# Patient Record
Sex: MALE | Race: WHITE | NOT HISPANIC OR LATINO
[De-identification: names, ages, dates, MRNs, and addresses within clinical notes are randomized per-mention and may not be internally consistent; named-entity substitution may affect disease eponyms.]

---

## 2018-04-03 ENCOUNTER — RX ONLY (RX ONLY)
Age: 83
End: 2018-04-03

## 2018-04-03 RX ORDER — AMLODIPINE BESYLATE 10 MG/1
TABLET ORAL
Qty: 180 | Refills: 0 | Status: ACTIVE | COMMUNITY

## 2018-04-03 RX ORDER — DIGOXIN 125 MCG
TABLET ORAL
Qty: 90 | Refills: 0 | Status: ACTIVE | COMMUNITY

## 2018-05-25 ENCOUNTER — RX ONLY (RX ONLY)
Age: 83
End: 2018-05-25

## 2018-05-25 RX ORDER — GLIPIZIDE 5 MG/1
TABLET ORAL
Qty: 180 | Refills: 0 | Status: ACTIVE | COMMUNITY

## 2018-06-19 ENCOUNTER — RX ONLY (RX ONLY)
Age: 83
End: 2018-06-19

## 2018-06-19 RX ORDER — METOPROLOL SUCCINATE 25 MG/1
TABLET, EXTENDED RELEASE ORAL
Qty: 60 | Refills: 0 | Status: ACTIVE | COMMUNITY

## 2018-06-21 ENCOUNTER — RX ONLY (RX ONLY)
Age: 83
End: 2018-06-21

## 2018-06-21 RX ORDER — VALSARTAN AND HYDROCHLOROTHIAZIDE 80; 12.5 MG/1; MG/1
TABLET, FILM COATED ORAL
Qty: 90 | Refills: 0 | Status: ACTIVE | COMMUNITY

## 2018-06-26 ENCOUNTER — DOCTOR'S OFFICE (OUTPATIENT)
Dept: URBAN - METROPOLITAN AREA CLINIC 165 | Facility: CLINIC | Age: 83
Setting detail: OPHTHALMOLOGY
End: 2018-06-26
Payer: MEDICARE

## 2018-06-26 DIAGNOSIS — H40.033: ICD-10-CM

## 2018-06-26 DIAGNOSIS — H25.13: ICD-10-CM

## 2018-06-26 DIAGNOSIS — H54.40: ICD-10-CM

## 2018-06-26 DIAGNOSIS — E11.9: ICD-10-CM

## 2018-06-26 DIAGNOSIS — H34.12: ICD-10-CM

## 2018-06-26 PROCEDURE — 92004 COMPRE OPH EXAM NEW PT 1/>: CPT | Performed by: OPHTHALMOLOGY

## 2018-06-26 ASSESSMENT — REFRACTION_MANIFEST
OS_ADD: +3.00
OD_ADD: +3.00
OS_CYLINDER: +0.75
OD_AXIS: 180
OS_VA1: 20/
OS_VA2: 20/
OD_VA3: 20/
OU_VA: 20/
OD_VA2: 20/25(J1)+1
OS_VA1: 20/NI
OS_VA2: 20/NI
OD_VA3: 20/
OD_SPHERE: PLANO
OS_SPHERE: PLANO
OU_VA: 20/
OS_VA3: 20/
OD_VA2: 20/
OD_CYLINDER: +0.75
OS_VA3: 20/
OD_VA1: 20/
OD_VA1: 20/NI
OS_AXIS: 180

## 2018-06-26 ASSESSMENT — CONFRONTATIONAL VISUAL FIELD TEST (CVF)
OD_FINDINGS: FULL
OS_FINDINGS: SUPERONASAL DEFECT, INFERONASAL DEFECT

## 2018-06-26 ASSESSMENT — REFRACTION_CURRENTRX
OS_OVR_VA: 20/
OD_OVR_VA: 20/
OS_OVR_VA: 20/
OD_OVR_VA: 20/
OD_OVR_VA: 20/
OS_OVR_VA: 20/

## 2018-06-26 ASSESSMENT — VISUAL ACUITY
OD_BCVA: 20/CF@FACE
OS_BCVA: 20/80-2

## 2018-06-26 ASSESSMENT — KERATOMETRY
OD_K2POWER_DIOPTERS: 45.25
OD_K1POWER_DIOPTERS: 44.00
OD_AXISANGLE_DEGREES: 170
OS_AXISANGLE_DEGREES: 005
OS_K1POWER_DIOPTERS: 44.25
OS_K2POWER_DIOPTERS: 45.25

## 2018-06-26 ASSESSMENT — REFRACTION_AUTOREFRACTION
OD_SPHERE: NO TARGET
OS_SPHERE: NO TARGET

## 2018-08-06 ENCOUNTER — DOCTOR'S OFFICE (OUTPATIENT)
Dept: URBAN - METROPOLITAN AREA CLINIC 166 | Facility: CLINIC | Age: 83
Setting detail: OPHTHALMOLOGY
End: 2018-08-06
Payer: MEDICARE

## 2018-08-06 DIAGNOSIS — H25.11: ICD-10-CM

## 2018-08-06 PROCEDURE — 92136 OPHTHALMIC BIOMETRY: CPT | Performed by: OPHTHALMOLOGY

## 2018-10-10 ENCOUNTER — AMBUL SURGICAL CARE (OUTPATIENT)
Dept: URBAN - METROPOLITAN AREA CLINIC 158 | Facility: CLINIC | Age: 83
Setting detail: OPHTHALMOLOGY
End: 2018-10-10
Payer: MEDICARE

## 2018-10-10 DIAGNOSIS — H25.13: ICD-10-CM

## 2018-10-10 DIAGNOSIS — H21.541: ICD-10-CM

## 2018-10-10 DIAGNOSIS — H25.21: ICD-10-CM

## 2018-10-10 PROCEDURE — 66982 XCAPSL CTRC RMVL CPLX WO ECP: CPT | Performed by: OPHTHALMOLOGY

## 2018-11-26 ENCOUNTER — PATIENT HOME (OUTPATIENT)
Dept: URBAN - METROPOLITAN AREA OTHER 4 | Facility: OTHER | Age: 83
Setting detail: OPHTHALMOLOGY
End: 2018-11-26
Payer: COMMERCIAL

## 2018-11-26 DIAGNOSIS — Z96.1: ICD-10-CM

## 2018-11-26 PROCEDURE — V2020 VISION SVCS FRAMES PURCHASES: HCPCS | Performed by: OPHTHALMOLOGY

## 2018-11-26 PROCEDURE — V2100 LENS SPHER SINGLE PLANO 4.00: HCPCS | Performed by: OPHTHALMOLOGY

## 2018-11-26 PROCEDURE — V2784 LENS POLYCARB OR EQUAL: HCPCS | Performed by: OPHTHALMOLOGY

## 2018-12-13 ENCOUNTER — DOCTOR'S OFFICE (OUTPATIENT)
Dept: URBAN - METROPOLITAN AREA CLINIC 166 | Facility: CLINIC | Age: 83
Setting detail: OPHTHALMOLOGY
End: 2018-12-13
Payer: MEDICARE

## 2018-12-13 DIAGNOSIS — H35.3112: ICD-10-CM

## 2018-12-13 PROCEDURE — 92134 CPTRZ OPH DX IMG PST SGM RTA: CPT | Performed by: OPHTHALMOLOGY

## 2018-12-13 ASSESSMENT — REFRACTION_MANIFEST
OD_SPHERE: +1.00
OD_AXIS: 180
OS_SPHERE: PLANO
OD_VA2: 20/30(J2)+
OS_VA3: 20/
OD_AXIS: 180
OS_VA2: 20/NI
OD_VA2: 20/
OD_VA1: 20/60-2
OS_AXIS: 180
OS_VA2: 20/
OD_VA3: 20/
OD_VA3: 20/
OU_VA: 20/
OD_SPHERE: +1.25
OU_VA: 20/
OD_CYLINDER: +0.75
OD_ADD: +3.00
OS_VA3: 20/
OS_SPHERE: PLANO
OS_ADD: +3.00
OD_CYLINDER: +1.00
OS_VA1: 20/NI
OS_CYLINDER: +0.75
OS_VA1: UNABLE
OS_CYLINDER: +0.75
OS_AXIS: 180
OD_VA1: 20/50-3

## 2018-12-13 ASSESSMENT — CONFRONTATIONAL VISUAL FIELD TEST (CVF)
OS_FINDINGS: SUPERONASAL DEFECT, INFERONASAL DEFECT
OD_FINDINGS: FULL

## 2018-12-13 ASSESSMENT — SPHEQUIV_DERIVED
OD_SPHEQUIV: 1.5
OD_SPHEQUIV: 1.625

## 2018-12-13 ASSESSMENT — CORNEAL EDEMA - FOLDS/STRIAE: OD_FOLDSSTRIAE: T

## 2018-12-14 ASSESSMENT — REFRACTION_CURRENTRX
OS_SPHERE: +3.00
OD_AXIS: 180
OD_CYLINDER: +0.75
OS_VPRISM_DIRECTION: SV
OS_AXIS: 180
OD_SPHERE: +4.25
OS_OVR_VA: 20/
OD_OVR_VA: 20/
OD_OVR_VA: 20/
OD_VPRISM_DIRECTION: SV
OD_OVR_VA: 20/
OS_OVR_VA: 20/
OS_OVR_VA: 20/
OS_CYLINDER: +0.75

## 2018-12-14 ASSESSMENT — KERATOMETRY
OS_AXISANGLE_DEGREES: 012
OD_AXISANGLE_DEGREES: 165
OS_K1POWER_DIOPTERS: 44.50
OD_K2POWER_DIOPTERS: 45.25
OS_K2POWER_DIOPTERS: 45.25
OD_K1POWER_DIOPTERS: 43.75

## 2018-12-14 ASSESSMENT — VISUAL ACUITY
OS_BCVA: 20/80-2
OD_BCVA: CF

## 2018-12-14 ASSESSMENT — REFRACTION_AUTOREFRACTION
OD_AXIS: 166
OS_SPHERE: UNABLE
OD_CYLINDER: +1.00
OD_SPHERE: +1.50

## 2018-12-14 ASSESSMENT — AXIALLENGTH_DERIVED: OD_AL: 22.4985

## 2018-12-14 ASSESSMENT — SPHEQUIV_DERIVED: OD_SPHEQUIV: 2

## 2018-12-19 ENCOUNTER — DOCTOR'S OFFICE (OUTPATIENT)
Dept: URBAN - METROPOLITAN AREA CLINIC 166 | Facility: CLINIC | Age: 83
Setting detail: OPHTHALMOLOGY
End: 2018-12-19

## 2018-12-19 ENCOUNTER — DOCTOR'S OFFICE (OUTPATIENT)
Dept: URBAN - METROPOLITAN AREA CLINIC 166 | Facility: CLINIC | Age: 83
Setting detail: OPHTHALMOLOGY
End: 2018-12-19
Payer: MEDICARE

## 2018-12-19 DIAGNOSIS — H25.12: ICD-10-CM

## 2018-12-19 DIAGNOSIS — E11.9: ICD-10-CM

## 2018-12-19 DIAGNOSIS — H52.13: ICD-10-CM

## 2018-12-19 DIAGNOSIS — H35.3112: ICD-10-CM

## 2018-12-19 PROCEDURE — 92014 COMPRE OPH EXAM EST PT 1/>: CPT | Performed by: OPHTHALMOLOGY

## 2018-12-19 PROCEDURE — V2020 VISION SVCS FRAMES PURCHASES: HCPCS | Performed by: OPHTHALMOLOGY

## 2018-12-19 PROCEDURE — 92235 FLUORESCEIN ANGRPH MLTIFRAME: CPT | Performed by: OPHTHALMOLOGY

## 2018-12-19 PROCEDURE — 92250 FUNDUS PHOTOGRAPHY W/I&R: CPT | Performed by: OPHTHALMOLOGY

## 2018-12-19 ASSESSMENT — REFRACTION_MANIFEST
OS_VA2: 20/
OD_CYLINDER: +0.75
OD_ADD: +3.00
OS_AXIS: 180
OS_SPHERE: PLANO
OS_VA2: 20/NI
OD_AXIS: 180
OU_VA: 20/
OD_SPHERE: +1.25
OS_VA1: 20/NI
OS_CYLINDER: +0.75
OU_VA: 20/
OD_VA1: 20/50-3
OS_AXIS: 180
OS_CYLINDER: +0.75
OD_VA3: 20/
OD_VA1: 20/60-2
OD_CYLINDER: +1.00
OS_VA1: UNABLE
OS_ADD: +3.00
OD_SPHERE: +1.00
OS_SPHERE: PLANO
OS_VA3: 20/
OD_VA2: 20/30(J2)+
OD_VA2: 20/
OS_VA3: 20/
OD_AXIS: 180
OD_VA3: 20/

## 2018-12-19 ASSESSMENT — CONFRONTATIONAL VISUAL FIELD TEST (CVF)
OS_FINDINGS: SUPERONASAL DEFECT, INFERONASAL DEFECT
OD_FINDINGS: FULL

## 2018-12-19 ASSESSMENT — REFRACTION_AUTOREFRACTION
OD_SPHERE: +1.50
OS_SPHERE: UNABLE
OD_AXIS: 166
OD_CYLINDER: +1.00

## 2018-12-19 ASSESSMENT — REFRACTION_CURRENTRX
OS_CYLINDER: +0.75
OD_CYLINDER: +0.75
OS_OVR_VA: 20/
OD_VPRISM_DIRECTION: SV
OD_SPHERE: +4.25
OD_OVR_VA: 20/
OD_OVR_VA: 20/
OS_SPHERE: +3.00
OS_VPRISM_DIRECTION: SV
OS_OVR_VA: 20/
OD_OVR_VA: 20/
OS_AXIS: 180
OD_AXIS: 180
OS_OVR_VA: 20/

## 2018-12-19 ASSESSMENT — SPHEQUIV_DERIVED
OD_SPHEQUIV: 1.625
OD_SPHEQUIV: 1.5
OD_SPHEQUIV: 2

## 2018-12-19 ASSESSMENT — VISUAL ACUITY
OS_BCVA: 20/60+1
OD_BCVA: CF

## 2018-12-19 ASSESSMENT — CORNEAL EDEMA - FOLDS/STRIAE: OD_FOLDSSTRIAE: T

## 2019-03-28 ENCOUNTER — DOCTOR'S OFFICE (OUTPATIENT)
Dept: URBAN - METROPOLITAN AREA CLINIC 166 | Facility: CLINIC | Age: 84
Setting detail: OPHTHALMOLOGY
End: 2019-03-28
Payer: MEDICARE

## 2019-03-28 ENCOUNTER — RX ONLY (RX ONLY)
Age: 84
End: 2019-03-28

## 2019-03-28 DIAGNOSIS — H40.033: ICD-10-CM

## 2019-03-28 DIAGNOSIS — H34.12: ICD-10-CM

## 2019-03-28 DIAGNOSIS — H35.3112: ICD-10-CM

## 2019-03-28 DIAGNOSIS — H54.40: ICD-10-CM

## 2019-03-28 DIAGNOSIS — E11.9: ICD-10-CM

## 2019-03-28 DIAGNOSIS — H26.491: ICD-10-CM

## 2019-03-28 DIAGNOSIS — H25.12: ICD-10-CM

## 2019-03-28 PROCEDURE — 92250 FUNDUS PHOTOGRAPHY W/I&R: CPT | Performed by: OPHTHALMOLOGY

## 2019-03-28 PROCEDURE — 92014 COMPRE OPH EXAM EST PT 1/>: CPT | Performed by: OPHTHALMOLOGY

## 2019-03-28 ASSESSMENT — SPHEQUIV_DERIVED
OD_SPHEQUIV: 1.625
OD_SPHEQUIV: 2
OD_SPHEQUIV: 1.5

## 2019-03-28 ASSESSMENT — REFRACTION_CURRENTRX
OS_AXIS: 180
OD_OVR_VA: 20/
OS_OVR_VA: 20/
OD_OVR_VA: 20/
OD_CYLINDER: +0.75
OD_SPHERE: +4.25
OS_OVR_VA: 20/
OS_VPRISM_DIRECTION: SV
OD_AXIS: 180
OD_OVR_VA: 20/
OS_SPHERE: +3.00
OS_CYLINDER: +0.75
OS_OVR_VA: 20/
OD_VPRISM_DIRECTION: SV

## 2019-03-28 ASSESSMENT — REFRACTION_MANIFEST
OS_VA3: 20/
OD_VA3: 20/
OS_CYLINDER: +0.75
OS_ADD: +3.00
OS_VA2: 20/NI
OS_AXIS: 180
OD_VA1: 20/60-2
OS_VA3: 20/
OS_CYLINDER: +0.75
OS_VA1: UNABLE
OU_VA: 20/
OU_VA: 20/
OS_VA2: 20/
OD_ADD: +3.00
OD_SPHERE: +1.25
OD_AXIS: 180
OD_VA1: 20/50-3
OD_VA3: 20/
OD_AXIS: 180
OS_SPHERE: PLANO
OD_VA2: 20/
OD_CYLINDER: +0.75
OD_SPHERE: +1.00
OS_VA1: 20/NI
OD_VA2: 20/30(J2)+
OS_SPHERE: PLANO
OD_CYLINDER: +1.00
OS_AXIS: 180

## 2019-03-28 ASSESSMENT — VISUAL ACUITY
OD_BCVA: CF 1FT
OS_BCVA: 20/60+2

## 2019-03-28 ASSESSMENT — REFRACTION_AUTOREFRACTION
OD_SPHERE: +1.50
OS_SPHERE: UNABLE
OD_AXIS: 166
OD_CYLINDER: +1.00

## 2019-03-28 ASSESSMENT — CONFRONTATIONAL VISUAL FIELD TEST (CVF)
OD_FINDINGS: FULL
OS_FINDINGS: SUPERONASAL DEFECT, INFERONASAL DEFECT

## 2022-03-29 ENCOUNTER — RX ONLY (RX ONLY)
Age: 87
End: 2022-03-29

## 2022-03-29 RX ORDER — FUROSEMIDE 20 MG/1
TABLET ORAL
Qty: 90 | Refills: 1 | Status: ACTIVE | COMMUNITY

## 2022-04-22 ENCOUNTER — RX ONLY (RX ONLY)
Age: 87
End: 2022-04-22

## 2022-04-22 RX ORDER — REPAGLINIDE 0.5 MG/1
TABLET ORAL
Qty: 270 | Refills: 0 | Status: ACTIVE | COMMUNITY

## 2022-05-17 ENCOUNTER — RX ONLY (RX ONLY)
Age: 87
End: 2022-05-17

## 2022-05-17 RX ORDER — TAMSULOSIN HYDROCHLORIDE 0.4 MG/1
CAPSULE ORAL
Qty: 90 | Refills: 0 | Status: ACTIVE | COMMUNITY

## 2022-05-18 ENCOUNTER — RX ONLY (RX ONLY)
Age: 87
End: 2022-05-18

## 2022-05-18 RX ORDER — METOPROLOL TARTRATE 25 MG/1
TABLET, FILM COATED ORAL
Qty: 90 | Refills: 1 | Status: ACTIVE | COMMUNITY

## 2022-06-06 ENCOUNTER — DOCTOR'S OFFICE (OUTPATIENT)
Dept: URBAN - METROPOLITAN AREA CLINIC 166 | Facility: CLINIC | Age: 87
Setting detail: OPHTHALMOLOGY
End: 2022-06-06
Payer: MEDICARE

## 2022-06-06 ENCOUNTER — RX ONLY (RX ONLY)
Age: 87
End: 2022-06-06

## 2022-06-06 PROBLEM — H25.12 NUCLEAR SCLEROSIS; LEFT EYE: Status: ACTIVE | Noted: 2018-10-11

## 2022-06-06 PROBLEM — H35.3112 MACULAR DEGENERATION, DRY; RIGHT EYE INTERMEDIATE: Status: ACTIVE | Noted: 2018-12-13

## 2022-06-06 PROBLEM — H34.12 CENTRAL RETINAL ARTERY OCCLUSION; LEFT EYE: Status: ACTIVE | Noted: 2018-06-26

## 2022-06-06 PROBLEM — E11.9 DIABETES TYPE 2 NO RETINOPATHY: Status: ACTIVE | Noted: 2018-06-26

## 2022-06-06 PROBLEM — H26.491 PCO; RIGHT EYE: Status: ACTIVE | Noted: 2018-11-26

## 2022-06-06 PROBLEM — H40.033 NAG SUSPECT; BOTH EYES: Status: ACTIVE | Noted: 2018-06-26

## 2022-06-06 PROBLEM — H54.40 BLINDNESS, ONE EYE, UNSPECIFIED EYE: Status: ACTIVE | Noted: 2018-06-26

## 2022-06-06 PROCEDURE — 92004 COMPRE OPH EXAM NEW PT 1/>: CPT | Performed by: OPHTHALMOLOGY

## 2022-06-06 PROCEDURE — 92134 CPTRZ OPH DX IMG PST SGM RTA: CPT | Performed by: OPHTHALMOLOGY

## 2022-06-06 ASSESSMENT — REFRACTION_CURRENTRX
OS_OVR_VA: 20/
OS_VPRISM_DIRECTION: SV
OD_SPHERE: +4.25
OD_AXIS: 180
OS_CYLINDER: +0.75
OD_OVR_VA: 20/
OD_OVR_VA: 20/
OD_VPRISM_DIRECTION: SV
OS_OVR_VA: 20/
OD_CYLINDER: +0.75
OS_SPHERE: +3.00
OS_AXIS: 180

## 2022-06-06 ASSESSMENT — REFRACTION_MANIFEST
OS_VA1: 20/NI
OD_VA2: 20/30(J2)+
OD_SPHERE: +1.00
OS_ADD: +3.25
OS_CYLINDER: +0.75
OS_AXIS: 180
OS_SPHERE: PLANO
OD_CYLINDER: +0.75
OD_CYLINDER: +1.00
OD_VA1: 20/50-3
OS_SPHERE: BAL
OD_ADD: +3.25
OS_VA2: 20/NI
OD_VA1: 20/80-2
OD_SPHERE: +1.25
OD_AXIS: 180
OS_VA1: UNABLE
OD_AXIS: 180

## 2022-06-06 ASSESSMENT — KERATOMETRY
OD_K2POWER_DIOPTERS: 44.50
OS_K1POWER_DIOPTERS: UNABLE
OD_AXISANGLE_DEGREES: 170
OD_K1POWER_DIOPTERS: 43.00

## 2022-06-06 ASSESSMENT — TONOMETRY
OD_IOP_MMHG: 19
OS_IOP_MMHG: 19

## 2022-06-06 ASSESSMENT — REFRACTION_AUTOREFRACTION
OD_SPHERE: UNABL
OS_SPHERE: UNABLE

## 2022-06-06 ASSESSMENT — CONFRONTATIONAL VISUAL FIELD TEST (CVF)
OS_FINDINGS: FULL
OD_FINDINGS: FULL

## 2022-06-06 ASSESSMENT — SPHEQUIV_DERIVED
OD_SPHEQUIV: 1.5
OD_SPHEQUIV: 1.625

## 2022-06-06 ASSESSMENT — VISUAL ACUITY
OD_BCVA: CF 1FT
OS_BCVA: 20/80-2

## 2022-06-06 ASSESSMENT — AXIALLENGTH_DERIVED
OD_AL: 22.8886
OD_AL: 22.9345

## 2022-11-15 ENCOUNTER — APPOINTMENT (EMERGENCY)
Dept: RADIOLOGY | Facility: HOSPITAL | Age: 87
End: 2022-11-15

## 2022-11-15 ENCOUNTER — HOSPITAL ENCOUNTER (INPATIENT)
Facility: HOSPITAL | Age: 87
LOS: 7 days | End: 2022-11-22
Attending: EMERGENCY MEDICINE | Admitting: FAMILY MEDICINE

## 2022-11-15 DIAGNOSIS — Z86.79 HISTORY OF CONGESTIVE HEART FAILURE: ICD-10-CM

## 2022-11-15 DIAGNOSIS — R14.3 FLATULENCE: ICD-10-CM

## 2022-11-15 DIAGNOSIS — B37.0 THRUSH: ICD-10-CM

## 2022-11-15 DIAGNOSIS — I50.31 ACUTE DIASTOLIC (CONGESTIVE) HEART FAILURE (HCC): ICD-10-CM

## 2022-11-15 DIAGNOSIS — R05.9 COUGH: ICD-10-CM

## 2022-11-15 DIAGNOSIS — R13.10 DYSPHAGIA: ICD-10-CM

## 2022-11-15 DIAGNOSIS — I50.9 CHF (CONGESTIVE HEART FAILURE) (HCC): ICD-10-CM

## 2022-11-15 DIAGNOSIS — R77.8 ELEVATED TROPONIN: ICD-10-CM

## 2022-11-15 DIAGNOSIS — Z87.448 HISTORY OF CHRONIC KIDNEY DISEASE: ICD-10-CM

## 2022-11-15 DIAGNOSIS — J18.9 PNEUMONIA: Primary | ICD-10-CM

## 2022-11-15 PROBLEM — E87.1 HYPONATREMIA: Status: ACTIVE | Noted: 2022-11-15

## 2022-11-15 PROBLEM — N18.4 STAGE 4 CHRONIC KIDNEY DISEASE (HCC): Status: ACTIVE | Noted: 2022-11-15

## 2022-11-15 PROBLEM — E11.9 TYPE 2 DIABETES MELLITUS (HCC): Status: ACTIVE | Noted: 2022-11-15

## 2022-11-15 PROBLEM — I10 PRIMARY HYPERTENSION: Status: ACTIVE | Noted: 2022-11-15

## 2022-11-15 LAB
2HR DELTA HS TROPONIN: 21 NG/L
4HR DELTA HS TROPONIN: 20 NG/L
ALBUMIN SERPL BCP-MCNC: 3 G/DL (ref 3.5–5)
ALP SERPL-CCNC: 84 U/L (ref 46–116)
ALT SERPL W P-5'-P-CCNC: 19 U/L (ref 12–78)
ANION GAP SERPL CALCULATED.3IONS-SCNC: 9 MMOL/L (ref 4–13)
BASOPHILS # BLD AUTO: 0.05 THOUSANDS/ÂΜL (ref 0–0.1)
BASOPHILS NFR BLD AUTO: 0 % (ref 0–1)
BILIRUB SERPL-MCNC: 0.79 MG/DL (ref 0.2–1)
BUN SERPL-MCNC: 53 MG/DL (ref 5–25)
CALCIUM ALBUM COR SERPL-MCNC: 9.4 MG/DL (ref 8.3–10.1)
CALCIUM SERPL-MCNC: 8.6 MG/DL (ref 8.3–10.1)
CARDIAC TROPONIN I PNL SERPL HS: 120 NG/L
CARDIAC TROPONIN I PNL SERPL HS: 140 NG/L
CARDIAC TROPONIN I PNL SERPL HS: 141 NG/L
CHLORIDE SERPL-SCNC: 94 MMOL/L (ref 96–108)
CO2 SERPL-SCNC: 28 MMOL/L (ref 21–32)
CREAT SERPL-MCNC: 2.59 MG/DL (ref 0.6–1.3)
EOSINOPHIL # BLD AUTO: 0.14 THOUSAND/ÂΜL (ref 0–0.61)
EOSINOPHIL NFR BLD AUTO: 1 % (ref 0–6)
ERYTHROCYTE [DISTWIDTH] IN BLOOD BY AUTOMATED COUNT: 14.1 % (ref 11.6–15.1)
FLUAV RNA RESP QL NAA+PROBE: NEGATIVE
FLUBV RNA RESP QL NAA+PROBE: NEGATIVE
GFR SERPL CREATININE-BSD FRML MDRD: 20 ML/MIN/1.73SQ M
GLUCOSE SERPL-MCNC: 149 MG/DL (ref 65–140)
GLUCOSE SERPL-MCNC: 181 MG/DL (ref 65–140)
GLUCOSE SERPL-MCNC: 96 MG/DL (ref 65–140)
HCT VFR BLD AUTO: 31.2 % (ref 36.5–49.3)
HGB BLD-MCNC: 10.2 G/DL (ref 12–17)
IMM GRANULOCYTES # BLD AUTO: 0.2 THOUSAND/UL (ref 0–0.2)
IMM GRANULOCYTES NFR BLD AUTO: 2 % (ref 0–2)
L PNEUMO1 AG UR QL IA.RAPID: NEGATIVE
LACTATE SERPL-SCNC: 0.8 MMOL/L (ref 0.5–2)
LYMPHOCYTES # BLD AUTO: 0.95 THOUSANDS/ÂΜL (ref 0.6–4.47)
LYMPHOCYTES NFR BLD AUTO: 8 % (ref 14–44)
MAGNESIUM SERPL-MCNC: 2.8 MG/DL (ref 1.6–2.6)
MCH RBC QN AUTO: 29.6 PG (ref 26.8–34.3)
MCHC RBC AUTO-ENTMCNC: 32.7 G/DL (ref 31.4–37.4)
MCV RBC AUTO: 90 FL (ref 82–98)
MONOCYTES # BLD AUTO: 1.11 THOUSAND/ÂΜL (ref 0.17–1.22)
MONOCYTES NFR BLD AUTO: 9 % (ref 4–12)
NEUTROPHILS # BLD AUTO: 9.42 THOUSANDS/ÂΜL (ref 1.85–7.62)
NEUTS SEG NFR BLD AUTO: 80 % (ref 43–75)
NRBC BLD AUTO-RTO: 0 /100 WBCS
NT-PROBNP SERPL-MCNC: 9359 PG/ML
PLATELET # BLD AUTO: 186 THOUSANDS/UL (ref 149–390)
PMV BLD AUTO: 12.1 FL (ref 8.9–12.7)
POTASSIUM SERPL-SCNC: 4.4 MMOL/L (ref 3.5–5.3)
PROT SERPL-MCNC: 6.8 G/DL (ref 6.4–8.4)
QRS AXIS: -44 DEGREES
QRSD INTERVAL: 134 MS
QT INTERVAL: 504 MS
QTC INTERVAL: 515 MS
RBC # BLD AUTO: 3.45 MILLION/UL (ref 3.88–5.62)
RSV RNA RESP QL NAA+PROBE: NEGATIVE
S PNEUM AG UR QL: NEGATIVE
SARS-COV-2 RNA RESP QL NAA+PROBE: NEGATIVE
SODIUM SERPL-SCNC: 131 MMOL/L (ref 135–147)
T WAVE AXIS: 14 DEGREES
VENTRICULAR RATE: 63 BPM
WBC # BLD AUTO: 11.87 THOUSAND/UL (ref 4.31–10.16)

## 2022-11-15 RX ORDER — GABAPENTIN 100 MG/1
100 CAPSULE ORAL 2 TIMES DAILY
Status: DISCONTINUED | OUTPATIENT
Start: 2022-11-15 | End: 2022-11-22 | Stop reason: HOSPADM

## 2022-11-15 RX ORDER — HYDRALAZINE HYDROCHLORIDE 20 MG/ML
5 INJECTION INTRAMUSCULAR; INTRAVENOUS ONCE
Status: COMPLETED | OUTPATIENT
Start: 2022-11-15 | End: 2022-11-15

## 2022-11-15 RX ORDER — INSULIN LISPRO 100 [IU]/ML
1-5 INJECTION, SOLUTION INTRAVENOUS; SUBCUTANEOUS
Status: DISCONTINUED | OUTPATIENT
Start: 2022-11-15 | End: 2022-11-22 | Stop reason: HOSPADM

## 2022-11-15 RX ORDER — CEFTRIAXONE 1 G/50ML
1000 INJECTION, SOLUTION INTRAVENOUS EVERY 24 HOURS
Status: DISCONTINUED | OUTPATIENT
Start: 2022-11-16 | End: 2022-11-22 | Stop reason: HOSPADM

## 2022-11-15 RX ORDER — AMLODIPINE BESYLATE 10 MG/1
10 TABLET ORAL DAILY
Status: DISCONTINUED | OUTPATIENT
Start: 2022-11-16 | End: 2022-11-22 | Stop reason: HOSPADM

## 2022-11-15 RX ORDER — AMLODIPINE BESYLATE 10 MG/1
10 TABLET ORAL DAILY
Status: ON HOLD | COMMUNITY
End: 2022-12-08 | Stop reason: SDUPTHER

## 2022-11-15 RX ORDER — HYDRALAZINE HYDROCHLORIDE 25 MG/1
100 TABLET, FILM COATED ORAL 2 TIMES DAILY
Status: DISCONTINUED | OUTPATIENT
Start: 2022-11-15 | End: 2022-11-22

## 2022-11-15 RX ORDER — CHOLECALCIFEROL (VITAMIN D3) 125 MCG
500 CAPSULE ORAL DAILY
COMMUNITY
End: 2022-12-10

## 2022-11-15 RX ORDER — LEVALBUTEROL 1.25 MG/.5ML
1.25 SOLUTION, CONCENTRATE RESPIRATORY (INHALATION) EVERY 6 HOURS PRN
Status: DISCONTINUED | OUTPATIENT
Start: 2022-11-15 | End: 2022-11-22 | Stop reason: HOSPADM

## 2022-11-15 RX ORDER — TAMSULOSIN HYDROCHLORIDE 0.4 MG/1
0.4 CAPSULE ORAL DAILY
Status: DISCONTINUED | OUTPATIENT
Start: 2022-11-15 | End: 2022-11-22 | Stop reason: HOSPADM

## 2022-11-15 RX ORDER — ISOSORBIDE MONONITRATE 60 MG/1
60 TABLET, EXTENDED RELEASE ORAL DAILY
Status: DISCONTINUED | OUTPATIENT
Start: 2022-11-16 | End: 2022-11-22 | Stop reason: HOSPADM

## 2022-11-15 RX ORDER — INSULIN LISPRO 100 [IU]/ML
1-6 INJECTION, SOLUTION INTRAVENOUS; SUBCUTANEOUS
Status: DISCONTINUED | OUTPATIENT
Start: 2022-11-15 | End: 2022-11-22 | Stop reason: HOSPADM

## 2022-11-15 RX ORDER — HEPARIN SODIUM 5000 [USP'U]/ML
5000 INJECTION, SOLUTION INTRAVENOUS; SUBCUTANEOUS EVERY 8 HOURS SCHEDULED
Status: DISCONTINUED | OUTPATIENT
Start: 2022-11-15 | End: 2022-11-22 | Stop reason: HOSPADM

## 2022-11-15 RX ORDER — HYDRALAZINE HYDROCHLORIDE 100 MG/1
100 TABLET, FILM COATED ORAL 2 TIMES DAILY
Status: ON HOLD | COMMUNITY
End: 2022-12-08 | Stop reason: SDUPTHER

## 2022-11-15 RX ORDER — CHOLECALCIFEROL (VITAMIN D3) 125 MCG
2000 CAPSULE ORAL DAILY
COMMUNITY
End: 2022-12-10

## 2022-11-15 RX ORDER — ASCORBIC ACID 500 MG
1000 TABLET ORAL DAILY
Status: DISCONTINUED | OUTPATIENT
Start: 2022-11-16 | End: 2022-11-22 | Stop reason: HOSPADM

## 2022-11-15 RX ORDER — DOXAZOSIN 2 MG/1
2 TABLET ORAL
COMMUNITY
End: 2022-12-10

## 2022-11-15 RX ORDER — FUROSEMIDE 20 MG/1
20 TABLET ORAL DAILY
COMMUNITY
End: 2022-12-10

## 2022-11-15 RX ORDER — SACCHAROMYCES BOULARDII 250 MG
250 CAPSULE ORAL 2 TIMES DAILY
Status: DISCONTINUED | OUTPATIENT
Start: 2022-11-15 | End: 2022-11-22 | Stop reason: HOSPADM

## 2022-11-15 RX ORDER — BENZONATATE 100 MG/1
100 CAPSULE ORAL 3 TIMES DAILY PRN
Status: DISCONTINUED | OUTPATIENT
Start: 2022-11-15 | End: 2022-11-22 | Stop reason: HOSPADM

## 2022-11-15 RX ORDER — MELATONIN
2000 DAILY
Status: DISCONTINUED | OUTPATIENT
Start: 2022-11-16 | End: 2022-11-22 | Stop reason: HOSPADM

## 2022-11-15 RX ORDER — CEFEPIME HYDROCHLORIDE 2 G/50ML
2000 INJECTION, SOLUTION INTRAVENOUS ONCE
Status: COMPLETED | OUTPATIENT
Start: 2022-11-15 | End: 2022-11-15

## 2022-11-15 RX ORDER — DOXAZOSIN 2 MG/1
2 TABLET ORAL
Status: DISCONTINUED | OUTPATIENT
Start: 2022-11-15 | End: 2022-11-22 | Stop reason: HOSPADM

## 2022-11-15 RX ORDER — ISOSORBIDE MONONITRATE 60 MG/1
60 TABLET, EXTENDED RELEASE ORAL DAILY
Status: ON HOLD | COMMUNITY
End: 2022-12-08 | Stop reason: SDUPTHER

## 2022-11-15 RX ORDER — TAMSULOSIN HYDROCHLORIDE 0.4 MG/1
0.4 CAPSULE ORAL DAILY
Status: ON HOLD | COMMUNITY
End: 2022-12-08 | Stop reason: SDUPTHER

## 2022-11-15 RX ORDER — ATORVASTATIN CALCIUM 40 MG/1
40 TABLET, FILM COATED ORAL DAILY
Status: ON HOLD | COMMUNITY
End: 2022-12-08 | Stop reason: SDUPTHER

## 2022-11-15 RX ORDER — GABAPENTIN 100 MG/1
100 CAPSULE ORAL 2 TIMES DAILY
Status: ON HOLD | COMMUNITY
End: 2022-12-08 | Stop reason: SDUPTHER

## 2022-11-15 RX ORDER — ATORVASTATIN CALCIUM 40 MG/1
40 TABLET, FILM COATED ORAL DAILY
Status: DISCONTINUED | OUTPATIENT
Start: 2022-11-16 | End: 2022-11-22 | Stop reason: HOSPADM

## 2022-11-15 RX ORDER — FUROSEMIDE 10 MG/ML
40 INJECTION INTRAMUSCULAR; INTRAVENOUS ONCE
Status: COMPLETED | OUTPATIENT
Start: 2022-11-15 | End: 2022-11-15

## 2022-11-15 RX ORDER — REPAGLINIDE 0.5 MG/1
0.5 TABLET ORAL
Status: ON HOLD | COMMUNITY
End: 2022-12-08 | Stop reason: SDUPTHER

## 2022-11-15 RX ORDER — ACETAMINOPHEN 325 MG/1
650 TABLET ORAL EVERY 6 HOURS PRN
Status: DISCONTINUED | OUTPATIENT
Start: 2022-11-15 | End: 2022-11-22 | Stop reason: HOSPADM

## 2022-11-15 RX ORDER — ASPIRIN 81 MG/1
81 TABLET, CHEWABLE ORAL DAILY
Status: DISCONTINUED | OUTPATIENT
Start: 2022-11-16 | End: 2022-11-22 | Stop reason: HOSPADM

## 2022-11-15 RX ORDER — MULTIVIT WITH MINERALS/LUTEIN
1000 TABLET ORAL DAILY
COMMUNITY
End: 2022-12-10

## 2022-11-15 RX ORDER — ASPIRIN 81 MG/1
81 TABLET, CHEWABLE ORAL DAILY
COMMUNITY
End: 2022-12-10

## 2022-11-15 RX ORDER — SODIUM CHLORIDE FOR INHALATION 0.9 %
3 VIAL, NEBULIZER (ML) INHALATION EVERY 6 HOURS PRN
Status: DISCONTINUED | OUTPATIENT
Start: 2022-11-15 | End: 2022-11-22 | Stop reason: HOSPADM

## 2022-11-15 RX ORDER — METRONIDAZOLE 500 MG/100ML
500 INJECTION, SOLUTION INTRAVENOUS EVERY 8 HOURS
Status: DISCONTINUED | OUTPATIENT
Start: 2022-11-15 | End: 2022-11-17 | Stop reason: SDUPTHER

## 2022-11-15 RX ORDER — SIMETHICONE 80 MG
80 TABLET,CHEWABLE ORAL 4 TIMES DAILY PRN
Status: DISCONTINUED | OUTPATIENT
Start: 2022-11-15 | End: 2022-11-22 | Stop reason: HOSPADM

## 2022-11-15 RX ADMIN — Medication 250 MG: at 18:06

## 2022-11-15 RX ADMIN — HYDRALAZINE HYDROCHLORIDE 5 MG: 20 INJECTION INTRAMUSCULAR; INTRAVENOUS at 16:46

## 2022-11-15 RX ADMIN — METOPROLOL TARTRATE 25 MG: 25 TABLET, FILM COATED ORAL at 18:06

## 2022-11-15 RX ADMIN — GABAPENTIN 100 MG: 100 CAPSULE ORAL at 18:07

## 2022-11-15 RX ADMIN — CEFEPIME HYDROCHLORIDE 2000 MG: 2 INJECTION, SOLUTION INTRAVENOUS at 14:07

## 2022-11-15 RX ADMIN — METRONIDAZOLE 500 MG: 500 INJECTION, SOLUTION INTRAVENOUS at 18:07

## 2022-11-15 RX ADMIN — TAMSULOSIN HYDROCHLORIDE 0.4 MG: 0.4 CAPSULE ORAL at 18:07

## 2022-11-15 RX ADMIN — NYSTATIN 500000 UNITS: 100000 SUSPENSION ORAL at 21:13

## 2022-11-15 RX ADMIN — FUROSEMIDE 40 MG: 10 INJECTION, SOLUTION INTRAMUSCULAR; INTRAVENOUS at 18:06

## 2022-11-15 RX ADMIN — DOXAZOSIN 2 MG: 2 TABLET ORAL at 21:13

## 2022-11-15 RX ADMIN — VANCOMYCIN HYDROCHLORIDE 2000 MG: 10 INJECTION, POWDER, LYOPHILIZED, FOR SOLUTION INTRAVENOUS at 14:45

## 2022-11-15 RX ADMIN — HEPARIN SODIUM 5000 UNITS: 5000 INJECTION INTRAVENOUS; SUBCUTANEOUS at 21:13

## 2022-11-15 RX ADMIN — NYSTATIN 500000 UNITS: 100000 SUSPENSION ORAL at 18:06

## 2022-11-15 RX ADMIN — HYDRALAZINE HYDROCHLORIDE 100 MG: 25 TABLET ORAL at 18:07

## 2022-11-15 NOTE — Clinical Note
Case was discussed with Dr Paul Palomares and the patient's admission status was agreed to be Admission Status: observation status to the service of Dr Paul Palomares

## 2022-11-15 NOTE — PLAN OF CARE
Problem: MOBILITY - ADULT  Goal: Maintain or return to baseline ADL function  Description: INTERVENTIONS:  -  Assess patient's ability to carry out ADLs; assess patient's baseline for ADL function and identify physical deficits which impact ability to perform ADLs (bathing, care of mouth/teeth, toileting, grooming, dressing, etc )  - Assess/evaluate cause of self-care deficits   - Assess range of motion  - Assess patient's mobility; develop plan if impaired  - Assess patient's need for assistive devices and provide as appropriate  - Encourage maximum independence but intervene and supervise when necessary  - Involve family in performance of ADLs  - Assess for home care needs following discharge   - Consider OT consult to assist with ADL evaluation and planning for discharge  - Provide patient education as appropriate  Outcome: Progressing  Goal: Maintains/Returns to pre admission functional level  Description: INTERVENTIONS:  - Perform BMAT or MOVE assessment daily    - Set and communicate daily mobility goal to care team and patient/family/caregiver     - Collaborate with rehabilitation services on mobility goals if consulted  - Out of bed for toileting  - Record patient progress and toleration of activity level   Outcome: Progressing     Problem: Potential for Falls  Goal: Patient will remain free of falls  Description: INTERVENTIONS:  - Educate patient/family on patient safety including physical limitations  - Instruct patient to call for assistance with activity   - Consult OT/PT to assist with strengthening/mobility   - Keep Call bell within reach  - Keep bed low and locked with side rails adjusted as appropriate  - Keep care items and personal belongings within reach  - Initiate and maintain comfort rounds  - Make Fall Risk Sign visible to staff  - Apply yellow socks and bracelet for high fall risk patients  - Consider moving patient to room near nurses station  Outcome: Progressing     Problem: RESPIRATORY - ADULT  Goal: Achieves optimal ventilation and oxygenation  Description: INTERVENTIONS:  - Assess for changes in respiratory status  - Assess for changes in mentation and behavior  - Position to facilitate oxygenation and minimize respiratory effort  - Oxygen administered by appropriate delivery if ordered  - Initiate smoking cessation education as indicated  - Encourage broncho-pulmonary hygiene including cough, deep breathe, Incentive Spirometry  - Assess the need for suctioning and aspirate as needed  - Assess and instruct to report SOB or any respiratory difficulty  - Respiratory Therapy support as indicated  Outcome: Progressing     Problem: Prexisting or High Potential for Compromised Skin Integrity  Goal: Skin integrity is maintained or improved  Description: INTERVENTIONS:  - Identify patients at risk for skin breakdown  - Assess and monitor skin integrity  - Assess and monitor nutrition and hydration status  - Monitor labs   - Assess for incontinence   - Turn and reposition patient  - Assist with mobility/ambulation  - Relieve pressure over bony prominences  - Avoid friction and shearing  - Provide appropriate hygiene as needed including keeping skin clean and dry  - Evaluate need for skin moisturizer/barrier cream  - Collaborate with interdisciplinary team   - Patient/family teaching  - Consider wound care consult   Outcome: Progressing

## 2022-11-15 NOTE — ASSESSMENT & PLAN NOTE
Patient has a history of primary hypertension  Home medications include Norvasc 10 mg, Cardura 2 mg hydralazine 100 mg b i d , Imdur 60 mg and Lopressor 25 mg  Patient's daughter reports that patient has been on this regimen for over a year and he feels much better on it    Vital signs as per unit routine  Cardiology consulted

## 2022-11-15 NOTE — ASSESSMENT & PLAN NOTE
Lab Results   Component Value Date    EGFR 20 11/15/2022    CREATININE 2 59 (H) 11/15/2022     Patient has a history of stage IV CKD, on certain of baseline creatinine  Will repeat BMP in a m  Patsi Reil Avoid periods of relative hypotension  Avoid nephrotoxin agents

## 2022-11-15 NOTE — ASSESSMENT & PLAN NOTE
Suspected pneumonia, possibly aspiration secondary to patient reporting dysphagia with choking on pills and fluids over the past 2 weeks  Reports that he choked on his Flomax on Saturday evening, in has been coughing continually since then, bringing up some phlegm, reports clear in color  Reports that he did have a fever at [facility] on Sunday, currently afebrile  WBC noted to be 11 87  Chest x-ray pending; follow-up on results  Lactic acid negative  Procalcitonin pending, repeat in a m   P r n  Xopenex nebs  Tessalon Perles p r n   Cough

## 2022-11-15 NOTE — ASSESSMENT & PLAN NOTE
No results found for: HGBA1C    No results for input(s): POCGLU in the last 72 hours  Blood Sugar Average: Last 72 hrs:    Patient normally takes Prandin 0 5 mg t i d  With meals  Will hold at this time  Place on Accu-Cheks AC and HS with sliding scale coverage  Diabetic diet  Will obtain hemoglobin A1c in a [facility]

## 2022-11-15 NOTE — ASSESSMENT & PLAN NOTE
Patient reports that he has been having intermittent dysphagia over the past few weeks, having difficulty after drinking thin liquids, choking on pills  Patient did pass [employer] in the ED with nursing staff  Speech consulted for bedside swallow eval   Aspiration precautions  Patient noted to have what appears to be thrush like patches on tongue, suspect may be contributing to dysphagia      Nystatin swish and swallow ordered  GI consulted

## 2022-11-15 NOTE — ED NOTES
Received two pages from [facility] related to pt's DNR status which is active at this time  Medication list included as well       Pasha Samuel RN  11/15/22 8391

## 2022-11-15 NOTE — ED NOTES
Daughter Ruben Miller called and was updated on the pt  She is planning on coming in shortly       Brenda Torrez RN  11/15/22 0384

## 2022-11-15 NOTE — H&P
[address]  H&P- Alma Mejia 10/19/1929, 80 y o  male MRN: [id number]  Unit/Bed#: [address] [place] [address] Encounter: [id number]  Primary Care Provider: Tali Chinchilla PA-C   Date and time admitted to hospital: 11/15/2022 11:27 AM    * Pneumonia  Assessment & Plan  Suspected pneumonia, possibly aspiration secondary to patient reporting dysphagia with choking on pills and fluids over the past 2 weeks  Reports that he choked on his Flomax on Saturday evening, in has been coughing continually since then, bringing up some phlegm, reports clear in color  Reports that he did have a fever at [facility] on Sunday, currently afebrile  WBC noted to be 11 87  Chest x-ray pending; follow-up on results  Lactic acid negative  Procalcitonin pending, repeat in a m   P r n  Xopenex nebs  Tessalon Perles p r n  Cough        Dysphagia  Assessment & Plan  Patient reports that he has been having intermittent dysphagia over the past few weeks, having difficulty after drinking thin liquids, choking on pills  Patient did pass [employer] in the ED with nursing staff  Speech consulted for bedside swallow eval   Aspiration precautions  Patient noted to have what appears to be thrush like patches on tongue, suspect may be contributing to dysphagia  Nystatin swish and swallow ordered  GI consulted    Hyponatremia  Assessment & Plan  Mild hyponatremia noted at 131   Fluid restriction 1500 cc daily  Repeat BMP in a m     CHF (congestive heart failure) (HCC)  Assessment & Plan  Wt Readings from Last 3 Encounters:   11/15/22 85 2 kg (187 lb 13 3 oz)     Patient appears to be volume overloaded, pitting edema noted bilateral lower extremities  BNP elevated 9359  Does normally take Lasix p o  At home  Will do 1 time dose of IV Lasix 40 mg today  Monitor response  Intake and output ordered  Daily weights ordered    Daughter indicates that patient has not established with a cardiologist in this area, has been following cardiology in [facility]  Will consult Cardiology, follow-up on recommendations  Primary hypertension  Assessment & Plan  Patient has a history of primary hypertension  Home medications include Norvasc 10 mg, Cardura 2 mg hydralazine 100 mg b i d , Imdur 60 mg and Lopressor 25 mg  Patient's daughter reports that patient has been on this regimen for over a year and he feels much better on it  Vital signs as per unit routine  Cardiology consulted    Stage 4 chronic kidney disease [facility])  Assessment & Plan  Lab Results   Component Value Date    EGFR 20 11/15/2022    CREATININE 2 59 (H) 11/15/2022     Patient has a history of stage IV CKD, on certain of baseline creatinine  Will repeat BMP in a [facility] Serum Avoid periods of relative hypotension  Avoid nephrotoxin agents  Type 2 diabetes mellitus ([facility] [address] )  Assessment & Plan  No results found for: HGBA1C    No results for input(s): POCGLU in the last 72 hours  Blood Sugar Average: Last 72 hrs:    Patient normally takes Prandin 0 5 mg t i d  With meals  Will hold at this time  Place on Accu-Cheks AC and HS with sliding scale coverage  Diabetic diet  Will obtain hemoglobin A1c in a   [address] [facility]  Patient noted to have white patches on his tongue, admits to some dysphagia, suspect thrush  Nystatin swish and swallow ordered  Monitor    Elevated troponin  Assessment & Plan  Troponin elevated 120->140  Denies any chest discomfort  Sinus rhythm noted on monitor, right bundle branch block  Suspect may be elevated secondary to CHF? Follow-up on 3rd troponin  Cardiology consulted    VTE Prophylaxis: Heparin  / sequential compression device   Code Status: DNR/DNI as per discussion and records from [facility]    Anticipated Length of Stay:  Patient will be admitted on an Inpatient basis with an anticipated length of stay of  Greater than 2 midnights     Justification for Hospital Stay: IV abx, IV lasix, cardiology and GI consult, speech svetlana, repeat labs    Total Time for Visit, including Counseling / Coordination of Care: 1 hour  Greater than 50% of this total time spent on direct patient counseling and coordination of care  Chief Complaint:   Shortness of Breath (Pt comes from [facility]  Pt took his evening med pills on Saturday night and choked on one of them  The next morning he had an xray  Pt continues to feel as if something is lodged in his throat  Clears throat regularly and coughs  Pt has vomited phlegm twice yesterday  )      History of Present Illness:    Christian Dawkins is a 80 y o  male with a PMH of hypertension, hyperlipidemia, diabetes type 2, CKD stage 4, CHF, hyponatremia, diabetic neuropathy and BPH who presents with shortness of breath  Patient reports that for the past 2-3 weeks he has been having decreased appetite, difficulty swallowing thin liquids and choking on pills  On Saturday evening prior to admission he choked on his Flomax pill and has been coughing continually since then  Reports that he did have a chest x-ray at [facility], was given Levaquin however continued with coughing and shortness of breath and was sent to the emergency department for further evaluation and treatment  In the ED patient was noted to have mild hyponatremia, BNP elevated 9359, mild leukocytosis and elevated troponin  Bilateral lower extremity edema noted  Chest x-ray performed, pending  Patient was also noted to have white patches on tongue, suspect possible thrush, starting nystatin swish and swallow  Will give 1 time dose of IV Lasix  Patient was given IV cefepime and vancomycin in the ED, will change to ceftriaxone and Flagyl for suspected aspiration pneumonia  Will consult cardiology and GI   PT/OT evaluation and treatment ordered  Will also have speech evaluate patient for dysphagia  This was discussed with the patient and the daughter at the bedside, they verbalized understanding and are agreeable to the plan    Code status was discussed with the patient and family, indicated DNR/DNI  Review of Systems:    Review of Systems   Constitutional: Positive for fever  Negative for chills  HENT: Positive for trouble swallowing  Eyes: Negative  Respiratory: Positive for cough and shortness of breath  Cardiovascular: Positive for leg swelling  Negative for chest pain and palpitations  Gastrointestinal: Positive for abdominal distention  Negative for nausea  Reports loose bowels   Endocrine: Negative  Genitourinary: Negative  Musculoskeletal: Negative  Skin: Negative  Neurological: Negative for dizziness, light-headedness and headaches  Hematological: Negative  Psychiatric/Behavioral: Negative  Past Medical and Surgical History:     Past Medical History:   Diagnosis Date   • CHF (congestive heart failure) ([facility] )    • Diabetes mellitus ([facility] )    • Elevated lipids    • Hypertension    • Neuropathy    • Renal disorder        History reviewed  No pertinent surgical history  Meds/Allergies:    Prior to Admission medications    Medication Sig Start Date End Date Taking?  Authorizing Provider   amLODIPine (NORVASC) 10 mg tablet Take 10 mg by mouth daily   Yes Historical Provider, MD   Ascorbic Acid (vitamin C) 1000 MG tablet Take 1,000 mg by mouth daily   Yes Historical Provider, MD   aspirin 81 mg chewable tablet Chew 81 mg daily   Yes Historical Provider, MD   atorvastatin (LIPITOR) 40 mg tablet Take 40 mg by mouth daily   Yes Historical Provider, MD   Cholecalciferol (Vitamin D3) 50 MCG (2000 UT) TABS Take 2,000 Units by mouth daily   Yes Historical Provider, MD   doxazosin (CARDURA) 2 mg tablet Take 2 mg by mouth daily at bedtime   Yes Historical Provider, MD   furosemide (LASIX) 20 mg tablet Take 20 mg by mouth daily   Yes Historical Provider, MD   gabapentin (Neurontin) 100 mg capsule Take 100 mg by mouth 2 (two) times a day   Yes Historical Provider, MD   hydrALAZINE (APRESOLINE) 100 MG tablet Take 100 mg by mouth 2 (two) times a day   Yes Historical Provider, MD   isosorbide mononitrate (IMDUR) 60 mg 24 hr tablet Take 60 mg by mouth daily   Yes Historical Provider, MD   metoprolol tartrate (LOPRESSOR) 25 mg tablet Take 25 mg by mouth in the morning   Yes Historical Provider, MD   repaglinide (PRANDIN) 0 5 mg tablet Take 0 5 mg by mouth 3 (three) times a day before meals   Yes Historical Provider, MD   tamsulosin (FLOMAX) 0 4 mg Take 0 4 mg by mouth in the morning   Yes Historical Provider, MD   vitamin B-12 (VITAMIN B-12) 500 mcg tablet Take 500 mcg by mouth daily   Yes Historical Provider, MD       Allergies: No Known Allergies    Social History:     Marital Status:    Substance Use History:   Social History     Substance and Sexual Activity   Alcohol Use Not Currently     Social History     Tobacco Use   Smoking Status Former Smoker   • Packs/day: 3 00   • Quit date:    • Years since quittin 9   Smokeless Tobacco Never Used     Social History     Substance and Sexual Activity   Drug Use Not Currently       Family History:    History reviewed  No pertinent family history  Physical Exam:     Vitals:   Blood Pressure: 163/62 (11/15/22 170)  Pulse: 65 (11/15/22 1707)  Temperature: (!) 96 7 °F (35 9 °C) (11/15/22 1707)  Temp Source: Tympanic (11/15/22 1129)  Respirations: 18 (11/15/22 1707)  Height: 5' 5" (165 1 cm) (11/15/22 1129)  Weight - Scale: 85 2 kg (187 lb 13 3 oz) (11/15/22 1129)  SpO2: 92 % (11/15/22 1707)    Physical Exam  Vitals and nursing note reviewed  Constitutional:       General: He is not in acute distress  HENT:      Head: Normocephalic  Nose: Nose normal       Mouth/Throat:      Mouth: Mucous membranes are moist       Comments: White patches noted on tongue  Eyes:      Extraocular Movements: Extraocular movements intact  Conjunctiva/sclera: Conjunctivae normal    Cardiovascular:      Rate and Rhythm: Normal rate and regular rhythm  Pulses: Normal pulses  Heart sounds: Normal heart sounds  Pulmonary:      Comments: Left base few rhonchi  Abdominal:      General: Bowel sounds are normal  There is no distension  Palpations: Abdomen is soft  Tenderness: There is no abdominal tenderness  Comments: Appears mildly distended    Genitourinary:     Comments: Voiding spontaneously  Musculoskeletal:      Cervical back: Normal range of motion  Right lower leg: Edema present  Left lower leg: Edema present  Comments: Reports that left leg is chronically more swollen than right   Skin:     General: Skin is warm and dry  Capillary Refill: Capillary refill takes less than 2 seconds  Comments: Venous stasis discoloration noted bilateral lower extremities   Neurological:      General: No focal deficit present  Mental Status: He is alert and oriented to person, place, and time  Psychiatric:         Mood and Affect: Mood normal          Behavior: Behavior normal          Thought Content: Thought content normal          Judgment: Judgment normal            Additional Data:     Lab Results: I have personally reviewed pertinent reports  Results from last 7 days   Lab Units 11/15/22  1207   WBC Thousand/uL 11 87*   HEMOGLOBIN g/dL 10 2*   HEMATOCRIT % 31 2*   PLATELETS Thousands/uL 186   NEUTROS PCT % 80*     Results from last 7 days   Lab Units 11/15/22  1207   SODIUM mmol/L 131*   POTASSIUM mmol/L 4 4   CHLORIDE mmol/L 94*   CO2 mmol/L 28   BUN mg/dL 53*   CREATININE mg/dL 2 59*   CALCIUM mg/dL 8 6   TOTAL BILIRUBIN mg/dL 0 79   ALK PHOS U/L 84   ALT U/L 19                 Results from last 7 days   Lab Units 11/15/22  1358   LACTIC ACID mmol/L 0 8       Imaging: I have personally reviewed pertinent reports        XR chest 1 view portable    (Results Pending)       XR chest 1 view portable    (Results Pending)       EKG, Pathology, and Other Studies Reviewed on Admission:   · EKG:  Sinus rhythm right bundle branch block    Allscripts / Epic Records Reviewed: Yes     ** Please Note: This note has been constructed using a voice recognition system   **

## 2022-11-15 NOTE — ASSESSMENT & PLAN NOTE
Wt Readings from Last 3 Encounters:   11/15/22 85 2 kg (187 lb 13 3 oz)     Patient appears to be volume overloaded, pitting edema noted bilateral lower extremities  BNP elevated 9359  Does normally take Lasix p o  At home  Will do 1 time dose of IV Lasix 40 mg today  Monitor response  Intake and output ordered  Daily weights ordered  Daughter indicates that patient has not established with a cardiologist in this area, has been following cardiology in [facility]  Will consult Cardiology, follow-up on recommendations

## 2022-11-15 NOTE — ASSESSMENT & PLAN NOTE
Patient noted to have white patches on his tongue, admits to some dysphagia, suspect thrush  Nystatin swish and swallow ordered    Monitor

## 2022-11-15 NOTE — ED PROVIDER NOTES
History  Chief Complaint   Patient presents with   • Shortness of Breath     Pt comes from [facility]  Pt took his evening med pills on Saturday night and choked on one of them  The next morning he had an xray  Pt continues to feel as if something is lodged in his throat  Clears throat regularly and coughs  Pt has vomited phlegm twice yesterday  Patient is a 72-year-old male, sent from the [facility] for evaluation of possible pneumonia  States that he feels like when he eats, he doesn't swallow his food, and it remains lodged for a while  He denies cough, fevers or chills  Outpatient x-ray performed, patient noted to have a pneumonia and started on Levaquin  History provided by:  Patient  History limited by:  Acuity of condition   used: No        Prior to Admission Medications   Prescriptions Last Dose Informant Patient Reported? Taking?    Ascorbic Acid (vitamin C) 1000 MG tablet 11/15/2022 at Unknown time  Yes Yes   Sig: Take 1,000 mg by mouth daily   Cholecalciferol (Vitamin D3) 50 MCG (2000 UT) TABS 11/15/2022 at Unknown time  Yes Yes   Sig: Take 2,000 Units by mouth daily   amLODIPine (NORVASC) 10 mg tablet 11/15/2022 at Unknown time  Yes Yes   Sig: Take 10 mg by mouth daily   aspirin 81 mg chewable tablet 11/15/2022 at Unknown time  Yes Yes   Sig: Chew 81 mg daily   atorvastatin (LIPITOR) 40 mg tablet 11/14/2022 at Unknown time  Yes Yes   Sig: Take 40 mg by mouth daily   doxazosin (CARDURA) 2 mg tablet 11/14/2022 at Unknown time  Yes Yes   Sig: Take 2 mg by mouth daily at bedtime   furosemide (LASIX) 20 mg tablet 11/15/2022 at Unknown time  Yes Yes   Sig: Take 20 mg by mouth daily   gabapentin (NEURONTIN) 100 mg capsule 11/15/2022 at Unknown time  Yes Yes   Sig: Take 100 mg by mouth 2 (two) times a day   hydrALAZINE (APRESOLINE) 100 MG tablet   Yes Yes   Sig: Take 100 mg by mouth 2 (two) times a day   isosorbide mononitrate (IMDUR) 60 mg 24 hr tablet Unknown at Unknown time  Yes No Sig: Take 60 mg by mouth daily   metoprolol tartrate (LOPRESSOR) 25 mg tablet 11/15/2022 at Unknown time  Yes Yes   Sig: Take 25 mg by mouth in the morning   repaglinide (PRANDIN) 0 5 mg tablet 11/15/2022 at Unknown time  Yes Yes   Sig: Take 0 5 mg by mouth 3 (three) times a day before meals   tamsulosin (FLOMAX) 0 4 mg 11/15/2022 at Unknown time  Yes Yes   Sig: Take 0 4 mg by mouth in the morning   vitamin B-12 (VITAMIN B-12) 500 mcg tablet 11/15/2022 at Unknown time  Yes Yes   Sig: Take 500 mcg by mouth daily      Facility-Administered Medications: None       Past Medical History:   Diagnosis Date   • CHF (congestive heart failure) ([facility])    • [facility]    • Elevated lipids    • Hypertension    • Neuropathy    • Renal disorder        History reviewed  No pertinent surgical history  History reviewed  No pertinent family history  I have reviewed and agree with the history as documented  E-Cigarette/Vaping   • E-Cigarette Use Never User      E-Cigarette/Vaping Substances     Social History     Tobacco Use   • Smoking status: Former     Packs/day: 3 00     Types: Cigarettes     Quit date:      Years since quittin 9   • Smokeless tobacco: Never   Vaping Use   • Vaping Use: Never used   Substance Use Topics   • Alcohol use: Not Currently   • Drug use: Not Currently       Review of Systems   Constitutional: Negative for chills and fever  Respiratory: Negative for cough, shortness of breath and wheezing  Cardiovascular: Negative for chest pain and palpitations  Gastrointestinal: Negative for abdominal pain, constipation, diarrhea, nausea and vomiting  Genitourinary: Negative for dysuria, flank pain, hematuria and urgency  Musculoskeletal: Negative for back pain  Skin: Negative for color change and rash  All other systems reviewed and are negative  Physical Exam  Physical Exam  Vitals and nursing note reviewed  Constitutional:       Appearance: He is well-developed  HENT:      Head: Normocephalic and atraumatic  Eyes:      Pupils: Pupils are equal, round, and reactive to light  Cardiovascular:      Rate and Rhythm: Normal rate and regular rhythm  Heart sounds: Normal heart sounds  Pulmonary:      Effort: Pulmonary effort is normal       Breath sounds: Normal breath sounds  Abdominal:      General: Bowel sounds are normal  There is no distension  Palpations: Abdomen is soft  There is no mass  Tenderness: There is no abdominal tenderness  There is no guarding or rebound  Skin:     General: Skin is warm and dry  Capillary Refill: Capillary refill takes less than 2 seconds  Neurological:      Mental Status: He is alert and oriented to person, place, and time  Psychiatric:         Behavior: Behavior normal          Thought Content:  Thought content normal          Judgment: Judgment normal          Vital Signs  ED Triage Vitals [11/15/22 1129]   Temperature Pulse Respirations Blood Pressure SpO2   (!) 96 8 °F (36 °C) 63 20 (!) 190/86 94 %      Temp Source Heart Rate Source Patient Position - Orthostatic VS BP Location FiO2 (%)   Tympanic Monitor Lying Right arm --      Pain Score       No Pain           Vitals:    11/16/22 0826 11/16/22 1109 11/16/22 1555 11/16/22 1945   BP: (!) 193/77 149/62 151/61 (!) 180/70   Pulse: 86 66 71 79   Patient Position - Orthostatic VS: Lying            Visual Acuity      ED Medications  Medications   amLODIPine (NORVASC) tablet 10 mg (10 mg Oral Given 11/16/22 0830)   ascorbic acid (VITAMIN C) tablet 1,000 mg (1,000 mg Oral Given 11/16/22 0830)   aspirin chewable tablet 81 mg (81 mg Oral Given 11/16/22 0830)   atorvastatin (LIPITOR) tablet 40 mg (40 mg Oral Given 11/16/22 0830)   cholecalciferol (VITAMIN D3) tablet 2,000 Units (2,000 Units Oral Given 11/16/22 0830)   doxazosin (CARDURA) tablet 2 mg (2 mg Oral Given 11/15/22 2113)   gabapentin (NEURONTIN) capsule 100 mg (100 mg Oral Given 11/16/22 1732)   hydrALAZINE (APRESOLINE) tablet 100 mg (100 mg Oral Given 11/16/22 1732)   isosorbide mononitrate (IMDUR) 24 hr tablet 60 mg (60 mg Oral Given 11/16/22 0830)   metoprolol tartrate (LOPRESSOR) tablet 25 mg (25 mg Oral Given 11/16/22 0830)   tamsulosin (FLOMAX) capsule 0 4 mg (0 4 mg Oral Given 11/16/22 0830)   cyanocobalamin (VITAMIN B-12) tablet 500 mcg (500 mcg Oral Given 11/16/22 0830)   acetaminophen (TYLENOL) tablet 650 mg (650 mg Oral Given 11/16/22 1953)   simethicone (MYLICON) chewable tablet 80 mg (80 mg Oral Given 11/16/22 1731)   heparin (porcine) subcutaneous injection 5,000 Units (5,000 Units Subcutaneous Given 11/16/22 1454)   insulin lispro (HumaLOG) 100 units/mL subcutaneous injection 1-6 Units (1 Units Subcutaneous Given 11/16/22 1732)   insulin lispro (HumaLOG) 100 units/mL subcutaneous injection 1-5 Units (1 Units Subcutaneous Not Given 11/15/22 2116)   nystatin (MYCOSTATIN) oral suspension 500,000 Units (500,000 Units Swish & Swallow Given 11/16/22 1731)   cefTRIAXone (ROCEPHIN) IVPB (premix in dextrose) 1,000 mg 50 mL (1,000 mg Intravenous New Bag 11/16/22 0830)   metroNIDAZOLE (FLAGYL) IVPB (premix) 500 mg 100 mL (500 mg Intravenous New Bag 11/16/22 1731)   saccharomyces boulardii (FLORASTOR) capsule 250 mg (250 mg Oral Given 11/16/22 1732)   levalbuterol (XOPENEX) inhalation solution 1 25 mg (has no administration in time range)     And   sodium chloride 0 9 % inhalation solution 3 mL (has no administration in time range)   benzonatate (TESSALON PERLES) capsule 100 mg (has no administration in time range)   furosemide (LASIX) injection 40 mg (40 mg Intravenous Given 11/16/22 1640)   pantoprazole (PROTONIX) EC tablet 40 mg (has no administration in time range)   polyethylene glycol (MIRALAX) packet 17 g (has no administration in time range)   cefepime (MAXIPIME) IVPB (premix in dextrose) 2,000 mg 50 mL (0 mg Intravenous Stopped 11/15/22 1444)   vancomycin (VANCOCIN) 2,000 mg in sodium chloride 0 9 % 500 mL IVPB (2,000 mg Intravenous New Bag 11/15/22 1445)   furosemide (LASIX) injection 40 mg (40 mg Intravenous Given 11/15/22 1806)   hydrALAZINE (APRESOLINE) injection 5 mg (5 mg Intravenous Given 11/15/22 1646)   barium sulfate 60 % cream 0 4 g (0 4 g Oral Given 11/16/22 1011)   barium sulfate (LIQUID E-Z-PAQUE) oral suspension 80 mL (80 mL Oral Given 11/16/22 1011)   barium sulfate 98 % oral suspension 60 mL (60 mL Oral Given 11/16/22 1012)   barium sulfate tablet 1 tablet (1 tablet Oral Given 11/16/22 1013)       Diagnostic Studies  Results Reviewed     Procedure Component Value Units Date/Time    Blood culture #1 [[id number]] Collected: 11/15/22 1358    Lab Status: Preliminary result Specimen: Blood from Arm, Left Updated: 11/16/22 2101     Blood Culture No Growth at 24 hrs  Blood culture #2 [[id number]] Collected: 11/15/22 1359    Lab Status: Preliminary result Specimen: Blood from Arm, Right Updated: 11/16/22 2101     Blood Culture No Growth at 24 hrs  HS Troponin I 4hr [[id number]]  (Abnormal) Collected: 11/15/22 1610    Lab Status: Final result Specimen: Blood from Arm, Right Updated: 11/15/22 1638     hs TnI 4hr 140 ng/L      Delta 4hr hsTnI 20 ng/L     HS Troponin I 2hr [[id number]]  (Abnormal) Collected: 11/15/22 1404    Lab Status: Final result Specimen: Blood from Arm, Right Updated: 11/15/22 1435     hs TnI 2hr 141 ng/L      Delta 2hr hsTnI 21 ng/L     Lactic acid [[id number]]  (Normal) Collected: 11/15/22 1358    Lab Status: Final result Specimen: Blood from Arm, Left Updated: 11/15/22 1432     LACTIC ACID 0 8 mmol/L     Narrative:      Result may be elevated if tourniquet was used during collection      Comprehensive metabolic panel [[id number]]  (Abnormal) Collected: 11/15/22 1207    Lab Status: Final result Specimen: Blood from Arm, Right Updated: 11/15/22 1259     Sodium 131 mmol/L      Potassium 4 4 mmol/L      Chloride 94 mmol/L      CO2 28 mmol/L      ANION GAP 9 mmol/L      BUN 53 mg/dL Creatinine 2 59 mg/dL      Glucose 181 mg/dL      Calcium 8 6 mg/dL      Corrected Calcium 9 4 mg/dL      AST --     ALT 19 U/L      Alkaline Phosphatase 84 U/L      Total Protein 6 8 g/dL      Albumin 3 0 g/dL      Total Bilirubin 0 79 mg/dL      eGFR 20 ml/min/1 73sq m     Narrative:      [place] guidelines for Chronic Kidney Disease (CKD):   •  Stage 1 with normal or high GFR (GFR > 90 mL/min/1 73 square meters)  •  Stage 2 Mild CKD (GFR = 60-89 mL/min/1 73 square meters)  •  Stage 3A Moderate CKD (GFR = 45-59 mL/min/1 73 square meters)  •  Stage 3B Moderate CKD (GFR = 30-44 mL/min/1 73 square meters)  •  Stage 4 Severe CKD (GFR = 15-29 mL/min/1 73 square meters)  •  Stage 5 End Stage CKD (GFR <15 mL/min/1 73 square meters)  Note: GFR calculation is accurate only with a steady state creatinine    FLU/RSV/COVID - if FLU/RSV clinically relevant [[id number]]  (Normal) Collected: 11/15/22 1207    Lab Status: Final result Specimen: Nares from Nose Updated: 11/15/22 1257     SARS-CoV-2 Negative     INFLUENZA A PCR Negative     INFLUENZA B PCR Negative     RSV PCR Negative    Narrative:      FOR PEDIATRIC PATIENTS - copy/paste COVID Guidelines URL to browser: [email or web address]    SARS-CoV-2 assay is a Nucleic Acid Amplification assay intended for the  qualitative detection of nucleic acid from SARS-CoV-2 in nasopharyngeal  swabs  Results are for the presumptive identification of SARS-CoV-2 RNA  Positive results are indicative of infection with SARS-CoV-2, the virus  causing COVID-19, but do not rule out bacterial infection or co-infection  with other viruses  Laboratories within the [place] and its  territories are required to report all positive results to the appropriate  public health authorities   Negative results do not preclude SARS-CoV-2  infection and should not be used as the sole basis for treatment or other  patient management decisions  Negative results must be combined with  clinical observations, patient history, and epidemiological information  This test has not been FDA cleared or approved  This test has been authorized by FDA under an Emergency Use Authorization  (EUA)  This test is only authorized for the duration of time the  declaration that circumstances exist justifying the authorization of the  emergency use of an in vitro diagnostic tests for detection of SARS-CoV-2  virus and/or diagnosis of COVID-19 infection under section 564(b)(1) of  the Act, 21 U  S C  265SVS-7(O)(3), unless the authorization is terminated  or revoked sooner  The test has been validated but independent review by FDA  and CLIA is pending  Test performed using [employer] GeneXpert: This RT-PCR assay targets N2,  a region unique to SARS-CoV-2  A conserved region in the E-gene was chosen  for pan-Sarbecovirus detection which includes SARS-CoV-2  According to CMS-2020-01-R, this platform meets the definition of high-throughput technology      HS Troponin 0hr (reflex protocol) [[id number]]  (Abnormal) Collected: 11/15/22 1207    Lab Status: Final result Specimen: Blood from Arm, Right Updated: 11/15/22 1253     hs TnI 0hr 120 ng/L     NT-BNP PRO [[id number]]  (Abnormal) Collected: 11/15/22 1207    Lab Status: Final result Specimen: Blood from Arm, Right Updated: 11/15/22 1252     NT-proBNP 9,359 pg/mL     Magnesium [[id number]]  (Abnormal) Collected: 11/15/22 1207    Lab Status: Final result Specimen: Blood from Arm, Right Updated: 11/15/22 1252     Magnesium 2 8 mg/dL     CBC and differential [[id number]]  (Abnormal) Collected: 11/15/22 1207    Lab Status: Final result Specimen: Blood from Arm, Right Updated: 11/15/22 1226     WBC 11 87 Thousand/uL      RBC 3 45 Million/uL      Hemoglobin 10 2 g/dL      Hematocrit 31 2 %      MCV 90 fL      MCH 29 6 pg      MCHC 32 7 g/dL      RDW 14 1 %      MPV 12 1 fL      Platelets 153 Thousands/uL      nRBC 0 /100 WBCs      Neutrophils Relative 80 %      Immat GRANS % 2 %      Lymphocytes Relative 8 %      Monocytes Relative 9 %      Eosinophils Relative 1 %      Basophils Relative 0 %      Neutrophils Absolute 9 42 Thousands/µL      Immature Grans Absolute 0 20 Thousand/uL      Lymphocytes Absolute 0 95 Thousands/µL      Monocytes Absolute 1 11 Thousand/µL      Eosinophils Absolute 0 14 Thousand/µL      Basophils Absolute 0 05 Thousands/µL                  FL barium swallow video w speech   Final Result by  (11/16 1026)      FL barium swallow video w speech   Final Result by SYSTEMGENERATED, DOCUMENTATION (11/16 1013)      XR chest 1 view portable   Final Result by Abhi Barrett MD (27/81 5703)      Bilateral multifocal infiltrates  Small bilateral pleural effusions  Workstation performed: [id number]                    Procedures  ECG 12 Lead Documentation Only    Date/Time: 11/15/2022 12:06 PM  Performed by: Chris Moreno DO  Authorized by: Chris Moreno DO     Indications / Diagnosis:  Sob  ECG reviewed by me, the ED Provider: yes    Patient location:  ED  Previous ECG:     Previous ECG:  Unavailable    Comparison to cardiac monitor: Yes    Interpretation:     Interpretation: normal    Rate:     ECG rate:  63bpm    ECG rate assessment: normal    Rhythm:     Rhythm: sinus rhythm    Ectopy:     Ectopy: none    QRS:     QRS axis:  Left  Conduction:     Conduction: abnormal      Abnormal conduction: complete RBBB    ST segments:     ST segments:  Normal  T waves:     T waves: normal               ED Course                               SBIRT 22yo+    Flowsheet Row Most Recent Value   SBIRT (23 yo +)    In order to provide better care to our patients, we are screening all of our patients for alcohol and drug use  Would it be okay to ask you these screening questions?  No Filed at: 11/15/2022 1145                    [facility]  Number of Diagnoses or Management Options  CHF (congestive heart failure) [facility]): new and requires workup  Elevated troponin: new and requires workup  History of chronic kidney disease: new and requires workup  History of congestive heart failure: new and requires workup  Pneumonia: new and requires workup     Amount and/or Complexity of Data Reviewed  Clinical lab tests: ordered and reviewed  Tests in the radiology section of CPT®: ordered and reviewed    Risk of Complications, Morbidity, and/or Mortality  Presenting problems: high  Diagnostic procedures: high  Management options: high    Patient Progress  Patient progress: stable      Disposition  Final diagnoses:   Pneumonia   CHF (congestive heart failure) (HCC)   Elevated troponin   History of chronic kidney disease   History of congestive heart failure     Time reflects when diagnosis was documented in both MDM as applicable and the Disposition within this note     Time User Action Codes Description Comment    11/15/2022  1:39 PM Karl Hyun O Add [J18 9] Pneumonia     11/15/2022  1:39 PM Karl Hyun O Add [I50 9] CHF (congestive heart failure) ([address] )     11/15/2022  2:18 PM Karl Hyun O Add [R77 8] Elevated troponin     11/15/2022  2:18 PM Karl Hyun Add [Z87 448] History of chronic kidney disease     11/15/2022  2:18 PM Karl Hyun Add [Z86 79] History of congestive heart failure     11/15/2022  3:34 PM Oflucie Sequeira Add [R13 10] Dysphagia       ED Disposition     ED Disposition   Admit    Condition   Stable    Date/Time   Tue Nov 15, 2022  2:17 PM    Comment   Case was discussed with Dr Evelyn Kim and the patient's admission status was agreed to be Admission Status: inpatient status to the service of Dr Evelyn Kim              Follow-up Information    None         Current Discharge Medication List      CONTINUE these medications which have NOT CHANGED    Details   amLODIPine (NORVASC) 10 mg tablet Take 10 mg by mouth daily      Ascorbic Acid (vitamin C) 1000 MG tablet Take 1,000 mg by mouth daily      aspirin 81 mg chewable tablet Chew 81 mg daily      atorvastatin (LIPITOR) 40 mg tablet Take 40 mg by mouth daily      Cholecalciferol (Vitamin D3) 50 MCG (2000 UT) TABS Take 2,000 Units by mouth daily      doxazosin (CARDURA) 2 mg tablet Take 2 mg by mouth daily at bedtime      furosemide (LASIX) 20 mg tablet Take 20 mg by mouth daily      gabapentin (NEURONTIN) 100 mg capsule Take 100 mg by mouth 2 (two) times a day      hydrALAZINE (APRESOLINE) 100 MG tablet Take 100 mg by mouth 2 (two) times a day      metoprolol tartrate (LOPRESSOR) 25 mg tablet Take 25 mg by mouth in the morning      repaglinide (PRANDIN) 0 5 mg tablet Take 0 5 mg by mouth 3 (three) times a day before meals      tamsulosin (FLOMAX) 0 4 mg Take 0 4 mg by mouth in the morning      vitamin B-12 (VITAMIN B-12) 500 mcg tablet Take 500 mcg by mouth daily      isosorbide mononitrate (IMDUR) 60 mg 24 hr tablet Take 60 mg by mouth daily             No discharge procedures on file      PDMP Review       Value Time User    PDMP Reviewed  Yes 11/15/2022  3:28 PM Yinka Hernández, [facility] Provider  Electronically Signed by           Jorge Meadows DO  11/16/22 7259

## 2022-11-15 NOTE — ASSESSMENT & PLAN NOTE
Troponin elevated 120->140  Denies any chest discomfort  Sinus rhythm noted on monitor, right bundle branch block  Suspect may be elevated secondary to CHF? Follow-up on 3rd troponin    Cardiology consulted

## 2022-11-16 ENCOUNTER — APPOINTMENT (INPATIENT)
Dept: RADIOLOGY | Facility: HOSPITAL | Age: 87
End: 2022-11-16

## 2022-11-16 LAB
ANION GAP SERPL CALCULATED.3IONS-SCNC: 11 MMOL/L (ref 4–13)
BUN SERPL-MCNC: 54 MG/DL (ref 5–25)
CALCIUM SERPL-MCNC: 8.5 MG/DL (ref 8.3–10.1)
CHLORIDE SERPL-SCNC: 97 MMOL/L (ref 96–108)
CO2 SERPL-SCNC: 26 MMOL/L (ref 21–32)
CREAT SERPL-MCNC: 2.59 MG/DL (ref 0.6–1.3)
ERYTHROCYTE [DISTWIDTH] IN BLOOD BY AUTOMATED COUNT: 14.2 % (ref 11.6–15.1)
EST. AVERAGE GLUCOSE BLD GHB EST-MCNC: 117 MG/DL
GFR SERPL CREATININE-BSD FRML MDRD: 20 ML/MIN/1.73SQ M
GLUCOSE SERPL-MCNC: 127 MG/DL (ref 65–140)
GLUCOSE SERPL-MCNC: 131 MG/DL (ref 65–140)
GLUCOSE SERPL-MCNC: 156 MG/DL (ref 65–140)
GLUCOSE SERPL-MCNC: 166 MG/DL (ref 65–140)
GLUCOSE SERPL-MCNC: 178 MG/DL (ref 65–140)
HBA1C MFR BLD: 5.7 %
HCT VFR BLD AUTO: 31.9 % (ref 36.5–49.3)
HGB BLD-MCNC: 10.8 G/DL (ref 12–17)
MAGNESIUM SERPL-MCNC: 2.7 MG/DL (ref 1.6–2.6)
MCH RBC QN AUTO: 30.6 PG (ref 26.8–34.3)
MCHC RBC AUTO-ENTMCNC: 33.9 G/DL (ref 31.4–37.4)
MCV RBC AUTO: 90 FL (ref 82–98)
PLATELET # BLD AUTO: 207 THOUSANDS/UL (ref 149–390)
PMV BLD AUTO: 12.1 FL (ref 8.9–12.7)
POTASSIUM SERPL-SCNC: 3.8 MMOL/L (ref 3.5–5.3)
PROCALCITONIN SERPL-MCNC: 0.12 NG/ML
RBC # BLD AUTO: 3.53 MILLION/UL (ref 3.88–5.62)
SODIUM SERPL-SCNC: 134 MMOL/L (ref 135–147)
WBC # BLD AUTO: 13.33 THOUSAND/UL (ref 4.31–10.16)

## 2022-11-16 RX ORDER — POLYETHYLENE GLYCOL 3350 17 G/17G
17 POWDER, FOR SOLUTION ORAL DAILY
Status: DISCONTINUED | OUTPATIENT
Start: 2022-11-17 | End: 2022-11-22 | Stop reason: HOSPADM

## 2022-11-16 RX ORDER — DOCUSATE SODIUM 100 MG/1
100 CAPSULE, LIQUID FILLED ORAL 2 TIMES DAILY
Status: DISCONTINUED | OUTPATIENT
Start: 2022-11-16 | End: 2022-11-16

## 2022-11-16 RX ORDER — FUROSEMIDE 10 MG/ML
40 INJECTION INTRAMUSCULAR; INTRAVENOUS DAILY
Status: DISCONTINUED | OUTPATIENT
Start: 2022-11-16 | End: 2022-11-20

## 2022-11-16 RX ORDER — PANTOPRAZOLE SODIUM 40 MG/1
40 TABLET, DELAYED RELEASE ORAL
Status: DISCONTINUED | OUTPATIENT
Start: 2022-11-17 | End: 2022-11-22 | Stop reason: HOSPADM

## 2022-11-16 RX ADMIN — CEFTRIAXONE 1000 MG: 1 INJECTION, SOLUTION INTRAVENOUS at 08:30

## 2022-11-16 RX ADMIN — GABAPENTIN 100 MG: 100 CAPSULE ORAL at 08:30

## 2022-11-16 RX ADMIN — FUROSEMIDE 40 MG: 10 INJECTION, SOLUTION INTRAMUSCULAR; INTRAVENOUS at 16:40

## 2022-11-16 RX ADMIN — ATORVASTATIN CALCIUM 40 MG: 40 TABLET, FILM COATED ORAL at 08:30

## 2022-11-16 RX ADMIN — Medication 2000 UNITS: at 08:30

## 2022-11-16 RX ADMIN — METRONIDAZOLE 500 MG: 500 INJECTION, SOLUTION INTRAVENOUS at 10:44

## 2022-11-16 RX ADMIN — CYANOCOBALAMIN TAB 500 MCG 500 MCG: 500 TAB at 08:30

## 2022-11-16 RX ADMIN — OXYCODONE HYDROCHLORIDE AND ACETAMINOPHEN 1000 MG: 500 TABLET ORAL at 08:30

## 2022-11-16 RX ADMIN — HYDRALAZINE HYDROCHLORIDE 100 MG: 25 TABLET ORAL at 08:30

## 2022-11-16 RX ADMIN — METOPROLOL TARTRATE 25 MG: 25 TABLET, FILM COATED ORAL at 08:30

## 2022-11-16 RX ADMIN — ASPIRIN 81 MG CHEWABLE TABLET 81 MG: 81 TABLET CHEWABLE at 08:30

## 2022-11-16 RX ADMIN — ISOSORBIDE MONONITRATE 60 MG: 60 TABLET, EXTENDED RELEASE ORAL at 08:30

## 2022-11-16 RX ADMIN — NYSTATIN 500000 UNITS: 100000 SUSPENSION ORAL at 17:31

## 2022-11-16 RX ADMIN — ACETAMINOPHEN 650 MG: 325 TABLET, FILM COATED ORAL at 19:53

## 2022-11-16 RX ADMIN — NYSTATIN 500000 UNITS: 100000 SUSPENSION ORAL at 08:30

## 2022-11-16 RX ADMIN — NYSTATIN 500000 UNITS: 100000 SUSPENSION ORAL at 22:00

## 2022-11-16 RX ADMIN — HEPARIN SODIUM 5000 UNITS: 5000 INJECTION INTRAVENOUS; SUBCUTANEOUS at 05:41

## 2022-11-16 RX ADMIN — NYSTATIN 500000 UNITS: 100000 SUSPENSION ORAL at 12:50

## 2022-11-16 RX ADMIN — AMLODIPINE BESYLATE 10 MG: 10 TABLET ORAL at 08:30

## 2022-11-16 RX ADMIN — TAMSULOSIN HYDROCHLORIDE 0.4 MG: 0.4 CAPSULE ORAL at 08:30

## 2022-11-16 RX ADMIN — HYDRALAZINE HYDROCHLORIDE 100 MG: 25 TABLET ORAL at 17:32

## 2022-11-16 RX ADMIN — METRONIDAZOLE 500 MG: 500 INJECTION, SOLUTION INTRAVENOUS at 02:39

## 2022-11-16 RX ADMIN — METRONIDAZOLE 500 MG: 500 INJECTION, SOLUTION INTRAVENOUS at 17:31

## 2022-11-16 RX ADMIN — INSULIN LISPRO 1 UNITS: 100 INJECTION, SOLUTION INTRAVENOUS; SUBCUTANEOUS at 22:24

## 2022-11-16 RX ADMIN — HEPARIN SODIUM 5000 UNITS: 5000 INJECTION INTRAVENOUS; SUBCUTANEOUS at 22:24

## 2022-11-16 RX ADMIN — Medication 250 MG: at 08:30

## 2022-11-16 RX ADMIN — HEPARIN SODIUM 5000 UNITS: 5000 INJECTION INTRAVENOUS; SUBCUTANEOUS at 14:54

## 2022-11-16 RX ADMIN — Medication 250 MG: at 17:32

## 2022-11-16 RX ADMIN — SIMETHICONE 80 MG: 80 TABLET, CHEWABLE ORAL at 17:31

## 2022-11-16 RX ADMIN — GABAPENTIN 100 MG: 100 CAPSULE ORAL at 17:32

## 2022-11-16 RX ADMIN — DOXAZOSIN 2 MG: 2 TABLET ORAL at 22:23

## 2022-11-16 RX ADMIN — INSULIN LISPRO 1 UNITS: 100 INJECTION, SOLUTION INTRAVENOUS; SUBCUTANEOUS at 17:32

## 2022-11-16 RX ADMIN — INSULIN LISPRO 1 UNITS: 100 INJECTION, SOLUTION INTRAVENOUS; SUBCUTANEOUS at 12:50

## 2022-11-16 NOTE — ASSESSMENT & PLAN NOTE
Suspected pneumonia, possibly aspiration secondary to patient reporting dysphagia with choking on pills and fluids over the past 2 weeks  Febrile three days PTA  Leukocytosis on admission  Chest x-ray with bilateral multifocal infiltrates  Lactic acid negative  Procalcitonin 0 12    Urine for strep and legionella negative   · Continue rocephin, flagyl   · Blood cultures pending  · Send procalcitonin and trend  · Supportive therapies with duonebs, respiratory protocol  · Speech therapy recommendations appreciated

## 2022-11-16 NOTE — ASSESSMENT & PLAN NOTE
Patient reports that he has been having intermittent dysphagia over the past few weeks, having difficulty after drinking thin liquids, choking on pills  Speech consulted for bedside swallow eval  Aspiration precautions    · Patient passed video swallow

## 2022-11-16 NOTE — CASE MANAGEMENT
Case Management Progress Note    Patient name Cuba Humphreys  Location [facility]-* MRN [id number]  : 10/19/1929 Date 2022       LOS (days): 1  Geometric Mean LOS (GMLOS) (days): 3 60  Days to GMLOS:2 6        OBJECTIVE:      Current admission status: Inpatient  Preferred Pharmacy:   [facility] [address] [place]  [id number]  Phone: [phone] Fax: [phone]    Primary Care Provider: Lebron Crenshaw PA-C    Primary Insurance: MEDICARE  Secondary Insurance: [employer] 35    PROGRESS NOTE:    SW spoke by phone with Abner Castro in nursing at the [facility] at [address] [facility] [phone] to update on patient's status and discharge timeline  Abner Castro confirmed that patient is mostly independent at the [facility] and self-administers his medications  Patient will need paper scripts for any medication changes at discharge and AVS should be faxed to [facility] [phone]  SW will continue to follow

## 2022-11-16 NOTE — ASSESSMENT & PLAN NOTE
Wt Readings from Last 3 Encounters:   11/16/22 82 6 kg (182 lb 3 2 oz)     Patient appears to be volume overloaded, pitting edema noted bilateral lower extremities  BNP elevated 9359  Given lasix on admission with good diuresis      · Continue lasix 40mg IVP daily   · Cardiology consultation pending   · Daily Weight  · Intake and output  · Low sodium diet

## 2022-11-16 NOTE — CONSULTS
[facility]  [address] [place] [address] [address]   [place]  Cardiology Consultation    Michael Clark  [id number]  10/19/1929      Consult for:  Appreciate consult by: Josiah Dennis PA-C    1  Pneumonia        2  CHF (congestive heart failure) [facility])  Inpatient consult to Cardiology    Inpatient consult to Cardiology      3  Elevated troponin        4  History of chronic kidney disease        5  History of congestive heart failure        6  Dysphagia  Inpatient consult to gastroenterology    Inpatient consult to gastroenterology         Discussion/Summary:   Acute shortness of breath/volume overload- multifactorial including suspected community-acquired pneumonia  Currently receiving antibiotics  He has a history of a cardiomyopathy? We do not have his prior records  Clinical evidence of increased volume status  Patient also has CKD which contributes to volume overload  Will have an echocardiogram to assess his EF and diastolic function  Agree with current IV Lasix diuresis  Stage 4 chronic kidney disease- monitor with IV diuresis    Dysphagia/thrush    Malignant hypertension- currently on Norvasc plus hydralazine plus Imdur and Lopressor  Monitor with IV diuresis  Type 2 diabetes mellitus      HPI:   22-year-old gentleman with history of a known cardiomyopathy, malignant hypertension, CKD stage 4 presents with increased shortness of breath  Patient has had early satiety and decreased appetite  He has also been having dysphagia  He reports being compliant with his medications  He has had increased weight gain  He has also had lower extremity swelling  He denies having prior history of myocardial infarction  The patient is DNR DNI code status      Past Medical History:   Diagnosis Date   • CHF (congestive heart failure) ([facility])    • Diabetes mellitus ([facility] [address] )    • Elevated lipids    • Hypertension    • Neuropathy    • Renal disorder      Social History Socioeconomic History   • Marital status:      Spouse name: Not on file   • Number of children: Not on file   • Years of education: Not on file   • Highest education level: Not on file   Occupational History   • Not on file   Tobacco Use   • Smoking status: Former     Packs/day: 3 00     Types: Cigarettes     Quit date: 80     Years since quittin 9   • Smokeless tobacco: Never   Vaping Use   • Vaping Use: Never used   Substance and Sexual Activity   • Alcohol use: Not Currently   • Drug use: Not Currently   • Sexual activity: Not Currently   Other Topics Concern   • Not on file   Social History Narrative   • Not on file     Social Determinants of Health     Financial Resource Strain: Not on file   Food Insecurity: No Food Insecurity   • Worried About Running Out of Food in the Last Year: Never true   • Ran Out of Food in the Last Year: Never true   Transportation Needs: No Transportation Needs   • Lack of Transportation (Medical): No   • Lack of Transportation (Non-Medical): No   Physical Activity: Not on file   Stress: Not on file   Social Connections: Not on file   Intimate Partner Violence: Not on file   Housing Stability: Low Risk    • Unable to Pay for Housing in the Last Year: No   • Number of Places Lived in the Last Year: 2   • Unstable Housing in the Last Year: No      History reviewed  No pertinent family history  History reviewed  No pertinent surgical history      Current Facility-Administered Medications:   •  acetaminophen (TYLENOL) tablet 650 mg, 650 mg, Oral, Q6H PRN, KEISHA Arzola  •  amLODIPine (NORVASC) tablet 10 mg, 10 mg, Oral, Daily, APOLONIA ArzolaNP, 10 mg at 22  •  ascorbic acid (VITAMIN C) tablet 1,000 mg, 1,000 mg, Oral, Daily, APOLONIA ArzolaNP, 1,000 mg at 22  •  aspirin chewable tablet 81 mg, 81 mg, Oral, Daily, APOLONIA ArzolaNP, 81 mg at 22  •  atorvastatin (LIPITOR) tablet 40 mg, 40 mg, Oral, Daily, KEISHA Aquino, 40 mg at 11/16/22 0830  •  benzonatate (TESSALON PERLES) capsule 100 mg, 100 mg, Oral, TID PRN, KEISHA Aquino  •  cefTRIAXone (ROCEPHIN) IVPB (premix in dextrose) 1,000 mg 50 mL, 1,000 mg, Intravenous, Q24H, KEISHA Aquino, Last Rate: 100 mL/hr at 11/16/22 0830, 1,000 mg at 11/16/22 0830  •  cholecalciferol (VITAMIN D3) tablet 2,000 Units, 2,000 Units, Oral, Daily, KEISHA Aquino, 2,000 Units at 11/16/22 0830  •  cyanocobalamin (VITAMIN B-12) tablet 500 mcg, 500 mcg, Oral, Daily, KEISHA Aquino, 500 mcg at 11/16/22 0830  •  doxazosin (CARDURA) tablet 2 mg, 2 mg, Oral, HS, KEISHA Aquino, 2 mg at 11/15/22 2113  •  furosemide (LASIX) injection 40 mg, 40 mg, Intravenous, Daily, Thierno Coughlin, KEISHA, 40 mg at 11/16/22 1640  •  gabapentin (NEURONTIN) capsule 100 mg, 100 mg, Oral, BID, KEISHA Aquino, 100 mg at 11/16/22 1732  •  heparin (porcine) subcutaneous injection 5,000 Units, 5,000 Units, Subcutaneous, Q8H [address], 5,000 Units at 11/16/22 1454 **AND** [CANCELED] Platelet count, , , Once, KEISHA Aquino  •  hydrALAZINE (APRESOLINE) tablet 100 mg, 100 mg, Oral, BID, KEISHA Aquino, 100 mg at 11/16/22 1732  •  insulin lispro (HumaLOG) 100 units/mL subcutaneous injection 1-5 Units, 1-5 Units, Subcutaneous, HS, KEISHA Aquino  •  insulin lispro (HumaLOG) 100 units/mL subcutaneous injection 1-6 Units, 1-6 Units, Subcutaneous, TID AC, 1 Units at 11/16/22 1250 **AND** Fingerstick Glucose (POCT), , , TID AC, KEISHA Aquino  •  isosorbide mononitrate (IMDUR) 24 hr tablet 60 mg, 60 mg, Oral, Daily, KEISHA Aquino, 60 mg at 11/16/22 0830  •  levalbuterol (XOPENEX) inhalation solution 1 25 mg, 1 25 mg, Nebulization, Q6H PRN **AND** sodium chloride 0 9 % inhalation solution 3 mL, 3 mL, Nebulization, Q6H PRN, Yodit Burow KEISHA Mccann  •  metoprolol tartrate (LOPRESSOR) tablet 25 mg, 25 mg, Oral, Daily, Gary River, CRNP, 25 mg at 11/16/22 0830  •  metroNIDAZOLE (FLAGYL) IVPB (premix) 500 mg 100 mL, 500 mg, Intravenous, Q8H, Gary River, NP, Last Rate: 200 mL/hr at 11/16/22 1731, 500 mg at 11/16/22 1731  •  nystatin (MYCOSTATIN) oral suspension 500,000 Units, 500,000 Units, Swish & Swallow, 4x Daily, Gary River, NP, 500,000 Units at 11/16/22 1731  •  saccharomyces boulardii (FLORASTOR) capsule 250 mg, 250 mg, Oral, BID, Gary River, CRNP, 250 mg at 11/16/22 1732  •  simethicone (MYLICON) chewable tablet 80 mg, 80 mg, Oral, 4x Daily PRN, Gary River, CRNP, 80 mg at 11/16/22 1731  •  tamsulosin (FLOMAX) capsule 0 4 mg, 0 4 mg, Oral, Daily, Gary River, CRNP, 0 4 mg at 11/16/22 0830  No Known Allergies  Vitals:    11/16/22 0545 11/16/22 0826 11/16/22 1109 11/16/22 1555   BP:  (!) 193/77 149/62 151/61   BP Location:  Right arm     Pulse:  86 66 71   Resp:  20  18   Temp:  98 7 °F (37 1 °C)  97 7 °F (36 5 °C)   TempSrc:  Oral     SpO2:  95% 95% 94%   Weight: 82 6 kg (182 lb 3 2 oz)      Height:           Review of Systems:   Review of Systems   Constitutional: Positive for fatigue  HENT: Negative  Eyes: Negative  Respiratory: Positive for shortness of breath  Cardiovascular: Positive for leg swelling  Gastrointestinal: Positive for abdominal distention  Endocrine: Negative  Genitourinary: Negative  Musculoskeletal: Positive for arthralgias  Skin: Negative  Allergic/Immunologic: Negative  Neurological: Negative  Hematological: Negative  Psychiatric/Behavioral: Negative          Vitals:    11/16/22 0545 11/16/22 0826 11/16/22 1109 11/16/22 1555   BP:  (!) 193/77 149/62 151/61   BP Location:  Right arm     Pulse:  86 66 71   Resp:  20  18   Temp:  98 7 °F (37 1 °C)  97 7 °F (36 5 °C)   TempSrc:  Oral     SpO2:  95% 95% 94% Weight: 82 6 kg (182 lb 3 2 oz)      Height:         Physical Examination:   Physical Exam  Constitutional:       General: He is not in acute distress  Appearance: He is well-developed and well-nourished  He is ill-appearing  He is not diaphoretic  HENT:      Head: Normocephalic and atraumatic  Right Ear: External ear normal       Left Ear: External ear normal    Eyes:      General: No scleral icterus  Right eye: No discharge  Left eye: No discharge  Conjunctiva/sclera: Conjunctivae normal       Pupils: Pupils are equal, round, and reactive to light  Neck:      Thyroid: No thyromegaly  Vascular: No JVD  Trachea: No tracheal deviation  Cardiovascular:      Rate and Rhythm: Normal rate and regular rhythm  Heart sounds: Murmur heard  No friction rub  Gallop present  Pulmonary:      Effort: No respiratory distress  Breath sounds: No stridor  Rales present  No wheezing  Chest:      Chest wall: No tenderness  Abdominal:      General: Bowel sounds are normal  There is distension  Palpations: Abdomen is soft  There is no mass  Tenderness: There is no abdominal tenderness  There is no guarding or rebound  Musculoskeletal:         General: Swelling present  No tenderness, deformity or edema  Normal range of motion  Cervical back: Normal range of motion and neck supple  Skin:     General: Skin is warm and dry  Coloration: Skin is not pale  Findings: No erythema or rash  Neurological:      Mental Status: He is alert and oriented to person, place, and time  Cranial Nerves: No cranial nerve deficit  Motor: No abnormal muscle tone  Coordination: Coordination normal       Deep Tendon Reflexes: Reflexes are normal and symmetric  Reflexes normal    Psychiatric:         Mood and Affect: Mood and affect normal          Behavior: Behavior normal          Thought Content:  Thought content normal          Judgment: Judgment normal          Labs:     Lab Results   Component Value Date    WBC 13 33 (H) 11/16/2022    HGB 10 8 (L) 11/16/2022    HCT 31 9 (L) 11/16/2022    MCV 90 11/16/2022    RDW 14 2 11/16/2022     11/16/2022     BMP:  Lab Results   Component Value Date    SODIUM 134 (L) 11/16/2022    K 3 8 11/16/2022    CL 97 11/16/2022    CO2 26 11/16/2022    BUN 54 (H) 11/16/2022    CREATININE 2 59 (H) 11/16/2022    GLUC 127 11/16/2022    CALCIUM 8 5 11/16/2022    CORRECTEDCA 9 4 11/15/2022    EGFR 20 11/16/2022    MG 2 7 (H) 11/16/2022     LFT:  Lab Results   Component Value Date    AST  11/15/2022      Comment:      No result  If an AST is required for patient care, it must be ordered separately  Specimen collection should occur prior to Sulfasalazine administration due to the potential for falsely depressed results       ALT 19 11/15/2022    ALKPHOS 84 11/15/2022    TP 6 8 11/15/2022    ALB 3 0 (L) 11/15/2022      No results found for: [facility]  Lab Results   Component Value Date    HGBA1C 5 7 (H) 11/16/2022     Lipid Profile:   No results found for: CHOLESTEROL, HDL, LDLCALC, TRIG  No results found for: CHOLESTEROL  No results found for: CKTOTAL, CKMB, CKMBINDEX, TROPONINI  Lab Results   Component Value Date    NTBNP 9,359 (H) 11/15/2022      Recent Results (from the past 672 hour(s))   ECG 12 lead    Collection Time: 11/15/22 12:04 PM   Result Value Ref Range    Ventricular Rate 63 BPM    Atrial Rate  BPM    VA Interval  ms    QRSD Interval 134 ms    QT Interval 504 ms    QTC Interval 515 ms    P Axis  degrees    QRS Axis -44 degrees    T Wave Axis 14 degrees   CBC and differential    Collection Time: 11/15/22 12:07 PM   Result Value Ref Range    WBC 11 87 (H) 4 31 - 10 16 Thousand/uL    RBC 3 45 (L) 3 88 - 5 62 Million/uL    Hemoglobin 10 2 (L) 12 0 - 17 0 g/dL    Hematocrit 31 2 (L) 36 5 - 49 3 %    MCV 90 82 - 98 fL    MCH 29 6 26 8 - 34 3 pg    MCHC 32 7 31 4 - 37 4 g/dL    RDW 14 1 11 6 - 15 1 %    MPV 12 1 8 9 - 12 7 fL    Platelets 817 978 - 430 Thousands/uL    nRBC 0 /100 WBCs    Neutrophils Relative 80 (H) 43 - 75 %    Immat GRANS % 2 0 - 2 %    Lymphocytes Relative 8 (L) 14 - 44 %    Monocytes Relative 9 4 - 12 %    Eosinophils Relative 1 0 - 6 %    Basophils Relative 0 0 - 1 %    Neutrophils Absolute 9 42 (H) 1 85 - 7 62 Thousands/µL    Immature Grans Absolute 0 20 0 00 - 0 20 Thousand/uL    Lymphocytes Absolute 0 95 0 60 - 4 47 Thousands/µL    Monocytes Absolute 1 11 0 17 - 1 22 Thousand/µL    Eosinophils Absolute 0 14 0 00 - 0 61 Thousand/µL    Basophils Absolute 0 05 0 00 - 0 10 Thousands/µL   Comprehensive metabolic panel    Collection Time: 11/15/22 12:07 PM   Result Value Ref Range    Sodium 131 (L) 135 - 147 mmol/L    Potassium 4 4 3 5 - 5 3 mmol/L    Chloride 94 (L) 96 - 108 mmol/L    CO2 28 21 - 32 mmol/L    ANION GAP 9 4 - 13 mmol/L    BUN 53 (H) 5 - 25 mg/dL    Creatinine 2 59 (H) 0 60 - 1 30 mg/dL    Glucose 181 (H) 65 - 140 mg/dL    Calcium 8 6 8 3 - 10 1 mg/dL    Corrected Calcium 9 4 8 3 - 10 1 mg/dL    AST      ALT 19 12 - 78 U/L    Alkaline Phosphatase 84 46 - 116 U/L    Total Protein 6 8 6 4 - 8 4 g/dL    Albumin 3 0 (L) 3 5 - 5 0 g/dL    Total Bilirubin 0 79 0 20 - 1 00 mg/dL    eGFR 20 ml/min/1 73sq m   HS Troponin 0hr (reflex protocol)    Collection Time: 11/15/22 12:07 PM   Result Value Ref Range    hs TnI 0hr 120 (H) "Refer to ACS Flowchart"- see link ng/L   NT-BNP PRO    Collection Time: 11/15/22 12:07 PM   Result Value Ref Range    NT-proBNP 9,359 (H) <450 pg/mL   Magnesium    Collection Time: 11/15/22 12:07 PM   Result Value Ref Range    Magnesium 2 8 (H) 1 6 - 2 6 mg/dL   FLU/RSV/COVID - if FLU/RSV clinically relevant    Collection Time: 11/15/22 12:07 PM    Specimen: Nose; Nares   Result Value Ref Range    SARS-CoV-2 Negative Negative    INFLUENZA A PCR Negative Negative    INFLUENZA B PCR Negative Negative    RSV PCR Negative Negative   Blood culture #1    Collection Time: 11/15/22  1:58 PM Specimen: Arm, Left; Blood   Result Value Ref Range    Blood Culture Received in Microbiology Lab  Culture in Progress  Lactic acid    Collection Time: 11/15/22  1:58 PM   Result Value Ref Range    LACTIC ACID 0 8 0 5 - 2 0 mmol/L   Blood culture #2    Collection Time: 11/15/22  1:59 PM    Specimen: Arm, Right; Blood   Result Value Ref Range    Blood Culture Received in Microbiology Lab  Culture in Progress      HS Troponin I 2hr    Collection Time: 11/15/22  2:04 PM   Result Value Ref Range    hs TnI 2hr 141 (H) "Refer to ACS Flowchart"- see link ng/L    Delta 2hr hsTnI 21 (H) <20 ng/L   HS Troponin I 4hr    Collection Time: 11/15/22  4:10 PM   Result Value Ref Range    hs TnI 4hr 140 (H) "Refer to ACS Flowchart"- see link ng/L    Delta 4hr hsTnI 20 (H) <20 ng/L   Fingerstick Glucose (POCT)    Collection Time: 11/15/22  5:21 PM   Result Value Ref Range    POC Glucose 96 65 - 140 mg/dl   Strep Pneumoniae, Urine    Collection Time: 11/15/22  7:37 PM    Specimen: Urine, Clean Catch   Result Value Ref Range    Strep pneumoniae antigen, urine Negative Negative   Legionella antigen, urine    Collection Time: 11/15/22  7:37 PM    Specimen: Urine, Clean Catch   Result Value Ref Range    Legionella Urinary Antigen Negative Negative   Fingerstick Glucose (POCT)    Collection Time: 11/15/22  8:50 PM   Result Value Ref Range    POC Glucose 149 (H) 65 - 140 mg/dl   Procalcitonin    Collection Time: 11/16/22  5:22 AM   Result Value Ref Range    Procalcitonin 0 12 <=0 25 ng/ml   Basic metabolic panel    Collection Time: 11/16/22  5:22 AM   Result Value Ref Range    Sodium 134 (L) 135 - 147 mmol/L    Potassium 3 8 3 5 - 5 3 mmol/L    Chloride 97 96 - 108 mmol/L    CO2 26 21 - 32 mmol/L    ANION GAP 11 4 - 13 mmol/L    BUN 54 (H) 5 - 25 mg/dL    Creatinine 2 59 (H) 0 60 - 1 30 mg/dL    Glucose 127 65 - 140 mg/dL    Calcium 8 5 8 3 - 10 1 mg/dL    eGFR 20 ml/min/1 73sq m   Magnesium    Collection Time: 11/16/22  5:22 AM Result Value Ref Range    Magnesium 2 7 (H) 1 6 - 2 6 mg/dL   CBC (With Platelets)    Collection Time: 11/16/22  5:22 AM   Result Value Ref Range    WBC 13 33 (H) 4 31 - 10 16 Thousand/uL    RBC 3 53 (L) 3 88 - 5 62 Million/uL    Hemoglobin 10 8 (L) 12 0 - 17 0 g/dL    Hematocrit 31 9 (L) 36 5 - 49 3 %    MCV 90 82 - 98 fL    MCH 30 6 26 8 - 34 3 pg    MCHC 33 9 31 4 - 37 4 g/dL    RDW 14 2 11 6 - 15 1 %    Platelets 665 872 - 231 Thousands/uL    MPV 12 1 8 9 - 12 7 fL   Hemoglobin A1c w/EAG Estimation (Orders if not completed within the last 90 days)    Collection Time: 11/16/22  5:22 AM   Result Value Ref Range    Hemoglobin A1C 5 7 (H) Normal 3 8-5 6%; PreDiabetic 5 7-6 4%; Diabetic >=6 5%; Glycemic control for adults with diabetes <7 0% %     mg/dl   Fingerstick Glucose (POCT)    Collection Time: 11/16/22  7:31 AM   Result Value Ref Range    POC Glucose 131 65 - 140 mg/dl   Fingerstick Glucose (POCT)    Collection Time: 11/16/22 11:31 AM   Result Value Ref Range    POC Glucose 178 (H) 65 - 140 mg/dl   Fingerstick Glucose (POCT)    Collection Time: 11/16/22  4:02 PM   Result Value Ref Range    POC Glucose 166 (H) 65 - 140 mg/dl       Imaging & Testing   I have personally reviewed pertinent reports  Cardiac Testing       EKG: Personally reviewed  Normal sinus rhythm bifascicular heart block      Remington Oro MD [facility]  [phone]  Please call with any questions or suggestions    Counseling :  A description of the counseling:   Goals and Barriers:  Patient's ability to self care:  Medication side effect reviewed with patient in detail and all their questions answered  "Portions of the record may have been created with voice recognition software  Occasional wrong word or "sound a like" substitutions may have occurred due to the inherent limitations of voice recognition software  Read the chart carefully and recognize, using context, where substitutions have occurred   Please call if you have any questions  "

## 2022-11-16 NOTE — SPEECH THERAPY NOTE
Speech-Language Pathology Bedside Swallow Evaluation      Patient Name: Christian Dawkins    RMUSM'Z Date: 11/16/2022     Problem List  Principal Problem:    Pneumonia  Active Problems:    Primary hypertension    CHF (congestive heart failure) (HCC)    Hyponatremia    Stage 4 chronic kidney disease (HCC)    Type 2 diabetes mellitus (HCC)    Dysphagia    Thrush    Elevated troponin      Past Medical History  Past Medical History:   Diagnosis Date   • CHF (congestive heart failure) (HCC)    • Diabetes mellitus (HCC)    • Elevated lipids    • Hypertension    • Neuropathy    • Renal disorder        Past Surgical History  History reviewed  No pertinent surgical history  Summary   Pt presented with functional appearing oral and pharyngeal stage swallowing skills with water, applesauce, hot cereal, lesley crackers and limited pills this am with materials administered today  However, he recently presented c s/s liquid dysphagia alone as well as pill dysphagia and currently presents with b/l infiltrates  Pt also with thrush  Recommendation:  MBS/VBS  Additional recommendations to follow      Current Medical Status  Christian Dawkins is a 80 y o  male with a PMH of hypertension, hyperlipidemia, diabetes type 2, CKD stage 4, CHF, hyponatremia, diabetic neuropathy and BPH who presented 11/15 with shortness of breath  Patient reports that for the past 2-3 weeks he has been having decreased appetite, difficulty swallowing thin liquids and choking on pills  On Saturday evening prior to admission he choked on his Flomax pill and has been coughing continually since then  Reports that he did have a chest x-ray at [facility], was given Levaquin however continued with coughing and shortness of breath and was sent to the emergency department for further evaluation and treatment  In the ED patient was noted to have mild hyponatremia, BNP elevated 9359, mild leukocytosis and elevated troponin  Bilateral lower extremity edema noted  Chest x-ray performed, pending  Patient was also noted to have white patches on tongue, suspect possible thrush, starting nystatin swish and swallow  Will give 1 time dose of IV Lasix  Patient was given IV cefepime and vancomycin in the ED, will change to ceftriaxone and Flagyl for suspected aspiration pneumonia  SLP Swallowing Evaluation ordered at this time  Current Precautions:  Fall & Aspiration    Allergies:  No known food allergies    Past medical history:  Please see H&P for details    Special Studies:  11/15 CXR: Bilateral multifocal infiltrates  Small bilateral pleural effusions  Social/Education/Vocational Hx:  Pt lives in assisted living facility ([facility])    Swallow Information   Current Diet: CCD regular textured food and thin liquids (1500 cc FR)  Baseline Diet: regular diet and thin liquids      Baseline Assessment   Behavior/Cognition: alert  Speech/Language Status: able to participate in conversation  Patient Positioning: upright in bed  Pain Status/Interventions/Response to Interventions:   No report of or nonverbal indications of pain  Swallow Mechanism Exam  Facial: symmetrical  Labial: WFL  Lingual: WFL  Velum: symmetrical  Mandible: adequate ROM  Dentition: edentulous and full dentures  Vocal quality:clear/adequate   Volitional Cough: strong/productive   Respiratory Status: on RA         Consistencies Assessed and Performance   Materials administered included water, applesauce, hot cereal, lesley crackers (and pills w/RN)    Oral Stage:   Mastication was adequate with the materials administered today  Bolus formation and transfer were functional with no significant oral residue noted  No overt s/s reduced oral control  Pharyngeal Stage:   Swallow Mechanics:  Swallowing initiation appeared prompt  Laryngeal rise was palpated and judged to be within functional limits  No coughing, throat clearing, change in vocal quality or respiratory status noted today       Esophageal Concerns: occasions of retropulsion    Summary and Recommendations (see above)    Results Reviewed with: patient, RN and CRNP     Treatment Recommended: pending MBS/VBS results     Short Term Goals:  -Patient will comply with a Video/Modified Barium Swallow study for more complete assessment of swallowing anatomy/physiology/aspiration risk and to assess efficacy of treatment techniques so as to best guide treatment plan    Kamini Carrillo [address] [facility] [phone]  [phone]  Available via [facility] Text

## 2022-11-16 NOTE — ASSESSMENT & PLAN NOTE
Patient has a history of primary hypertension  Home medications include Norvasc 10 mg, Cardura 2 mg hydralazine 100 mg b i d , Imdur 60 mg and Lopressor 25 mg

## 2022-11-16 NOTE — PROCEDURES
Speech Pathology Videofluoroscopic Swallow Study (VFSS/VBSS/MBSS)      Patient Name: Michelle Dobbins    MYVTUMARIYA Date: 11/16/2022     Problem List  Principal Problem:    Pneumonia  Active Problems:    Primary hypertension    CHF (congestive heart failure) (HCC)    Hyponatremia    Stage 4 chronic kidney disease (HCC)    Type 2 diabetes mellitus (HCC)    Dysphagia    Thrush    Elevated troponin      Past Medical History  Past Medical History:   Diagnosis Date   • CHF (congestive heart failure) (HCC)    • Diabetes mellitus (HCC)    • Elevated lipids    • Hypertension    • Neuropathy    • Renal disorder        General Information;  Pt is a 80 y o  male with a PMH as stated above  Pt admitted 11/15 with recent c/o dysphagia & b/l infiltrates  No overt s/s oropharyngeal dysphagia noted on clinical/bedside swallowing evaluation  A VFSS was recommended for formal/instrumental assessment of oropharyngeal stage swallowing skills at this time  Pt was viewed in lateral position and assessed with thin liquid, nectar thick liquid, puree, solid food (Sujata Doone cookie) and a13mm pill with thin liquid  I transported pt and and from study  Oral stage:  No significant oral stage dysphagia  Mastication was timely and grossly effective with materials administered today  Bolus formation and transfer were functional   Oral control appeared adequate with no gross premature spillage over the base of tongue  Pharyngeal stage:  No significant pharyngeal dysphagia  Velar elevation noted  Swallowing initiation was prompt  Laryngeal rise and anterior hyoid excursion appeared adequate  AIrway entrance closure appeared adequate  Tongue base retraction appeared to be of adequate strength  Pharyngeal constriction presumed to be adequate  PES opening was adequate  Management of food/liquid/barium tablet follows:    All food, liquid and the barium tablet passed through the pharynx safely and efficiently (ie no laryngeal penetration, aspiration or significant pharyngeal residue noted today)  Esophageal stage:  Brief view of esophagus was completed after administration of the barium tablet  S/S suggestive of significant esophageal dysmotility (eg stasis throughout esophagus, tertiary contractions, retrograde intra-esophageal flow)  Pt also with frequent belching  Assessment Summary:    Pt presents with no significant s/s oropharyngeal dysphagia but did present with s/s suggest of esophageal dysphagia (stasis throughout esophagus, tertiary contractions, retrograde intra-esophageal flow,  belching and episode of reflux),    Note: Images are available for review in PACS as desired  Recommendations:   Recommended Diet:  No modification to diet texture indicated  Recommended Form of Medications: as tolerated (tolerated them whole w/water today)  Precautions to promote esophageal clearance  Consider referral to GI, ?trial of PPI vs other  No SLP Dysphagia therapy recommended at this time    Thank you for this referral   Please do not hesitate to contact me with any questions or concerns      [facility]   Speech Pathologist  [place] License [id number]  [place] License [id number]  Available via [facility] Text

## 2022-11-16 NOTE — ASSESSMENT & PLAN NOTE
Lab Results   Component Value Date    EGFR 20 11/16/2022    EGFR 20 11/15/2022    CREATININE 2 59 (H) 11/16/2022    CREATININE 2 59 (H) 11/15/2022     Patient has a history of stage IV CKD, on certain of baseline creatinine  Will repeat BMP in a [facility] Avoid periods of relative hypotension  Avoid nephrotoxin agents

## 2022-11-16 NOTE — PLAN OF CARE
Problem: PHYSICAL THERAPY ADULT  Goal: Performs mobility at highest level of function for planned discharge setting  See evaluation for individualized goals  Description: Treatment/Interventions: LE strengthening/ROM, Functional transfer training, Therapeutic exercise, Endurance training, Patient/family training  Equipment Recommended:  (pt has a walker)       See flowsheet documentation for full assessment, interventions and recommendations  Note: Prognosis: Good  Problem List: Decreased strength, Decreased endurance, Impaired balance, Decreased mobility  Assessment: Patient is an 80y o  year old male seen for Physical Therapy evaluation  Patient admitted with Pneumonia  Comorbidities affecting patient's physical performance include: CHF, DM, htn  Personal factors affecting patient at time of initial evaluation include: inability to navigate level surfaces without external assistance, inability to perform dynamic tasks in community and inability to live alone  Prior to admission, patient was independent with functional mobility without assistive device  Please find objective findings from Physical Therapy assessment regarding body systems outlined above with impairments and limitations including decreased endurance and decreased activity tolerance  The Barthel Index was used as a functional outcome tool presenting with a score of Barthel Index Score: 60 today indicating marked limitations of functional mobility and ADLS  Patient's clinical presentation is currently unstable/unpredictable as seen in patient's presentation of new onset of impairment of functional mobility and decreased endurance  Pt would benefit from continued Physical Therapy treatment to address deficits as defined above and maximize level of functional mobility  As demonstrated by objective findings, the assigned level of complexity for this evaluation is high  The patient's AM-PAC Basic Mobility Inpatient Short Form Raw Score is 17   A Raw score of greater than 16 suggests the patient may benefit from discharge to home  PT Discharge Recommendation: Return to facility with rehabilitation services    See flowsheet documentation for full assessment

## 2022-11-16 NOTE — PROGRESS NOTES
Irene 45  Progress Note Ada Harm 10/19/1929, 80 y o  male MRN: [id number]  Unit/Bed#: [address] [place] [address] Encounter: [id number]  Primary Care Provider: Nya Joshi PA-C   Date and time admitted to hospital: 11/15/2022 11:27 AM    * Pneumonia  Assessment & Plan  Suspected pneumonia, possibly aspiration secondary to patient reporting dysphagia with choking on pills and fluids over the past 2 weeks  Febrile three days PTA  Leukocytosis on admission  Chest x-ray with bilateral multifocal infiltrates  Lactic acid negative  Procalcitonin 0 12  Urine for strep and legionella negative   · Continue rocephin, flagyl   · Blood cultures pending  · Send procalcitonin and trend  · Supportive therapies with duonebs, respiratory protocol  · Speech therapy recommendations appreciated         Elevated troponin  Assessment & Plan  Troponin elevated 120->140  Denies any chest discomfort  Sinus rhythm noted on monitor, right bundle branch block  Suspect may be elevated secondary to CHF? Follow-up on 3rd troponin  Cardiology consult pending     [address]  Patient noted to have white patches on his tongue, admits to some dysphagia, suspect thrush  Nystatin swish and swallow ordered  Monitor    Dysphagia  Assessment & Plan  Patient reports that he has been having intermittent dysphagia over the past few weeks, having difficulty after drinking thin liquids, choking on pills  Speech consulted for bedside swallow eval  Aspiration precautions  · Patient passed video swallow    Type 2 diabetes mellitus [facility])  Assessment & Plan  Lab Results   Component Value Date    HGBA1C 5 7 (H) 11/16/2022       Recent Labs     11/15/22  1721 11/15/22  2050 11/16/22  0731 11/16/22  1131   POCGLU 96 149* 131 178*       Blood Sugar Average: Last 72 hrs:  (P) 138 5  Patient normally takes Prandin 0 5 mg t i d  With meals  Will hold at this time    Place on Accu-Cheks AC and HS with sliding scale coverage  Diabetic diet  Hga1c 5 7       Stage 4 chronic kidney disease [facility])  Assessment & Plan  Lab Results   Component Value Date    EGFR 20 11/16/2022    EGFR 20 11/15/2022    CREATININE 2 59 (H) 11/16/2022    CREATININE 2 59 (H) 11/15/2022     Patient has a history of stage IV CKD, on certain of baseline creatinine  Will repeat BMP in a m  [facility] Avoid periods of relative hypotension  Avoid nephrotoxin agents  Hyponatremia  Assessment & Plan  Mild hyponatremia noted at 131 - improving   Fluid restriction 1500 cc daily  Repeat BMP in a m     CHF (congestive heart failure) (HCC)  Assessment & Plan  Wt Readings from Last 3 Encounters:   11/16/22 82 6 kg (182 lb 3 2 oz)     Patient appears to be volume overloaded, pitting edema noted bilateral lower extremities  BNP elevated 9359  Given lasix on admission with good diuresis  · Continue lasix 40mg IVP daily   · Cardiology consultation pending   · Daily Weight  · Intake and output  · Low sodium diet        Primary hypertension  Assessment & Plan  Patient has a history of primary hypertension  Home medications include Norvasc 10 mg, Cardura 2 mg hydralazine 100 mg b i d , Imdur 60 mg and Lopressor 25 mg  VTE Pharmacologic Prophylaxis:   Pharmacologic: Heparin  Mechanical VTE Prophylaxis in Place: Yes    Patient Centered Rounds: I have performed bedside rounds with nursing staff today  Discussions with Specialists or Other Care Team Provider: Yes  Education and Discussions with Family / Patient:Yes  Time Spent for Care: 15 minutes  More than 50% of total time spent on counseling and coordination of care as described above  Current Length of Stay: 1 day(s)  Current Patient Status: Inpatient     Discharge Plan: to assisted living after treatment for PNA and cardiology consultation     Code Status: Level 3 - DNAR and DNI      Subjective:   Patient seen with daughter at bedside sitting up in the chair  He is on room air    He is feeling better - no chest pain or shortness of breath  Objective:     Vitals:   Temp (24hrs), Av 5 °F (36 4 °C), Min:96 7 °F (35 9 °C), Max:98 7 °F (37 1 °C)    Temp:  [96 7 °F (35 9 °C)-98 7 °F (37 1 °C)] 98 7 °F (37 1 °C)  HR:  [64-86] 66  Resp:  [16-20] 20  BP: (134-196)/(62-88) 149/62  SpO2:  [90 %-96 %] 95 %  Body mass index is 30 32 kg/m²  Input and Output Summary (last 24 hours): Intake/Output Summary (Last 24 hours) at 2022 1520  Last data filed at 2022 1055  Gross per 24 hour   Intake 940 ml   Output 1775 ml   Net -835 ml        Physical Exam:     Physical Exam  Vitals and nursing note reviewed  Constitutional:       Appearance: Normal appearance  HENT:      Head: Normocephalic  Nose: Nose normal    Eyes:      Extraocular Movements: Extraocular movements intact  Cardiovascular:      Rate and Rhythm: Normal rate and regular rhythm  Heart sounds: Murmur heard  Pulmonary:      Effort: Pulmonary effort is normal  No respiratory distress  Breath sounds: Normal breath sounds  No wheezing  Abdominal:      General: Abdomen is flat  Bowel sounds are normal  There is no distension  Palpations: Abdomen is soft  Tenderness: There is no abdominal tenderness  Musculoskeletal:         General: Swelling (1 pedal edema bilaterally) present  Normal range of motion  Skin:     General: Skin is warm and dry  Neurological:      General: No focal deficit present  Mental Status: He is alert and oriented to person, place, and time           Additional Data:     Labs:    Results from last 7 days   Lab Units 22  0522 11/15/22  1207   WBC Thousand/uL 13 33* 11 87*   HEMOGLOBIN g/dL 10 8* 10 2*   HEMATOCRIT % 31 9* 31 2*   PLATELETS Thousands/uL 207 186   NEUTROS PCT %  --  80*     Results from last 7 days   Lab Units 22  0522 11/15/22  1207   SODIUM mmol/L 134* 131*   POTASSIUM mmol/L 3 8 4 4   CHLORIDE mmol/L 97 94*   CO2 mmol/L 26 28   BUN mg/dL 54* 53*   CREATININE mg/dL 2 59* 2 59*   CALCIUM mg/dL 8 5 8 6   TOTAL BILIRUBIN mg/dL  --  0 79   ALK PHOS U/L  --  84   ALT U/L  --  19             Lab Results   Component Value Date/Time    HGBA1C 5 7 (H) 11/16/2022 05:22 AM     Results from last 7 days   Lab Units 11/16/22  1131 11/16/22  0731 11/15/22  2050 11/15/22  1721   POC GLUCOSE mg/dl 178* 131 149* 96     Results from last 7 days   Lab Units 11/16/22  0522 11/15/22  1358   LACTIC ACID mmol/L  --  0 8   PROCALCITONIN ng/ml 0 12  --        * I Have Reviewed All Lab Data Listed Above  * Additional Pertinent Lab Tests Reviewed: Lexie 66 Admission Reviewed    Imaging:     FL barium swallow video w speech   Final Result by  (11/16 1026)      FL barium swallow video w speech   Final Result by SYSTEMGENERATED, DOCUMENTATION (11/16 1013)      XR chest 1 view portable   Final Result by An Carmichael MD (54/31 0593)      Bilateral multifocal infiltrates  Small bilateral pleural effusions  Workstation performed: [id number]           Imaging Reports Reviewed by myself    Cultures:   Blood Culture:   Lab Results   Component Value Date    BLOODCX Received in Microbiology Lab  Culture in Progress  11/15/2022    BLOODCX Received in Microbiology Lab  Culture in Progress   11/15/2022     Urine Culture: No results found for: URINECX  Sputum Culture: No components found for: SPUTUMCX  Wound Culture: No results found for: WOUNDCULT    Last 24 Hours Medication List:   Current Facility-Administered Medications   Medication Dose Route Frequency Provider Last Rate   • acetaminophen  650 mg Oral Q6H PRN KEISHA Beckett     • amLODIPine  10 mg Oral Daily KEISHA Beckett     • vitamin C  1,000 mg Oral Daily KEISHA Beckett     • aspirin  81 mg Oral Daily KEISHA Beckett     • atorvastatin  40 mg Oral Daily KEISHA Beckett     • benzonatate  100 mg Oral TID PRN KEISHA Beckett     • cefTRIAXone  1,000 mg Intravenous Q24H KEISHA Aquino 1,000 mg (11/16/22 0830)   • cholecalciferol  2,000 Units Oral Daily KEISHA Aquino     • vitamin B-12  500 mcg Oral Daily KEISHA Aquino     • doxazosin  2 mg Oral HS KEISHA Aquino     • furosemide  40 mg Intravenous Daily Emry Conception, CRNP     • gabapentin  100 mg Oral BID KEISHA Aquino     • heparin (porcine)  5,000 Units Subcutaneous [facility] KEISHA Aquino     • hydrALAZINE  100 mg Oral BID KEISHA Aquino     • insulin lispro  1-5 Units Subcutaneous HS KEISHA Aquino     • insulin lispro  1-6 Units Subcutaneous TID AC KEISHA Aquino     • isosorbide mononitrate  60 mg Oral Daily KEISHA Aquino     • levalbuterol  1 25 mg Nebulization Q6H PRN KEISHA Aquino      And   • sodium chloride  3 mL Nebulization Q6H PRN KEISHA Aquino     • metoprolol tartrate  25 mg Oral Daily KEISHA Aquino     • metroNIDAZOLE  500 mg Intravenous Q8H KEISHA Aquino 500 mg (11/16/22 1044)   • nystatin  500,000 Units Swish & Swallow 4x Daily KEISHA Aquino     • saccharomyces boulardii  250 mg Oral BID KEISHA Aquino     • simethicone  80 mg Oral 4x Daily PRN KEISHA Aquino     • tamsulosin  0 4 mg Oral Daily KEISHA Aquino          Today, Patient Was Seen By: KEISHA William    ** Please Note: Dragon 360 Dictation voice to text software may have been used in the creation of this document   **

## 2022-11-16 NOTE — CONSULTS
Consultation - [facility] Gastroenterology   Edwina Small 80 y o  male MRN: [id number]  Unit/Bed#: [address] [place] [address] Encounter: [id number]        Inpatient consult to gastroenterology  Consult performed by: KEISHA Anguiano  Consult ordered by: Lupe Linton          Reason for Consult / Principal Problem:     Dysphagia and belching after drinking      ASSESSMENT AND PLAN:      This is a 58-year-old male with history of CHF, DM, HLD, HTN, CKD 4, hyponatremia, diabetic neuropathy, and BPH who presented to the hospital from [facility] due to shortness of breath after failed outpatient treatment of pneumonia and admitted for treatment of suspected aspiration PNA/CHF exacerbation and started on Nystatin for oral thrush  GI consulted due to dysphagia  1  Esophageal dysphagia: Patient reports 2 week history of belching after drinking liquids and intermittent regurgitation, reports this has happened with food as well but on rare occurrence  Two days prior to admission he felt like a pill got stuck in his throat, but reports he feels like this has now cleared  He reports no prior history of dysphagia and he denies any heartburn, odynophagia, abdominal pain, nausea/vomiting, or choking  He has had an EGD in the past, but does not recall the indication and this was more than 5 years ago in [address] [place] [address] Dr  He was evaluated by speech with VBS with no s/s of oropharyngeal dysphagia, but did have signs suggestive of significant esophageal dysmotility with stasis throughout the esophagus, tertiary contractions, retrograde intra esophageal flow, belching and episode of reflux   Pt reports symptoms have somewhat improved since admission      -continue treatment of oral thrush  -start pantoprazole 40 mg daily   -appreciate speech evaluation  -diet per speech  -EGD deferred due to underlying pneumonia/CHF exacerbation/age  -Consider esophagram for further evaluation of suspected esophageal dysmotility    2  Constipation  Pt reports history of constipation, last BM 2 days ago  -Start Miralax daily    Thank you for the consultation case discussed with Dr Manju Gordon  ______________________________________________________________________    HPI:  Christian Dawkins is a 80 y o  male with history of CHF, DM, HLD, HTN, CKD 4, hyponatremia, diabetic neuropathy, and BPH who presented to the hospital from [facility] living due to shortness of breath  He reported for the last 2-3 weeks having decreased appetite, difficulty swallowing thin liquids and choking on pills  On Saturday evening prior to admission he choked on his Flomax pill and has been coughing continuously since then  He had x-ray and was started on Levaquin, however given continued shortness of breath and coughing presented to the ED for further evaluation and treatment  In the ED, he was noted to have mild hyponatremia, BNP elevated 9359, mild leukocytosis, and elevated troponin  Chest x-ray was performed showing bilateral multifocal infiltrates and small bilateral pleural effusions  He was given IV Lasix in the ED and started on cefepime and vancomycin which was changed to Flagyl given suspicion for aspiration pneumonia  He was noted to have white plaques in his mouth suspicious for thrush and started on nystatin swish and swallow  GI/speech consulted for further evaluation of dysphagia  He was evaluated by speech this morning and appeared with functional oral/pharyngeal stage swallowing skills with water, applesauce, hot cereal, Niraj crackers and pills and video barium swallow ordered for further evaluation  Patient denies any prior history of dysphagia  He reports over the last 2 weeks noting belching after drinking liquids and sometimes regurgitation  Sometimes this occurs with, but less often  Had a pill stuck in his throat about 2 days ago, but now this has resolved  Denies any coughing or choking with food    Denies any odynophagia, heartburn, abdominal pain  He has a history of constipation and takes stool softeners with improvement  His last EGD was several years ago in [address] [place] [address] and he does not recall why it was done  He states that symptoms have improved somewhat since admission  REVIEW OF SYSTEMS:    CONSTITUTIONAL: Denies any fever, chills, rigors, and weight loss  HEENT: No earache or tinnitus  Denies hearing loss or visual disturbances  CARDIOVASCULAR: No chest pain or palpitations  RESPIRATORY: Denies any cough, hemoptysis, shortness of breath or dyspnea on exertion  GASTROINTESTINAL: As noted in the History of Present Illness  GENITOURINARY: No problems with urination  Denies any hematuria or dysuria  NEUROLOGIC: No dizziness or vertigo, denies headaches  MUSCULOSKELETAL: Denies any muscle or joint pain  SKIN: Denies skin rashes or itching  ENDOCRINE: Denies excessive thirst  Denies intolerance to heat or cold  PSYCHOSOCIAL: Denies depression or anxiety  Denies any recent memory loss  Historical Information   Past Medical History:   Diagnosis Date   • CHF (congestive heart failure) ([facility] )    • Diabetes mellitus ([facility] )    • Elevated lipids    • Hypertension    • Neuropathy    • Renal disorder      History reviewed  No pertinent surgical history  Social History   Social History     Substance and Sexual Activity   Alcohol Use Not Currently     Social History     Substance and Sexual Activity   Drug Use Not Currently     Social History     Tobacco Use   Smoking Status Former   • Packs/day: 3 00   • Types: Cigarettes   • Quit date:    • Years since quittin 9   Smokeless Tobacco Never     History reviewed  No pertinent family history      Meds/Allergies     Medications Prior to Admission   Medication   • amLODIPine (NORVASC) 10 mg tablet   • Ascorbic Acid (vitamin C) 1000 MG tablet   • aspirin 81 mg chewable tablet   • atorvastatin (LIPITOR) 40 mg tablet   • Cholecalciferol (Vitamin D3) 50 MCG (2000 UT) TABS   • doxazosin (CARDURA) 2 mg tablet   • furosemide (LASIX) 20 mg tablet   • gabapentin (NEURONTIN) 100 mg capsule   • hydrALAZINE (APRESOLINE) 100 MG tablet   • metoprolol tartrate (LOPRESSOR) 25 mg tablet   • repaglinide (PRANDIN) 0 5 mg tablet   • tamsulosin (FLOMAX) 0 4 mg   • vitamin B-12 (VITAMIN B-12) 500 mcg tablet   • isosorbide mononitrate (IMDUR) 60 mg 24 hr tablet     Current Facility-Administered Medications   Medication Dose Route Frequency   • acetaminophen (TYLENOL) tablet 650 mg  650 mg Oral Q6H PRN   • amLODIPine (NORVASC) tablet 10 mg  10 mg Oral Daily   • ascorbic acid (VITAMIN C) tablet 1,000 mg  1,000 mg Oral Daily   • aspirin chewable tablet 81 mg  81 mg Oral Daily   • atorvastatin (LIPITOR) tablet 40 mg  40 mg Oral Daily   • benzonatate (TESSALON PERLES) capsule 100 mg  100 mg Oral TID PRN   • cefTRIAXone (ROCEPHIN) IVPB (premix in dextrose) 1,000 mg 50 mL  1,000 mg Intravenous Q24H   • cholecalciferol (VITAMIN D3) tablet 2,000 Units  2,000 Units Oral Daily   • cyanocobalamin (VITAMIN B-12) tablet 500 mcg  500 mcg Oral Daily   • doxazosin (CARDURA) tablet 2 mg  2 mg Oral HS   • gabapentin (NEURONTIN) capsule 100 mg  100 mg Oral BID   • heparin (porcine) subcutaneous injection 5,000 Units  5,000 Units Subcutaneous Q8H [address]   • hydrALAZINE (APRESOLINE) tablet 100 mg  100 mg Oral BID   • insulin lispro (HumaLOG) 100 units/mL subcutaneous injection 1-5 Units  1-5 Units Subcutaneous HS   • insulin lispro (HumaLOG) 100 units/mL subcutaneous injection 1-6 Units  1-6 Units Subcutaneous TID AC   • isosorbide mononitrate (IMDUR) 24 hr tablet 60 mg  60 mg Oral Daily   • levalbuterol (XOPENEX) inhalation solution 1 25 mg  1 25 mg Nebulization Q6H PRN    And   • sodium chloride 0 9 % inhalation solution 3 mL  3 mL Nebulization Q6H PRN   • metoprolol tartrate (LOPRESSOR) tablet 25 mg  25 mg Oral Daily   • metroNIDAZOLE (FLAGYL) IVPB (premix) 500 mg 100 mL  500 mg Intravenous Q8H   • nystatin (MYCOSTATIN) oral suspension 500,000 Units  500,000 Units Swish & Swallow 4x Daily   • saccharomyces boulardii (FLORASTOR) capsule 250 mg  250 mg Oral BID   • simethicone (MYLICON) chewable tablet 80 mg  80 mg Oral 4x Daily PRN   • tamsulosin (FLOMAX) capsule 0 4 mg  0 4 mg Oral Daily       No Known Allergies        Objective     Blood pressure (!) 193/77, pulse 86, temperature 98 7 °F (37 1 °C), temperature source Oral, resp  rate 20, height 5' 5" (1 651 m), weight 82 6 kg (182 lb 3 2 oz), SpO2 95 %  Body mass index is 30 32 kg/m²  Intake/Output Summary (Last 24 hours) at 11/16/2022 0944  Last data filed at 11/16/2022 1934  Gross per 24 hour   Intake 670 ml   Output 1575 ml   Net -905 ml         PHYSICAL EXAM:      General Appearance:   Alert, cooperative, no distress   HEENT:   Normocephalic, atraumatic, anicteric  Neck:  Supple, symmetrical, trachea midline   Lungs:   Clear to auscultation bilaterally; no rales, rhonchi or wheezing; respirations unlabored    Heart[de-identified]   Regular rate and rhythm; no murmur, rub, or gallop     Abdomen:   Soft, non-tender, non-distended; normal bowel sounds; no masses, no organomegaly    Genitalia:   Deferred    Rectal:   Deferred    Extremities:  No cyanosis, clubbing or edema    Pulses:  2+ and symmetric all extremities    Skin:  No jaundice, rashes, or lesions    Lymph nodes:  No palpable cervical lymphadenopathy        Lab Results:   Admission on 11/15/2022   Component Date Value   • WBC 11/15/2022 11 87 (H)    • RBC 11/15/2022 3 45 (L)    • Hemoglobin 11/15/2022 10 2 (L)    • Hematocrit 11/15/2022 31 2 (L)    • MCV 11/15/2022 90    • MCH 11/15/2022 29 6    • MCHC 11/15/2022 32 7    • RDW 11/15/2022 14 1    • MPV 11/15/2022 12 1    • Platelets 03/44/8861 186    • nRBC 11/15/2022 0    • Neutrophils Relative 11/15/2022 80 (H)    • Immat GRANS % 11/15/2022 2    • Lymphocytes Relative 11/15/2022 8 (L)    • Monocytes Relative 11/15/2022 9    • Eosinophils Relative 11/15/2022 1    • Basophils Relative 11/15/2022 0    • Neutrophils Absolute 11/15/2022 9 42 (H)    • Immature Grans Absolute 11/15/2022 0 20    • Lymphocytes Absolute 11/15/2022 0 95    • Monocytes Absolute 11/15/2022 1 11    • Eosinophils Absolute 11/15/2022 0 14    • Basophils Absolute 11/15/2022 0 05    • Sodium 11/15/2022 131 (L)    • Potassium 11/15/2022 4 4    • Chloride 11/15/2022 94 (L)    • CO2 11/15/2022 28    • ANION GAP 11/15/2022 9    • BUN 11/15/2022 53 (H)    • Creatinine 11/15/2022 2 59 (H)    • Glucose 11/15/2022 181 (H)    • Calcium 11/15/2022 8 6    • Corrected Calcium 11/15/2022 9 4    • AST 11/15/2022     • ALT 11/15/2022 19    • Alkaline Phosphatase 11/15/2022 84    • Total Protein 11/15/2022 6 8    • Albumin 11/15/2022 3 0 (L)    • Total Bilirubin 11/15/2022 0 79    • eGFR 11/15/2022 20    • hs TnI 0hr 11/15/2022 120 (H)    • NT-proBNP 11/15/2022 9,359 (H)    • Magnesium 11/15/2022 2 8 (H)    • SARS-CoV-2 11/15/2022 Negative    • INFLUENZA A PCR 11/15/2022 Negative    • INFLUENZA B PCR 11/15/2022 Negative    • RSV PCR 11/15/2022 Negative    • hs TnI 2hr 11/15/2022 141 (H)    • Delta 2hr hsTnI 11/15/2022 21 (H)    • Blood Culture 11/15/2022 Received in Microbiology Lab  Culture in Progress  • Blood Culture 11/15/2022 Received in Microbiology Lab  Culture in Progress      • LACTIC ACID 11/15/2022 0 8    • hs TnI 4hr 11/15/2022 140 (H)    • Delta 4hr hsTnI 11/15/2022 20 (H)    • Ventricular Rate 11/15/2022 63    • QRSD Interval 11/15/2022 134    • QT Interval 11/15/2022 504    • QTC Interval 11/15/2022 515    • QRS Axis 11/15/2022 -44    • T Wave Axis 11/15/2022 14    • Strep pneumoniae antigen* 11/15/2022 Negative    • Legionella Urinary Antig* 11/15/2022 Negative    • POC Glucose 11/15/2022 96    • POC Glucose 11/15/2022 149 (H)    • Procalcitonin 11/16/2022 0 12    • Sodium 11/16/2022 134 (L)    • Potassium 11/16/2022 3 8    • Chloride 11/16/2022 97    • CO2 11/16/2022 26    • ANION GAP 11/16/2022 11    • BUN 11/16/2022 54 (H)    • Creatinine 11/16/2022 2 59 (H)    • Glucose 11/16/2022 127    • Calcium 11/16/2022 8 5    • eGFR 11/16/2022 20    • Magnesium 11/16/2022 2 7 (H)    • WBC 11/16/2022 13 33 (H)    • RBC 11/16/2022 3 53 (L)    • Hemoglobin 11/16/2022 10 8 (L)    • Hematocrit 11/16/2022 31 9 (L)    • MCV 11/16/2022 90    • MCH 11/16/2022 30 6    • MCHC 11/16/2022 33 9    • RDW 11/16/2022 14 2    • Platelets 31/67/0565 207    • MPV 11/16/2022 12 1    • POC Glucose 11/16/2022 131        Imaging Studies: I have personally reviewed pertinent imaging studies

## 2022-11-16 NOTE — ASSESSMENT & PLAN NOTE
Troponin elevated 120->140  Denies any chest discomfort  Sinus rhythm noted on monitor, right bundle branch block  Suspect may be elevated secondary to CHF? Follow-up on 3rd troponin    Cardiology consult pending

## 2022-11-16 NOTE — PLAN OF CARE
Problem: MOBILITY - ADULT  Goal: Maintain or return to baseline ADL function  Description: INTERVENTIONS:  -  Assess patient's ability to carry out ADLs; assess patient's baseline for ADL function and identify physical deficits which impact ability to perform ADLs (bathing, care of mouth/teeth, toileting, grooming, dressing, etc )  - Assess/evaluate cause of self-care deficits   - Assess range of motion  - Assess patient's mobility; develop plan if impaired  - Assess patient's need for assistive devices and provide as appropriate  - Encourage maximum independence but intervene and supervise when necessary  - Involve family in performance of ADLs  - Assess for home care needs following discharge   - Consider OT consult to assist with ADL evaluation and planning for discharge  - Provide patient education as appropriate  Outcome: Progressing  Goal: Maintains/Returns to pre admission functional level  Description: INTERVENTIONS:  - Perform BMAT or MOVE assessment daily    - Set and communicate daily mobility goal to care team and patient/family/caregiver  - Collaborate with rehabilitation services on mobility goals if consulted  - Perform Range of Motion 5 times a day  - Reposition patient every 2 hours    - Dangle patient 3 times a day  - Stand patient 3 times a day  - Ambulate patient 3 times a day  - Out of bed to chair 3 times a day   - Out of bed for meals 3 times a day  - Out of bed for toileting  - Record patient progress and toleration of activity level   Outcome: Progressing     Problem: Potential for Falls  Goal: Patient will remain free of falls  Description: INTERVENTIONS:  - Educate patient/family on patient safety including physical limitations  - Instruct patient to call for assistance with activity   - Consult OT/PT to assist with strengthening/mobility   - Keep Call bell within reach  - Keep bed low and locked with side rails adjusted as appropriate  - Keep care items and personal belongings within reach  - Initiate and maintain comfort rounds  - Make Fall Risk Sign visible to staff  - Offer Toileting every 2 Hours, in advance of need  - Initiate/Maintain bed alarm  - Obtain necessary fall risk management equipment: alarms  - Apply yellow socks and bracelet for high fall risk patients  - Consider moving patient to room near nurses station  Outcome: Progressing     Problem: RESPIRATORY - ADULT  Goal: Achieves optimal ventilation and oxygenation  Description: INTERVENTIONS:  - Assess for changes in respiratory status  - Assess for changes in mentation and behavior  - Position to facilitate oxygenation and minimize respiratory effort  - Oxygen administered by appropriate delivery if ordered  - Initiate smoking cessation education as indicated  - Encourage broncho-pulmonary hygiene including cough, deep breathe, Incentive Spirometry  - Assess the need for suctioning and aspirate as needed  - Assess and instruct to report SOB or any respiratory difficulty  - Respiratory Therapy support as indicated  Outcome: Progressing     Problem: Prexisting or High Potential for Compromised Skin Integrity  Goal: Skin integrity is maintained or improved  Description: INTERVENTIONS:  - Identify patients at risk for skin breakdown  - Assess and monitor skin integrity  - Assess and monitor nutrition and hydration status  - Monitor labs   - Assess for incontinence   - Turn and reposition patient  - Assist with mobility/ambulation  - Relieve pressure over bony prominences  - Avoid friction and shearing  - Provide appropriate hygiene as needed including keeping skin clean and dry  - Evaluate need for skin moisturizer/barrier cream  - Collaborate with interdisciplinary team   - Patient/family teaching  - Consider wound care consult   Outcome: Progressing

## 2022-11-16 NOTE — CASE MANAGEMENT
Case Management Assessment & Discharge Planning Note    Patient name Aracelis Hardwick  Location [address] [facility] [address] MRN [id number]  : 10/19/1929 Date 2022       Current Admission Date: 11/15/2022  Current Admission Diagnosis:Pneumonia   Patient Active Problem List    Diagnosis Date Noted   • Primary hypertension 11/15/2022   • Pneumonia 11/15/2022   • CHF (congestive heart failure) ([facility] ) 11/15/2022   • Hyponatremia 11/15/2022   • Stage 4 chronic kidney disease ([facility] ) 11/15/2022   • Type 2 diabetes mellitus ([facility] ) 11/15/2022   • Dysphagia 11/15/2022   • Thrush 11/15/2022   • Elevated troponin 11/15/2022      LOS (days): 1  Geometric Mean LOS (GMLOS) (days): 3 60  Days to GMLOS:2 6     OBJECTIVE:    Risk of Unplanned Readmission Score: 18 02       Current admission status: Inpatient  Referral Reason: Other (Discharge planning)    Preferred Pharmacy:   [facility] [address] [facility]  [place] [address]  Phone: [phone] Fax: [phone]    Primary Care Provider: Tin Logan PA-C    Primary Insurance: MEDICARE  Secondary Insurance: [employer] [facility]    ASSESSMENT:  Velma 26 Proxies    There are no active Health Care Proxies on file  Readmission Root Cause  30 Day Readmission: No    Patient Information  Admitted from[de-identified] Facility ([facility] at [address])  Mental Status: Alert  During Assessment patient was accompanied by: Daughter  Assessment information provided by[de-identified] Patient, Daughter  Primary Caregiver: Other (Comment) (Staff at [facility] at [address])  [address] [facility] Telephone Number[de-identified] [phone]  Support Systems: Daughter  [place] of Residence: [address] [place] [address] do you live in?: [address] entry access options   Select all that apply : No steps to enter home  Type of Current Residence: Facility (Assisted living facility)  Upon entering residence, is there a bedroom on the main floor (no further steps)?: Yes  Upon entering residence, is there a bathroom on the main floor (no further steps)?: Yes  In the last 12 months, was there a time when you were not able to pay the mortgage or rent on time?: No  In the last 12 months, how many places have you lived?: 2 (moved from [place] to [facility] in July 2022)  In the last 12 months, was there a time when you did not have a steady place to sleep or slept in a shelter (including now)?: No  Homeless/housing insecurity resource given?: N/A  Living Arrangements: Other (Comment) (Assisted living)  Is patient a ?: Yes  Is patient active with [facility])?: Yes    Activities of Daily Living Prior to Admission  Functional Status: Assistance  Completes ADLs independently?: No  Level of ADL dependence: Assistance  Ambulates independently?: Yes  Does patient use assisted devices?: Yes  Assisted Devices (DME) used: Straight Cane  Does patient currently own DME?: Yes  What DME does the patient currently own?: Straight Cane  Does patient currently have [employer]?: No    Patient Information Continued  Income Source: Pension/[employer]  Does patient have prescription coverage?: Yes  Within the past 12 months, you worried that your food would run out before you got the money to buy more : Never true  Within the past 12 months, the food you bought just didn't last and you didn't have money to get more : Never true  Food insecurity resource given?: N/A  Does patient receive dialysis treatments?: No  Does patient have a history of substance abuse?: No  Does patient have a history of Mental Health Diagnosis?: No    Means of Transportation  [facility]] Family transport  In the past 12 months, has lack of transportation kept you from medical appointments or from getting medications?: No  In the past 12 months, has lack of transportation kept you from meetings, work, or from getting things needed for daily living?: No  Was application for public transport provided?: N/A    DISCHARGE DETAILS:    Discharge planning discussed with[de-identified] Patient and daughter Miguel Gravely of Choice: Yes  Comments - Freedom of Choice: FOC reviewed, preference is for patient to return to [facility] at [address] contacted family/caregiver?: Yes  Were Treatment Team discharge recommendations reviewed with patient/caregiver?: Yes  Did patient/caregiver verbalize understanding of patient care needs?: N/A- going to facility  Were patient/caregiver advised of the risks associated with not following Treatment Team discharge recommendations?: Yes    Contacts  Patient Contacts: Siva Kitty (daughter)  Relationship to Patient[de-identified] Family  Contact Method:  In Person  Reason/Outcome: Emergency Contact, Discharge [address]         Is the patient interested in [facility] at discharge?: No    DME Referral Provided  Referral made for DME?: No    Other Referral/Resources/Interventions Provided:  Interventions: None Indicated  Referral Comments: Plan is for patient to return to [facility] at [address] Team Recommendation: Facility Return  Discharge Destination Plan[de-identified] Facility Return  Transport at Discharge : Family

## 2022-11-16 NOTE — PHYSICAL THERAPY NOTE
PHYSICAL THERAPY EVALUATION/TREATMENT     11/16/22 1046   Note Type   Note type Evaluation   Pain Assessment   Pain Assessment Tool 0-10   Pain Score 2   Pain Location/Orientation   (L chest)   Restrictions/Precautions   Other Precautions O2;Fall Risk   Home Living   Type of Home Assisted living  [facility])   Home Layout One level   Prior Function   Level of [place]   (Pt reports he ambulates without an assistive device )   Lives With Facility staff   General   Additional Pertinent History Pt admitted with SOB with diagnosis of PNA, now feeling much better  Cognition   Overall Cognitive Status WFL   RLE Assessment   RLE Assessment   (ROM WFL, MMT 4/5)   LLE Assessment   LLE Assessment   (ROM WFL, MMT 4/5)   Transfers   Sit to Stand 5  Supervision   Stand to Sit 5  Supervision   Stand pivot 5  Supervision   Additional items   (with walker)   Ambulation/Elevation   Gait pattern Wide DIMAS   Gait Assistance 5  Supervision   Assistive Device Rolling walker  (9T39)   Distance 25 feet   Balance   Static Sitting Good   Static Standing Fair   Assessment   Prognosis Good   Problem List Decreased strength;Decreased endurance; Impaired balance;Decreased mobility   Assessment Patient is an 80y o  year old male seen for Physical Therapy evaluation  Patient admitted with Pneumonia  Comorbidities affecting patient's physical performance include: CHF, DM, htn  Personal factors affecting patient at time of initial evaluation include: inability to navigate level surfaces without external assistance, inability to perform dynamic tasks in community and inability to live alone  Prior to admission, patient was independent with functional mobility without assistive device  Please find objective findings from Physical Therapy assessment regarding body systems outlined above with impairments and limitations including decreased endurance and decreased activity tolerance    The Barthel Index was used as a functional outcome tool presenting with a score of Barthel Index Score: 60 today indicating marked limitations of functional mobility and ADLS  Patient's clinical presentation is currently unstable/unpredictable as seen in patient's presentation of new onset of impairment of functional mobility and decreased endurance  Pt would benefit from continued Physical Therapy treatment to address deficits as defined above and maximize level of functional mobility  As demonstrated by objective findings, the assigned level of complexity for this evaluation is high  The patient's -[facility] Basic Mobility Inpatient Short Form Raw Score is 17  A Raw score of greater than 16 suggests the patient may benefit from discharge to home  Goals   Patient Goals "go home"   STG Expiration Date 11/23/22   Short Term Goal #1 independent bed mobility, independent transfers, independent ambulation with a walker 150 feet without SOB   LTG Expiration Date 11/30/22   Long Term Goal #1 independent ambulation without a walker outdoor surfaces 250 feet, improve standign static balance to good   Plan   Treatment/Interventions LE strengthening/ROM; Functional transfer training; Therapeutic exercise; Endurance training;Patient/family training   PT Frequency Other (Comment)  (5x/week)   Recommendation   PT Discharge Recommendation Return to facility with rehabilitation services   Equipment Recommended   (pt has a walker)   AM-[facility] Basic Mobility Inpatient   Turning in Bed Without Bedrails 3   Lying on Back to Sitting on Edge of Flat Bed 3   Moving Bed to Chair 3   Standing Up From Chair 3   Walk in Room 3   Climb 3-5 Stairs 2   Basic Mobility Inpatient Raw Score 17   Basic Mobility Standardized Score 39 67   Highest Level Of Mobility   -HLM Goal 5: Stand one or more mins   -HLM Achieved 7: Walk 25 feet or more   Barthel Index   Feeding 10   Bathing 0   Grooming Score 0   Dressing Score 5   Bladder Score 10   Bowels Score 10   Toilet Use Score 5   Transfers (Bed/Chair) Score 10 Mobility (Level Surface) Score 10   Stairs Score 0   Barthel Index Score 60   Additional Treatment Session   Start Time 1036   End Time 1046   Treatment Assessment S:  "I feel a little weak"  O: Supervision to ambulate with a rolling walker and 2L02 x 125 feet  Mild SOB, Sp02 >92% on 2L02  A: Pt demonstrates a steady gait with a walker P:  Continue PT to improve endurance and independence with functional mobility  End of Consult   Patient Position at End of Consult All needs within reach; Bedside chair   Licensure   [address] [place] [address] License Number  Bill NICHOLS [id number]

## 2022-11-16 NOTE — ASSESSMENT & PLAN NOTE
Lab Results   Component Value Date    HGBA1C 5 7 (H) 11/16/2022       Recent Labs     11/15/22  1721 11/15/22  2050 11/16/22  0731 11/16/22  1131   POCGLU 96 149* 131 178*       Blood Sugar Average: Last 72 hrs:  (P) 138 5  Patient normally takes Prandin 0 5 mg t i d  With meals  Will hold at this time  Place on Accu-Cheks AC and HS with sliding scale coverage  Diabetic diet    Hga1c 5 7

## 2022-11-17 ENCOUNTER — APPOINTMENT (INPATIENT)
Dept: RADIOLOGY | Facility: HOSPITAL | Age: 87
End: 2022-11-17

## 2022-11-17 ENCOUNTER — APPOINTMENT (INPATIENT)
Dept: NON INVASIVE DIAGNOSTICS | Facility: HOSPITAL | Age: 87
End: 2022-11-17

## 2022-11-17 LAB
ALBUMIN SERPL BCP-MCNC: 2.8 G/DL (ref 3.5–5)
ALP SERPL-CCNC: 72 U/L (ref 46–116)
ALT SERPL W P-5'-P-CCNC: 22 U/L (ref 12–78)
ANION GAP SERPL CALCULATED.3IONS-SCNC: 9 MMOL/L (ref 4–13)
AORTIC ROOT: 3.6 CM
AORTIC VALVE MEAN VELOCITY: 15.2 M/S
APICAL FOUR CHAMBER EJECTION FRACTION: 59 %
AST SERPL W P-5'-P-CCNC: 35 U/L (ref 5–45)
ATRIAL RATE: 78 BPM
AV AREA BY CONTINUOUS VTI: 1.8 CM2
AV AREA PEAK VELOCITY: 1.7 CM2
AV LVOT MEAN GRADIENT: 3 MMHG
AV LVOT PEAK GRADIENT: 7 MMHG
AV MEAN GRADIENT: 12 MMHG
AV PEAK GRADIENT: 27 MMHG
AV REGURGITATION PRESSURE HALF TIME: 307 MS
AV VALVE AREA: 1.83 CM2
AV VELOCITY RATIO: 0.5
BASOPHILS # BLD AUTO: 0.05 THOUSANDS/ÂΜL (ref 0–0.1)
BASOPHILS NFR BLD AUTO: 1 % (ref 0–1)
BILIRUB SERPL-MCNC: 0.64 MG/DL (ref 0.2–1)
BUN SERPL-MCNC: 55 MG/DL (ref 5–25)
CALCIUM ALBUM COR SERPL-MCNC: 9.4 MG/DL (ref 8.3–10.1)
CALCIUM SERPL-MCNC: 8.4 MG/DL (ref 8.3–10.1)
CHLORIDE SERPL-SCNC: 97 MMOL/L (ref 96–108)
CO2 SERPL-SCNC: 26 MMOL/L (ref 21–32)
CREAT SERPL-MCNC: 2.44 MG/DL (ref 0.6–1.3)
DOP CALC AO PEAK VEL: 2.6 M/S
DOP CALC AO VTI: 47.93 CM
DOP CALC LVOT AREA: 3.14 CM2
DOP CALC LVOT DIAMETER: 2 CM
DOP CALC LVOT PEAK VEL VTI: 27.89 CM
DOP CALC LVOT PEAK VEL: 1.31 M/S
DOP CALC LVOT STROKE INDEX: 43.7 ML/M2
DOP CALC LVOT STROKE VOLUME: 87.57 CM3
DOP CALC MV VTI: 40.93 CM
E WAVE DECELERATION TIME: 164 MS
EOSINOPHIL # BLD AUTO: 0.37 THOUSAND/ÂΜL (ref 0–0.61)
EOSINOPHIL NFR BLD AUTO: 4 % (ref 0–6)
ERYTHROCYTE [DISTWIDTH] IN BLOOD BY AUTOMATED COUNT: 13.9 % (ref 11.6–15.1)
FRACTIONAL SHORTENING: 27 % (ref 28–44)
GFR SERPL CREATININE-BSD FRML MDRD: 21 ML/MIN/1.73SQ M
GLUCOSE SERPL-MCNC: 114 MG/DL (ref 65–140)
GLUCOSE SERPL-MCNC: 115 MG/DL (ref 65–140)
GLUCOSE SERPL-MCNC: 132 MG/DL (ref 65–140)
GLUCOSE SERPL-MCNC: 134 MG/DL (ref 65–140)
GLUCOSE SERPL-MCNC: 211 MG/DL (ref 65–140)
HCT VFR BLD AUTO: 29.9 % (ref 36.5–49.3)
HGB BLD-MCNC: 9.9 G/DL (ref 12–17)
IMM GRANULOCYTES # BLD AUTO: 0.1 THOUSAND/UL (ref 0–0.2)
IMM GRANULOCYTES NFR BLD AUTO: 1 % (ref 0–2)
INTERVENTRICULAR SEPTUM IN DIASTOLE (PARASTERNAL SHORT AXIS VIEW): 1.4 CM
INTERVENTRICULAR SEPTUM: 1.4 CM (ref 0.6–1.1)
LAAS-AP2: 25.6 CM2
LAAS-AP4: 27.8 CM2
LEFT ATRIUM AREA SYSTOLE SINGLE PLANE A4C: 27.7 CM2
LEFT ATRIUM SIZE: 4.3 CM
LEFT INTERNAL DIMENSION IN SYSTOLE: 3.7 CM (ref 2.1–4)
LEFT VENTRICULAR INTERNAL DIMENSION IN DIASTOLE: 5.1 CM (ref 3.5–6)
LEFT VENTRICULAR POSTERIOR WALL IN END DIASTOLE: 1.3 CM
LEFT VENTRICULAR STROKE VOLUME: 68 ML
LVSV (TEICH): 68 ML
LYMPHOCYTES # BLD AUTO: 1.53 THOUSANDS/ÂΜL (ref 0.6–4.47)
LYMPHOCYTES NFR BLD AUTO: 14 % (ref 14–44)
MCH RBC QN AUTO: 29.9 PG (ref 26.8–34.3)
MCHC RBC AUTO-ENTMCNC: 33.1 G/DL (ref 31.4–37.4)
MCV RBC AUTO: 90 FL (ref 82–98)
MONOCYTES # BLD AUTO: 1.11 THOUSAND/ÂΜL (ref 0.17–1.22)
MONOCYTES NFR BLD AUTO: 11 % (ref 4–12)
MV E'TISSUE VEL-SEP: 6 CM/S
MV MEAN GRADIENT: 2 MMHG
MV PEAK A VEL: 0.92 M/S
MV PEAK E VEL: 132 CM/S
MV PEAK GRADIENT: 7 MMHG
MV STENOSIS PRESSURE HALF TIME: 47 MS
MV VALVE AREA BY CONTINUITY EQUATION: 2.14 CM2
MV VALVE AREA P 1/2 METHOD: 4.68 CM2
NEUTROPHILS # BLD AUTO: 7.44 THOUSANDS/ÂΜL (ref 1.85–7.62)
NEUTS SEG NFR BLD AUTO: 69 % (ref 43–75)
NRBC BLD AUTO-RTO: 0 /100 WBCS
P AXIS: 94 DEGREES
PLATELET # BLD AUTO: 182 THOUSANDS/UL (ref 149–390)
PMV BLD AUTO: 11.7 FL (ref 8.9–12.7)
POTASSIUM SERPL-SCNC: 3.6 MMOL/L (ref 3.5–5.3)
PR INTERVAL: 152 MS
PROT SERPL-MCNC: 6.3 G/DL (ref 6.4–8.4)
QRS AXIS: -47 DEGREES
QRSD INTERVAL: 142 MS
QT INTERVAL: 480 MS
QTC INTERVAL: 547 MS
RA PRESSURE ESTIMATED: 5 MMHG
RBC # BLD AUTO: 3.31 MILLION/UL (ref 3.88–5.62)
RIGHT ATRIUM AREA SYSTOLE A4C: 20.4 CM2
RIGHT VENTRICLE ID DIMENSION: 2.6 CM
RV PSP: 62 MMHG
SL CV AV DECELERATION TIME RETROGRADE: 1058 MS
SL CV AV PEAK GRADIENT RETROGRADE: 79 MMHG
SL CV LEFT ATRIUM LENGTH A2C: 5.7 CM
SL CV PED ECHO LEFT VENTRICLE DIASTOLIC VOLUME (MOD BIPLANE) 2D: 126 ML
SL CV PED ECHO LEFT VENTRICLE SYSTOLIC VOLUME (MOD BIPLANE) 2D: 58 ML
SODIUM SERPL-SCNC: 132 MMOL/L (ref 135–147)
T WAVE AXIS: 59 DEGREES
TR MAX PG: 57 MMHG
TR PEAK VELOCITY: 3.8 M/S
TRICUSPID VALVE PEAK REGURGITATION VELOCITY: 3.76 M/S
VENTRICULAR RATE: 78 BPM
WBC # BLD AUTO: 10.6 THOUSAND/UL (ref 4.31–10.16)

## 2022-11-17 RX ORDER — METRONIDAZOLE 500 MG/1
500 TABLET ORAL EVERY 8 HOURS SCHEDULED
Status: DISCONTINUED | OUTPATIENT
Start: 2022-11-17 | End: 2022-11-22 | Stop reason: HOSPADM

## 2022-11-17 RX ADMIN — NYSTATIN 500000 UNITS: 100000 SUSPENSION ORAL at 18:52

## 2022-11-17 RX ADMIN — ISOSORBIDE MONONITRATE 60 MG: 60 TABLET, EXTENDED RELEASE ORAL at 10:00

## 2022-11-17 RX ADMIN — AMLODIPINE BESYLATE 10 MG: 10 TABLET ORAL at 09:57

## 2022-11-17 RX ADMIN — ACETAMINOPHEN 650 MG: 325 TABLET, FILM COATED ORAL at 21:24

## 2022-11-17 RX ADMIN — GABAPENTIN 100 MG: 100 CAPSULE ORAL at 10:00

## 2022-11-17 RX ADMIN — HEPARIN SODIUM 5000 UNITS: 5000 INJECTION INTRAVENOUS; SUBCUTANEOUS at 05:14

## 2022-11-17 RX ADMIN — CEFTRIAXONE 1000 MG: 1 INJECTION, SOLUTION INTRAVENOUS at 10:01

## 2022-11-17 RX ADMIN — NYSTATIN 500000 UNITS: 100000 SUSPENSION ORAL at 09:55

## 2022-11-17 RX ADMIN — METRONIDAZOLE 500 MG: 500 INJECTION, SOLUTION INTRAVENOUS at 11:53

## 2022-11-17 RX ADMIN — INSULIN LISPRO 2 UNITS: 100 INJECTION, SOLUTION INTRAVENOUS; SUBCUTANEOUS at 12:28

## 2022-11-17 RX ADMIN — DOXAZOSIN 2 MG: 2 TABLET ORAL at 21:25

## 2022-11-17 RX ADMIN — FUROSEMIDE 40 MG: 10 INJECTION, SOLUTION INTRAMUSCULAR; INTRAVENOUS at 10:00

## 2022-11-17 RX ADMIN — HEPARIN SODIUM 5000 UNITS: 5000 INJECTION INTRAVENOUS; SUBCUTANEOUS at 21:30

## 2022-11-17 RX ADMIN — HYDRALAZINE HYDROCHLORIDE 100 MG: 25 TABLET ORAL at 09:55

## 2022-11-17 RX ADMIN — Medication 250 MG: at 10:00

## 2022-11-17 RX ADMIN — PANTOPRAZOLE SODIUM 40 MG: 40 TABLET, DELAYED RELEASE ORAL at 05:14

## 2022-11-17 RX ADMIN — ATORVASTATIN CALCIUM 40 MG: 40 TABLET, FILM COATED ORAL at 10:00

## 2022-11-17 RX ADMIN — GABAPENTIN 100 MG: 100 CAPSULE ORAL at 18:51

## 2022-11-17 RX ADMIN — Medication 250 MG: at 18:51

## 2022-11-17 RX ADMIN — METRONIDAZOLE 500 MG: 500 INJECTION, SOLUTION INTRAVENOUS at 01:58

## 2022-11-17 RX ADMIN — NYSTATIN 500000 UNITS: 100000 SUSPENSION ORAL at 21:24

## 2022-11-17 RX ADMIN — Medication 2000 UNITS: at 09:57

## 2022-11-17 RX ADMIN — POLYETHYLENE GLYCOL 3350 17 G: 17 POWDER, FOR SOLUTION ORAL at 10:01

## 2022-11-17 RX ADMIN — HYDRALAZINE HYDROCHLORIDE 100 MG: 25 TABLET ORAL at 18:52

## 2022-11-17 RX ADMIN — TAMSULOSIN HYDROCHLORIDE 0.4 MG: 0.4 CAPSULE ORAL at 10:00

## 2022-11-17 RX ADMIN — OXYCODONE HYDROCHLORIDE AND ACETAMINOPHEN 1000 MG: 500 TABLET ORAL at 09:57

## 2022-11-17 RX ADMIN — HEPARIN SODIUM 5000 UNITS: 5000 INJECTION INTRAVENOUS; SUBCUTANEOUS at 15:16

## 2022-11-17 RX ADMIN — ASPIRIN 81 MG CHEWABLE TABLET 81 MG: 81 TABLET CHEWABLE at 10:00

## 2022-11-17 RX ADMIN — METRONIDAZOLE 500 MG: 500 TABLET ORAL at 20:33

## 2022-11-17 RX ADMIN — METOPROLOL TARTRATE 25 MG: 25 TABLET, FILM COATED ORAL at 10:00

## 2022-11-17 RX ADMIN — NYSTATIN 500000 UNITS: 100000 SUSPENSION ORAL at 12:36

## 2022-11-17 RX ADMIN — CYANOCOBALAMIN TAB 500 MCG 500 MCG: 500 TAB at 09:57

## 2022-11-17 NOTE — PROGRESS NOTES
Irene 45  Progress Note Oswaldo Come 10/19/1929, 80 y o  male MRN: [id number]  Unit/Bed#: [address] [place] [address] Encounter: [id number]  Primary Care Provider: Rasta Villarreal PA-C   Date and time admitted to hospital: 11/15/2022 11:27 AM    * Pneumonia  Assessment & Plan  Suspected pneumonia, possibly aspiration secondary to patient reporting dysphagia with choking on pills and fluids over the past 2 weeks  Febrile three days PTA  Leukocytosis on admission  Chest x-ray with bilateral multifocal infiltrates  Lactic acid negative  Procalcitonin 0 12  Urine for strep and legionella negative   · Continue rocephin, flagyl   · Blood cultures negative at 24 hours  · Send procalcitonin and trend  · Supportive therapies with duonebs, respiratory protocol  · Speech therapy recommendations appreciated         Elevated troponin  Assessment & Plan  Troponin elevated 120->140  Denies any chest discomfort  Sinus rhythm noted on monitor, right bundle branch block  Suspect may be elevated secondary to CHF? Cardiology consult appreciated     [address]  Patient noted to have white patches on his tongue, admits to some dysphagia, suspect thrush  Nystatin swish and swallow ordered  Monitor    Dysphagia  Assessment & Plan  Patient reports that he has been having intermittent dysphagia over the past few weeks, having difficulty after drinking thin liquids, choking on pills  Speech consulted for bedside swallow eval  Aspiration precautions    · Patient passed video swallow but still is complaining of a stuck sensation intermittently with pills  · GI consult appreciated  · Esophagram pending  · Possible EGD when volume status improves     Type 2 diabetes mellitus [facility])  Assessment & Plan  Lab Results   Component Value Date    HGBA1C 5 7 (H) 11/16/2022       Recent Labs     11/16/22  1131 11/16/22  1602 11/16/22  2046 11/17/22  0724   POCGLU 178* 166* 156* 114       Blood Sugar Average: Last 72 hrs:  (P) 092 [id number]  Patient normally takes Prandin 0 5 mg t i d  With meals  Will hold at this time  Place on Accu-Cheks AC and HS with sliding scale coverage  Diabetic diet  Hga1c 5 7       Stage 4 chronic kidney disease [facility])  Assessment & Plan  Lab Results   Component Value Date    EGFR 21 11/17/2022    EGFR 20 11/16/2022    EGFR 20 11/15/2022    CREATININE 2 44 (H) 11/17/2022    CREATININE 2 59 (H) 11/16/2022    CREATININE 2 59 (H) 11/15/2022     Patient has a history of stage IV CKD, on certain of baseline creatinine  Will repeat BMP in a m  [place]ri [place] Avoid periods of relative hypotension  Avoid nephrotoxin agents  Hyponatremia  Assessment & Plan  Mild hyponatremia  Fluid restriction 1500 cc daily  Repeat BMP in a m     CHF (congestive heart failure) ([address] )  Assessment & Plan  Wt Readings from Last 3 Encounters:   11/17/22 82 6 kg (182 lb)     Patient appears to be volume overloaded, pitting edema noted bilateral lower extremities  BNP elevated 9359  Given lasix on admission with good diuresis  · Continue lasix 40mg IVP daily   · Cardiology consultation appreciated  · Echo pending   · Daily Weight  · Intake and output  · Low sodium diet        Primary hypertension  Assessment & Plan  Patient has a history of primary hypertension  Home medications include Norvasc 10 mg, Cardura 2 mg hydralazine 100 mg b i d , Imdur 60 mg and Lopressor 25 mg  VTE Pharmacologic Prophylaxis:   Pharmacologic: Heparin  Mechanical VTE Prophylaxis in Place: Yes    Patient Centered Rounds: I have performed bedside rounds with nursing staff today  Discussions with Specialists or Other Care Team Provider: Yes  Education and Discussions with Family / Patient:Yes Daughter Alfredo updated   Time Spent for Care: 15 minutes  More than 50% of total time spent on counseling and coordination of care as described above      Current Length of Stay: 2 day(s)  Current Patient Status: Inpatient     Discharge Plan: pending, plan for esophagram, echo today     Code Status: Level 3 - DNAR and DNI      Subjective:   Patient sitting up in bed, states he didn't have a good night  He feels like one of his pills got stuck in his throat again  He is speaking in full sentences, oxygen saturation 98% on room air  He denies chest pain, shortness of breath or cough  He just had his echo done  Objective:     Vitals:   Temp (24hrs), Av 8 °F (36 6 °C), Min:97 3 °F (36 3 °C), Max:98 2 °F (36 8 °C)    Temp:  [97 3 °F (36 3 °C)-98 2 °F (36 8 °C)] 98 2 °F (36 8 °C)  HR:  [66-94] 82  Resp:  [14-18] 16  BP: (121-196)/(61-76) 196/76  SpO2:  [90 %-95 %] 93 %  Body mass index is 30 29 kg/m²  Input and Output Summary (last 24 hours): Intake/Output Summary (Last 24 hours) at 2022 0932  Last data filed at 2022 4232  Gross per 24 hour   Intake 70 ml   Output 1300 ml   Net -1230 ml        Physical Exam:     Physical Exam  Vitals and nursing note reviewed  Constitutional:       Appearance: Normal appearance  HENT:      Head: Normocephalic  Nose: Nose normal    Eyes:      Extraocular Movements: Extraocular movements intact  Cardiovascular:      Rate and Rhythm: Normal rate and regular rhythm  Heart sounds: No murmur heard  Pulmonary:      Effort: Pulmonary effort is normal  No respiratory distress  Breath sounds: Normal breath sounds  No wheezing  Abdominal:      General: Abdomen is flat  Bowel sounds are normal  There is no distension  Palpations: Abdomen is soft  Tenderness: There is no abdominal tenderness  Musculoskeletal:         General: Swelling (trace pedal edema) present  Normal range of motion  Skin:     General: Skin is warm and dry  Neurological:      General: No focal deficit present  Mental Status: He is alert and oriented to person, place, and time     Psychiatric:         Mood and Affect: Mood normal          Behavior: Behavior normal          Additional Data: Labs:    Results from last 7 days   Lab Units 11/17/22  0537 11/16/22  0522 11/15/22  1207   WBC Thousand/uL 10 60* 13 33* 11 87*   HEMOGLOBIN g/dL 9 9* 10 8* 10 2*   HEMATOCRIT % 29 9* 31 9* 31 2*   PLATELETS Thousands/uL 182 207 186   NEUTROS PCT % 69  --  80*     Results from last 7 days   Lab Units 11/17/22  0537 11/16/22  0522 11/15/22  1207   SODIUM mmol/L 132* 134* 131*   POTASSIUM mmol/L 3 6 3 8 4 4   CHLORIDE mmol/L 97 97 94*   CO2 mmol/L 26 26 28   BUN mg/dL 55* 54* 53*   CREATININE mg/dL 2 44* 2 59* 2 59*   CALCIUM mg/dL 8 4 8 5 8 6   TOTAL BILIRUBIN mg/dL 0 64  --  0 79   ALK PHOS U/L 72  --  84   ALT U/L 22  --  19   AST U/L 35  --   --              Lab Results   Component Value Date/Time    HGBA1C 5 7 (H) 11/16/2022 05:22 AM     Results from last 7 days   Lab Units 11/17/22  0724 11/16/22  2046 11/16/22  1602 11/16/22  1131 11/16/22  0731 11/15/22  2050 11/15/22  1721   POC GLUCOSE mg/dl 114 156* 166* 178* 131 149* 96     Results from last 7 days   Lab Units 11/16/22  0522 11/15/22  1358   LACTIC ACID mmol/L  --  0 8   PROCALCITONIN ng/ml 0 12  --        * I Have Reviewed All Lab Data Listed Above  * Additional Pertinent Lab Tests [facility]ed[facility]    Imaging:     FL barium swallow video w speech   Final Result by  (11/16 1026)      FL barium swallow video w speech   Final Result by SYSTEMGENERATED, DOCUMENTATION (11/16 1013)      XR chest 1 view portable   Final Result by Ainsley Ware MD (99/12 3960)      Bilateral multifocal infiltrates  Small bilateral pleural effusions                    Workstation performed: [id number]         FL esophagram complete    (Results Pending)     Imaging Reports Reviewed by myself    Cultures:   Blood Culture:   Lab Results   Component Value Date    BLOODCX No Growth at 24 hrs  11/15/2022    Jamey Pickering No Growth at 24 hrs  11/15/2022     Urine Culture: No results found for: URINECX  Sputum Culture: No components found for: SPUTUMCX  Wound Culture: No results found for: WOUNDCULT    Last 24 Hours Medication List:   Current Facility-Administered Medications   Medication Dose Route Frequency Provider Last Rate   • acetaminophen  650 mg Oral Q6H PRN KEISHA Lema     • amLODIPine  10 mg Oral Daily APOLONIA LemaNP     • vitamin C  1,000 mg Oral Daily StevAPOLONIA LanderosNP     • aspirin  81 mg Oral Daily StevAPOLONIA LanderosNP     • atorvastatin  40 mg Oral Daily KEISHA Lema     • benzonatate  100 mg Oral TID PRN StevAPOLONIA LanderosNP     • cefTRIAXone  1,000 mg Intravenous Q24H KEISHA Lema 1,000 mg (11/16/22 0830)   • cholecalciferol  2,000 Units Oral Daily APOLONIA LemaNP     • vitamin B-12  500 mcg Oral Daily APOLONIA LemaNP     • doxazosin  2 mg Oral HS KEISHA Lema     • furosemide  40 mg Intravenous Daily Toy Medon, CRNP     • gabapentin  100 mg Oral BID KEISHA Lema     • heparin (porcine)  5,000 Units Subcutaneous [facility] KEISHA Lema     • hydrALAZINE  100 mg Oral BID APOLONIA LemaNP     • insulin lispro  1-5 Units Subcutaneous HS KEISHA Lema     • insulin lispro  1-6 Units Subcutaneous TID [facility] [place] KEISHA Lema     • isosorbide mononitrate  60 mg Oral Daily KEISHA Lema     • levalbuterol  1 25 mg Nebulization Q6H PRN KEISHA Lema      And   • sodium chloride  3 mL Nebulization Q6H PRN StevKEISHA Landeros     • metoprolol tartrate  25 mg Oral Daily KEISHA Lema     • metroNIDAZOLE  500 mg Intravenous Q8H KEISHA Lema 500 mg (11/17/22 0158)   • nystatin  500,000 Units Swish & Swallow 4x Daily APOLONIA LemaNP     • pantoprazole  40 mg Oral Early Morning Blanka KEISHA Garcia     • polyethylene glycol  17 g Oral Daily Aman Ball KEISHA Garces     • saccharomyces boulardii  250 mg Oral BID KEISHA Echols     • simethicone  80 mg Oral 4x Daily PRN KEISHA Echols     • tamsulosin  0 4 mg Oral Daily KEISHA Echols          Today, Patient Was Seen By: KEISHA Vega    ** Please Note: Dragon 360 Dictation voice to text software may have been used in the creation of this document   **

## 2022-11-17 NOTE — PROGRESS NOTES
Progress Note- Nabeel Sidhu 80 y o  male MRN: [id number]    Unit/Bed#: [address] [place] [address] Encounter: [id number]      Assessment and Plan: This is a 68-year-old male with history of CHF, DM, HLD, HTN, CKD 4, hyponatremia, diabetic neuropathy, and BPH who presented to the hospital from [facility] due to shortness of breath after failed outpatient treatment of pneumonia and admitted for treatment of suspected aspiration PNA/CHF exacerbation and started on Nystatin for oral thrush  GI consulted due to dysphagia      1  Esophageal dysphagia: Patient reports 2 week history of belching after drinking liquids and intermittent regurgitation, reports this has happened with food as well but on rare occurrence  Two days prior to admission he felt like a pill got stuck in his throat, but reports he feels like this has now cleared  He reports no prior history of dysphagia and he denies any heartburn, odynophagia, abdominal pain, nausea/vomiting, or choking  He has had an EGD in the past, but does not recall the indication and this was more than 5 years ago in [address] [place] [address]  He was evaluated by speech with VBS with no s/s of oropharyngeal dysphagia, but did have signs suggestive of significant esophageal dysmotility with stasis throughout the esophagus, tertiary contractions, retrograde intra esophageal flow, belching and episode of reflux  Pt reports he feels like he has another pill stuck in his throat today  Tolerating secretions and diet with some intermittent regurgitation of liquids     -Esophagram performed this morning showed moderate to severe esophageal dysmotility with significant residual contrast remaining in the esophagus at the end of the study and small likely mixed type hernia    -patient reports he feels as if he has another pill stuck in his throat, tolerating secretions   -continue treatment of oral thrush  -continue pantoprazole 40 mg daily   -appreciate speech evaluation  -diet per speech  -we will schedule EGD tomorrow for further evaluation dysphagia  Prep and procedure explained  Patient's respiratory status is improved yesterday and WBC trending down with treatment of pneumonia     2  Constipation  Pt reports history of constipation, last BM 2 days ago  -Continue Miralax daily     Thank you for the consultation case discussed with Dr Min Chu    ______________________________________________________________________    Subjective:     Patient sitting with daughter at bedside  Reports he has another pill stuck in his throat, tolerating secretions  Denies any odynophagia      Medication Administration - last 24 hours from 11/16/2022 1625 to 11/17/2022 1625       Date/Time Order Dose Route Action Action by     11/17/2022 0957 EST amLODIPine (NORVASC) tablet 10 mg 10 mg Oral Given Celester Duel, RN     11/17/2022 0957 EST ascorbic acid (VITAMIN C) tablet 1,000 mg 1,000 mg Oral Given Celester Duel, RN     11/17/2022 1000 EST aspirin chewable tablet 81 mg 81 mg Oral Given Celester Duel, RN     11/17/2022 1000 EST atorvastatin (LIPITOR) tablet 40 mg 40 mg Oral Given Celester Duel, RN     11/17/2022 0957 EST cholecalciferol (VITAMIN D3) tablet 2,000 Units 2,000 Units Oral Given Celester Duel, RN     11/16/2022 2223 EST doxazosin (CARDURA) tablet 2 mg 2 mg Oral Given Kay Ramos, RN     11/17/2022 1000 EST gabapentin (NEURONTIN) capsule 100 mg 100 mg Oral Given Celester Due, RN     11/16/2022 1732 EST gabapentin (NEURONTIN) capsule 100 mg 100 mg Oral Given Constance Bosworth, RN     11/17/2022 0955 EST hydrALAZINE (APRESOLINE) tablet 100 mg 100 mg Oral Given Celester Dueberny, RN     11/16/2022 1732 EST hydrALAZINE (APRESOLINE) tablet 100 mg 100 mg Oral Given Constance Bosworth, RN     11/17/2022 1000 EST isosorbide mononitrate (IMDUR) 24 hr tablet 60 mg 60 mg Oral Given Lila Louise RN     11/17/2022 1000 EST metoprolol tartrate (LOPRESSOR) tablet 25 mg 25 mg Oral Given Haleigh Mireles Beth, VERONIQUE     11/17/2022 1000 EST tamsulosin (FLOMAX) capsule 0 4 mg 0 4 mg Oral Given Garth Benz, VERONIQUE     11/17/2022 0957 EST cyanocobalamin (VITAMIN B-12) tablet 500 mcg 500 mcg Oral Given Garth Benz, VERONIQUE     11/16/2022 1953 EST acetaminophen (TYLENOL) tablet 650 mg 650 mg Oral Given Lexi Nova RN     11/16/2022 1731 EST simethicone (MYLICON) chewable tablet 80 mg 80 mg Oral Given Sung Mazariegos, VERONIQUE     11/17/2022 1516 EST heparin (porcine) subcutaneous injection 5,000 Units 5,000 Units Subcutaneous Given Kennedi Jackson RN     11/17/2022 0514 EST heparin (porcine) subcutaneous injection 5,000 Units 5,000 Units Subcutaneous Given Lexi Nova RN     11/16/2022 2224 EST heparin (porcine) subcutaneous injection 5,000 Units 5,000 Units Subcutaneous Given Lexi Nova RN     11/17/2022 1228 EST insulin lispro (HumaLOG) 100 units/mL subcutaneous injection 1-6 Units 2 Units Subcutaneous Given Garth Benz, VERONIQUE     11/17/2022 0839 EST insulin lispro (HumaLOG) 100 units/mL subcutaneous injection 1-6 Units 1 Units Subcutaneous Not Given Garth Benz, VERONIQUE     11/16/2022 1732 EST insulin lispro (HumaLOG) 100 units/mL subcutaneous injection 1-6 Units 1 Units Subcutaneous Given Sung Mazariegos RN     11/16/2022 2224 EST insulin lispro (HumaLOG) 100 units/mL subcutaneous injection 1-5 Units 1 Units Subcutaneous Given Lexi Nvoa RN     11/17/2022 1236 EST nystatin (MYCOSTATIN) oral suspension 500,000 Units 500,000 Units Swish & Swallow Given Garth Benz RN     11/17/2022 0955 EST nystatin (MYCOSTATIN) oral suspension 500,000 Units 500,000 Units Swish & Swallow Given Garth Benz RN     11/16/2022 2200 EST nystatin (MYCOSTATIN) oral suspension 500,000 Units 500,000 Units Swish & Swallow Given Lexi Nova RN     11/16/2022 1731 EST nystatin (MYCOSTATIN) oral suspension 500,000 Units 500,000 Units Swish & Swallow Given Sung Mazariegos RN     11/17/2022 1153 EST cefTRIAXone (ROCEPHIN) IVPB (premix in dextrose) 1,000 mg 50 mL 0 mg Intravenous Stopped Milton Zelaya RN     11/17/2022 1001 EST cefTRIAXone (ROCEPHIN) IVPB (premix in dextrose) 1,000 mg 50 mL 1,000 mg Intravenous [address] Milton Zelaya RN     11/17/2022 1153 EST metroNIDAZOLE (FLAGYL) IVPB (premix) 500 mg 100 mL 500 mg Intravenous [address] Milton Zelaya, VERONIQUE     11/17/2022 0158 EST metroNIDAZOLE (FLAGYL) IVPB (premix) 500 mg 100 mL 500 mg Intravenous [address]     11/16/2022 1731 EST metroNIDAZOLE (FLAGYL) IVPB (premix) 500 mg 100 mL 500 mg Intravenous [address] Reyna Yip RN     11/17/2022 1000 EST saccharomyces boulardii (FLORASTOR) capsule 250 mg 250 mg Oral Given Milton Zelaya, VERONIQUE     11/16/2022 1732 EST saccharomyces boulardii (FLORASTOR) capsule 250 mg 250 mg Oral Given Reyan Yip RN     11/17/2022 1000 EST furosemide (LASIX) injection 40 mg 40 mg Intravenous Given Milton Zelaya, VERONIQUE     11/16/2022 1640 EST furosemide (LASIX) injection 40 mg 40 mg Intravenous Given Reyna Yip RN     11/17/2022 0514 EST pantoprazole (PROTONIX) EC tablet 40 mg 40 mg Oral Given Muna Cisneros RN     11/17/2022 1001 EST polyethylene glycol (MIRALAX) packet 17 g 17 g Oral Given Milton Zelaya, VERONIQUE     11/17/2022 1209 EST barium sulfate (LIQUID E-Z-PAQUE) oral suspension 80 mL 80 mL Oral Given Akash Chin          Objective:     Vitals: Blood pressure 157/55, pulse 78, temperature 97 7 °F (36 5 °C), resp  rate 18, height 5' 5" (1 651 m), weight 82 6 kg (182 lb), SpO2 92 %  ,Body mass index is 30 29 kg/m²        Intake/Output Summary (Last 24 hours) at 11/17/2022 1625  Last data filed at 11/17/2022 1138  Gross per 24 hour   Intake 70 ml   Output 1300 ml   Net -1230 ml       Physical Exam:   General Appearance: Awake and alert, in no acute distress  Abdomen: Soft, non-tender, non-distended; bowel sounds normal; no masses or no organomegaly    Invasive Devices Peripheral Intravenous Line  Duration           Peripheral IV 11/17/22 Right;Ventral (anterior) Forearm <1 day                Lab Results:  Admission on 11/15/2022   Component Date Value   • WBC 11/15/2022 11 87 (H)    • RBC 11/15/2022 3 45 (L)    • Hemoglobin 11/15/2022 10 2 (L)    • Hematocrit 11/15/2022 31 2 (L)    • MCV 11/15/2022 90    • MCH 11/15/2022 29 6    • MCHC 11/15/2022 32 7    • RDW 11/15/2022 14 1    • MPV 11/15/2022 12 1    • Platelets 14/39/2018 186    • nRBC 11/15/2022 0    • Neutrophils Relative 11/15/2022 80 (H)    • Immat GRANS % 11/15/2022 2    • Lymphocytes Relative 11/15/2022 8 (L)    • Monocytes Relative 11/15/2022 9    • Eosinophils Relative 11/15/2022 1    • Basophils Relative 11/15/2022 0    • Neutrophils Absolute 11/15/2022 9 42 (H)    • Immature Grans Absolute 11/15/2022 0 20    • Lymphocytes Absolute 11/15/2022 0 95    • Monocytes Absolute 11/15/2022 1 11    • Eosinophils Absolute 11/15/2022 0 14    • Basophils Absolute 11/15/2022 0 05    • Sodium 11/15/2022 131 (L)    • Potassium 11/15/2022 4 4    • Chloride 11/15/2022 94 (L)    • CO2 11/15/2022 28    • ANION GAP 11/15/2022 9    • BUN 11/15/2022 53 (H)    • Creatinine 11/15/2022 2 59 (H)    • Glucose 11/15/2022 181 (H)    • Calcium 11/15/2022 8 6    • Corrected Calcium 11/15/2022 9 4    • AST 11/15/2022     • ALT 11/15/2022 19    • Alkaline Phosphatase 11/15/2022 84    • Total Protein 11/15/2022 6 8    • Albumin 11/15/2022 3 0 (L)    • Total Bilirubin 11/15/2022 0 79    • eGFR 11/15/2022 20    • hs TnI 0hr 11/15/2022 120 (H)    • NT-proBNP 11/15/2022 9,359 (H)    • Magnesium 11/15/2022 2 8 (H)    • SARS-CoV-2 11/15/2022 Negative    • INFLUENZA A PCR 11/15/2022 Negative    • INFLUENZA B PCR 11/15/2022 Negative    • RSV PCR 11/15/2022 Negative    • hs TnI 2hr 11/15/2022 141 (H)    • Delta 2hr hsTnI 11/15/2022 21 (H)    • Blood Culture 11/15/2022 No Growth at 24 hrs  • Blood Culture 11/15/2022 No Growth at 24 hrs      • LACTIC ACID 11/15/2022 0 8    • hs TnI 4hr 11/15/2022 140 (H)    • Delta 4hr hsTnI 11/15/2022 20 (H)    • Ventricular Rate 11/15/2022 63    • QRSD Interval 11/15/2022 134    • QT Interval 11/15/2022 504    • QTC Interval 11/15/2022 515    • QRS Axis 11/15/2022 -44    • T Wave Axis 11/15/2022 14    • Strep pneumoniae antigen* 11/15/2022 Negative    • Legionella Urinary Antig* 11/15/2022 Negative    • POC Glucose 11/15/2022 96    • POC Glucose 11/15/2022 149 (H)    • Procalcitonin 11/16/2022 0 12    • Sodium 11/16/2022 134 (L)    • Potassium 11/16/2022 3 8    • Chloride 11/16/2022 97    • CO2 11/16/2022 26    • ANION GAP 11/16/2022 11    • BUN 11/16/2022 54 (H)    • Creatinine 11/16/2022 2 59 (H)    • Glucose 11/16/2022 127    • Calcium 11/16/2022 8 5    • eGFR 11/16/2022 20    • Magnesium 11/16/2022 2 7 (H)    • WBC 11/16/2022 13 33 (H)    • RBC 11/16/2022 3 53 (L)    • Hemoglobin 11/16/2022 10 8 (L)    • Hematocrit 11/16/2022 31 9 (L)    • MCV 11/16/2022 90    • MCH 11/16/2022 30 6    • MCHC 11/16/2022 33 9    • RDW 11/16/2022 14 2    • Platelets 10/48/5917 207    • MPV 11/16/2022 12 1    • Hemoglobin A1C 11/16/2022 5 7 (H)    • EAG 11/16/2022 117    • POC Glucose 11/16/2022 131    • POC Glucose 11/16/2022 178 (H)    • POC Glucose 11/16/2022 166 (H)    • AV area peak chad 11/17/2022 1 7    • AV peak gradient 11/17/2022 79    • LA size 11/17/2022 4 3    • Aortic valve mean veloci* 11/17/2022 15 20    • Triscuspid Valve Regurgi* 11/17/2022 57 0    • Tricuspid valve peak reg* 11/17/2022 3 76    • LVPWd 11/17/2022 1 30    • Left Atrium Area-systoli* 11/17/2022 25 6    • Left Atrium Area-systoli* 11/17/2022 27 8    • MV E' Tissue Velocity Se* 11/17/2022 6    • TR Peak Chad 11/17/2022 3 8    • IVSd 11/17/2022 5 65    • LV DIASTOLIC VOLUME (MOD* 61/75/6086 126    • LEFT VENTRICLE SYSTOLIC * 92/37/9069 58    • Left ventricular stroke * 11/17/2022 68 00    • AV Deceleration Time 11/17/2022 1,058    • A4C EF 11/17/2022 59    • LA length (A2C) 11/17/2022 5 70    • LVIDd 11/17/2022 5 10    • IVS 11/17/2022 1 4    • LVIDS 11/17/2022 3 70    • FS 11/17/2022 27    • Ao root 11/17/2022 3 60    • RVID d 11/17/2022 2 6    • LVOT mn grad 11/17/2022 3 0    • AV area by cont VTI 11/17/2022 1 8    • AV regurgitation pressur* 11/17/2022 307    • AV mean gradient 11/17/2022 12 0    • AV LVOT peak gradient 11/17/2022 7    • MV mean gradient antegra* 11/17/2022 2    • MV valve area p 1/2 meth* 11/17/2022 4 68    • E wave deceleration time 11/17/2022 164    • LVOT diameter 11/17/2022 2 0    • LVOT peak chad 11/17/2022 1 31    • LVOT peak VTI 11/17/2022 27 89    • Aortic valve peak veloci* 11/17/2022 2 6    • Ao VTI 11/17/2022 47 93    • LVOT stroke volume 11/17/2022 87 57    • AV peak gradient 11/17/2022 27 0    • MV peak gradient antegra* 11/17/2022 7    • MV Peak E Chad 11/17/2022 132    • MV VTI 11/17/2022 40 93    • MV Peak A Chad 11/17/2022 0 92    • JANNET A4C 11/17/2022 27 7    • RAA A4C 11/17/2022 20 4    • MV stenosis pressure 1/2* 11/17/2022 47    • LVOT stroke volume index 11/17/2022 43 70    • LVSV, 2D 11/17/2022 68    • LVOT area 11/17/2022 3 14    • Dimensionless velociy in* 11/17/2022 0 50    • AV valve area 11/17/2022 1 83    • MV valve area by continu* 11/17/2022 2 14    • Est  RA pres 11/17/2022 5 0    • Right Ventricular Peak S* 11/17/2022 62 00    • POC Glucose 11/16/2022 156 (H)    • WBC 11/17/2022 10 60 (H)    • RBC 11/17/2022 3 31 (L)    • Hemoglobin 11/17/2022 9 9 (L)    • Hematocrit 11/17/2022 29 9 (L)    • MCV 11/17/2022 90    • MCH 11/17/2022 29 9    • MCHC 11/17/2022 33 1    • RDW 11/17/2022 13 9    • MPV 11/17/2022 11 7    • Platelets 76/11/0333 182    • nRBC 11/17/2022 0    • Neutrophils Relative 11/17/2022 69    • Immat GRANS % 11/17/2022 1    • Lymphocytes Relative 11/17/2022 14    • Monocytes Relative 11/17/2022 11    • Eosinophils Relative 11/17/2022 4    • Basophils Relative 11/17/2022 1    • Neutrophils Absolute 11/17/2022 7 44    • Immature Grans Absolute 11/17/2022 0 10    • Lymphocytes Absolute 11/17/2022 1 53    • Monocytes Absolute 11/17/2022 1 11    • Eosinophils Absolute 11/17/2022 0 37    • Basophils Absolute 11/17/2022 0 05    • Sodium 11/17/2022 132 (L)    • Potassium 11/17/2022 3 6    • Chloride 11/17/2022 97    • CO2 11/17/2022 26    • ANION GAP 11/17/2022 9    • BUN 11/17/2022 55 (H)    • Creatinine 11/17/2022 2 44 (H)    • Glucose 11/17/2022 115    • Calcium 11/17/2022 8 4    • Corrected Calcium 11/17/2022 9 4    • AST 11/17/2022 35    • ALT 11/17/2022 22    • Alkaline Phosphatase 11/17/2022 72    • Total Protein 11/17/2022 6 3 (L)    • Albumin 11/17/2022 2 8 (L)    • Total Bilirubin 11/17/2022 0 64    • eGFR 11/17/2022 21    • POC Glucose 11/17/2022 114    • Ventricular Rate 11/17/2022 78    • Atrial Rate 11/17/2022 78    • CA Interval 11/17/2022 152    • QRSD Interval 11/17/2022 142    • QT Interval 11/17/2022 480    • QTC Interval 11/17/2022 547    • P Axis 11/17/2022 94    • QRS Axis 11/17/2022 -47    • T Wave Axis 11/17/2022 59    • POC Glucose 11/17/2022 211 (H)        Imaging Studies: I have personally reviewed pertinent imaging studies

## 2022-11-17 NOTE — ASSESSMENT & PLAN NOTE
Troponin elevated 120->140  Denies any chest discomfort  Sinus rhythm noted on monitor, right bundle branch block  Suspect may be elevated secondary to CHF?   Cardiology consult appreciated

## 2022-11-17 NOTE — PLAN OF CARE
Problem: MOBILITY - ADULT  Goal: Maintain or return to baseline ADL function  Description: INTERVENTIONS:  -  Assess patient's ability to carry out ADLs; assess patient's baseline for ADL function and identify physical deficits which impact ability to perform ADLs (bathing, care of mouth/teeth, toileting, grooming, dressing, etc )  - Assess/evaluate cause of self-care deficits   - Assess range of motion  - Assess patient's mobility; develop plan if impaired  - Assess patient's need for assistive devices and provide as appropriate  - Encourage maximum independence but intervene and supervise when necessary  - Involve family in performance of ADLs  - Assess for home care needs following discharge   - Consider OT consult to assist with ADL evaluation and planning for discharge  - Provide patient education as appropriate  Outcome: Progressing  Goal: Maintains/Returns to pre admission functional level  Description: INTERVENTIONS:  - Perform BMAT or MOVE assessment daily    - Set and communicate daily mobility goal to care team and patient/family/caregiver     - Collaborate with rehabilitation services on mobility goals if consulted  Problem: Potential for Falls  Goal: Patient will remain free of falls  Description: INTERVENTIONS:  - Educate patient/family on patient safety including physical limitations  - Instruct patient to call for assistance with activity   - Consult OT/PT to assist with strengthening/mobility   - Keep Call bell within reach  - Keep bed low and locked with side rails adjusted as appropriate  - Keep care items and personal belongings within reach  - Initiate and maintain comfort rounds  - Make Fall Risk Sign visible to staff  - Offer Toileting every 2  Problem: RESPIRATORY - ADULT  Goal: Achieves optimal ventilation and oxygenation  Description: INTERVENTIONS:  - Assess for changes in respiratory status  - Assess for changes in mentation and behavior  - Position to facilitate oxygenation and minimize respiratory effort  - Oxygen administered by appropriate delivery if ordered  - Initiate smoking cessation education as indicated  - Encourage broncho-pulmonary hygiene including cough, deep breathe, Incentive Spirometry  - Assess the need for suctioning and aspirate as needed  - Assess and instruct to report SOB or any respiratory difficulty  - Respiratory Therapy support as indicated  Outcome: Progressing     Problem: Prexisting or High Potential for Compromised Skin Integrity  Goal: Skin integrity is maintained or improved  Description: INTERVENTIONS:  - Identify patients at risk for skin breakdown  - Assess and monitor skin integrity  - Assess and monitor nutrition and hydration status  - Monitor labs   - Assess for incontinence   - Turn and reposition patient  - Assist with mobility/ambulation  - Relieve pressure over bony prominences  - Avoid friction and shearing  - Provide appropriate hygiene as needed including keeping skin clean and dry  - Evaluate need for skin moisturizer/barrier cream  - Collaborate with interdisciplinary team   - Patient/family teaching  - Consider wound care consult   Outcome: Progressing     Problem: Nutrition/Hydration-ADULT  Goal: Nutrient/Hydration intake appropriate for improving, restoring or maintaining nutritional needs  Description: Monitor and assess patient's nutrition/hydration status for malnutrition  Collaborate with interdisciplinary team and initiate plan and interventions as ordered  Monitor patient's weight and dietary intake as ordered or per policy  Utilize nutrition screening tool and intervene as necessary  Determine patient's food preferences and provide high-protein, high-caloric foods as appropriate       INTERVENTIONS:  - Monitor oral intake, urinary output, labs, and treatment plans  - Assess nutrition and hydration status and recommend course of action  - Evaluate amount of meals eaten  - Assist patient with eating if necessary   - Allow adequate time for meals  - Recommend/ encourage appropriate diets, oral nutritional supplements, and vitamin/mineral supplements  - Order, calculate, and assess calorie counts as needed  - Recommend, monitor, and adjust tube feedings and TPN/PPN based on assessed needs  - Assess need for intravenous fluids  - Provide specific nutrition/hydration education as appropriate  - Include patient/family/caregiver in decisions related to nutrition  Outcome: Progressing     Problem: Nutrition/Hydration-ADULT  Goal: Nutrient/Hydration intake appropriate for improving, restoring or maintaining nutritional needs  Description: Monitor and assess patient's nutrition/hydration status for malnutrition  Collaborate with interdisciplinary team and initiate plan and interventions as ordered  Monitor patient's weight and dietary intake as ordered or per policy  Utilize nutrition screening tool and intervene as necessary  Determine patient's food preferences and provide high-protein, high-caloric foods as appropriate  INTERVENTIONS:  - Monitor oral intake, urinary output, labs, and treatment plans  - Assess nutrition and hydration status and recommend course of action  - Evaluate amount of meals eaten  - Assist patient with eating if necessary   - Allow adequate time for meals  - Recommend/ encourage appropriate diets, oral nutritional supplements, and vitamin/mineral supplements  - Order, calculate, and assess calorie counts as needed  - Recommend, monitor, and adjust tube feedings and TPN/PPN based on assessed needs  - Assess need for intravenous fluids  - Provide specific nutrition/hydration education as appropriate  - Include patient/family/caregiver in decisions related to nutrition  Outcome: Progressing    Hours, in advance of need  - Initiate/Maintain bed alarm  - Apply yellow socks and bracelet for high fall risk patients  - Consider moving patient to room near nurses station  Outcome: Progressing     - Reposition patient every 2 hours    - Nhi patient 3 times a day  - Out of bed for meals 3 times a day  - Out of bed for toileting  - Record patient progress and toleration of activity level   Outcome: Progressing

## 2022-11-17 NOTE — ASSESSMENT & PLAN NOTE
Lab Results   Component Value Date    HGBA1C 5 7 (H) 11/16/2022       Recent Labs     11/16/22  1131 11/16/22  1602 11/16/22 2046 11/17/22  0724   POCGLU 178* 166* 156* 114       Blood Sugar Average: Last 72 hrs:  (P) 141 [id number]  Patient normally takes Prandin 0 5 mg t i d  With meals  Will hold at this time  Place on Accu-Cheks AC and HS with sliding scale coverage  Diabetic diet    Hga1c 5 7

## 2022-11-17 NOTE — PLAN OF CARE
Problem: MOBILITY - ADULT  Goal: Maintain or return to baseline ADL function  Description: INTERVENTIONS:  -  Assess patient's ability to carry out ADLs; assess patient's baseline for ADL function and identify physical deficits which impact ability to perform ADLs (bathing, care of mouth/teeth, toileting, grooming, dressing, etc )  - Assess/evaluate cause of self-care deficits   - Assess range of motion  - Assess patient's mobility; develop plan if impaired  - Assess patient's need for assistive devices and provide as appropriate  - Encourage maximum independence but intervene and supervise when necessary  - Involve family in performance of ADLs  - Assess for home care needs following discharge   - Consider OT consult to assist with ADL evaluation and planning for discharge  - Provide patient education as appropriate  Outcome: Progressing  Goal: Maintains/Returns to pre admission functional level  Description: INTERVENTIONS:  - Perform BMAT or MOVE assessment daily    - Set and communicate daily mobility goal to care team and patient/family/caregiver  - Collaborate with rehabilitation services on mobility goals if consulted  - Perform Range of Motion 4 times a day  - Reposition patient every 2 hours    - Dangle patient 3 times a day  - Stand patient 3 times a day  - Ambulate patient 3 times a day  - Out of bed to chair 3 times a day   - Out of bed for meals 3 times a day  - Out of bed for toileting  - Record patient progress and toleration of activity level   Outcome: Progressing     Problem: Potential for Falls  Goal: Patient will remain free of falls  Description: INTERVENTIONS:  - Educate patient/family on patient safety including physical limitations  - Instruct patient to call for assistance with activity   - Consult OT/PT to assist with strengthening/mobility   - Keep Call bell within reach  - Keep bed low and locked with side rails adjusted as appropriate  - Keep care items and personal belongings within reach  - Initiate and maintain comfort rounds  - Make Fall Risk Sign visible to staff  - Offer Toileting every 2 Hours, in advance of need  - Initiate/Maintain bed alarm  - Obtain necessary fall risk management equipment:  yellow fall bracelet  - Apply yellow socks and bracelet for high fall risk patients  - Consider moving patient to room near nurses station  Outcome: Progressing     Problem: RESPIRATORY - ADULT  Goal: Achieves optimal ventilation and oxygenation  Description: INTERVENTIONS:  - Assess for changes in respiratory status  - Assess for changes in mentation and behavior  - Position to facilitate oxygenation and minimize respiratory effort  - Oxygen administered by appropriate delivery if ordered  - Initiate smoking cessation education as indicated  - Encourage broncho-pulmonary hygiene including cough, deep breathe, Incentive Spirometry  - Assess the need for suctioning and aspirate as needed  - Assess and instruct to report SOB or any respiratory difficulty  - Respiratory Therapy support as indicated  Outcome: Progressing     Problem: Prexisting or High Potential for Compromised Skin Integrity  Goal: Skin integrity is maintained or improved  Description: INTERVENTIONS:  - Identify patients at risk for skin breakdown  - Assess and monitor skin integrity  - Assess and monitor nutrition and hydration status  - Monitor labs   - Assess for incontinence   - Turn and reposition patient  - Assist with mobility/ambulation  - Relieve pressure over bony prominences  - Avoid friction and shearing  - Provide appropriate hygiene as needed including keeping skin clean and dry  - Evaluate need for skin moisturizer/barrier cream  - Collaborate with interdisciplinary team   - Patient/family teaching  - Consider wound care consult   Outcome: Progressing     Problem: Nutrition/Hydration-ADULT  Goal: Nutrient/Hydration intake appropriate for improving, restoring or maintaining nutritional needs  Description: Monitor and assess patient's nutrition/hydration status for malnutrition  Collaborate with interdisciplinary team and initiate plan and interventions as ordered  Monitor patient's weight and dietary intake as ordered or per policy  Utilize nutrition screening tool and intervene as necessary  Determine patient's food preferences and provide high-protein, high-caloric foods as appropriate       INTERVENTIONS:  - Monitor oral intake, urinary output, labs, and treatment plans  - Assess nutrition and hydration status and recommend course of action  - Evaluate amount of meals eaten  - Assist patient with eating if necessary   - Allow adequate time for meals  - Recommend/ encourage appropriate diets, oral nutritional supplements, and vitamin/mineral supplements  - Order, calculate, and assess calorie counts as needed  - Recommend, monitor, and adjust tube feedings and TPN/PPN based on assessed needs  - Assess need for intravenous fluids  - Provide specific nutrition/hydration education as appropriate  - Include patient/family/caregiver in decisions related to nutrition  Outcome: Progressing

## 2022-11-17 NOTE — ASSESSMENT & PLAN NOTE
Lab Results   Component Value Date    EGFR 21 11/17/2022    EGFR 20 11/16/2022    EGFR 20 11/15/2022    CREATININE 2 44 (H) 11/17/2022    CREATININE 2 59 (H) 11/16/2022    CREATININE 2 59 (H) 11/15/2022     Patient has a history of stage IV CKD, on certain of baseline creatinine  Will repeat BMP in a m  Kayla Sequin Avoid periods of relative hypotension  Avoid nephrotoxin agents

## 2022-11-17 NOTE — ASSESSMENT & PLAN NOTE
Wt Readings from Last 3 Encounters:   11/17/22 82 6 kg (182 lb)     Patient appears to be volume overloaded, pitting edema noted bilateral lower extremities  BNP elevated 9359  Given lasix on admission with good diuresis      · Continue lasix 40mg IVP daily   · Cardiology consultation appreciated  · Echo pending   · Daily Weight  · Intake and output  · Low sodium diet

## 2022-11-17 NOTE — ASSESSMENT & PLAN NOTE
Suspected pneumonia, possibly aspiration secondary to patient reporting dysphagia with choking on pills and fluids over the past 2 weeks  Febrile three days PTA  Leukocytosis on admission  Chest x-ray with bilateral multifocal infiltrates  Lactic acid negative  Procalcitonin 0 12    Urine for strep and legionella negative   · Continue rocephin, flagyl   · Blood cultures negative at 24 hours  · Send procalcitonin and trend  · Supportive therapies with duonebs, respiratory protocol  · Speech therapy recommendations appreciated

## 2022-11-17 NOTE — ASSESSMENT & PLAN NOTE
Patient reports that he has been having intermittent dysphagia over the past few weeks, having difficulty after drinking thin liquids, choking on pills  Speech consulted for bedside swallow eval  Aspiration precautions    · Patient passed video swallow but still is complaining of a stuck sensation intermittently with pills  · GI consult appreciated  · Esophagram pending  · Possible EGD when volume status improves

## 2022-11-17 NOTE — PHYSICAL THERAPY NOTE
Attempted PT treatment however pt declined  Pt reports he is not feeling well today  Will follow    John Hart PT  [id number]     11/17/22 1203   Note Type   Note Type Cancelled Session   Cancel Reasons Refusal

## 2022-11-17 NOTE — PROGRESS NOTES
The metronidazole has / have been converted to Oral   per [facility] IV-to-PO Auto-Conversion Protocol for Adults as approved by the Pharmacy and Therapeutics Committee  The patient met all eligible criteria:  3 Age = 25years old   2) Received at least one dose of the IV form   3) Receiving at least one other scheduled oral/enteral medication   4) Tolerating an oral/enteral diet   and did not have any exclusions:   1) Critical care patient   2) Active GI bleed (IF assessing H2RAs or PPIs)   3) Continuous tube feeding (IF assessing cipro, doxycycline, levofloxacin, minocycline, rifampin, or voriconazole)   4) Receiving PO vancomycin (IF assessing metronidazole)   5) Persistent nausea and/or vomiting   6) Ileus or gastrointestinal obstruction   7) Laura/nasogastric tube set for continuous suction   8) Specific order not to automatically convert to PO (in the order's comments or if discussed in the most recent Infectious Disease or primary team's progress notes)     Thanks  Carol Hickey, Pharmacist

## 2022-11-18 ENCOUNTER — ANESTHESIA EVENT (INPATIENT)
Dept: PERIOP | Facility: HOSPITAL | Age: 87
End: 2022-11-18

## 2022-11-18 ENCOUNTER — ANESTHESIA (INPATIENT)
Dept: PERIOP | Facility: HOSPITAL | Age: 87
End: 2022-11-18

## 2022-11-18 ENCOUNTER — APPOINTMENT (OUTPATIENT)
Dept: PERIOP | Facility: HOSPITAL | Age: 87
End: 2022-11-18

## 2022-11-18 PROBLEM — I49.3 PVCS (PREMATURE VENTRICULAR CONTRACTIONS): Status: ACTIVE | Noted: 2022-11-18

## 2022-11-18 PROBLEM — E78.5 HYPERLIPIDEMIA: Status: ACTIVE | Noted: 2022-11-18

## 2022-11-18 LAB
ANION GAP SERPL CALCULATED.3IONS-SCNC: 11 MMOL/L (ref 4–13)
BASOPHILS # BLD AUTO: 0.03 THOUSANDS/ÂΜL (ref 0–0.1)
BASOPHILS NFR BLD AUTO: 0 % (ref 0–1)
BUN SERPL-MCNC: 51 MG/DL (ref 5–25)
CALCIUM SERPL-MCNC: 8.5 MG/DL (ref 8.3–10.1)
CHLORIDE SERPL-SCNC: 99 MMOL/L (ref 96–108)
CO2 SERPL-SCNC: 28 MMOL/L (ref 21–32)
CREAT SERPL-MCNC: 2.45 MG/DL (ref 0.6–1.3)
EOSINOPHIL # BLD AUTO: 0.1 THOUSAND/ÂΜL (ref 0–0.61)
EOSINOPHIL NFR BLD AUTO: 1 % (ref 0–6)
ERYTHROCYTE [DISTWIDTH] IN BLOOD BY AUTOMATED COUNT: 14.2 % (ref 11.6–15.1)
GFR SERPL CREATININE-BSD FRML MDRD: 21 ML/MIN/1.73SQ M
GLUCOSE SERPL-MCNC: 118 MG/DL (ref 65–140)
GLUCOSE SERPL-MCNC: 118 MG/DL (ref 65–140)
GLUCOSE SERPL-MCNC: 162 MG/DL (ref 65–140)
GLUCOSE SERPL-MCNC: 176 MG/DL (ref 65–140)
GLUCOSE SERPL-MCNC: 180 MG/DL (ref 65–140)
HCT VFR BLD AUTO: 30.7 % (ref 36.5–49.3)
HGB BLD-MCNC: 10 G/DL (ref 12–17)
IMM GRANULOCYTES # BLD AUTO: 0.09 THOUSAND/UL (ref 0–0.2)
IMM GRANULOCYTES NFR BLD AUTO: 1 % (ref 0–2)
LYMPHOCYTES # BLD AUTO: 1.63 THOUSANDS/ÂΜL (ref 0.6–4.47)
LYMPHOCYTES NFR BLD AUTO: 12 % (ref 14–44)
MCH RBC QN AUTO: 29.3 PG (ref 26.8–34.3)
MCHC RBC AUTO-ENTMCNC: 32.6 G/DL (ref 31.4–37.4)
MCV RBC AUTO: 90 FL (ref 82–98)
MONOCYTES # BLD AUTO: 1.61 THOUSAND/ÂΜL (ref 0.17–1.22)
MONOCYTES NFR BLD AUTO: 12 % (ref 4–12)
NEUTROPHILS # BLD AUTO: 9.99 THOUSANDS/ÂΜL (ref 1.85–7.62)
NEUTS SEG NFR BLD AUTO: 74 % (ref 43–75)
NRBC BLD AUTO-RTO: 0 /100 WBCS
PLATELET # BLD AUTO: 190 THOUSANDS/UL (ref 149–390)
PMV BLD AUTO: 12.7 FL (ref 8.9–12.7)
POTASSIUM SERPL-SCNC: 3.3 MMOL/L (ref 3.5–5.3)
RBC # BLD AUTO: 3.41 MILLION/UL (ref 3.88–5.62)
SODIUM SERPL-SCNC: 138 MMOL/L (ref 135–147)
WBC # BLD AUTO: 13.45 THOUSAND/UL (ref 4.31–10.16)

## 2022-11-18 PROCEDURE — 0DB38ZX EXCISION OF LOWER ESOPHAGUS, VIA NATURAL OR ARTIFICIAL OPENING ENDOSCOPIC, DIAGNOSTIC: ICD-10-PCS | Performed by: INTERNAL MEDICINE

## 2022-11-18 RX ORDER — LIDOCAINE HYDROCHLORIDE 10 MG/ML
INJECTION, SOLUTION EPIDURAL; INFILTRATION; INTRACAUDAL; PERINEURAL AS NEEDED
Status: DISCONTINUED | OUTPATIENT
Start: 2022-11-18 | End: 2022-11-18

## 2022-11-18 RX ORDER — POTASSIUM CHLORIDE 20 MEQ/1
20 TABLET, EXTENDED RELEASE ORAL ONCE
Status: COMPLETED | OUTPATIENT
Start: 2022-11-19 | End: 2022-11-19

## 2022-11-18 RX ORDER — SODIUM CHLORIDE, SODIUM LACTATE, POTASSIUM CHLORIDE, CALCIUM CHLORIDE 600; 310; 30; 20 MG/100ML; MG/100ML; MG/100ML; MG/100ML
INJECTION, SOLUTION INTRAVENOUS CONTINUOUS PRN
Status: DISCONTINUED | OUTPATIENT
Start: 2022-11-18 | End: 2022-11-18

## 2022-11-18 RX ORDER — POTASSIUM CHLORIDE 14.9 MG/ML
20 INJECTION INTRAVENOUS ONCE
Status: COMPLETED | OUTPATIENT
Start: 2022-11-18 | End: 2022-11-18

## 2022-11-18 RX ORDER — PROPOFOL 10 MG/ML
INJECTION, EMULSION INTRAVENOUS AS NEEDED
Status: DISCONTINUED | OUTPATIENT
Start: 2022-11-18 | End: 2022-11-18

## 2022-11-18 RX ORDER — HYDRALAZINE HYDROCHLORIDE 20 MG/ML
5 INJECTION INTRAMUSCULAR; INTRAVENOUS EVERY 6 HOURS PRN
Status: DISCONTINUED | OUTPATIENT
Start: 2022-11-18 | End: 2022-11-22 | Stop reason: HOSPADM

## 2022-11-18 RX ADMIN — METRONIDAZOLE 500 MG: 500 TABLET ORAL at 05:13

## 2022-11-18 RX ADMIN — ATORVASTATIN CALCIUM 40 MG: 40 TABLET, FILM COATED ORAL at 09:12

## 2022-11-18 RX ADMIN — Medication 2000 UNITS: at 09:13

## 2022-11-18 RX ADMIN — METRONIDAZOLE 500 MG: 500 TABLET ORAL at 13:54

## 2022-11-18 RX ADMIN — TAMSULOSIN HYDROCHLORIDE 0.4 MG: 0.4 CAPSULE ORAL at 09:13

## 2022-11-18 RX ADMIN — PROPOFOL 50 MG: 10 INJECTION, EMULSION INTRAVENOUS at 15:22

## 2022-11-18 RX ADMIN — HYDRALAZINE HYDROCHLORIDE 100 MG: 25 TABLET ORAL at 16:27

## 2022-11-18 RX ADMIN — HEPARIN SODIUM 5000 UNITS: 5000 INJECTION INTRAVENOUS; SUBCUTANEOUS at 13:54

## 2022-11-18 RX ADMIN — POTASSIUM CHLORIDE 20 MEQ: 14.9 INJECTION, SOLUTION INTRAVENOUS at 10:50

## 2022-11-18 RX ADMIN — NYSTATIN 500000 UNITS: 100000 SUSPENSION ORAL at 17:08

## 2022-11-18 RX ADMIN — ACETAMINOPHEN 650 MG: 325 TABLET, FILM COATED ORAL at 21:28

## 2022-11-18 RX ADMIN — NYSTATIN 500000 UNITS: 100000 SUSPENSION ORAL at 09:13

## 2022-11-18 RX ADMIN — PANTOPRAZOLE SODIUM 40 MG: 40 TABLET, DELAYED RELEASE ORAL at 05:13

## 2022-11-18 RX ADMIN — CYANOCOBALAMIN TAB 500 MCG 500 MCG: 500 TAB at 09:12

## 2022-11-18 RX ADMIN — GABAPENTIN 100 MG: 100 CAPSULE ORAL at 09:12

## 2022-11-18 RX ADMIN — NYSTATIN 500000 UNITS: 100000 SUSPENSION ORAL at 12:43

## 2022-11-18 RX ADMIN — ISODIUM CHLORIDE 3 ML: 0.03 SOLUTION RESPIRATORY (INHALATION) at 05:26

## 2022-11-18 RX ADMIN — HYDRALAZINE HYDROCHLORIDE 5 MG: 20 INJECTION INTRAMUSCULAR; INTRAVENOUS at 19:40

## 2022-11-18 RX ADMIN — HEPARIN SODIUM 5000 UNITS: 5000 INJECTION INTRAVENOUS; SUBCUTANEOUS at 21:29

## 2022-11-18 RX ADMIN — SODIUM CHLORIDE, SODIUM LACTATE, POTASSIUM CHLORIDE, AND CALCIUM CHLORIDE: .6; .31; .03; .02 INJECTION, SOLUTION INTRAVENOUS at 15:13

## 2022-11-18 RX ADMIN — METOPROLOL TARTRATE 25 MG: 25 TABLET, FILM COATED ORAL at 09:13

## 2022-11-18 RX ADMIN — ASPIRIN 81 MG CHEWABLE TABLET 81 MG: 81 TABLET CHEWABLE at 09:13

## 2022-11-18 RX ADMIN — Medication 250 MG: at 09:12

## 2022-11-18 RX ADMIN — HEPARIN SODIUM 5000 UNITS: 5000 INJECTION INTRAVENOUS; SUBCUTANEOUS at 05:13

## 2022-11-18 RX ADMIN — OXYCODONE HYDROCHLORIDE AND ACETAMINOPHEN 1000 MG: 500 TABLET ORAL at 09:12

## 2022-11-18 RX ADMIN — POLYETHYLENE GLYCOL 3350 17 G: 17 POWDER, FOR SOLUTION ORAL at 09:17

## 2022-11-18 RX ADMIN — LIDOCAINE HYDROCHLORIDE 50 MG: 10 INJECTION, SOLUTION EPIDURAL; INFILTRATION; INTRACAUDAL; PERINEURAL at 15:24

## 2022-11-18 RX ADMIN — GABAPENTIN 100 MG: 100 CAPSULE ORAL at 17:08

## 2022-11-18 RX ADMIN — INSULIN LISPRO 1 UNITS: 100 INJECTION, SOLUTION INTRAVENOUS; SUBCUTANEOUS at 21:36

## 2022-11-18 RX ADMIN — LIDOCAINE HYDROCHLORIDE 50 MG: 10 INJECTION, SOLUTION EPIDURAL; INFILTRATION; INTRACAUDAL; PERINEURAL at 15:22

## 2022-11-18 RX ADMIN — NYSTATIN 500000 UNITS: 100000 SUSPENSION ORAL at 21:29

## 2022-11-18 RX ADMIN — METRONIDAZOLE 500 MG: 500 TABLET ORAL at 21:29

## 2022-11-18 RX ADMIN — CEFTRIAXONE 1000 MG: 1 INJECTION, SOLUTION INTRAVENOUS at 09:17

## 2022-11-18 RX ADMIN — LEVALBUTEROL HYDROCHLORIDE 1.25 MG: 1.25 SOLUTION, CONCENTRATE RESPIRATORY (INHALATION) at 05:26

## 2022-11-18 RX ADMIN — DOXAZOSIN 2 MG: 2 TABLET ORAL at 21:28

## 2022-11-18 RX ADMIN — Medication 250 MG: at 17:08

## 2022-11-18 NOTE — CASE MANAGEMENT
Case Management Discharge Planning Note    Patient name Kristine Montalvo  Location [address] [facility] [address] [facility] [address] MRN [id number]  : 10/19/1929 Date 2022       Current Admission Date: 11/15/2022  Current Admission Diagnosis:Pneumonia   Patient Active Problem List    Diagnosis Date Noted   • Primary hypertension 11/15/2022   • Pneumonia 11/15/2022   • CHF (congestive heart failure) ([facility] [address] ) 11/15/2022   • Hyponatremia 11/15/2022   • Stage 4 chronic kidney disease ([facility] [address] ) 11/15/2022   • Type 2 diabetes mellitus ([facility] [address] ) 11/15/2022   • Dysphagia 11/15/2022   • Thrush 11/15/2022   • Elevated troponin 11/15/2022      LOS (days): 3  Geometric Mean LOS (GMLOS) (days): 3 60  Days to GMLOS:0 7     OBJECTIVE:  Risk of Unplanned Readmission Score: 19 03         Current admission status: Inpatient   Preferred Pharmacy:   [facility]  [address]  [address]  Phone: [phone] Fax: [phone]    Primary Care Provider: Rasta Villarreal PA-C    Primary Insurance: MEDICARE  Secondary Insurance: [address] [place] [address]:    Discharge planning discussed with[de-identified] Gerardo  Freedom of Choice: Yes  Comments - Freedom of Choice: FOC discussed regarding recs for IP rehab  Daughter will speak with CHAYA at [facility] prior to making decision, however agreeable to blanket referral  within 15 mile radius in the meantime    Contacts  Patient Contacts: Dai Rivas (daughter)  Relationship to Patient[de-identified] Family  Contact Method: Phone  Phone Number: [phone]  Reason/Outcome: Continuity of Care, Referral, Discharge Planning    Other Referral/Resources/Interventions Provided:  Interventions: Short Term Rehab  Referral Comments: CM informed therapy recs for IP rehab  CM TC daughter-Nba whom is agreeable to blanket referral within 15 mile radius with no preference at this time  Abdifatah Hollis would like to speak with CHAYA at the [facility] prior to choosing a preference    CM placed referrals in [address] [place] [address]

## 2022-11-18 NOTE — PLAN OF CARE
Problem: MOBILITY - ADULT  Goal: Maintain or return to baseline ADL function  Description: INTERVENTIONS:  -  Assess patient's ability to carry out ADLs; assess patient's baseline for ADL function and identify physical deficits which impact ability to perform ADLs (bathing, care of mouth/teeth, toileting, grooming, dressing, etc )  - Assess/evaluate cause of self-care deficits   - Assess range of motion  - Assess patient's mobility; develop plan if impaired  - Assess patient's need for assistive devices and provide as appropriate  - Encourage maximum independence but intervene and supervise when necessary  - Involve family in performance of ADLs  - Assess for home care needs following discharge   - Consider OT consult to assist with ADL evaluation and planning for discharge  - Provide patient education as appropriate  Outcome: Progressing  Goal: Maintains/Returns to pre admission functional level  Description: INTERVENTIONS:  - Perform BMAT or MOVE assessment daily    - Set and communicate daily mobility goal to care team and patient/family/caregiver  - Collaborate with rehabilitation services on mobility goals if consulted  - Perform Range of Motion 5 times a day  - Reposition patient every 2 hours    - Dangle patient 3 times a day  - Stand patient 3 times a day  - Ambulate patient 3 times a day  - Out of bed to chair 3 times a day   - Out of bed for meals 3 times a day  - Out of bed for toileting  - Record patient progress and toleration of activity level   Outcome: Progressing     Problem: Potential for Falls  Goal: Patient will remain free of falls  Description: INTERVENTIONS:  - Educate patient/family on patient safety including physical limitations  - Instruct patient to call for assistance with activity   - Consult OT/PT to assist with strengthening/mobility   - Keep Call bell within reach  - Keep bed low and locked with side rails adjusted as appropriate  - Keep care items and personal belongings within reach  - Initiate and maintain comfort rounds  - Make Fall Risk Sign visible to staff  - Offer Toileting every 2 Hours, in advance of need  - Initiate/Maintain bed alarm  - Obtain necessary fall risk management equipment: alarms  - Apply yellow socks and bracelet for high fall risk patients  - Consider moving patient to room near nurses station  Outcome: Progressing     Problem: RESPIRATORY - ADULT  Goal: Achieves optimal ventilation and oxygenation  Description: INTERVENTIONS:  - Assess for changes in respiratory status  - Assess for changes in mentation and behavior  - Position to facilitate oxygenation and minimize respiratory effort  - Oxygen administered by appropriate delivery if ordered  - Initiate smoking cessation education as indicated  - Encourage broncho-pulmonary hygiene including cough, deep breathe, Incentive Spirometry  - Assess the need for suctioning and aspirate as needed  - Assess and instruct to report SOB or any respiratory difficulty  - Respiratory Therapy support as indicated  Outcome: Progressing     Problem: Prexisting or High Potential for Compromised Skin Integrity  Goal: Skin integrity is maintained or improved  Description: INTERVENTIONS:  - Identify patients at risk for skin breakdown  - Assess and monitor skin integrity  - Assess and monitor nutrition and hydration status  - Monitor labs   - Assess for incontinence   - Turn and reposition patient  - Assist with mobility/ambulation  - Relieve pressure over bony prominences  - Avoid friction and shearing  - Provide appropriate hygiene as needed including keeping skin clean and dry  - Evaluate need for skin moisturizer/barrier cream  - Collaborate with interdisciplinary team   - Patient/family teaching  - Consider wound care consult   Outcome: Progressing     Problem: Nutrition/Hydration-ADULT  Goal: Nutrient/Hydration intake appropriate for improving, restoring or maintaining nutritional needs  Description: Monitor and assess patient's nutrition/hydration status for malnutrition  Collaborate with interdisciplinary team and initiate plan and interventions as ordered  Monitor patient's weight and dietary intake as ordered or per policy  Utilize nutrition screening tool and intervene as necessary  Determine patient's food preferences and provide high-protein, high-caloric foods as appropriate       INTERVENTIONS:  - Monitor oral intake, urinary output, labs, and treatment plans  - Assess nutrition and hydration status and recommend course of action  - Evaluate amount of meals eaten  - Assist patient with eating if necessary   - Allow adequate time for meals  - Recommend/ encourage appropriate diets, oral nutritional supplements, and vitamin/mineral supplements  - Order, calculate, and assess calorie counts as needed  - Recommend, monitor, and adjust tube feedings and TPN/PPN based on assessed needs  - Assess need for intravenous fluids  - Provide specific nutrition/hydration education as appropriate  - Include patient/family/caregiver in decisions related to nutrition  Outcome: Progressing

## 2022-11-18 NOTE — OCCUPATIONAL THERAPY NOTE
OT EVALUATION       11/18/22 1000   Note Type   Note type Evaluation   Pain Assessment   Pain Assessment Tool 0-10   Pain Score No Pain   Restrictions/Precautions   Other Precautions Fall Risk;O2   Home Living   Type of Home Assisted living  [facility])   Home Layout One level   Additional Comments pt reports being independent with ADLS and functional mobility prior to admission   Prior Function   Level of [place] Independent with ADLs; Independent with IADLS;Needs assistance with [address] staff   Receives Help From Personal care attendant   ADL   Eating Assistance 7  Independent   Grooming Assistance 5  Supervision/Setup   UB Bathing Assistance 4  Minimal Assistance   LB Bathing Assistance 2  Maximal Assistance   UB Dressing Assistance 4  Minimal Assistance   LB Dressing Assistance 2  Maximal Assistance   Toileting Assistance  2  Maximal Assistance   Bed Mobility   Supine to Sit 4  Minimal assistance   Transfers   Sit to Stand 2  Maximal assistance   Stand to Sit 2  Maximal assistance   Stand pivot 2  Maximal assistance   Additional items   (bed to chair with RW, poor balance, unsteady)   Balance   Static Sitting Fair   Dynamic Sitting Fair -   Static Standing Poor   Dynamic Standing Poor   Activity Tolerance   Activity Tolerance Patient limited by fatigue   Medical Staff Made Aware case management, pt will benefit from STR   Nurse Made Aware yes, Mikala Lever Assessment   RUE Assessment WFL   LUE Assessment   LUE Assessment WFL   Cognition   Overall Cognitive Status WFL   Arousal/Participation Cooperative   Attention Within functional limits   Orientation Level Oriented X4   Following Commands Follows multistep commands with increased time or repetition   Assessment   Limitation Decreased ADL status; Decreased UE strength;Decreased Safe judgement during ADL;Decreased endurance;Decreased self-care trans;Decreased high-level ADLs  (decreased balance and mobility)   Prognosis Good   Assessment Patient evaluated by Occupational Therapy  Patient admitted with Pneumonia  The patients occupational profile, medical and therapy history includes a extensive additional review of physical, cognitive, or psychosocial history related to current functional performance  Comorbidities affecting functional mobility and ADLS include: CHF, diabetes, hypertension and neuropathy  Prior to admission, patient was independent with functional mobility without assistive device, independent with ADLS, requiring assist for IADLS and an assisted living resident  The evaluation identifies the following performance deficits: weakness, impaired balance, decreased endurance, increased fall risk, new onset of impairment of functional mobility, decreased ADLS, decreased IADLS, decreased activity tolerance, decreased safety awareness, impaired judgement and decreased strength, that result in activity limitations and/or participation restrictions  This evaluation requires clinical decision making of high complexity, because the patient presents with comorbidites that affect occupational performance and required significant modification of tasks or assistance with consideration of multiple treatment options  The Barthel Index was used as a functional outcome tool presenting with a score of Barthel Index Score: 45, indicating marked limitations of functional mobility and ADLS  The patient's raw score on the -PAC Daily Activity inpatient short form is 13, standardized score is 32 03, less than 39 4  Patients at this level are likely to benefit from DC to post-acute rehabilitation services  Please refer to the recommendation of the Occupational Therapist for safe DC planning  Patient will benefit from skilled Occupational Therapy services to address above deficits and facilitate a safe return to prior level of function     Goals   Patient Goals get stronger   STG Time Frame   (1-7 days)   Short Term Goal  Goals established to promote Patient Goals: get stronger:   Grooming: independent seated; Bathing: mod assist; Upper Body Dressing min assist; Lower Body Dressing: mod assist; Toileting: mod assist; Patient will increase standing tolerance to 3 minutes during ADL task to decrease assistance level and decrease fall risk; Patient will increase bed mobility to supervision in preparation for ADLS and transfers; Patient will increase functional mobility to and from bathroom with rolling walker with mod assist to increase performance with ADLS and to use a toilet; Patient will tolerate 10 minutes of UE ROM/strengthening to increase general activity tolerance and performance in ADLS/IADLS; Patient will improve functional activity tolerance to 10 minutes of sustained functional tasks to increase participation in basic self-care and decrease assistance level;  Patient will be able to to verbalize understanding and perform energy conservation/proper body mechanics during ADLS and functional mobility at least 75% of the time with minimal cueing to decrease signs of fatigue and increase stamina to return to prior level of function; Patient will increase dynamic sitting balance to fair to improve the ability to sit at edge of bed or on a chair for ADLS;  Patient will increase dynamic standing balance to poor+ to improve postural stability and decrease fall risk during standing ADLS and transfers  LTG Time Frame   (8-14 days)   Long Term Goal Bathing: min assist; Upper Body Dressing supervision; Lower Body Dressing: min assist; Toileting: min assist; Patient will increase standing tolerance to 6 minutes during ADL task to decrease assistance level and decrease fall risk; Patient will increase bed mobility to independent in preparation for ADLS and transfers;  Patient will increase functional mobility to and from bathroom with rolling walker with min assist to increase performance with ADLS and to use a toilet; Patient will tolerate 20 minutes of UE ROM/strengthening to increase general activity tolerance and performance in ADLS/IADLS; Patient will improve functional activity tolerance to 20 minutes of sustained functional tasks to increase participation in basic self-care and decrease assistance level;  Patient will be able to to verbalize understanding and perform energy conservation/proper body mechanics during ADLS and functional mobility at least 90% of the time to decrease signs of fatigue and increase stamina to return to prior level of function; Patient will increase dynamic sitting balance to fair+ to improve the ability to sit at edge of bed or on a chair for ADLS;  Patient will increase dynamic standing balance to fair- to improve postural stability and decrease fall risk during standing ADLS and transfers  Pt will score >/= 17/24 on AM-PAC Daily Activity Inpatient scale to promote safe independence with ADLs and functional mobility; Pt will score >/= 75/100 on Barthel Index in order to decrease caregiver assistance needed and increase ability to perform ADLs and functional mobility  Plan   Treatment Interventions ADL retraining;Functional transfer training;UE strengthening/ROM; Endurance training;Patient/family training;Equipment evaluation/education; Activityengagement; Compensatory technique education   Goal Expiration Date 12/02/22   OT Frequency 3-5x/wk   Recommendation   OT Discharge Recommendation Post acute rehabilitation services   AM-PAC Daily Activity Inpatient   Lower Body Dressing 1   Bathing 1   Toileting 1   Upper Body Dressing 3   Grooming 3   Eating 4   Daily Activity Raw Score 13   Daily Activity Standardized Score (Calc for Raw Score >=11) 32 03   AM-PAC Applied Cognition Inpatient   Following a Speech/Presentation 4   Understanding Ordinary Conversation 4   Taking Medications 4   Remembering Where Things Are Placed or Put Away 4   Remembering List of 4-5 Errands 3   Taking Care of Complicated Tasks 3   Applied Cognition Raw Score 22   Applied Cognition Standardized Score 47 83   Barthel Index   Feeding 10   Bathing 0   Grooming Score 0   Dressing Score 5   Bladder Score 10   Bowels Score 10   Toilet Use Score 5   Transfers (Bed/Chair) Score 5   Mobility (Level Surface) Score 0   Stairs Score 0   Barthel Index Score 45   Licensure   [place] License Number  Tomás Cirri Shona [place] 87 OTR/L [id number]

## 2022-11-18 NOTE — ASSESSMENT & PLAN NOTE
Lab Results   Component Value Date    EGFR 21 11/18/2022    EGFR 21 11/17/2022    EGFR 20 11/16/2022    CREATININE 2 45 (H) 11/18/2022    CREATININE 2 44 (H) 11/17/2022    CREATININE 2 59 (H) 11/16/2022     Patient has a history of stage IV CKD, uncertain of baseline creatinine  Will repeat BMP in a m  Francella Crooked Avoid periods of relative hypotension  Avoid nephrotoxin agents

## 2022-11-18 NOTE — PROGRESS NOTES
Irene 45  Progress Note Josesito Seat 10/19/1929, 80 y o  male MRN: [id number]  Unit/Bed#: [address] [place] [address] Encounter: [id number]  Primary Care Provider: Werner Donis PA-C   Date and time admitted to hospital: 11/15/2022 11:27 AM    * Pneumonia  Assessment & Plan  Suspected pneumonia, possibly aspiration secondary to patient reporting dysphagia with choking on pills and fluids over the past 2 weeks  Febrile three days PTA  Leukocytosis on admission  Chest x-ray with bilateral multifocal infiltrates  Lactic acid negative  Procalcitonin 0 12  Urine for strep and legionella negative   · Continue rocephin, flagyl   · Blood cultures negative to date  · Send procalcitonin and trend  · Supportive therapies with duonebs, respiratory protocol  · Speech therapy recommendations appreciated; no modification to consistency or liquids        Dysphagia  Assessment & Plan  Patient reports that he has been having intermittent dysphagia over the past few weeks, having difficulty after drinking thin liquids, choking on pills  Speech consulted for bedside swallow eval  Aspiration precautions  · Patient passed video swallow but still is complaining of a stuck sensation intermittently with pills  · GI consult appreciated  · EGD performed on 11/18 shows oropharyngeal dysphagia with esophageal dysmotility  · GI recommending if symptoms persist may need to consider PEG tube    Hyponatremia  Assessment & Plan  Mild hyponatremia  Fluid restriction 1500 cc daily  Repeat BMP in a m     CHF (congestive heart failure) ([address] )  Assessment & Plan  Wt Readings from Last 3 Encounters:   11/18/22 83 5 kg (184 lb)     Patient appears to be volume overloaded, pitting edema noted bilateral lower extremities  BNP elevated 9359  Given lasix on admission with good diuresis      · Continue lasix 40mg IVP daily   · Cardiology consultation appreciated  · Echo pending   · Daily Weight  · Intake and output  · Low sodium diet        Primary hypertension  Assessment & Plan  Patient has a history of primary hypertension  Home medications include Norvasc 10 mg, Cardura 2 mg hydralazine 100 mg b i d , Imdur 60 mg and Lopressor 25 mg  Stage 4 chronic kidney disease [facility])  Assessment & Plan  Lab Results   Component Value Date    EGFR 21 11/18/2022    EGFR 21 11/17/2022    EGFR 20 11/16/2022    CREATININE 2 45 (H) 11/18/2022    CREATININE 2 44 (H) 11/17/2022    CREATININE 2 59 (H) 11/16/2022     Patient has a history of stage IV CKD, uncertain of baseline creatinine  Will repeat BMP in a m  Pratima Ada Avoid periods of relative hypotension  Avoid nephrotoxin agents  Type 2 diabetes mellitus [facility])  Assessment & Plan  Lab Results   Component Value Date    HGBA1C 5 7 (H) 11/16/2022       Recent Labs     11/17/22  2049 11/18/22  0709 11/18/22  1120 11/18/22  1606   POCGLU 134 118 180* 162*       Blood Sugar Average: Last 72 hrs:  (P) 230 [id number]  Patient normally takes Prandin 0 5 mg t i d  With meals  Will hold at this time  Place on Accu-Cheks AC and HS with sliding scale coverage  Diabetic diet  Hga1c 5 7       Thrush  Assessment & Plan  Patient noted to have white patches on his tongue, admits to some dysphagia, suspect thrush  Nystatin swish and swallow ordered  Monitor    Elevated troponin  Assessment & Plan  Troponin elevated 120->140  Denies any chest discomfort  Sinus rhythm noted on monitor, right bundle branch block  Suspect may be elevated secondary to CHF? Cardiology consult appreciated           VTE Pharmacologic Prophylaxis:   Pharmacologic: Heparin  Mechanical VTE Prophylaxis in Place: Yes    Patient Centered Rounds: I have performed bedside rounds with nursing staff today  Discussions with Specialists or Other Care Team Provider: Yes  Education and Discussions with Family / Patient:Yes  Time Spent for Care: 30 minutes    More than 50% of total time spent on counseling and coordination of care as described above     Current Length of Stay: 3 day(s)  Current Patient Status: Inpatient     Discharge Plan: Not stable for discharge today; most likely in next 24 to 48 hours  Resume diet; follow up GI recommendations     Code Status: Level 3 - DNAR and DNI      Subjective:   Patient seen sitting up in chair, knows that he is having EGD done later today and is NPO  Reports that he is doing better with his pills crushed, no incident of choking on them recently  Denies headache, dizziness, chest pain, shortness of breath  No nausea or vomiting  Objective:     Vitals:   Temp (24hrs), Av 5 °F (37 5 °C), Min:98 8 °F (37 1 °C), Max:100 5 °F (38 1 °C)    Temp:  [98 8 °F (37 1 °C)-100 5 °F (38 1 °C)] 98 8 °F (37 1 °C)  HR:  [] 85  Resp:  [17-22] 18  BP: (103-238)/() 188/68  SpO2:  [90 %-100 %] 95 %  Body mass index is 30 62 kg/m²  Input and Output Summary (last 24 hours): Intake/Output Summary (Last 24 hours) at 2022 1844  Last data filed at 2022 1532  Gross per 24 hour   Intake 100 ml   Output 470 ml   Net -370 ml        Physical Exam:     Physical Exam  Vitals and nursing note reviewed  HENT:      Head: Normocephalic  Nose: Nose normal       Mouth/Throat:      Mouth: Mucous membranes are moist    Eyes:      Extraocular Movements: Extraocular movements intact  Conjunctiva/sclera: Conjunctivae normal    Cardiovascular:      Rate and Rhythm: Normal rate and regular rhythm  Heart sounds: Murmur heard  Pulmonary:      Effort: Pulmonary effort is normal       Breath sounds: Normal breath sounds  Abdominal:      General: Bowel sounds are normal  There is no distension  Palpations: Abdomen is soft  Tenderness: There is no abdominal tenderness  Genitourinary:     Comments: Voiding spontaneously   Musculoskeletal:      Cervical back: Normal range of motion  Comments: Bilateral LE edema improved from earlier this week   Skin:     General: Skin is warm and dry  Capillary Refill: Capillary refill takes less than 2 seconds  Neurological:      General: No focal deficit present  Mental Status: He is alert and oriented to person, place, and time  Psychiatric:         Mood and Affect: Mood normal          Behavior: Behavior normal          Thought Content: Thought content normal          Judgment: Judgment normal          Additional Data:     Labs:    Results from last 7 days   Lab Units 11/18/22  0510 11/17/22  0537 11/16/22  0522 11/15/22  1207   WBC Thousand/uL 13 45* 10 60* 13 33* 11 87*   HEMOGLOBIN g/dL 10 0* 9 9* 10 8* 10 2*   HEMATOCRIT % 30 7* 29 9* 31 9* 31 2*   PLATELETS Thousands/uL 190 182 207 186   NEUTROS PCT % 74 69  --  80*     Results from last 7 days   Lab Units 11/18/22  0510 11/17/22  0537 11/16/22  0522 11/15/22  1207   SODIUM mmol/L 138 132* 134* 131*   POTASSIUM mmol/L 3 3* 3 6 3 8 4 4   CHLORIDE mmol/L 99 97 97 94*   CO2 mmol/L 28 26 26 28   BUN mg/dL 51* 55* 54* 53*   CREATININE mg/dL 2 45* 2 44* 2 59* 2 59*   CALCIUM mg/dL 8 5 8 4 8 5 8 6   TOTAL BILIRUBIN mg/dL  --  0 64  --  0 79   ALK PHOS U/L  --  72  --  84   ALT U/L  --  22  --  19   AST U/L  --  35  --   --              Lab Results   Component Value Date/Time    HGBA1C 5 7 (H) 11/16/2022 05:22 AM     Results from last 7 days   Lab Units 11/18/22  1606 11/18/22  1120 11/18/22  0709 11/17/22  2049 11/17/22  1624 11/17/22  1138 11/17/22  0724 11/16/22  2046 11/16/22  1602 11/16/22  1131 11/16/22  0731 11/15/22  2050   POC GLUCOSE mg/dl 162* 180* 118 134 132 211* 114 156* 166* 178* 131 149*     Results from last 7 days   Lab Units 11/16/22  0522 11/15/22  1358   LACTIC ACID mmol/L  --  0 8   PROCALCITONIN ng/ml 0 12  --        * I Have Reviewed All Lab Data Listed Above  * Additional Pertinent Lab Tests Reviewed:  Lexie 66 Admission Reviewed    Imaging:     FL esophagram complete   Final Result by Heather Pinto MD (11/17 0224)      Moderate to severe esophageal dysmotility with significant residual contrast remaining in the esophagus at the end of the study  Small likely mixed type hiatal hernia  Workstation performed: [id number]         FL barium swallow video w speech   Final Result by DOMITILA Alberto (11/18 1106)      FL barium swallow video w speech   Final Result by GRANT MORENO (11/16 1013)      XR chest 1 view portable   Final Result by Jaswinder Washington MD (24/46 6332)      Bilateral multifocal infiltrates  Small bilateral pleural effusions                    Workstation performed: [id number]           Imaging Reports Reviewed by myself    Cultures:   Blood Culture:   Lab Results   Component Value Date    BLOODCX No Growth at 48 hrs  11/15/2022    BLOODCX No Growth at 48 hrs  11/15/2022     Urine Culture: No results found for: URINECX  Sputum Culture: No components found for: SPUTUMCX  Wound Culture: No results found for: WOUNDCULT    Last 24 Hours Medication List:   Current Facility-Administered Medications   Medication Dose Route Frequency Provider Last Rate   • acetaminophen  650 mg Oral Q6H PRN APOLONIA BrowneNP     • amLODIPine  10 mg Oral Daily APOLONIA BrowneNP     • vitamin C  1,000 mg Oral Daily APOLONIA BrowneNP     • aspirin  81 mg Oral Daily APOLONIA BrowneNP     • atorvastatin  40 mg Oral Daily APOLONIA BrowneNP     • benzonatate  100 mg Oral TID PRN APOLONIA BrowneNP     • cefTRIAXone  1,000 mg Intravenous Q24H KEISHA Browne 1,000 mg (11/18/22 8357)   • cholecalciferol  2,000 Units Oral Daily APOLONIA BrowneNP     • vitamin B-12  500 mcg Oral Daily APOLONIA BrowneNP     • doxazosin  2 mg Oral HS KEISHA Browne     • furosemide  40 mg Intravenous Daily KEISHA Landrum     • gabapentin  100 mg Oral BID KEISHA Browne     • heparin (porcine)  5,000 Units Subcutaneous [facility] [address] , APOLONIANP     • hydrALAZINE  100 mg Oral BID KEISHA Trammell     • insulin lispro  1-5 Units Subcutaneous HS KEISHA Trammell     • insulin lispro  1-6 Units Subcutaneous TID [facility] KEISHA Trammell     • isosorbide mononitrate  60 mg Oral Daily KEISHA Trammell     • levalbuterol  1 25 mg Nebulization Q6H PRN KEISHA Trammell      And   • sodium chloride  3 mL Nebulization Q6H PRN KEISHA Trammell     • metoprolol tartrate  25 mg Oral Daily KEISHA Trammell     • metroNIDAZOLE  500 mg Oral [facility] KEISHA Trammell     • nystatin  500,000 Units Swish & Swallow 4x Daily KEISHA Trammell     • pantoprazole  40 mg Oral Early Morning KEISHA Gacria     • polyethylene glycol  17 g Oral Daily KEISHA Garcia     • [START ON 11/19/2022] potassium chloride  20 mEq Oral Once KEISHA Trammell     • saccharomyces boulardii  250 mg Oral BID KEISHA Trammell     • simethicone  80 mg Oral 4x Daily PRN KEISHA Trammell     • tamsulosin  0 4 mg Oral Daily KEISHA Trammell          Today, Patient Was Seen By: KEISHA Trammell    ** Please Note: Dragon 360 Dictation voice to text software may have been used in the creation of this document   **

## 2022-11-18 NOTE — PLAN OF CARE
Problem: MOBILITY - ADULT  Goal: Maintain or return to baseline ADL function  Description: INTERVENTIONS:  -  Assess patient's ability to carry out ADLs; assess patient's baseline for ADL function and identify physical deficits which impact ability to perform ADLs (bathing, care of mouth/teeth, toileting, grooming, dressing, etc )  - Assess/evaluate cause of self-care deficits   - Assess range of motion  - Assess patient's mobility; develop plan if impaired  - Assess patient's need for assistive devices and provide as appropriate  - Encourage maximum independence but intervene and supervise when necessary  - Involve family in performance of ADLs  - Assess for home care needs following discharge   - Consider OT consult to assist with ADL evaluation and planning for discharge  - Provide patient education as appropriate  Outcome: Progressing  Goal: Maintains/Returns to pre admission functional level  Description: INTERVENTIONS:  - Perform BMAT or MOVE assessment daily    - Set and communicate daily mobility goal to care team and patient/family/caregiver  - Collaborate with rehabilitation services on mobility goals if consulted  - Perform Range of Motion 4 times a day  - Reposition patient every 2 hours    - Dangle patient 3 times a day  - Stand patient 3 times a day  - Ambulate patient 3 times a day  - Out of bed to chair 3 times a day   - Out of bed for meals 3 times a day  - Out of bed for toileting  - Record patient progress and toleration of activity level   Outcome: Progressing     Problem: RESPIRATORY - ADULT  Goal: Achieves optimal ventilation and oxygenation  Description: INTERVENTIONS:  - Assess for changes in respiratory status  - Assess for changes in mentation and behavior  - Position to facilitate oxygenation and minimize respiratory effort  - Oxygen administered by appropriate delivery if ordered  - Initiate smoking cessation education as indicated  - Encourage broncho-pulmonary hygiene including cough, deep breathe, Incentive Spirometry  - Assess the need for suctioning and aspirate as needed  - Assess and instruct to report SOB or any respiratory difficulty  - Respiratory Therapy support as indicated  Outcome: Progressing     Problem: Nutrition/Hydration-ADULT  Goal: Nutrient/Hydration intake appropriate for improving, restoring or maintaining nutritional needs  Description: Monitor and assess patient's nutrition/hydration status for malnutrition  Collaborate with interdisciplinary team and initiate plan and interventions as ordered  Monitor patient's weight and dietary intake as ordered or per policy  Utilize nutrition screening tool and intervene as necessary  Determine patient's food preferences and provide high-protein, high-caloric foods as appropriate       INTERVENTIONS:  - Monitor oral intake, urinary output, labs, and treatment plans  - Assess nutrition and hydration status and recommend course of action  - Evaluate amount of meals eaten  - Assist patient with eating if necessary   - Allow adequate time for meals  - Recommend/ encourage appropriate diets, oral nutritional supplements, and vitamin/mineral supplements  - Order, calculate, and assess calorie counts as needed  - Recommend, monitor, and adjust tube feedings and TPN/PPN based on assessed needs  - Assess need for intravenous fluids  - Provide specific nutrition/hydration education as appropriate  - Include patient/family/caregiver in decisions related to nutrition  Outcome: Progressing     Problem: Prexisting or High Potential for Compromised Skin Integrity  Goal: Skin integrity is maintained or improved  Description: INTERVENTIONS:  - Identify patients at risk for skin breakdown  - Assess and monitor skin integrity  - Assess and monitor nutrition and hydration status  - Monitor labs   - Assess for incontinence   - Turn and reposition patient  - Assist with mobility/ambulation  - Relieve pressure over bony prominences  - Avoid friction and shearing  - Provide appropriate hygiene as needed including keeping skin clean and dry  - Evaluate need for skin moisturizer/barrier cream  - Collaborate with interdisciplinary team   - Patient/family teaching  - Consider wound care consult   Outcome: Progressing

## 2022-11-18 NOTE — ANESTHESIA PREPROCEDURE EVALUATION
Procedure:  EGD    Relevant Problems   CARDIO   (+) CHF (congestive heart failure) (HCC)   (+) Hyperlipidemia   (+) PVCs (premature ventricular contractions)   (+) Primary hypertension      ENDO   (+) Type 2 diabetes mellitus (HCC)      GI/HEPATIC   (+) Dysphagia      /RENAL   (+) Stage 4 chronic kidney disease (HCC)      PULMONARY   (+) Pneumonia        Physical Exam    Airway    Mallampati score: III  TM Distance: >3 FB  Neck ROM: full     Dental   upper dentures and lower dentures,     Cardiovascular  Rhythm: regular, Rate: normal,     Pulmonary  Breath sounds clear to auscultation,     Other Findings        Anesthesia Plan  ASA Score- 3     Anesthesia Type- IV sedation with anesthesia with ASA Monitors  Additional Monitors:   Airway Plan:           Plan Factors-    Chart reviewed  Patient is not a current smoker  Induction- intravenous  Postoperative Plan-     Informed Consent- Anesthetic plan and risks discussed with patient  I personally reviewed this patient with the CRNA  Discussed and agreed on the Anesthesia Plan with the CRNA  Carl Luna

## 2022-11-18 NOTE — ASSESSMENT & PLAN NOTE
Suspected pneumonia, possibly aspiration secondary to patient reporting dysphagia with choking on pills and fluids over the past 2 weeks  Febrile three days PTA  Leukocytosis on admission  Chest x-ray with bilateral multifocal infiltrates  Lactic acid negative  Procalcitonin 0 12    Urine for strep and legionella negative   · Continue rocephin, flagyl   · Blood cultures negative to date  · Send procalcitonin and trend  · Supportive therapies with duonebs, respiratory protocol  · Speech therapy recommendations appreciated; no modification to consistency or liquids

## 2022-11-18 NOTE — ASSESSMENT & PLAN NOTE
Wt Readings from Last 3 Encounters:   11/18/22 83 5 kg (184 lb)     Patient appears to be volume overloaded, pitting edema noted bilateral lower extremities  BNP elevated 9359  Given lasix on admission with good diuresis      · Continue lasix 40mg IVP daily   · Cardiology consultation appreciated  · Echo pending   · Daily Weight  · Intake and output  · Low sodium diet

## 2022-11-18 NOTE — ASSESSMENT & PLAN NOTE
Lab Results   Component Value Date    HGBA1C 5 7 (H) 11/16/2022       Recent Labs     11/17/22  2049 11/18/22  0709 11/18/22  1120 11/18/22  1606   POCGLU 134 118 180* 162*       Blood Sugar Average: Last 72 hrs:  (P) 807 [id number]  Patient normally takes Prandin 0 5 mg t i d  With meals  Will hold at this time  Place on Accu-Cheks AC and HS with sliding scale coverage  Diabetic diet    Hga1c 5 7

## 2022-11-18 NOTE — ASSESSMENT & PLAN NOTE
Patient reports that he has been having intermittent dysphagia over the past few weeks, having difficulty after drinking thin liquids, choking on pills  Speech consulted for bedside swallow eval  Aspiration precautions    · Patient passed video swallow but still is complaining of a stuck sensation intermittently with pills  · GI consult appreciated  · EGD performed on 11/18 shows oropharyngeal dysphagia with esophageal dysmotility  · GI recommending if symptoms persist may need to consider PEG tube

## 2022-11-18 NOTE — DISCHARGE INSTR - DIET
Fully upright for meals and for 45 min after meals to promote clearance of food/liquid through esophagus and into stomach  Consider small more frequent feedings

## 2022-11-18 NOTE — ANESTHESIA POSTPROCEDURE EVALUATION
Post-Op Assessment Note    CV Status:  Stable  Pain Score: 0    Pain management: adequate     Mental Status:  Alert and awake   Hydration Status:  Euvolemic   PONV Controlled:  Controlled   Airway Patency:  Patent      Post Op Vitals Reviewed: Yes      Staff: CRNA, Anesthesiologist   Comments: Report given to recoveirng RN, Pt states he is comfortable  No notable events documented      BP (!) 205/85 (11/18/22 1536)    Temp      Pulse 81 (11/18/22 1536)   Resp 18 (11/18/22 1536)    SpO2 100 % (11/18/22 1536)

## 2022-11-18 NOTE — PHYSICAL THERAPY NOTE
PT TREATMENT     11/18/22 1023   PT Last Visit   PT Visit Date 11/18/22   Note Type   Note Type Treatment   Pain Assessment   Pain Assessment Tool 0-10   Pain Score No Pain   Restrictions/Precautions   Weight Bearing Precautions Per Order No   Other Precautions O2;Fall Risk   General   Chart Reviewed Yes   Family/Caregiver Present No   Cognition   Overall Cognitive Status WFL   Arousal/Participation Cooperative   Attention Within functional limits   Orientation Level Oriented X4   Following Commands Follows multistep commands with increased time or repetition   Subjective   Subjective "I have not been eating enough" " I have no energy"   Bed Mobility   Additional Comments Pt presents in chair   Transfers   Sit to Stand 2  Maximal assistance   Stand to Sit 2  Maximal assistance   Ambulation/Elevation   Gait pattern Wide DIMAS;Step to;Excessively slow  (unsteady,)   Gait Assistance 2  Maximal assist   Additional items Assist x 1;Verbal cues  (chair follow)   Assistive Device Rolling walker   Distance 5 feet x 2   Balance   Ambulatory Poor -   Activity Tolerance   Activity Tolerance Patient limited by fatigue  (limited by generalized weakness and decrease balance )   Medical Staff Made Aware yes: CM: will now recommend STR   Nurse Made Aware yes: Dameon   Exercises   Hip Flexion Sitting;10 reps;Bilateral   Knee AROM Long Arc Quad Sitting;20 reps;Bilateral   Ankle Pumps Sitting;10 reps;Bilateral  (heel/toe raises and APs)   Balance training  standing at RW to get steady on his feet pre gait , then with taking steps   Assessment   Prognosis Good   Problem List Decreased strength;Decreased endurance; Impaired balance;Decreased mobility; Decreased safety awareness; Obesity   Assessment Pt presents in chair  Pt has declined in his functional abiliities since last PT visit  Pt is now unable to walk due to generalized weakness and decreased balance with walking  Pt would now benefit from STR    Pt is unable to return home to care for himself  The patient's AM-PAC Basic Mobility Inpatient Short Form Raw Score is 11  A Raw score of less than or equal to 16 suggests the patient may benefit from discharge to post-acute rehabilitation services  Please also refer to the recommendation of the Physical Therapist for safe discharge planning  Goals   Patient Goals to be as strong as he was and to walk   Plan   Treatment/Interventions Functional transfer training;LE strengthening/ROM; Therapeutic exercise; Endurance training;Patient/family training;Equipment eval/education; Bed mobility;Gait training;Spoke to nursing;Spoke to case management;OT   Progress Progressing toward goals   PT Frequency Other (Comment)  (5/wk)   Recommendation   PT Discharge Recommendation Post acute rehabilitation services   AM-PAC Basic Mobility Inpatient   Turning in Bed Without Bedrails 2   Lying on Back to Sitting on Edge of Flat Bed 2   Moving Bed to Chair 2   Standing Up From Chair 2   Walk in Room 2   Climb 3-5 Stairs 1   Basic Mobility Inpatient Raw Score 11   Basic Mobility Standardized Score 30 25   Highest Level Of Mobility   JH-HLM Goal 4: Move to chair/commode   JH-HLM Achieved 6: Walk 10 steps or more   End of Consult   Patient Position at End of Consult Bedside chair;Bed/Chair alarm activated; All needs within reach   Licensure   [place] License Number  Yvette Harmtan PT  [id number]

## 2022-11-18 NOTE — CASE MANAGEMENT
Case Management Discharge Planning Note    Patient name Mirian Amaya  Location [address] [facility] 410/4 [address] [place] [address] Drive-* MRN [id number]  : 10/19/1929 Date 2022       Current Admission Date: 11/15/2022  Current Admission Diagnosis:Pneumonia   Patient Active Problem List    Diagnosis Date Noted   • Primary hypertension 11/15/2022   • Pneumonia 11/15/2022   • CHF (congestive heart failure) ([facility] ) 11/15/2022   • Hyponatremia 11/15/2022   • Stage 4 chronic kidney disease ([facility] ) 11/15/2022   • Type 2 diabetes mellitus ([facility] ) 11/15/2022   • Dysphagia 11/15/2022   • Thrush 11/15/2022   • Elevated troponin 11/15/2022      LOS (days): 3  Geometric Mean LOS (GMLOS) (days): 3 60  Days to GMLOS:0 6     OBJECTIVE:  Risk of Unplanned Readmission Score: 19 03         Current admission status: Inpatient   Preferred Pharmacy:   [facility] [address] [facility]  [address]  [address]  Phone: [phone] Fax: [phone]    Primary Care Provider: Benja Bella PA-C    Primary Insurance: MEDICARE  Secondary Insurance: [address] [place] [address] Street:    Contacts  Patient Contacts: Gustavo Balderrama (dtr)  Relationship to Patient[de-identified] Family  Contact Method: Phone  Phone Number: [phone]  Reason/Outcome: Continuity of Care, Discharge Planning, Referral    Other Referral/Resources/Interventions Provided:  Interventions: Short Term Rehab  Referral Comments: BAYLEE TC daughter-Nba per request of family  Yanira Hunt confirmed [facility] is their preference  CM informed Yanira Hunt that [facility] is still reviewing, but informed them via AIDIN that they are first choice

## 2022-11-19 ENCOUNTER — APPOINTMENT (INPATIENT)
Dept: RADIOLOGY | Facility: HOSPITAL | Age: 87
End: 2022-11-19

## 2022-11-19 LAB
ANION GAP SERPL CALCULATED.3IONS-SCNC: 11 MMOL/L (ref 4–13)
BUN SERPL-MCNC: 53 MG/DL (ref 5–25)
CALCIUM SERPL-MCNC: 8.5 MG/DL (ref 8.3–10.1)
CHLORIDE SERPL-SCNC: 99 MMOL/L (ref 96–108)
CO2 SERPL-SCNC: 26 MMOL/L (ref 21–32)
CREAT SERPL-MCNC: 2.41 MG/DL (ref 0.6–1.3)
ERYTHROCYTE [DISTWIDTH] IN BLOOD BY AUTOMATED COUNT: 14.6 % (ref 11.6–15.1)
GFR SERPL CREATININE-BSD FRML MDRD: 22 ML/MIN/1.73SQ M
GLUCOSE SERPL-MCNC: 141 MG/DL (ref 65–140)
GLUCOSE SERPL-MCNC: 150 MG/DL (ref 65–140)
GLUCOSE SERPL-MCNC: 153 MG/DL (ref 65–140)
GLUCOSE SERPL-MCNC: 183 MG/DL (ref 65–140)
GLUCOSE SERPL-MCNC: 237 MG/DL (ref 65–140)
HCT VFR BLD AUTO: 31.9 % (ref 36.5–49.3)
HGB BLD-MCNC: 10.4 G/DL (ref 12–17)
MCH RBC QN AUTO: 30.1 PG (ref 26.8–34.3)
MCHC RBC AUTO-ENTMCNC: 32.6 G/DL (ref 31.4–37.4)
MCV RBC AUTO: 92 FL (ref 82–98)
PLATELET # BLD AUTO: 179 THOUSANDS/UL (ref 149–390)
PMV BLD AUTO: 12.6 FL (ref 8.9–12.7)
POTASSIUM SERPL-SCNC: 3.7 MMOL/L (ref 3.5–5.3)
RBC # BLD AUTO: 3.46 MILLION/UL (ref 3.88–5.62)
SODIUM SERPL-SCNC: 136 MMOL/L (ref 135–147)
WBC # BLD AUTO: 16.41 THOUSAND/UL (ref 4.31–10.16)

## 2022-11-19 RX ADMIN — INSULIN LISPRO 3 UNITS: 100 INJECTION, SOLUTION INTRAVENOUS; SUBCUTANEOUS at 13:41

## 2022-11-19 RX ADMIN — METRONIDAZOLE 500 MG: 500 TABLET ORAL at 21:48

## 2022-11-19 RX ADMIN — METRONIDAZOLE 500 MG: 500 TABLET ORAL at 05:29

## 2022-11-19 RX ADMIN — POLYETHYLENE GLYCOL 3350 17 G: 17 POWDER, FOR SOLUTION ORAL at 08:38

## 2022-11-19 RX ADMIN — ACETAMINOPHEN 650 MG: 325 TABLET, FILM COATED ORAL at 21:55

## 2022-11-19 RX ADMIN — INSULIN LISPRO 1 UNITS: 100 INJECTION, SOLUTION INTRAVENOUS; SUBCUTANEOUS at 08:48

## 2022-11-19 RX ADMIN — CEFTRIAXONE 1000 MG: 1 INJECTION, SOLUTION INTRAVENOUS at 09:08

## 2022-11-19 RX ADMIN — HEPARIN SODIUM 5000 UNITS: 5000 INJECTION INTRAVENOUS; SUBCUTANEOUS at 21:48

## 2022-11-19 RX ADMIN — METRONIDAZOLE 500 MG: 500 TABLET ORAL at 13:36

## 2022-11-19 RX ADMIN — GABAPENTIN 100 MG: 100 CAPSULE ORAL at 08:34

## 2022-11-19 RX ADMIN — PANTOPRAZOLE SODIUM 40 MG: 40 TABLET, DELAYED RELEASE ORAL at 05:29

## 2022-11-19 RX ADMIN — ATORVASTATIN CALCIUM 40 MG: 40 TABLET, FILM COATED ORAL at 08:35

## 2022-11-19 RX ADMIN — HYDRALAZINE HYDROCHLORIDE 100 MG: 25 TABLET ORAL at 17:43

## 2022-11-19 RX ADMIN — METOPROLOL TARTRATE 25 MG: 25 TABLET, FILM COATED ORAL at 08:35

## 2022-11-19 RX ADMIN — ISOSORBIDE MONONITRATE 60 MG: 60 TABLET, EXTENDED RELEASE ORAL at 08:34

## 2022-11-19 RX ADMIN — HEPARIN SODIUM 5000 UNITS: 5000 INJECTION INTRAVENOUS; SUBCUTANEOUS at 13:36

## 2022-11-19 RX ADMIN — CYANOCOBALAMIN TAB 500 MCG 500 MCG: 500 TAB at 08:34

## 2022-11-19 RX ADMIN — NYSTATIN 500000 UNITS: 100000 SUSPENSION ORAL at 13:36

## 2022-11-19 RX ADMIN — FUROSEMIDE 40 MG: 10 INJECTION, SOLUTION INTRAMUSCULAR; INTRAVENOUS at 08:34

## 2022-11-19 RX ADMIN — DOXAZOSIN 2 MG: 2 TABLET ORAL at 21:48

## 2022-11-19 RX ADMIN — OXYCODONE HYDROCHLORIDE AND ACETAMINOPHEN 1000 MG: 500 TABLET ORAL at 08:34

## 2022-11-19 RX ADMIN — HYDRALAZINE HYDROCHLORIDE 5 MG: 20 INJECTION INTRAMUSCULAR; INTRAVENOUS at 21:55

## 2022-11-19 RX ADMIN — HYDRALAZINE HYDROCHLORIDE 100 MG: 25 TABLET ORAL at 08:35

## 2022-11-19 RX ADMIN — GABAPENTIN 100 MG: 100 CAPSULE ORAL at 17:43

## 2022-11-19 RX ADMIN — Medication 2000 UNITS: at 08:34

## 2022-11-19 RX ADMIN — AMLODIPINE BESYLATE 10 MG: 10 TABLET ORAL at 08:34

## 2022-11-19 RX ADMIN — POTASSIUM CHLORIDE 20 MEQ: 1500 TABLET, EXTENDED RELEASE ORAL at 08:34

## 2022-11-19 RX ADMIN — HEPARIN SODIUM 5000 UNITS: 5000 INJECTION INTRAVENOUS; SUBCUTANEOUS at 05:29

## 2022-11-19 RX ADMIN — NYSTATIN 500000 UNITS: 100000 SUSPENSION ORAL at 08:59

## 2022-11-19 RX ADMIN — NYSTATIN 500000 UNITS: 100000 SUSPENSION ORAL at 17:43

## 2022-11-19 RX ADMIN — INSULIN LISPRO 1 UNITS: 100 INJECTION, SOLUTION INTRAVENOUS; SUBCUTANEOUS at 17:43

## 2022-11-19 RX ADMIN — Medication 250 MG: at 08:34

## 2022-11-19 RX ADMIN — NYSTATIN 500000 UNITS: 100000 SUSPENSION ORAL at 21:48

## 2022-11-19 RX ADMIN — SIMETHICONE 80 MG: 80 TABLET, CHEWABLE ORAL at 21:57

## 2022-11-19 RX ADMIN — TAMSULOSIN HYDROCHLORIDE 0.4 MG: 0.4 CAPSULE ORAL at 08:34

## 2022-11-19 RX ADMIN — ASPIRIN 81 MG CHEWABLE TABLET 81 MG: 81 TABLET CHEWABLE at 08:34

## 2022-11-19 RX ADMIN — Medication 250 MG: at 17:43

## 2022-11-19 NOTE — ASSESSMENT & PLAN NOTE
Suspected pneumonia, possibly aspiration secondary to patient reporting dysphagia with choking on pills and fluids over the past 2 weeks  Febrile three days PTA  Leukocytosis on admission  Chest x-ray with bilateral multifocal infiltrates  Lactic acid negative  Procalcitonin 0 12  Urine for strep and legionella negative   · Continue rocephin, flagyl   · Blood cultures negative to date  · Repeat procalcitonin a m    · Supportive therapies with duonebs, respiratory protocol  · Speech therapy recommendations appreciated; no modification to consistency or liquids

## 2022-11-19 NOTE — ASSESSMENT & PLAN NOTE
Lab Results   Component Value Date    EGFR 22 11/19/2022    EGFR 21 11/18/2022    EGFR 21 11/17/2022    CREATININE 2 41 (H) 11/19/2022    CREATININE 2 45 (H) 11/18/2022    CREATININE 2 44 (H) 11/17/2022     Patient has a history of stage IV CKD, uncertain of baseline creatinine  Will repeat BMP in a m  Patsi Reil Avoid periods of relative hypotension  Avoid nephrotoxin agents

## 2022-11-19 NOTE — PLAN OF CARE
Problem: MOBILITY - ADULT  Goal: Maintain or return to baseline ADL function  Description: INTERVENTIONS:  -  Assess patient's ability to carry out ADLs; assess patient's baseline for ADL function and identify physical deficits which impact ability to perform ADLs (bathing, care of mouth/teeth, toileting, grooming, dressing, etc )  - Assess/evaluate cause of self-care deficits   - Assess range of motion  - Assess patient's mobility; develop plan if impaired  - Assess patient's need for assistive devices and provide as appropriate  - Encourage maximum independence but intervene and supervise when necessary  - Involve family in performance of ADLs  - Assess for home care needs following discharge   - Consider OT consult to assist with ADL evaluation and planning for discharge  - Provide patient education as appropriate  Outcome: Progressing  Goal: Maintains/Returns to pre admission functional level  Description: INTERVENTIONS:  - Perform BMAT or MOVE assessment daily    - Set and communicate daily mobility goal to care team and patient/family/caregiver  - Collaborate with rehabilitation services on mobility goals if consulted  - Perform Range of Motion 3 times a day  - Reposition patient every 2 hours    - Dangle patient 3 times a day  - Stand patient 3 times a day  - Ambulate patient 3 times a day  - Out of bed to chair 3 times a day   - Out of bed for meals 3 times a day  - Out of bed for toileting  - Record patient progress and toleration of activity level   Outcome: Progressing     Problem: Potential for Falls  Goal: Patient will remain free of falls  Description: INTERVENTIONS:  - Educate patient/family on patient safety including physical limitations  - Instruct patient to call for assistance with activity   - Consult OT/PT to assist with strengthening/mobility   - Keep Call bell within reach  - Keep bed low and locked with side rails adjusted as appropriate  - Keep care items and personal belongings within reach  - Initiate and maintain comfort rounds  - Make Fall Risk Sign visible to staff  - Offer Toileting every 2 Hours, in advance of need  - Initiate/Maintain bed alarm  - Obtain necessary fall risk management equipment:   - Apply yellow socks and bracelet for high fall risk patients  - Consider moving patient to room near nurses station  Outcome: Progressing     Problem: RESPIRATORY - ADULT  Goal: Achieves optimal ventilation and oxygenation  Description: INTERVENTIONS:  - Assess for changes in respiratory status  - Assess for changes in mentation and behavior  - Position to facilitate oxygenation and minimize respiratory effort  - Oxygen administered by appropriate delivery if ordered  - Initiate smoking cessation education as indicated  - Encourage broncho-pulmonary hygiene including cough, deep breathe, Incentive Spirometry  - Assess the need for suctioning and aspirate as needed  - Assess and instruct to report SOB or any respiratory difficulty  - Respiratory Therapy support as indicated  Outcome: Progressing     Problem: Prexisting or High Potential for Compromised Skin Integrity  Goal: Skin integrity is maintained or improved  Description: INTERVENTIONS:  - Identify patients at risk for skin breakdown  - Assess and monitor skin integrity  - Assess and monitor nutrition and hydration status  - Monitor labs   - Assess for incontinence   - Turn and reposition patient  - Assist with mobility/ambulation  - Relieve pressure over bony prominences  - Avoid friction and shearing  - Provide appropriate hygiene as needed including keeping skin clean and dry  - Evaluate need for skin moisturizer/barrier cream  - Collaborate with interdisciplinary team   - Patient/family teaching  - Consider wound care consult   Outcome: Progressing     Problem: Nutrition/Hydration-ADULT  Goal: Nutrient/Hydration intake appropriate for improving, restoring or maintaining nutritional needs  Description: Monitor and assess patient's nutrition/hydration status for malnutrition  Collaborate with interdisciplinary team and initiate plan and interventions as ordered  Monitor patient's weight and dietary intake as ordered or per policy  Utilize nutrition screening tool and intervene as necessary  Determine patient's food preferences and provide high-protein, high-caloric foods as appropriate       INTERVENTIONS:  - Monitor oral intake, urinary output, labs, and treatment plans  - Assess nutrition and hydration status and recommend course of action  - Evaluate amount of meals eaten  - Assist patient with eating if necessary   - Allow adequate time for meals  - Recommend/ encourage appropriate diets, oral nutritional supplements, and vitamin/mineral supplements  - Order, calculate, and assess calorie counts as needed  - Recommend, monitor, and adjust tube feedings and TPN/PPN based on assessed needs  - Assess need for intravenous fluids  - Provide specific nutrition/hydration education as appropriate  - Include patient/family/caregiver in decisions related to nutrition  Outcome: Progressing

## 2022-11-19 NOTE — PROGRESS NOTES
Irene 45  Progress Note Tamera More 10/19/1929, 80 y o  male MRN: [id number]  Unit/Bed#: [address] [place] [address] Encounter: [id number]  Primary Care Provider: Benja Bella PA-C   Date and time admitted to hospital: 11/15/2022 11:27 AM    * Pneumonia  Assessment & Plan  Suspected pneumonia, possibly aspiration secondary to patient reporting dysphagia with choking on pills and fluids over the past 2 weeks  Febrile three days PTA  Leukocytosis on admission  Chest x-ray with bilateral multifocal infiltrates  Lactic acid negative  Procalcitonin 0 12  Urine for strep and legionella negative   · Continue rocephin, flagyl   · Blood cultures negative to date  · Repeat procalcitonin a m  · Supportive therapies with duonebs, respiratory protocol  · Speech therapy recommendations appreciated; no modification to consistency or liquids        Dysphagia  Assessment & Plan  Patient reports that he has been having intermittent dysphagia over the past few weeks, having difficulty after drinking thin liquids, choking on pills  Speech consulted for bedside swallow eval  Aspiration precautions  · Patient passed video swallow but still is complaining of a stuck sensation intermittently with pills  · GI consult appreciated  · EGD performed on 11/18 shows oropharyngeal dysphagia with esophageal dysmotility  · GI recommending if symptoms persist may need to consider PEG tube  · Discussed with daughter on phone, does not feel this is an option at this time    Hyponatremia  Assessment & Plan  Mild hyponatremia  Fluid restriction 1500 cc daily  Improving  Repeat BMP in a m     CHF (congestive heart failure) ([facility] [address] )  Assessment & Plan  Wt Readings from Last 3 Encounters:   11/19/22 82 2 kg (181 lb 3 2 oz)     Patient appeared to be volume overloaded, pitting edema noted bilateral lower extremities  BNP elevated 9359  Given lasix on admission with good diuresis      · Continue lasix 40mg IVP daily   · Cardiology consultation appreciated  · Echo done  · Daily Weight  · Intake and output  · Low sodium diet        Primary hypertension  Assessment & Plan  Patient has a history of primary hypertension  Home medications include Norvasc 10 mg, Cardura 2 mg hydralazine 100 mg b i d , Imdur 60 mg and Lopressor 25 mg  Stage 4 chronic kidney disease [facility])  Assessment & Plan  Lab Results   Component Value Date    EGFR 22 11/19/2022    EGFR 21 11/18/2022    EGFR 21 11/17/2022    CREATININE 2 41 (H) 11/19/2022    CREATININE 2 45 (H) 11/18/2022    CREATININE 2 44 (H) 11/17/2022     Patient has a history of stage IV CKD, uncertain of baseline creatinine  Will repeat BMP in a m  Malaika Arbour Avoid periods of relative hypotension  Avoid nephrotoxin agents  Type 2 diabetes mellitus [facility])  Assessment & Plan  Lab Results   Component Value Date    HGBA1C 5 7 (H) 11/16/2022       Recent Labs     11/18/22  1606 11/18/22  2111 11/19/22  0726 11/19/22  1104   POCGLU 162* 176* 150* 237*       Blood Sugar Average: Last 72 hrs:  (P) 160 [id number]  Patient normally takes Prandin 0 5 mg t i d  With meals  Will hold at this time  Place on Accu-Cheks AC and HS with sliding scale coverage  Diabetic diet  Hga1c 5 7       Thrush  Assessment & Plan  Patient noted to have white patches on his tongue, admits to some dysphagia, suspect thrush  Nystatin swish and swallow ordered  Monitor    Elevated troponin  Assessment & Plan  Troponin elevated 120->140  Denies any chest discomfort  Sinus rhythm noted on monitor, right bundle branch block  Suspect may be elevated secondary to CHF? Cardiology consult appreciated           VTE Pharmacologic Prophylaxis:   Pharmacologic: Heparin  Mechanical VTE Prophylaxis in Place: Yes    Patient Centered Rounds: I have performed bedside rounds with nursing staff today    Discussions with Specialists or Other Care Team Provider: Yes  Education and Discussions with Family / Patient:Yes  Time Spent for Care: 30 minutes  More than 50% of total time spent on counseling and coordination of care as described above  Current Length of Stay: 4 day(s)  Current Patient Status: Inpatient     Discharge Plan:  Most likely in the next 24-48 hours pending patient's progress    Code Status: Level 3 - DNAR and DNI      Subjective:   Patient seen sitting up in chair, eating breakfast   Reports that he slept very well  Good appetite no nausea or vomiting  Feels that his swallowing is better with the pills as they are being crushed and put in applesauce  Objective:     Vitals:   Temp (24hrs), Av 8 °F (37 1 °C), Min:97 7 °F (36 5 °C), Max:99 9 °F (37 7 °C)    Temp:  [97 7 °F (36 5 °C)-99 9 °F (37 7 °C)] 97 7 °F (36 5 °C)  HR:  [] 80  Resp:  [18-20] 18  BP: (129-189)/(59-99) 151/62  SpO2:  [94 %-97 %] 96 %  Body mass index is 30 15 kg/m²  Input and Output Summary (last 24 hours): Intake/Output Summary (Last 24 hours) at 2022 1604  Last data filed at 2022 1130  Gross per 24 hour   Intake 400 ml   Output 1000 ml   Net -600 ml        Physical Exam:     Physical Exam  Vitals and nursing note reviewed  Constitutional:       General: He is not in acute distress  HENT:      Head: Normocephalic  Nose: Nose normal       Mouth/Throat:      Mouth: Mucous membranes are moist    Eyes:      Extraocular Movements: Extraocular movements intact  Conjunctiva/sclera: Conjunctivae normal    Cardiovascular:      Rate and Rhythm: Normal rate and regular rhythm  Pulses: Normal pulses  Heart sounds: Murmur heard  Pulmonary:      Comments: Diminished bilaterally  Abdominal:      General: Bowel sounds are normal  There is no distension  Palpations: Abdomen is soft  Tenderness: There is no abdominal tenderness  Genitourinary:     Comments: Voiding spontaneously  Musculoskeletal:         General: Normal range of motion  Cervical back: Normal range of motion  Right lower leg: No edema  Left lower leg: No edema  Skin:     General: Skin is warm and dry  Capillary Refill: Capillary refill takes less than 2 seconds  Neurological:      General: No focal deficit present  Mental Status: He is alert and oriented to person, place, and time  Psychiatric:         Mood and Affect: Mood normal          Behavior: Behavior normal          Thought Content: Thought content normal          Judgment: Judgment normal          Additional Data:     Labs:    Results from last 7 days   Lab Units 11/19/22  0548 11/18/22  0510 11/17/22  0537 11/16/22  0522 11/15/22  1207   WBC Thousand/uL 16 41* 13 45* 10 60*   < > 11 87*   HEMOGLOBIN g/dL 10 4* 10 0* 9 9*   < > 10 2*   HEMATOCRIT % 31 9* 30 7* 29 9*   < > 31 2*   PLATELETS Thousands/uL 179 190 182   < > 186   NEUTROS PCT %  --  74 69  --  80*    < > = values in this interval not displayed  Results from last 7 days   Lab Units 11/19/22  0548 11/18/22  0510 11/17/22  0537 11/16/22  0522 11/15/22  1207   SODIUM mmol/L 136 138 132*   < > 131*   POTASSIUM mmol/L 3 7 3 3* 3 6   < > 4 4   CHLORIDE mmol/L 99 99 97   < > 94*   CO2 mmol/L 26 28 26   < > 28   BUN mg/dL 53* 51* 55*   < > 53*   CREATININE mg/dL 2 41* 2 45* 2 44*   < > 2 59*   CALCIUM mg/dL 8 5 8 5 8 4   < > 8 6   TOTAL BILIRUBIN mg/dL  --   --  0 64  --  0 79   ALK PHOS U/L  --   --  72  --  84   ALT U/L  --   --  22  --  19   AST U/L  --   --  35  --   --     < > = values in this interval not displayed               Lab Results   Component Value Date/Time    HGBA1C 5 7 (H) 11/16/2022 05:22 AM     Results from last 7 days   Lab Units 11/19/22  1104 11/19/22  0726 11/18/22  2111 11/18/22  1606 11/18/22  1120 11/18/22  0709 11/17/22  2049 11/17/22  1624 11/17/22  1138 11/17/22  0724 11/16/22 2046 11/16/22  1602   POC GLUCOSE mg/dl 237* 150* 176* 162* 180* 118 134 132 211* 114 156* 166*     Results from last 7 days   Lab Units 11/16/22  0522 11/15/22  1358   LACTIC ACID mmol/L  --  0 8 PROCALCITONIN ng/ml 0 12  --        * I Have Reviewed All Lab Data Listed Above  * Additional Pertinent Lab Tests Reviewed: All Labs Within Last 24 Hours Reviewed    Imaging:     FL esophagram complete   Final Result by Boubacar Alves MD (11/17 1918)      Moderate to severe esophageal dysmotility with significant residual contrast remaining in the esophagus at the end of the study  Small likely mixed type hiatal hernia  Workstation performed: [id number]         FL barium swallow video w speech   Final Result by DOMITILA Hirsch (11/18 1106)      FL barium swallow video w speech   Final Result by GRANT MORENO (11/16 1013)      XR chest 1 view portable   Final Result by Paty Rico MD (78/74 0649)      Bilateral multifocal infiltrates  Small bilateral pleural effusions                    Workstation performed: [id number]         XR chest portable    (Results Pending)     Imaging Reports Reviewed by myself    Cultures:   Blood Culture:   Lab Results   Component Value Date    BLOODCX No Growth at 72 hrs  11/15/2022    Wayne Valentes No Growth at 72 hrs  11/15/2022     Urine Culture: No results found for: URINECX  Sputum Culture: No components found for: SPUTUMCX  Wound Culture: No results found for: WOUNDCULT    Last 24 Hours Medication List:   Current Facility-Administered Medications   Medication Dose Route Frequency Provider Last Rate   • acetaminophen  650 mg Oral Q6H PRN KEISHA Aquino     • amLODIPine  10 mg Oral Daily KEISHA Aquino     • vitamin C  1,000 mg Oral Daily KEISHA Aquino     • aspirin  81 mg Oral Daily KEISHA Aquino     • atorvastatin  40 mg Oral Daily KEISHA Aquino     • benzonatate  100 mg Oral TID PRN KEISHA Aquino     • cefTRIAXone  1,000 mg Intravenous Q24H KEISHA Aquino 1,000 mg (11/19/22 0908)   • cholecalciferol  2,000 Units Oral Daily KEISHA Echols     • vitamin B-12  500 mcg Oral Daily KEISHA Echols     • doxazosin  2 mg Oral HS KEISHA Echols     • furosemide  40 mg Intravenous Daily KEISHA Vega     • gabapentin  100 mg Oral BID KEISHA Echols     • heparin (porcine)  5,000 Units Subcutaneous [facility] KEISHA Echols     • hydrALAZINE  5 mg Intravenous Q6H PRN KEISHA Echols     • hydrALAZINE  100 mg Oral BID KEISHA Echols     • insulin lispro  1-5 Units Subcutaneous HS KEISHA Echols     • insulin lispro  1-6 Units Subcutaneous TID [facility] KEISHA Echols     • isosorbide mononitrate  60 mg Oral Daily KEISHA Echols     • levalbuterol  1 25 mg Nebulization Q6H PRN KEISHA Echols      And   • sodium chloride  3 mL Nebulization Q6H PRN KEISHA Echols     • metoprolol tartrate  25 mg Oral Daily KEISHA Echols     • metroNIDAZOLE  500 mg Oral [facility] KEISHA Echols     • nystatin  500,000 Units Swish & Swallow 4x Daily KEISHA Echols     • pantoprazole  40 mg Oral Early Morning KEISHA Hardy     • polyethylene glycol  17 g Oral Daily KEISHA Hardy     • saccharomyces boulardii  250 mg Oral BID KEISHA Echols     • simethicone  80 mg Oral 4x Daily PRN KEISHA Echols     • tamsulosin  0 4 mg Oral Daily KEISHA Echols          Today, Patient Was Seen By: KEISHA Echols    ** Please Note: Dragon 360 Dictation voice to text software may have been used in the creation of this document   **

## 2022-11-19 NOTE — ASSESSMENT & PLAN NOTE
Patient reports that he has been having intermittent dysphagia over the past few weeks, having difficulty after drinking thin liquids, choking on pills  Speech consulted for bedside swallow eval  Aspiration precautions    · Patient passed video swallow but still is complaining of a stuck sensation intermittently with pills  · GI consult appreciated  · EGD performed on 11/18 shows oropharyngeal dysphagia with esophageal dysmotility  · GI recommending if symptoms persist may need to consider PEG tube  · Discussed with daughter on phone, does not feel this is an option at this time

## 2022-11-19 NOTE — ASSESSMENT & PLAN NOTE
Lab Results   Component Value Date    HGBA1C 5 7 (H) 11/16/2022       Recent Labs     11/18/22  1606 11/18/22  2111 11/19/22  0726 11/19/22  1104   POCGLU 162* 176* 150* 237*       Blood Sugar Average: Last 72 hrs:  (P) 160 [id number]  Patient normally takes Prandin 0 5 mg t i d  With meals  Will hold at this time  Place on Accu-Cheks AC and HS with sliding scale coverage  Diabetic diet    Hga1c 5 7

## 2022-11-20 PROBLEM — I50.31 ACUTE DIASTOLIC (CONGESTIVE) HEART FAILURE (HCC): Status: ACTIVE | Noted: 2022-11-20

## 2022-11-20 LAB
ANION GAP SERPL CALCULATED.3IONS-SCNC: 9 MMOL/L (ref 4–13)
BACTERIA BLD CULT: NORMAL
BACTERIA BLD CULT: NORMAL
BACTERIA UR QL AUTO: ABNORMAL /HPF
BILIRUB UR QL STRIP: NEGATIVE
BUN SERPL-MCNC: 68 MG/DL (ref 5–25)
CALCIUM SERPL-MCNC: 8.4 MG/DL (ref 8.3–10.1)
CHLORIDE SERPL-SCNC: 100 MMOL/L (ref 96–108)
CLARITY UR: CLEAR
CO2 SERPL-SCNC: 29 MMOL/L (ref 21–32)
COLOR UR: YELLOW
CREAT SERPL-MCNC: 2.79 MG/DL (ref 0.6–1.3)
ERYTHROCYTE [DISTWIDTH] IN BLOOD BY AUTOMATED COUNT: 14.6 % (ref 11.6–15.1)
GFR SERPL CREATININE-BSD FRML MDRD: 18 ML/MIN/1.73SQ M
GLUCOSE SERPL-MCNC: 152 MG/DL (ref 65–140)
GLUCOSE SERPL-MCNC: 156 MG/DL (ref 65–140)
GLUCOSE SERPL-MCNC: 195 MG/DL (ref 65–140)
GLUCOSE SERPL-MCNC: 212 MG/DL (ref 65–140)
GLUCOSE SERPL-MCNC: 259 MG/DL (ref 65–140)
GLUCOSE UR STRIP-MCNC: NEGATIVE MG/DL
HCT VFR BLD AUTO: 30.2 % (ref 36.5–49.3)
HGB BLD-MCNC: 9.8 G/DL (ref 12–17)
HGB UR QL STRIP.AUTO: NEGATIVE
KETONES UR STRIP-MCNC: NEGATIVE MG/DL
LEUKOCYTE ESTERASE UR QL STRIP: ABNORMAL
MCH RBC QN AUTO: 29.7 PG (ref 26.8–34.3)
MCHC RBC AUTO-ENTMCNC: 32.5 G/DL (ref 31.4–37.4)
MCV RBC AUTO: 92 FL (ref 82–98)
NITRITE UR QL STRIP: NEGATIVE
NON-SQ EPI CELLS URNS QL MICRO: ABNORMAL /HPF
OTHER STN SPEC: ABNORMAL
PH UR STRIP.AUTO: 5.5 [PH]
PLATELET # BLD AUTO: 195 THOUSANDS/UL (ref 149–390)
PMV BLD AUTO: 11.7 FL (ref 8.9–12.7)
POTASSIUM SERPL-SCNC: 3.8 MMOL/L (ref 3.5–5.3)
PROT UR STRIP-MCNC: ABNORMAL MG/DL
RBC # BLD AUTO: 3.3 MILLION/UL (ref 3.88–5.62)
RBC #/AREA URNS AUTO: ABNORMAL /HPF
SODIUM SERPL-SCNC: 138 MMOL/L (ref 135–147)
SP GR UR STRIP.AUTO: 1.02 (ref 1–1.03)
UROBILINOGEN UR QL STRIP.AUTO: 0.2 E.U./DL
WBC # BLD AUTO: 15.88 THOUSAND/UL (ref 4.31–10.16)
WBC #/AREA URNS AUTO: ABNORMAL /HPF

## 2022-11-20 RX ORDER — FUROSEMIDE 20 MG/1
20 TABLET ORAL DAILY
Status: DISCONTINUED | OUTPATIENT
Start: 2022-11-21 | End: 2022-11-22 | Stop reason: HOSPADM

## 2022-11-20 RX ADMIN — Medication 250 MG: at 09:48

## 2022-11-20 RX ADMIN — NYSTATIN 500000 UNITS: 100000 SUSPENSION ORAL at 22:05

## 2022-11-20 RX ADMIN — DOXAZOSIN 2 MG: 2 TABLET ORAL at 22:05

## 2022-11-20 RX ADMIN — FUROSEMIDE 40 MG: 10 INJECTION, SOLUTION INTRAMUSCULAR; INTRAVENOUS at 09:49

## 2022-11-20 RX ADMIN — HEPARIN SODIUM 5000 UNITS: 5000 INJECTION INTRAVENOUS; SUBCUTANEOUS at 13:32

## 2022-11-20 RX ADMIN — HYDRALAZINE HYDROCHLORIDE 100 MG: 25 TABLET ORAL at 09:46

## 2022-11-20 RX ADMIN — METRONIDAZOLE 500 MG: 500 TABLET ORAL at 22:05

## 2022-11-20 RX ADMIN — METRONIDAZOLE 500 MG: 500 TABLET ORAL at 05:49

## 2022-11-20 RX ADMIN — ATORVASTATIN CALCIUM 40 MG: 40 TABLET, FILM COATED ORAL at 09:48

## 2022-11-20 RX ADMIN — CEFTRIAXONE 1000 MG: 1 INJECTION, SOLUTION INTRAVENOUS at 09:49

## 2022-11-20 RX ADMIN — ISOSORBIDE MONONITRATE 60 MG: 60 TABLET, EXTENDED RELEASE ORAL at 09:49

## 2022-11-20 RX ADMIN — POLYETHYLENE GLYCOL 3350 17 G: 17 POWDER, FOR SOLUTION ORAL at 09:49

## 2022-11-20 RX ADMIN — INSULIN LISPRO 2 UNITS: 100 INJECTION, SOLUTION INTRAVENOUS; SUBCUTANEOUS at 17:07

## 2022-11-20 RX ADMIN — METRONIDAZOLE 500 MG: 500 TABLET ORAL at 13:32

## 2022-11-20 RX ADMIN — GABAPENTIN 100 MG: 100 CAPSULE ORAL at 09:48

## 2022-11-20 RX ADMIN — HEPARIN SODIUM 5000 UNITS: 5000 INJECTION INTRAVENOUS; SUBCUTANEOUS at 22:05

## 2022-11-20 RX ADMIN — INSULIN LISPRO 1 UNITS: 100 INJECTION, SOLUTION INTRAVENOUS; SUBCUTANEOUS at 08:02

## 2022-11-20 RX ADMIN — Medication 250 MG: at 18:43

## 2022-11-20 RX ADMIN — HEPARIN SODIUM 5000 UNITS: 5000 INJECTION INTRAVENOUS; SUBCUTANEOUS at 05:49

## 2022-11-20 RX ADMIN — HYDRALAZINE HYDROCHLORIDE 100 MG: 25 TABLET ORAL at 18:42

## 2022-11-20 RX ADMIN — INSULIN LISPRO 2 UNITS: 100 INJECTION, SOLUTION INTRAVENOUS; SUBCUTANEOUS at 22:10

## 2022-11-20 RX ADMIN — NYSTATIN 500000 UNITS: 100000 SUSPENSION ORAL at 12:04

## 2022-11-20 RX ADMIN — TAMSULOSIN HYDROCHLORIDE 0.4 MG: 0.4 CAPSULE ORAL at 09:48

## 2022-11-20 RX ADMIN — NYSTATIN 500000 UNITS: 100000 SUSPENSION ORAL at 18:43

## 2022-11-20 RX ADMIN — SODIUM CHLORIDE 250 ML: 0.9 INJECTION, SOLUTION INTRAVENOUS at 09:33

## 2022-11-20 RX ADMIN — NYSTATIN 500000 UNITS: 100000 SUSPENSION ORAL at 09:49

## 2022-11-20 RX ADMIN — Medication 2000 UNITS: at 09:49

## 2022-11-20 RX ADMIN — INSULIN LISPRO 3 UNITS: 100 INJECTION, SOLUTION INTRAVENOUS; SUBCUTANEOUS at 12:04

## 2022-11-20 RX ADMIN — PANTOPRAZOLE SODIUM 40 MG: 40 TABLET, DELAYED RELEASE ORAL at 05:49

## 2022-11-20 RX ADMIN — ASPIRIN 81 MG CHEWABLE TABLET 81 MG: 81 TABLET CHEWABLE at 09:48

## 2022-11-20 RX ADMIN — CYANOCOBALAMIN TAB 500 MCG 500 MCG: 500 TAB at 09:47

## 2022-11-20 RX ADMIN — OXYCODONE HYDROCHLORIDE AND ACETAMINOPHEN 1000 MG: 500 TABLET ORAL at 09:48

## 2022-11-20 RX ADMIN — ACETAMINOPHEN 650 MG: 325 TABLET, FILM COATED ORAL at 22:08

## 2022-11-20 RX ADMIN — METOPROLOL TARTRATE 25 MG: 25 TABLET, FILM COATED ORAL at 09:49

## 2022-11-20 RX ADMIN — AMLODIPINE BESYLATE 10 MG: 10 TABLET ORAL at 09:48

## 2022-11-20 RX ADMIN — GABAPENTIN 100 MG: 100 CAPSULE ORAL at 18:42

## 2022-11-20 NOTE — ASSESSMENT & PLAN NOTE
Lab Results   Component Value Date    HGBA1C 5 7 (H) 11/16/2022       Recent Labs     11/19/22  2111 11/20/22  0719 11/20/22  1130 11/20/22  1553   POCGLU 153* 152* 259* 195*       Blood Sugar Average: Last 72 hrs:  (P) 170 4  Patient normally takes Prandin 0 5 mg t i d  With meals  Will hold at this time  Place on Accu-Cheks AC and HS with sliding scale coverage  Diabetic diet    Hga1c 5 7

## 2022-11-20 NOTE — ASSESSMENT & PLAN NOTE
Wt Readings from Last 3 Encounters:   11/20/22 82 1 kg (181 lb)     Acute diastolic congestive heart failure in setting of hypertension evidence by volume overload, pitting edema bilateral lower extremities, elevated BNP, echo with low-normal systolic function EF 71% and moderately abnormal diastolic function treated with IV Lasix, daily weights, intake and output and low-sodium diet    Transitioned back to home Lasix 20 mg po daily on 11/20; monitor response

## 2022-11-20 NOTE — ASSESSMENT & PLAN NOTE
· Patient has a history of primary hypertension    · Home medications include Norvasc 10 mg, Cardura 2 mg hydralazine 100 mg b i d , Imdur 60 mg   · Discontinue Toprol-XL and started Coreg 3 125 mg b i d  per Cardiology

## 2022-11-20 NOTE — ASSESSMENT & PLAN NOTE
Patient noted to have white patches on his tongue, admits to some dysphagia, suspect thrush  Nystatin swish and swallow ordered    Improving

## 2022-11-20 NOTE — ASSESSMENT & PLAN NOTE
Wt Readings from Last 3 Encounters:   11/20/22 82 1 kg (181 lb)     Patient appeared to be volume overloaded, pitting edema noted bilateral lower extremities  BNP elevated 9359  Given lasix on admission with good diuresis      · Transition back to home medication lasix 20 mg daily   · Cardiology consultation appreciated  · Echo done  · Daily Weight  · Intake and output  · Low sodium diet

## 2022-11-20 NOTE — ASSESSMENT & PLAN NOTE
Lab Results   Component Value Date    EGFR 18 11/20/2022    EGFR 22 11/19/2022    EGFR 21 11/18/2022    CREATININE 2 79 (H) 11/20/2022    CREATININE 2 41 (H) 11/19/2022    CREATININE 2 45 (H) 11/18/2022     Patient has a history of stage IV CKD, uncertain of baseline creatinine  Will repeat BMP in a reyna Andujar Ada Avoid periods of relative hypotension  Avoid nephrotoxin agents

## 2022-11-20 NOTE — ASSESSMENT & PLAN NOTE
Lab Results   Component Value Date    HGBA1C 5 7 (H) 11/16/2022       Recent Labs     11/19/22  1613 11/19/22  2111 11/20/22  0719 11/20/22  1130   POCGLU 183* 153* 152* 259*       Blood Sugar Average: Last 72 hrs:  (P) 203 [id number]  Patient normally takes Prandin 0 5 mg t i d  With meals  Will hold at this time  Place on Accu-Cheks AC and HS with sliding scale coverage  Diabetic diet    Hga1c 5 7

## 2022-11-20 NOTE — ASSESSMENT & PLAN NOTE
Lab Results   Component Value Date    EGFR 18 11/20/2022    EGFR 22 11/19/2022    EGFR 21 11/18/2022    CREATININE 2 79 (H) 11/20/2022    CREATININE 2 41 (H) 11/19/2022    CREATININE 2 45 (H) 11/18/2022     Patient has a history of stage IV CKD, uncertain of baseline creatinine  Will repeat BMP in a m  Jolene Console Avoid periods of relative hypotension  Avoid nephrotoxin agents

## 2022-11-20 NOTE — ASSESSMENT & PLAN NOTE
Suspected pneumonia, possibly aspiration secondary to patient reporting dysphagia with choking on pills and fluids over the past 2 weeks  Febrile three days PTA  Leukocytosis on admission  Chest x-ray with bilateral multifocal infiltrates  Lactic acid negative  Procalcitonin 0 12  Urine for strep and legionella negative   · Continue rocephin, flagyl; day 5  · Blood cultures negative to date  · Repeat procalcitonin a m    · Supportive therapies with duonebs, respiratory protocol  · Speech therapy recommendations appreciated; no modification to consistency or liquids

## 2022-11-20 NOTE — ASSESSMENT & PLAN NOTE
Patient reports that he has been having intermittent dysphagia over the past few weeks, having difficulty after drinking thin liquids, choking on pills  Speech consulted for bedside swallow eval  Aspiration precautions    · Patient passed video swallow, reports that he feels he is doing better with his pills crushed in applesauce  · GI consult appreciated  · EGD performed on 11/18 shows oropharyngeal dysphagia with esophageal dysmotility  · GI recommending if symptoms persist may need to consider PEG tube  · Discussed with daughter on phone, does not feel this is an option at this time  · Discussed with patient feeding options of remaining in upright position, slow feed, alternating solids with liquids

## 2022-11-20 NOTE — ASSESSMENT & PLAN NOTE
Suspected pneumonia, possibly aspiration secondary to patient reporting dysphagia with choking on pills and fluids over the past 2 weeks  Febrile three days PTA  Leukocytosis on admission  Chest x-ray with bilateral multifocal infiltrates  Lactic acid negative  Procalcitonin 0 57 today  Urine for strep and legionella negative   · Continue rocephin day # 6, flagyl; day 8  · Blood cultures negative to date  · Repeat procalcitonin a m    · Supportive therapies with aleja, respiratory protocol  · Speech therapy recommendations appreciated; no modification to consistency or liquids

## 2022-11-20 NOTE — ASSESSMENT & PLAN NOTE
Wt Readings from Last 3 Encounters:   11/20/22 82 1 kg (181 lb)     · Patient appeared to be volume overloaded, pitting edema noted bilateral lower extremities  BNP elevated 9359  Given lasix on admission with good diuresis      · Echo done on 31/41 showed systolic function of low normal with grade 2 diastolic dysfunction  · Continue home medication lasix 20 mg daily   · Cardiology consultation appreciated  · Daily Weight  · Intake and output  · Low sodium diet

## 2022-11-20 NOTE — ASSESSMENT & PLAN NOTE
Patient reports that he has been having intermittent dysphagia over the past few weeks, having difficulty after drinking thin liquids, choking on pills  Speech consulted for bedside swallow eval  Aspiration precautions    · Patient passed video swallow, reports that he feels he is doing better with his pills crushed in applesauce  · GI consult appreciated  · EGD performed on 11/18 shows oropharyngeal dysphagia with esophageal dysmotility  · GI recommending if symptoms persist may need to consider PEG tube  · Family does not want PEG

## 2022-11-20 NOTE — ASSESSMENT & PLAN NOTE
Wt Readings from Last 3 Encounters:   11/20/22 82 1 kg (181 lb)     Acute diastolic congestive heart failure in setting of hypertension evidence by volume overload, pitting edema bilateral lower extremities, elevated BNP, echo with low-normal systolic function EF 36% and moderately abnormal diastolic function treated with IV Lasix, daily weights, intake and output and low-sodium diet    Transitioned back to home Lasix 20 mg po daily on 11/20; monitor response

## 2022-11-20 NOTE — PROGRESS NOTES
[address]  Progress Note Robby Maynor 10/19/1929, 80 y o  male MRN: [id number]  Unit/Bed#: [address] [place] [address] Encounter: [id number]  Primary Care Provider: Julisa Bunch PA-C   Date and time admitted to hospital: 11/15/2022 11:27 AM    * Pneumonia  Assessment & Plan  Suspected pneumonia, possibly aspiration secondary to patient reporting dysphagia with choking on pills and fluids over the past 2 weeks  Febrile three days PTA  Leukocytosis on admission  Chest x-ray with bilateral multifocal infiltrates  Lactic acid negative  Procalcitonin 0 12  Urine for strep and legionella negative   · Continue rocephin, flagyl; day 5  · Blood cultures negative to date  · Repeat procalcitonin a m  · Supportive therapies with duonebs, respiratory protocol  · Speech therapy recommendations appreciated; no modification to consistency or liquids        Dysphagia  Assessment & Plan  Patient reports that he has been having intermittent dysphagia over the past few weeks, having difficulty after drinking thin liquids, choking on pills  Speech consulted for bedside swallow eval  Aspiration precautions  · Patient passed video swallow, reports that he feels he is doing better with his pills crushed in applesauce  · GI consult appreciated  · EGD performed on 11/18 shows oropharyngeal dysphagia with esophageal dysmotility  · GI recommending if symptoms persist may need to consider PEG tube  · Discussed with daughter on phone, does not feel this is an option at this time  · Discussed with patient feeding options of remaining in upright position, slow feed, alternating solids with liquids    Hyponatremia  Assessment & Plan  Mild hyponatremia  Improving  Repeat BMP in a m     CHF (congestive heart failure) ([facility] [address] )  Assessment & Plan  Wt Readings from Last 3 Encounters:   11/20/22 82 1 kg (181 lb)     Patient appeared to be volume overloaded, pitting edema noted bilateral lower extremities  BNP elevated 9359   Given lasix on admission with good diuresis  · Transition back to home medication lasix 20 mg daily   · Cardiology consultation appreciated  · Echo done  · Daily Weight  · Intake and output  · Low sodium diet        Primary hypertension  Assessment & Plan  Patient has a history of primary hypertension  Home medications include Norvasc 10 mg, Cardura 2 mg hydralazine 100 mg b i d , Imdur 60 mg and Lopressor 25 mg  Stage 4 chronic kidney disease [facility])  Assessment & Plan  Lab Results   Component Value Date    EGFR 18 11/20/2022    EGFR 22 11/19/2022    EGFR 21 11/18/2022    CREATININE 2 79 (H) 11/20/2022    CREATININE 2 41 (H) 11/19/2022    CREATININE 2 45 (H) 11/18/2022     Patient has a history of stage IV CKD, uncertain of baseline creatinine  Will repeat BMP in a reyna Barry Avoid periods of relative hypotension  Avoid nephrotoxin agents  Type 2 diabetes mellitus [facility])  Assessment & Plan  Lab Results   Component Value Date    HGBA1C 5 7 (H) 11/16/2022       Recent Labs     11/19/22  1613 11/19/22  2111 11/20/22  0719 11/20/22  1130   POCGLU 183* 153* 152* 259*       Blood Sugar Average: Last 72 hrs:  (P) 302 [id number]  Patient normally takes Prandin 0 5 mg t i d  With meals  Will hold at this time  Place on Accu-Cheks AC and HS with sliding scale coverage  Diabetic diet  Hga1c 5 7       Thrush  Assessment & Plan  Patient noted to have white patches on his tongue, admits to some dysphagia, suspect thrush  Nystatin swish and swallow ordered    Improving     Acute diastolic (congestive) heart failure (HCC)  Assessment & Plan  Wt Readings from Last 3 Encounters:   11/20/22 82 1 kg (181 lb)     Acute diastolic congestive heart failure in setting of hypertension evidence by volume overload, pitting edema bilateral lower extremities, elevated BNP, echo with low-normal systolic function EF 48% and moderately abnormal diastolic function treated with IV Lasix, daily weights, intake and output and low-sodium diet   Transitioned back to home Lasix 20 mg po daily on ; monitor response         Elevated troponin  Assessment & Plan  Troponin elevated 120->140  Denies any chest discomfort  Sinus rhythm noted on monitor, right bundle branch block  Suspect may be elevated secondary to CHF? Cardiology consult appreciated           VTE Pharmacologic Prophylaxis:   Pharmacologic: Heparin  Mechanical VTE Prophylaxis in Place: Yes    Patient Centered Rounds: I have performed bedside rounds with nursing staff today  Discussions with Specialists or Other Care Team Provider: Yes  Education and Discussions with Family / Patient:Yes  Time Spent for Care: 30 minutes  More than 50% of total time spent on counseling and coordination of care as described above  Current Length of Stay: 5 day(s)  Current Patient Status: Inpatient     Discharge Plan: Not stable for discharge today; continued IV abx, continued elevated WBC, low grade fever    Code Status: Level 3 - DNAR and DNI      Subjective:   Patient seen sitting up in chair, resting comfortably  Denies any headache or dizziness, feels that his breathing is doing okay  States that when he swallows his pills crushed in applesauce he has no problems  Denies any choking  Denies fever chills  Objective:     Vitals:   Temp (24hrs), Av 4 °F (37 4 °C), Min:97 7 °F (36 5 °C), Max:100 6 °F (38 1 °C)    Temp:  [97 7 °F (36 5 °C)-100 6 °F (38 1 °C)] 100 °F (37 8 °C)  HR:  [74-94] 79  Resp:  [17-18] 18  BP: (151-206)/(59-72) 158/64  SpO2:  [91 %-97 %] 97 %  Body mass index is 30 12 kg/m²  Input and Output Summary (last 24 hours): Intake/Output Summary (Last 24 hours) at 2022 1258  Last data filed at 2022 0346  Gross per 24 hour   Intake 300 ml   Output 600 ml   Net -300 ml        Physical Exam:     Physical Exam  Vitals and nursing note reviewed  Constitutional:       General: He is not in acute distress  Appearance: Normal appearance     HENT: Head: Normocephalic  Nose: Nose normal       Mouth/Throat:      Mouth: Mucous membranes are moist    Eyes:      Extraocular Movements: Extraocular movements intact  Conjunctiva/sclera: Conjunctivae normal       Pupils: Pupils are equal, round, and reactive to light  Cardiovascular:      Rate and Rhythm: Normal rate  Pulses: Normal pulses  Heart sounds: Murmur heard  Pulmonary:      Comments: Few fine wheezes noted; improving   Abdominal:      General: Bowel sounds are normal  There is no distension  Palpations: Abdomen is soft  Tenderness: There is no abdominal tenderness  Genitourinary:     Comments: Voiding spontaneously   Musculoskeletal:         General: Normal range of motion  Cervical back: Normal range of motion  Skin:     General: Skin is warm and dry  Capillary Refill: Capillary refill takes less than 2 seconds  Neurological:      General: No focal deficit present  Mental Status: He is alert and oriented to person, place, and time  Psychiatric:         Mood and Affect: Mood normal          Behavior: Behavior normal          Thought Content: Thought content normal          Judgment: Judgment normal          Additional Data:     Labs:    Results from last 7 days   Lab Units 11/20/22  0555 11/19/22  0548 11/18/22  0510 11/17/22  0537 11/16/22  0522 11/15/22  1207   WBC Thousand/uL 15 88* 16 41* 13 45* 10 60*   < > 11 87*   HEMOGLOBIN g/dL 9 8* 10 4* 10 0* 9 9*   < > 10 2*   HEMATOCRIT % 30 2* 31 9* 30 7* 29 9*   < > 31 2*   PLATELETS Thousands/uL 195 179 190 182   < > 186   NEUTROS PCT %  --   --  74 69  --  80*    < > = values in this interval not displayed       Results from last 7 days   Lab Units 11/20/22  0555 11/19/22  0548 11/18/22  0510 11/17/22  0537 11/16/22  0522 11/15/22  1207   SODIUM mmol/L 138 136 138 132*   < > 131*   POTASSIUM mmol/L 3 8 3 7 3 3* 3 6   < > 4 4   CHLORIDE mmol/L 100 99 99 97   < > 94*   CO2 mmol/L 29 26 28 26   < > 28 BUN mg/dL 68* 53* 51* 55*   < > 53*   CREATININE mg/dL 2 79* 2 41* 2 45* 2 44*   < > 2 59*   CALCIUM mg/dL 8 4 8 5 8 5 8 4   < > 8 6   TOTAL BILIRUBIN mg/dL  --   --   --  0 64  --  0 79   ALK PHOS U/L  --   --   --  72  --  84   ALT U/L  --   --   --  22  --  19   AST U/L  --   --   --  35  --   --     < > = values in this interval not displayed  Lab Results   Component Value Date/Time    HGBA1C 5 7 (H) 11/16/2022 05:22 AM     Results from last 7 days   Lab Units 11/20/22  1130 11/20/22  0719 11/19/22  2111 11/19/22  1613 11/19/22  1104 11/19/22  0726 11/18/22  2111 11/18/22  1606 11/18/22  1120 11/18/22  0709 11/17/22  2049 11/17/22  1624   POC GLUCOSE mg/dl 259* 152* 153* 183* 237* 150* 176* 162* 180* 118 134 132     Results from last 7 days   Lab Units 11/16/22  0522 11/15/22  1358   LACTIC ACID mmol/L  --  0 8   PROCALCITONIN ng/ml 0 12  --        * I Have Reviewed All Lab Data Listed Above  * Additional Pertinent Lab Tests Reviewed: All Labs Within Last 24 Hours Reviewed    Imaging:     XR chest portable   Final Result by Desirae Poole MD (11/20 1214)      Persistent bilateral pulmonary opacity  With recent elevated BNP, this is at least likely in part due to pulmonary edema  Pneumonia not excluded  Workstation performed: [id number]         FL esophagram complete   Final Result by Homer Land MD (11/17 9704)      Moderate to severe esophageal dysmotility with significant residual contrast remaining in the esophagus at the end of the study  Small likely mixed type hiatal hernia  Workstation performed: [id number]         FL barium swallow video w speech   Final Result by DOMITILA Low (11/18 1106)      FL barium swallow video w speech   Final Result by GRANT MORENO (11/16 1013)      XR chest 1 view portable   Final Result by Alfie Foley MD (63/25 0073)      Bilateral multifocal infiltrates    Small bilateral pleural effusions  Workstation performed: [id number]           Imaging Reports Reviewed by myself    Cultures:   Blood Culture:   Lab Results   Component Value Date    BLOODCX No Growth After 4 Days  11/15/2022    BLOODCX No Growth After 4 Days   11/15/2022     Urine Culture: No results found for: URINECX  Sputum Culture: No components found for: SPUTUMCX  Wound Culture: No results found for: WOUNDCULT    Last 24 Hours Medication List:   Current Facility-Administered Medications   Medication Dose Route Frequency Provider Last Rate   • acetaminophen  650 mg Oral Q6H PRN Celine Gonzalez MD     • amLODIPine  10 mg Oral Daily Celine Gonzalez MD     • vitamin C  1,000 mg Oral Daily Celine Gonzalez MD     • aspirin  81 mg Oral Daily Celine Gonzalez MD     • atorvastatin  40 mg Oral Daily Celine Gonzalez MD     • benzonatate  100 mg Oral TID PRN Celine Gonzalez MD     • cefTRIAXone  1,000 mg Intravenous Q24H Celine Gonzalez MD 1,000 mg (11/20/22 0949)   • cholecalciferol  2,000 Units Oral Daily Celine Gonzalez MD     • vitamin B-12  500 mcg Oral Daily Celine Gonzalez MD     • doxazosin  2 mg Oral HS Ronny Kerns MD     • [START ON 11/21/2022] furosemide  20 mg Oral Daily KEISHA Jones     • gabapentin  100 mg Oral BID Celine Gonzalez MD     • heparin (porcine)  5,000 Units Subcutaneous [facility] Ronny Kerns MD     • hydrALAZINE  5 mg Intravenous Q6H PRN KEISHA Jones     • hydrALAZINE  100 mg Oral BID Celine Gonzalez MD     • insulin lispro  1-5 Units Subcutaneous HS Celine Gonzalez MD     • insulin lispro  1-6 Units Subcutaneous TID AC Celine Gonzalez MD     • isosorbide mononitrate  60 mg Oral Daily Celine Gonzalez MD     • levalbuterol  1 25 mg Nebulization Q6H PRN Celine Gonzalez MD      And   • sodium chloride  3 mL Nebulization Q6H PRN Lisa Cisneros MD     • metoprolol tartrate  25 mg Oral Daily Lisa Cisneros MD     • metroNIDAZOLE  500 mg Oral Q8H Rodney Kern MD     • nystatin  500,000 Units Swish & Swallow 4x Daily Lisa Cisneros MD     • pantoprazole  40 mg Oral Early Morning Lisa Cisneros MD     • polyethylene glycol  17 g Oral Daily Lisa Cisneros MD     • saccharomyces boulardii  250 mg Oral BID Lisa Cisneros MD     • simethicone  80 mg Oral 4x Daily PRN Lisa Cisneros MD     • tamsulosin  0 4 mg Oral Daily Lisa Cisneros MD          Today, Patient Was Seen By: KEISHA Alaniz    ** Please Note: Dragon 360 Dictation voice to text software may have been used in the creation of this document   **

## 2022-11-21 PROBLEM — E87.1 HYPONATREMIA: Status: RESOLVED | Noted: 2022-11-15 | Resolved: 2022-11-21

## 2022-11-21 LAB
ANION GAP SERPL CALCULATED.3IONS-SCNC: 9 MMOL/L (ref 4–13)
BUN SERPL-MCNC: 75 MG/DL (ref 5–25)
CALCIUM SERPL-MCNC: 8.1 MG/DL (ref 8.3–10.1)
CHLORIDE SERPL-SCNC: 101 MMOL/L (ref 96–108)
CO2 SERPL-SCNC: 28 MMOL/L (ref 21–32)
CREAT SERPL-MCNC: 2.85 MG/DL (ref 0.6–1.3)
ERYTHROCYTE [DISTWIDTH] IN BLOOD BY AUTOMATED COUNT: 14.6 % (ref 11.6–15.1)
GFR SERPL CREATININE-BSD FRML MDRD: 18 ML/MIN/1.73SQ M
GLUCOSE SERPL-MCNC: 142 MG/DL (ref 65–140)
GLUCOSE SERPL-MCNC: 170 MG/DL (ref 65–140)
GLUCOSE SERPL-MCNC: 201 MG/DL (ref 65–140)
GLUCOSE SERPL-MCNC: 244 MG/DL (ref 65–140)
GLUCOSE SERPL-MCNC: 295 MG/DL (ref 65–140)
HCT VFR BLD AUTO: 28.6 % (ref 36.5–49.3)
HGB BLD-MCNC: 9.3 G/DL (ref 12–17)
MCH RBC QN AUTO: 29.8 PG (ref 26.8–34.3)
MCHC RBC AUTO-ENTMCNC: 32.5 G/DL (ref 31.4–37.4)
MCV RBC AUTO: 92 FL (ref 82–98)
PLATELET # BLD AUTO: 188 THOUSANDS/UL (ref 149–390)
PMV BLD AUTO: 11.4 FL (ref 8.9–12.7)
POTASSIUM SERPL-SCNC: 4.2 MMOL/L (ref 3.5–5.3)
PROCALCITONIN SERPL-MCNC: 0.57 NG/ML
RBC # BLD AUTO: 3.12 MILLION/UL (ref 3.88–5.62)
SODIUM SERPL-SCNC: 138 MMOL/L (ref 135–147)
WBC # BLD AUTO: 14.42 THOUSAND/UL (ref 4.31–10.16)

## 2022-11-21 RX ORDER — ONDANSETRON 2 MG/ML
4 INJECTION INTRAMUSCULAR; INTRAVENOUS EVERY 6 HOURS PRN
Status: DISCONTINUED | OUTPATIENT
Start: 2022-11-21 | End: 2022-11-22 | Stop reason: HOSPADM

## 2022-11-21 RX ORDER — CARVEDILOL 3.12 MG/1
3.12 TABLET ORAL 2 TIMES DAILY WITH MEALS
Status: DISCONTINUED | OUTPATIENT
Start: 2022-11-21 | End: 2022-11-22

## 2022-11-21 RX ADMIN — METRONIDAZOLE 500 MG: 500 TABLET ORAL at 21:57

## 2022-11-21 RX ADMIN — PANTOPRAZOLE SODIUM 40 MG: 40 TABLET, DELAYED RELEASE ORAL at 05:02

## 2022-11-21 RX ADMIN — NYSTATIN 500000 UNITS: 100000 SUSPENSION ORAL at 09:56

## 2022-11-21 RX ADMIN — NYSTATIN 500000 UNITS: 100000 SUSPENSION ORAL at 12:26

## 2022-11-21 RX ADMIN — ASPIRIN 81 MG CHEWABLE TABLET 81 MG: 81 TABLET CHEWABLE at 09:56

## 2022-11-21 RX ADMIN — METRONIDAZOLE 500 MG: 500 TABLET ORAL at 14:30

## 2022-11-21 RX ADMIN — ACETAMINOPHEN 650 MG: 325 TABLET, FILM COATED ORAL at 21:57

## 2022-11-21 RX ADMIN — NYSTATIN 500000 UNITS: 100000 SUSPENSION ORAL at 21:57

## 2022-11-21 RX ADMIN — INSULIN LISPRO 3 UNITS: 100 INJECTION, SOLUTION INTRAVENOUS; SUBCUTANEOUS at 17:08

## 2022-11-21 RX ADMIN — HEPARIN SODIUM 5000 UNITS: 5000 INJECTION INTRAVENOUS; SUBCUTANEOUS at 21:57

## 2022-11-21 RX ADMIN — HEPARIN SODIUM 5000 UNITS: 5000 INJECTION INTRAVENOUS; SUBCUTANEOUS at 14:30

## 2022-11-21 RX ADMIN — NYSTATIN 500000 UNITS: 100000 SUSPENSION ORAL at 17:16

## 2022-11-21 RX ADMIN — FUROSEMIDE 20 MG: 20 TABLET ORAL at 09:57

## 2022-11-21 RX ADMIN — CYANOCOBALAMIN TAB 500 MCG 500 MCG: 500 TAB at 09:56

## 2022-11-21 RX ADMIN — AMLODIPINE BESYLATE 10 MG: 10 TABLET ORAL at 09:57

## 2022-11-21 RX ADMIN — INSULIN LISPRO 4 UNITS: 100 INJECTION, SOLUTION INTRAVENOUS; SUBCUTANEOUS at 12:24

## 2022-11-21 RX ADMIN — HYDRALAZINE HYDROCHLORIDE 100 MG: 25 TABLET ORAL at 17:15

## 2022-11-21 RX ADMIN — ATORVASTATIN CALCIUM 40 MG: 40 TABLET, FILM COATED ORAL at 09:56

## 2022-11-21 RX ADMIN — HYDRALAZINE HYDROCHLORIDE 100 MG: 25 TABLET ORAL at 09:56

## 2022-11-21 RX ADMIN — Medication 250 MG: at 17:15

## 2022-11-21 RX ADMIN — CARVEDILOL 3.12 MG: 3.12 TABLET, FILM COATED ORAL at 17:09

## 2022-11-21 RX ADMIN — OXYCODONE HYDROCHLORIDE AND ACETAMINOPHEN 1000 MG: 500 TABLET ORAL at 09:56

## 2022-11-21 RX ADMIN — POLYETHYLENE GLYCOL 3350 17 G: 17 POWDER, FOR SOLUTION ORAL at 09:57

## 2022-11-21 RX ADMIN — Medication 2000 UNITS: at 09:57

## 2022-11-21 RX ADMIN — GABAPENTIN 100 MG: 100 CAPSULE ORAL at 09:57

## 2022-11-21 RX ADMIN — TAMSULOSIN HYDROCHLORIDE 0.4 MG: 0.4 CAPSULE ORAL at 09:56

## 2022-11-21 RX ADMIN — CARVEDILOL 3.12 MG: 3.12 TABLET, FILM COATED ORAL at 09:57

## 2022-11-21 RX ADMIN — CEFTRIAXONE 1000 MG: 1 INJECTION, SOLUTION INTRAVENOUS at 09:55

## 2022-11-21 RX ADMIN — METRONIDAZOLE 500 MG: 500 TABLET ORAL at 05:02

## 2022-11-21 RX ADMIN — DOXAZOSIN 2 MG: 2 TABLET ORAL at 21:57

## 2022-11-21 RX ADMIN — GABAPENTIN 100 MG: 100 CAPSULE ORAL at 17:16

## 2022-11-21 RX ADMIN — Medication 250 MG: at 09:57

## 2022-11-21 RX ADMIN — INSULIN LISPRO 1 UNITS: 100 INJECTION, SOLUTION INTRAVENOUS; SUBCUTANEOUS at 08:04

## 2022-11-21 RX ADMIN — INSULIN LISPRO 1 UNITS: 100 INJECTION, SOLUTION INTRAVENOUS; SUBCUTANEOUS at 21:58

## 2022-11-21 RX ADMIN — ISOSORBIDE MONONITRATE 60 MG: 60 TABLET, EXTENDED RELEASE ORAL at 09:57

## 2022-11-21 RX ADMIN — HEPARIN SODIUM 5000 UNITS: 5000 INJECTION INTRAVENOUS; SUBCUTANEOUS at 05:02

## 2022-11-21 NOTE — PROGRESS NOTES
Progress Note - Cardiology   [facility]     Tika Carney 80 y o  male MRN: [id number]  : 10/19/1929  Unit/Bed#: [address] [place] [address] Encounter: [id number]    Assessment and Plan:   1  Pneumonia:  Managed per primary team    -  on IV Rocephin and p o  Flagyl    2  Chronic diastolic heart failure:  Echocardiogram performed on 2022 demonstrated systolic function of low normal with grade 2 diastolic dysfunction    -  continue Lasix 20 mg p o     -  monitor I&O, daily weights and labs    -  patient appears to be euvolemic at this time    3  Hypertension:  Patient with longstanding history of hypertension which has been difficult to control     -  goal for systolic blood pressure to be around 140 mm Hg to avoid falling in this elderly gentleman    -  will change Toprol XL to Coreg and started 3 125 mg b i d  And titrate as needed    -  continue hydralazine 100 mg b i d     -  continue Imdur 60 mg once a day    -  continue Norvasc 10 mg once a day     -  unable to use ACE/ARB secondary to chronic kidney disease    -  patient currently on Cardura 2 mg daily at bedtime    -  monitor vital signs    4  Chronic kidney disease:  Stage IV    5  Dysphagia:  Followed by GI, may need PEG tube if he continues to aspirate    Subjective / Objective:   Patient seen and examined  He notes that he has poor appetite and not interested in eating food  Also notes difficulty with fluids to swallow  He denies any chest pain, pressure or palpitations  Patient noted to have oropharyngeal dysphagia with esophageal dysmotility  Vitals: Blood pressure 164/69, pulse 87, temperature 98 3 °F (36 8 °C), temperature source Oral, resp  rate 18, height 5' 5" (1 651 m), weight 84 4 kg (186 lb), SpO2 95 %  Vitals:    22 0600 22 0550   Weight: 82 1 kg (181 lb) 84 4 kg (186 lb)     Body mass index is 30 95 kg/m²    BP Readings from Last 3 Encounters:   22 164/69     Orthostatic Blood Pressures Flowsheet Row Most Recent Value   Blood Pressure 164/69 filed at 11/21/2022 2529   Patient Position - Orthostatic VS Lying filed at 11/21/2022 0718        I/O       11/19 0701 11/20 0700 11/20 0701 11/21 0700 11/21 0701 11/22 0700    P  O  1100 180     I V  (mL/kg)       Total Intake(mL/kg) 1100 (13 4) 180 (2 1)     Urine (mL/kg/hr) 1100 (0 6) 850 (0 4)     Total Output 1100 850     Net 0 -670                Invasive Devices     Peripheral Intravenous Line  Duration           Peripheral IV 11/18/22 Left Antecubital 2 days                  Intake/Output Summary (Last 24 hours) at 11/21/2022 0837  Last data filed at 11/21/2022 0501  Gross per 24 hour   Intake 180 ml   Output 850 ml   Net -670 ml         Physical Exam:   Physical Exam  Vitals and nursing note reviewed  Constitutional:       General: He is not in acute distress  Appearance: Normal appearance  He is obese  HENT:      Right Ear: External ear normal       Left Ear: External ear normal    Eyes:      General: No scleral icterus  Right eye: No discharge  Left eye: No discharge  Cardiovascular:      Rate and Rhythm: Normal rate and regular rhythm  Frequent extrasystoles are present  Pulmonary:      Effort: Pulmonary effort is normal  No accessory muscle usage or respiratory distress  Breath sounds: Examination of the right-lower field reveals decreased breath sounds  Examination of the left-lower field reveals decreased breath sounds  Decreased breath sounds present  Abdominal:      General: Bowel sounds are normal  There is no distension  Palpations: Abdomen is soft  Musculoskeletal:      Right lower leg: No edema  Left lower leg: No edema  Skin:     General: Skin is warm and dry  Capillary Refill: Capillary refill takes less than 2 seconds  Neurological:      General: No focal deficit present  Mental Status: He is alert and oriented to person, place, and time  Mental status is at baseline  Psychiatric:         Mood and Affect: Mood normal             Medications/ Allergies:     Current Facility-Administered Medications   Medication Dose Route Frequency Provider Last Rate   • acetaminophen  650 mg Oral Q6H PRN Jolie Lord MD     • amLODIPine  10 mg Oral Daily Ronny Delgado MD     • vitamin C  1,000 mg Oral Daily Jolie Lord MD     • aspirin  81 mg Oral Daily Jolie Lord MD     • atorvastatin  40 mg Oral Daily Jolie Lord MD     • benzonatate  100 mg Oral TID PRN Jolie Lord MD     • carvedilol  3 125 mg Oral BID With Meals Chuy Walls CRNORI     • cefTRIAXone  1,000 mg Intravenous Q24H Jolie Lord MD Stopped (11/20/22 1029)   • cholecalciferol  2,000 Units Oral Daily Jolie Lord MD     • vitamin B-12  500 mcg Oral Daily Jolie Lord MD     • doxazosin  2 mg Oral HS Jolie Lord MD     • furosemide  20 mg Oral Daily Media KEISHA Gregory     • gabapentin  100 mg Oral BID Jolie Lord MD     • heparin (porcine)  5,000 Units Subcutaneous [facility] Ronny Delgado MD     • hydrALAZINE  5 mg Intravenous Q6H PRN KEISHA Varela     • hydrALAZINE  100 mg Oral BID Jolie Lord MD     • insulin lispro  1-5 Units Subcutaneous HS Jolie Lord MD     • insulin lispro  1-6 Units Subcutaneous TID AC Ronny Delgado MD     • isosorbide mononitrate  60 mg Oral Daily Jolie Lord MD     • levalbuterol  1 25 mg Nebulization Q6H PRN Jolie Lord MD      And   • sodium chloride  3 mL Nebulization Q6H PRN Jolie Lord MD     • metroNIDAZOLE  500 mg Oral [facility] Ronny Delgado MD     • nystatin  500,000 Units Swish & Swallow 4x Daily Ronny Delgado MD     • pantoprazole  40 mg Oral Early Morning Ronny Delgado MD     • polyethylene glycol  17 g Oral Daily Rolf Mackey MD     • saccharomyces boulardii  250 mg Oral BID Nick Solorzano MD     • simethicone  80 mg Oral 4x Daily PRN Nick Solorzano MD     • tamsulosin  0 4 mg Oral Daily Ronny Solares MD       acetaminophen, 650 mg, Q6H PRN  benzonatate, 100 mg, TID PRN  hydrALAZINE, 5 mg, Q6H PRN  levalbuterol, 1 25 mg, Q6H PRN   And  sodium chloride, 3 mL, Q6H PRN  simethicone, 80 mg, 4x Daily PRN      No Known Allergies    VTE Pharmacologic Prophylaxis:   Sequential compression device (Venodyne)     Labs:   Troponins:  Results from last 7 days   Lab Units 11/15/22  1610 11/15/22  1404   HSTNI D2 ng/L  --  21*   HSTNI D4 ng/L 20*  --      CBC with diff:  Results from last 7 days   Lab Units 11/21/22  0506 11/20/22  0555 11/19/22  0548 11/18/22  0510 11/17/22  0537 11/16/22  0522 11/15/22  1207   WBC Thousand/uL 14 42* 15 88* 16 41* 13 45* 10 60* 13 33* 11 87*   HEMOGLOBIN g/dL 9 3* 9 8* 10 4* 10 0* 9 9* 10 8* 10 2*   HEMATOCRIT % 28 6* 30 2* 31 9* 30 7* 29 9* 31 9* 31 2*   MCV fL 92 92 92 90 90 90 90   PLATELETS Thousands/uL 188 195 179 190 182 207 186   MCH pg 29 8 29 7 30 1 29 3 29 9 30 6 29 6   MCHC g/dL 32 5 32 5 32 6 32 6 33 1 33 9 32 7   RDW % 14 6 14 6 14 6 14 2 13 9 14 2 14 1   MPV fL 11 4 11 7 12 6 12 7 11 7 12 1 12 1   NRBC AUTO /100 WBCs  --   --   --  0 0  --  0     CMP:  Results from last 7 days   Lab Units 11/21/22  0506 11/20/22  0555 11/19/22  0548 11/18/22  0510 11/17/22  0537 11/16/22  0522 11/15/22  1207   SODIUM mmol/L 138 138 136 138 132* 134* 131*   POTASSIUM mmol/L 4 2 3 8 3 7 3 3* 3 6 3 8 4 4   CHLORIDE mmol/L 101 100 99 99 97 97 94*   CO2 mmol/L 28 29 26 28 26 26 28   ANION GAP mmol/L 9 9 11 11 9 11 9   BUN mg/dL 75* 68* 53* 51* 55* 54* 53*   CREATININE mg/dL 2 85* 2 79* 2 41* 2 45* 2 44* 2 59* 2 59*   CALCIUM mg/dL 8 1* 8 4 8 5 8 5 8 4 8 5 8 6   AST U/L  --   --   --   --  35  --   --    ALT U/L  --   --   --   --  22  --  19   ALK PHOS U/L  --   --   --   --  72  --  84   TOTAL PROTEIN g/dL  --   --   --   --  6 3*  --  6 8   ALBUMIN g/dL  --   --   --   --  2 8*  --  3 0*   TOTAL BILIRUBIN mg/dL  --   --   --   --  0 64  --  0 79   EGFR ml/min/1 73sq m 18 18 22 21 21 20 20     Magnesium:  Results from last 7 days   Lab Units 11/16/22  0522 11/15/22  1207   MAGNESIUM mg/dL 2 7* 2 8*     Hgb A1c:  Results from last 7 days   Lab Units 11/16/22 0522   HEMOGLOBIN A1C % 5 7*       Imaging & Testing   I have personally reviewed pertinent reports  EGD    Result Date: 11/18/2022  Narrative: Table formatting from the original result was not included  [address] [facility] Operating Room [place] [address] [phone] DATE OF SERVICE: 11/18/22 PHYSICIAN(S): Attending: Lisa Cisneros MD Fellow: No Staff Documented Procedure :  EGD with biopsies INDICATION: Dysphagia POST-OP DIAGNOSIS: See the impression below  PREPROCEDURE: Informed consent was obtained for the procedure, including sedation  Risks of perforation, hemorrhage, adverse drug reaction and aspiration were discussed  The patient was placed in the left lateral decubitus position  Patient was explained about the risks and benefits of the procedure  Risks including but not limited to bleeding, infection, and perforation were explained in detail  Also explained about less than 100% sensitivity with the exam and other alternatives  DETAILS OF PROCEDURE: Patient was taken to the procedure room where a time out was performed to confirm correct patient and correct procedure  The patient underwent monitored anesthesia care, which was administered by an anesthesia professional  The patient's blood pressure, heart rate, level of consciousness, respirations and oxygen were monitored throughout the procedure  The scope was advanced to the second part of the duodenum  Retroflexion was performed in the fundus  Prior to the procedure, the patient's H  Pylori status was unknown  The patient experienced no blood loss  The procedure was not difficult  The patient tolerated the procedure well  There were no apparent complications  ANESTHESIA INFORMATION: ASA: III Anesthesia Type: IV Sedation with Anesthesia MEDICATIONS: No administrations occurring from 1456 to 1530 on 11/18/22 FINDINGS: Large amount of liquid and thick secretion in the esophagus suggests evidence of esophageal dysmotility, no obvious stricture was seen  GE junction appeared slightly erythematous, multiple lower esophageal biopsies were done Mild, localized erythematous mucosa in the antrum, consistent with gastritis The duodenal bulb, 1st part of the duodenum and 2nd part of the duodenum appeared normal  SPECIMENS: ID Type Source Tests Collected by Time Destination 1 : bxs lower esophagus Tissue Esophagus TISSUE EXAM Florecita Nguyen MD 11/18/2022  3:26 PM      Impression: 1  Oropharyngeal dysphagia with esophageal dysmotility 2  No obvious esophageal stricture 3  Mild erythema in the antrum 4  Normal duodenum RECOMMENDATION:  Await pathology results    Resume diet   If recurrent aspiration persists then consider for PEG tube placement   Florecita Nguyen MD     XR chest portable    Result Date: 11/20/2022  Narrative: CHEST INDICATION:   elevated WBC, fever, aspiration pneumonia  COMPARISON:  CXR 11/15/2022  EXAM PERFORMED/VIEWS:  XR CHEST PORTABLE FINDINGS: Cardiomediastinal silhouette appears unremarkable  Persistent bilateral pulmonary opacity, greatest in the left base  Small effusions  No pneumothorax  Osseous structures appear within normal limits for patient age  Impression: Persistent bilateral pulmonary opacity  With recent elevated BNP, this is at least likely in part due to pulmonary edema  Pneumonia not excluded  Workstation performed: [id number]     XR chest 1 view portable    Result Date: 11/16/2022  Narrative: CHEST INDICATION:   pneumonia   COMPARISON:  None EXAM PERFORMED/VIEWS:  XR CHEST PORTABLE FINDINGS: Cardiomediastinal silhouette appears unremarkable  Infiltrates in the bilateral upper lobes and left lower lobes  Small bilateral pleural effusions  Osseous structures appear within normal limits for patient age  Impression: Bilateral multifocal infiltrates  Small bilateral pleural effusions  Workstation performed: [id number]     FL esophagram complete    Result Date: 11/17/2022  Narrative: BARIUM SWALLOW-ESOPHAGRAM INDICATION:   esophageal dysphagia, suspect dysmotility  COMPARISON:  None IMAGES:  31 FLUOROSCOPY TIME:   2 6 MIN  TECHNIQUE: The patient was given effervescent granules and barium by mouth and images of the esophagus were obtained  FINDINGS: The esophagus is normal in caliber  Moderate to severe esophageal dysmotility with diffuse tertiary contractions  Significant residual contrast remaining in the esophagus with only a small amount in the stomach at the end of the study  No mucosal lesion, ulceration or evidence of fold thickening is seen  Gastroesophageal reflux was not observed  Small likely mixed type hiatal hernia  Impression: Moderate to severe esophageal dysmotility with significant residual contrast remaining in the esophagus at the end of the study  Small likely mixed type hiatal hernia  Workstation performed: [id number]     FL barium swallow video w speech    Result Date: 11/16/2022  Narrative: A video barium swallow study was performed by the Department of Speech Pathology  Please refer to the report for the official interpretation  The images are stored for archival purposes only  Study images were not formally reviewed by the Radiology Department      FL barium swallow video w speech    Result Date: 11/16/2022  Narrative: DOMITILA Lamas     11/18/2022 11:06 AM Speech Pathology Videofluoroscopic Swallow Study (VFSS/VBSS/MBSS) Patient Name: Kristine Cobian's Date: 11/16/2022  Problem List Principal Problem:   Pneumonia Active Problems:   Primary hypertension   CHF (congestive heart failure) (HCC)   Hyponatremia   Stage 4 chronic kidney disease (HCC)   Type 2 diabetes mellitus (HCC)   Dysphagia   Thrush   Elevated troponin Past Medical History Past Medical History: Diagnosis Date • CHF (congestive heart failure) (HCC)  • Diabetes mellitus (HCC)  • Elevated lipids  • Hypertension  • Neuropathy  • Renal disorder  General Information; Pt is a 80 y o  male with a PMH as stated above  Pt admitted 11/15 with recent c/o dysphagia & b/l infiltrates  No overt s/s oropharyngeal dysphagia noted on clinical/bedside swallowing evaluation  A VFSS was recommended for formal/instrumental assessment of oropharyngeal stage swallowing skills at this time  Pt was viewed in lateral position and assessed with thin liquid, nectar thick liquid, puree, solid food (Sujata Doone cookie) and a13mm pill with thin liquid  I transported pt and and from study  Oral stage:  No significant oral stage dysphagia  Mastication was timely and grossly effective with materials administered today  Bolus formation and transfer were functional   Oral control appeared adequate with no gross premature spillage over the base of tongue  Pharyngeal stage:  No significant pharyngeal dysphagia  Velar elevation noted  Swallowing initiation was prompt  Laryngeal rise and anterior hyoid excursion appeared adequate  AIrway entrance closure appeared adequate Tongue base retraction appeared to be of adequate strength  Pharyngeal constriction presumed to be adequate  PES opening was adequate  Management of food/liquid/barium tablet follows: All food, liquid and the barium tablet passed through the pharynx safely and efficiently (ie no laryngeal penetration, aspiration or significant pharyngeal residue noted today)  Esophageal stage: Brief view of esophagus was completed after administration of the barium tablet   S/S suggestive of significant esophageal dysmotility (eg stasis throughout esophagus, tertiary contractions, retrograde intra-esophageal flow)  Pt also with frequent belching  Assessment Summary:  Pt presents with no significant s/s oropharyngeal dysphagia but did present with s/s suggest of esophageal dysphagia (stasis throughout esophagus, tertiary contractions, retrograde intra-esophageal flow,  belching and episode of reflux),  Note: Images are available for review in PACS as desired  Recommendations: Recommended Diet:  No modification to diet texture indicated  Recommended Form of Medications: as tolerated (tolerated them whole w/water today)  Precautions to promote esophageal clearance Consider referral to GI, ?trial of PPI vs other No SLP Dysphagia therapy recommended at this time Thank you for this referral   Please do not hesitate to contact me with any questions or concerns  Elio Álvarez [facility]  Speech Pathologist [place] License [id number] [place] License [id number] Available via [employer]     Echo complete w/ contrast if indicated    Result Date: 11/17/2022  Narrative: •  Left Ventricle: Left ventricular cavity size is normal  Wall thickness is moderately increased  There is mild to moderate concentric hypertrophy  by visual estimation  Systolic function is low normal  Wall motion is normal  Diastolic function is moderately abnormal, consistent with grade II (pseudonormal) relaxation  Left atrial filling pressure is elevated  •  IVS: There is abnormal septal motion consistent with bundle branch block  •  Left Atrium: The atrium is moderately dilated  •  Right Atrium: The atrium is moderately dilated  •  Aortic Valve: The aortic valve is functionally bicuspid with commissural fusion of the left-noncoronary cusp  The leaflets are mildly thickened  The leaflets are moderately calcified  There is mildly reduced mobility  There is mild regurgitation  There is mild stenosis  The aortic valve peak velocity is 2 6 m/s  The aortic valve peak gradient is 27 0 mmHg  The aortic valve mean gradient is 12 0 mmHg  •  Tricuspid Valve:  The right ventricular systolic pressure/pulmonary is moderately elevated  [address]  Cardiology      "This note was completed in part utilizing m-modal fluency direct voice recognition software  Grammatical errors, random word insertion, spelling mistakes, and incomplete sentences may be an occasional consequence of the system secondary to software limitations, ambient noise and hardware issues  Please read the chart carefully and recognize, using context, where substitutions have occurred    If you have any questions or concerns about the context, text or information contained within the body of this dictation, please contact myself, the provider, for further clarification "

## 2022-11-21 NOTE — PROGRESS NOTES
Progress Note - Yeni Sullivan 80 y o  male MRN: [id number]    Unit/Bed#: [address] 410[address]01 Encounter: [id number]        Assessment/Plan: This is a 80-year-old male with history of CHF, DM, HLD, HTN, CKD 4, hyponatremia, diabetic neuropathy, and BPH who presented to the hospital from [facility] due to shortness of breath after failed outpatient treatment of pneumonia and admitted for treatment of suspected aspiration PNA/CHF exacerbation and started on Nystatin for oral thrush  GI consulted due to dysphagia      1  Esophageal dysphagia: Patient reports 2 week history of belching after drinking liquids and intermittent regurgitation, reports this has happened with food as well but on rare occurrence  Two days prior to admission he felt like a pill got stuck in his throat, but reports he feels like this has now cleared  He reports no prior history of dysphagia and he denies any heartburn, odynophagia, abdominal pain, nausea/vomiting, or choking  He was evaluated by speech with VBS with no s/s of oropharyngeal dysphagia, but did have signs suggestive of significant esophageal dysmotility with stasis throughout the esophagus, tertiary contractions, retrograde intra esophageal flow, belching and episode of reflux  Pt reports he feels like he has another pill stuck in his throat today   Tolerating secretions and diet with some intermittent regurgitation of liquids      -Esophagram performed last week showed moderate to severe esophageal dysmotility with significant residual contrast remaining in the esophagus at the end of the study and small likely mixed type hernia    -continue treatment of oral thrush  -continue pantoprazole 40 mg daily   -appreciate speech evaluation  -diet per speech  -EGD done last week showing large amount of liquid and thick secretion in the esophagus suggests evidence of esophageal dysmotility, no obvious stricture was seen  -per primary team, family does not want PEG  -continue current diet and will follow as needed  -continue antiemetics as need    Subjective:   Patient is lying in bed  States he has some nausea today  He otherwise reports has been eating and does not desire to have any feeding tube      Objective:     Vitals: /69 (BP Location: Right arm)   Pulse 87   Temp 98 3 °F (36 8 °C) (Oral)   Resp 18   Ht 5' 5" (1 651 m)   Wt 84 4 kg (186 lb)   SpO2 95%   BMI 30 95 kg/m²       Physical Exam:  Gen-alert no acute distress  Abd-positive bowel sounds nontender nondistended, protuberant, no rebound rigidity guarding       Lab, Imaging and other studies:   Recent Results (from the past 72 hour(s))   Fingerstick Glucose (POCT)    Collection Time: 11/18/22 11:20 AM   Result Value Ref Range    POC Glucose 180 (H) 65 - 140 mg/dl   Fingerstick Glucose (POCT)    Collection Time: 11/18/22  4:06 PM   Result Value Ref Range    POC Glucose 162 (H) 65 - 140 mg/dl   Fingerstick Glucose (POCT)    Collection Time: 11/18/22  9:11 PM   Result Value Ref Range    POC Glucose 176 (H) 65 - 140 mg/dl   Basic metabolic panel    Collection Time: 11/19/22  5:48 AM   Result Value Ref Range    Sodium 136 135 - 147 mmol/L    Potassium 3 7 3 5 - 5 3 mmol/L    Chloride 99 96 - 108 mmol/L    CO2 26 21 - 32 mmol/L    ANION GAP 11 4 - 13 mmol/L    BUN 53 (H) 5 - 25 mg/dL    Creatinine 2 41 (H) 0 60 - 1 30 mg/dL    Glucose 141 (H) 65 - 140 mg/dL    Calcium 8 5 8 3 - 10 1 mg/dL    eGFR 22 ml/min/1 73sq m   CBC    Collection Time: 11/19/22  5:48 AM   Result Value Ref Range    WBC 16 41 (H) 4 31 - 10 16 Thousand/uL    RBC 3 46 (L) 3 88 - 5 62 Million/uL    Hemoglobin 10 4 (L) 12 0 - 17 0 g/dL    Hematocrit 31 9 (L) 36 5 - 49 3 %    MCV 92 82 - 98 fL    MCH 30 1 26 8 - 34 3 pg    MCHC 32 6 31 4 - 37 4 g/dL    RDW 14 6 11 6 - 15 1 %    Platelets 007 399 - 541 Thousands/uL    MPV 12 6 8 9 - 12 7 fL   Fingerstick Glucose (POCT)    Collection Time: 11/19/22  7:26 AM   Result Value Ref Range    POC Glucose 150 (H) 65 - 140 mg/dl Fingerstick Glucose (POCT)    Collection Time: 11/19/22 11:04 AM   Result Value Ref Range    POC Glucose 237 (H) 65 - 140 mg/dl   Fingerstick Glucose (POCT)    Collection Time: 11/19/22  4:13 PM   Result Value Ref Range    POC Glucose 183 (H) 65 - 140 mg/dl   Fingerstick Glucose (POCT)    Collection Time: 11/19/22  9:11 PM   Result Value Ref Range    POC Glucose 153 (H) 65 - 140 mg/dl   Basic metabolic panel    Collection Time: 11/20/22  5:55 AM   Result Value Ref Range    Sodium 138 135 - 147 mmol/L    Potassium 3 8 3 5 - 5 3 mmol/L    Chloride 100 96 - 108 mmol/L    CO2 29 21 - 32 mmol/L    ANION GAP 9 4 - 13 mmol/L    BUN 68 (H) 5 - 25 mg/dL    Creatinine 2 79 (H) 0 60 - 1 30 mg/dL    Glucose 156 (H) 65 - 140 mg/dL    Calcium 8 4 8 3 - 10 1 mg/dL    eGFR 18 ml/min/1 73sq m   CBC    Collection Time: 11/20/22  5:55 AM   Result Value Ref Range    WBC 15 88 (H) 4 31 - 10 16 Thousand/uL    RBC 3 30 (L) 3 88 - 5 62 Million/uL    Hemoglobin 9 8 (L) 12 0 - 17 0 g/dL    Hematocrit 30 2 (L) 36 5 - 49 3 %    MCV 92 82 - 98 fL    MCH 29 7 26 8 - 34 3 pg    MCHC 32 5 31 4 - 37 4 g/dL    RDW 14 6 11 6 - 15 1 %    Platelets 919 895 - 951 Thousands/uL    MPV 11 7 8 9 - 12 7 fL   Fingerstick Glucose (POCT)    Collection Time: 11/20/22  7:19 AM   Result Value Ref Range    POC Glucose 152 (H) 65 - 140 mg/dl   Fingerstick Glucose (POCT)    Collection Time: 11/20/22 11:30 AM   Result Value Ref Range    POC Glucose 259 (H) 65 - 140 mg/dl   Fingerstick Glucose (POCT)    Collection Time: 11/20/22  3:53 PM   Result Value Ref Range    POC Glucose 195 (H) 65 - 140 mg/dl   Fingerstick Glucose (POCT)    Collection Time: 11/20/22  8:34 PM   Result Value Ref Range    POC Glucose 212 (H) 65 - 140 mg/dl   Urinalysis with microscopic    Collection Time: 11/20/22 10:04 PM   Result Value Ref Range    Color, UA Yellow     Clarity, UA Clear     Specific Gravity, UA 1 020 1 000 - 1 030    pH, UA 5 5 5 0, 5 5, 6 0, 6 5, 7 0, 7 5, 8 0, 8 5, 9 0 Leukocytes, UA Trace (A) Negative    Nitrite, UA Negative Negative    Protein, UA Trace (A) Negative mg/dl    Glucose, UA Negative Negative mg/dl    Ketones, UA Negative Negative mg/dl    Urobilinogen, UA 0 2 0 2, 1 0 E U /dl E U /dl    Bilirubin, UA Negative Negative    Occult Blood, UA Negative Negative    RBC, UA None Seen None Seen, 2-4 /hpf    WBC, UA 1-2 (A) None Seen, 2-4, 5-60 /hpf    Epithelial Cells Occasional None Seen, Occasional /hpf    Bacteria, UA Occasional None Seen, Occasional /hpf    OTHER OBSERVATIONS Yeast Cells Present    Procalcitonin    Collection Time: 11/21/22  5:06 AM   Result Value Ref Range    Procalcitonin 0 57 (H) <=0 25 ng/ml   Basic metabolic panel    Collection Time: 11/21/22  5:06 AM   Result Value Ref Range    Sodium 138 135 - 147 mmol/L    Potassium 4 2 3 5 - 5 3 mmol/L    Chloride 101 96 - 108 mmol/L    CO2 28 21 - 32 mmol/L    ANION GAP 9 4 - 13 mmol/L    BUN 75 (H) 5 - 25 mg/dL    Creatinine 2 85 (H) 0 60 - 1 30 mg/dL    Glucose 142 (H) 65 - 140 mg/dL    Calcium 8 1 (L) 8 3 - 10 1 mg/dL    eGFR 18 ml/min/1 73sq m   CBC    Collection Time: 11/21/22  5:06 AM   Result Value Ref Range    WBC 14 42 (H) 4 31 - 10 16 Thousand/uL    RBC 3 12 (L) 3 88 - 5 62 Million/uL    Hemoglobin 9 3 (L) 12 0 - 17 0 g/dL    Hematocrit 28 6 (L) 36 5 - 49 3 %    MCV 92 82 - 98 fL    MCH 29 8 26 8 - 34 3 pg    MCHC 32 5 31 4 - 37 4 g/dL    RDW 14 6 11 6 - 15 1 %    Platelets 914 134 - 574 Thousands/uL    MPV 11 4 8 9 - 12 7 fL   Fingerstick Glucose (POCT)    Collection Time: 11/21/22  7:31 AM   Result Value Ref Range    POC Glucose 170 (H) 65 - 140 mg/dl

## 2022-11-21 NOTE — PROGRESS NOTES
Irene 45  Progress Note Ewa Mulligan 10/19/1929, 80 y o  male MRN: [id number]  Unit/Bed#: [address] [place] [address] Encounter: [id number]  Primary Care Provider: Irma Corcoran PA-C   Date and time admitted to hospital: 11/15/2022 11:27 AM    * Pneumonia  Assessment & Plan  Suspected pneumonia, possibly aspiration secondary to patient reporting dysphagia with choking on pills and fluids over the past 2 weeks  Febrile three days PTA  Leukocytosis on admission  Chest x-ray with bilateral multifocal infiltrates  Lactic acid negative  Procalcitonin 0 57 today  Urine for strep and legionella negative   · Continue rocephin day # 6, flagyl; day 8  · Blood cultures negative to date  · Repeat procalcitonin a m  · Supportive therapies with duonebs, respiratory protocol  · Speech therapy recommendations appreciated; no modification to consistency or liquids        Primary hypertension  Assessment & Plan  · Patient has a history of primary hypertension  · Home medications include Norvasc 10 mg, Cardura 2 mg hydralazine 100 mg b i d , Imdur 60 mg   · Discontinue Toprol-XL and started Coreg 3 125 mg b i d  per Cardiology      Acute diastolic (congestive) heart failure (HCC)  Assessment & Plan  Wt Readings from Last 3 Encounters:   11/20/22 82 1 kg (181 lb)     Acute diastolic congestive heart failure in setting of hypertension evidence by volume overload, pitting edema bilateral lower extremities, elevated BNP, echo with low-normal systolic function EF 09% and moderately abnormal diastolic function treated with IV Lasix, daily weights, intake and output and low-sodium diet  Transitioned back to home Lasix 20 mg po daily on 11/20; monitor response         Elevated troponin  Assessment & Plan  Troponin elevated 120->140  Denies any chest discomfort  Sinus rhythm noted on monitor, right bundle branch block  Suspect may be elevated secondary to CHF?   Cardiology consult appreciated     Thrush  Assessment & Plan  Patient noted to have white patches on his tongue, admits to some dysphagia, suspect thrush  Nystatin swish and swallow ordered  Improving     Dysphagia  Assessment & Plan  Patient reports that he has been having intermittent dysphagia over the past few weeks, having difficulty after drinking thin liquids, choking on pills  Speech consulted for bedside swallow eval  Aspiration precautions  · Patient passed video swallow, reports that he feels he is doing better with his pills crushed in applesauce  · GI consult appreciated  · EGD performed on 11/18 shows oropharyngeal dysphagia with esophageal dysmotility  · GI recommending if symptoms persist may need to consider PEG tube  · Family does not want PEG    Type 2 diabetes mellitus [facility])  Assessment & Plan  Lab Results   Component Value Date    HGBA1C 5 7 (H) 11/16/2022       Recent Labs     11/19/22  2111 11/20/22  0719 11/20/22  1130 11/20/22  1553   POCGLU 153* 152* 259* 195*       Blood Sugar Average: Last 72 hrs:  (P) 170 4  Patient normally takes Prandin 0 5 mg t i d  With meals  Will hold at this time  Place on Accu-Cheks AC and HS with sliding scale coverage  Diabetic diet  Hga1c 5 7       Stage 4 chronic kidney disease [facility])  Assessment & Plan  Lab Results   Component Value Date    EGFR 18 11/20/2022    EGFR 22 11/19/2022    EGFR 21 11/18/2022    CREATININE 2 79 (H) 11/20/2022    CREATININE 2 41 (H) 11/19/2022    CREATININE 2 45 (H) 11/18/2022     Patient has a history of stage IV CKD, uncertain of baseline creatinine  Will repeat BMP in a [employer] Avoid periods of relative hypotension  Avoid nephrotoxin agents  CHF (congestive heart failure) ([facility] [address] )  Assessment & Plan  Wt Readings from Last 3 Encounters:   11/20/22 82 1 kg (181 lb)     · Patient appeared to be volume overloaded, pitting edema noted bilateral lower extremities  BNP elevated 9359  Given lasix on admission with good diuresis      · Echo done on 77/49 showed systolic function of low normal with grade 2 diastolic dysfunction  · Continue home medication lasix 20 mg daily   · Cardiology consultation appreciated  · Daily Weight  · Intake and output  · Low sodium diet        Hyponatremia-resolved as of 2022  Assessment & Plan  Mild hyponatremia  Improving  Repeat BMP in a m  VTE Pharmacologic Prophylaxis: VTE Score: 5 High Risk (Score >/= 5) - Pharmacological DVT Prophylaxis Ordered: heparin  Sequential Compression Devices Ordered  Patient Centered Rounds: I performed bedside rounds with nursing staff today  Discussions with Specialists or Other Care Team Provider: Yes    Education and Discussions with Family / Patient: Updated  (daughter) via phone  Time Spent for Care: 30 minutes  More than 50% of total time spent on counseling and coordination of care as described above  Current Length of Stay: 6 day(s)  Current Patient Status: Inpatient   Certification Statement: The patient will continue to require additional inpatient hospital stay due to Not stable for discharge today  Discharge Plan: Anticipate discharge tomorrow to rehab facility  Code Status: Level 3 - DNAR and DNI    Subjective:   Patient seen and examined lying in bed  Reported feeling of something stuck in throat  Reported difficulty swallowing  Denies chest pain, abdominal pain, nausea or vomiting  Objective:     Vitals:   Temp (24hrs), Av 8 °F (37 1 °C), Min:98 3 °F (36 8 °C), Max:99 9 °F (37 7 °C)    Temp:  [98 3 °F (36 8 °C)-99 9 °F (37 7 °C)] 99 1 °F (37 3 °C)  HR:  [75-93] 75  Resp:  [18] 18  BP: (138-167)/(64-69) 138/65  SpO2:  [93 %-95 %] 95 %  Body mass index is 30 95 kg/m²  Input and Output Summary (last 24 hours): Intake/Output Summary (Last 24 hours) at 2022 1521  Last data filed at 2022 1056  Gross per 24 hour   Intake --   Output 750 ml   Net -750 ml       Physical Exam:   Physical Exam  Constitutional:       Appearance: He is obese     HENT: Head: Normocephalic  Nose: Nose normal       Mouth/Throat:      Mouth: Mucous membranes are moist    Eyes:      General: No scleral icterus  Cardiovascular:      Rate and Rhythm: Normal rate  Heart sounds: Murmur heard  Pulmonary:      Breath sounds: Normal breath sounds  Abdominal:      General: Bowel sounds are normal  There is no distension  Musculoskeletal:         General: Normal range of motion  Cervical back: Normal range of motion  Skin:     General: Skin is dry  Neurological:      Mental Status: He is alert  Mental status is at baseline  Psychiatric:         Behavior: Behavior normal          Additional Data:     Labs:  Results from last 7 days   Lab Units 11/21/22  0506 11/19/22  0548 11/18/22  0510   WBC Thousand/uL 14 42*   < > 13 45*   HEMOGLOBIN g/dL 9 3*   < > 10 0*   HEMATOCRIT % 28 6*   < > 30 7*   PLATELETS Thousands/uL 188   < > 190   NEUTROS PCT %  --   --  74   LYMPHS PCT %  --   --  12*   MONOS PCT %  --   --  12   EOS PCT %  --   --  1    < > = values in this interval not displayed  Results from last 7 days   Lab Units 11/21/22  0506 11/18/22  0510 11/17/22  0537   SODIUM mmol/L 138   < > 132*   POTASSIUM mmol/L 4 2   < > 3 6   CHLORIDE mmol/L 101   < > 97   CO2 mmol/L 28   < > 26   BUN mg/dL 75*   < > 55*   CREATININE mg/dL 2 85*   < > 2 44*   ANION GAP mmol/L 9   < > 9   CALCIUM mg/dL 8 1*   < > 8 4   ALBUMIN g/dL  --   --  2 8*   TOTAL BILIRUBIN mg/dL  --   --  0 64   ALK PHOS U/L  --   --  72   ALT U/L  --   --  22   AST U/L  --   --  35   GLUCOSE RANDOM mg/dL 142*   < > 115    < > = values in this interval not displayed           Results from last 7 days   Lab Units 11/21/22  1123 11/21/22  0731 11/20/22  2034 11/20/22  1553 11/20/22  1130 11/20/22  0719 11/19/22  2111 11/19/22  1613 11/19/22  1104 11/19/22  0726 11/18/22  2111 11/18/22  1606   POC GLUCOSE mg/dl 295* 170* 212* 195* 259* 152* 153* 183* 237* 150* 176* 162*     Results from last 7 days   Lab Units 11/16/22  0522   HEMOGLOBIN A1C % 5 7*     Results from last 7 days   Lab Units 11/21/22  0506 11/16/22  0522 11/15/22  1358   LACTIC ACID mmol/L  --   --  0 8   PROCALCITONIN ng/ml 0 57* 0 12  --        Lines/Drains:  Invasive Devices     Peripheral Intravenous Line  Duration           Peripheral IV 11/18/22 Left Antecubital 3 days                      Imaging: No pertinent imaging reviewed  Recent Cultures (last 7 days):   Results from last 7 days   Lab Units 11/15/22  1937 11/15/22  1359 11/15/22  1358   BLOOD CULTURE   --  No Growth After 5 Days  No Growth After 5 Days     LEGIONELLA URINARY ANTIGEN  Negative  --   --        Last 24 Hours Medication List:   Current Facility-Administered Medications   Medication Dose Route Frequency Provider Last Rate   • acetaminophen  650 mg Oral Q6H PRN Vincent Bosworth, MD     • amLODIPine  10 mg Oral Daily Ronny Sanderson MD     • vitamin C  1,000 mg Oral Daily Vincent Bosworth, MD     • aspirin  81 mg Oral Daily Vincent Bosworth, MD     • atorvastatin  40 mg Oral Daily Vincent Bosworth, MD     • benzonatate  100 mg Oral TID PRN Vincent Bosworth, MD     • carvedilol  3 125 mg Oral BID With Meals Velma Hamman, CRNP     • cefTRIAXone  1,000 mg Intravenous Q24H Vincent Bosworth, MD 1,000 mg (11/21/22 0955)   • cholecalciferol  2,000 Units Oral Daily Vincent Bosworth, MD     • vitamin B-12  500 mcg Oral Daily Vincent Bosworth, MD     • doxazosin  2 mg Oral HS Vincent Bosworth, MD     • furosemide  20 mg Oral Daily KEISHA Barrios     • gabapentin  100 mg Oral BID Vincent Bosworth, MD     • heparin (porcine)  5,000 Units Subcutaneous [facility] Ronny Sanderson MD     • hydrALAZINE  5 mg Intravenous Q6H PRN KEISHA Barrios     • hydrALAZINE  100 mg Oral BID Vincent Bosworth, MD     • insulin lispro  1-5 Units Subcutaneous HS Vincent Bosworth, MD     • insulin lispro  1-6 Units Subcutaneous TID AC Ronny Rea Santo MD     • isosorbide mononitrate  60 mg Oral Daily Booker Tracy MD     • levalbuterol  1 25 mg Nebulization Q6H PRN Booker Tracy MD      And   • sodium chloride  3 mL Nebulization Q6H PRN Booker Tracy MD     • metroNIDAZOLE  500 mg Oral Q8H [address]  Rea Santo MD     • nystatin  500,000 Units Swish & Swallow 4x Daily Booker Tracy MD     • ondansetron  4 mg Intravenous Q6H PRN Moriah Juarez PA-C     • pantoprazole  40 mg Oral Early Morning Booker Tracy MD     • polyethylene glycol  17 g Oral Daily Booker Tracy MD     • saccharomyces boulardii  250 mg Oral BID Booker Tracy MD     • simethicone  80 mg Oral 4x Daily PRN Booker Tracy MD     • tamsulosin  0 4 mg Oral Daily Booker Tracy MD          Today, Patient Was Seen By: KEISHA Casarez    **Please Note: This note may have been constructed using a voice recognition system  **

## 2022-11-22 ENCOUNTER — APPOINTMENT (EMERGENCY)
Dept: RADIOLOGY | Facility: HOSPITAL | Age: 87
End: 2022-11-22

## 2022-11-22 ENCOUNTER — HOSPITAL ENCOUNTER (INPATIENT)
Facility: HOSPITAL | Age: 87
LOS: 18 days | Discharge: HOSPICE | End: 2022-12-10
Attending: EMERGENCY MEDICINE | Admitting: INTERNAL MEDICINE

## 2022-11-22 VITALS
TEMPERATURE: 98.5 F | BODY MASS INDEX: 31.04 KG/M2 | HEIGHT: 65 IN | RESPIRATION RATE: 18 BRPM | WEIGHT: 186.3 LBS | OXYGEN SATURATION: 91 % | HEART RATE: 82 BPM | SYSTOLIC BLOOD PRESSURE: 168 MMHG | DIASTOLIC BLOOD PRESSURE: 64 MMHG

## 2022-11-22 DIAGNOSIS — R06.03 RESPIRATORY DISTRESS: Primary | ICD-10-CM

## 2022-11-22 DIAGNOSIS — N18.4 STAGE 4 CHRONIC KIDNEY DISEASE (HCC): ICD-10-CM

## 2022-11-22 DIAGNOSIS — J96.01 ACUTE RESPIRATORY FAILURE WITH HYPOXIA (HCC): ICD-10-CM

## 2022-11-22 DIAGNOSIS — R50.9 FEVER: ICD-10-CM

## 2022-11-22 DIAGNOSIS — J18.9 PNEUMONIA: ICD-10-CM

## 2022-11-22 DIAGNOSIS — I50.9 CONGESTIVE HEART FAILURE, UNSPECIFIED HF CHRONICITY, UNSPECIFIED HEART FAILURE TYPE (HCC): ICD-10-CM

## 2022-11-22 DIAGNOSIS — E11.9 TYPE 2 DIABETES MELLITUS (HCC): ICD-10-CM

## 2022-11-22 DIAGNOSIS — R33.9 URINARY RETENTION: ICD-10-CM

## 2022-11-22 DIAGNOSIS — R77.8 ELEVATED TROPONIN: ICD-10-CM

## 2022-11-22 DIAGNOSIS — I10 PRIMARY HYPERTENSION: ICD-10-CM

## 2022-11-22 DIAGNOSIS — J69.0 ASPIRATION PNEUMONIA (HCC): ICD-10-CM

## 2022-11-22 DIAGNOSIS — J81.1 PULMONARY EDEMA: ICD-10-CM

## 2022-11-22 DIAGNOSIS — R13.19 ESOPHAGEAL DYSPHAGIA: ICD-10-CM

## 2022-11-22 DIAGNOSIS — R05.9 COUGH: ICD-10-CM

## 2022-11-22 DIAGNOSIS — K59.00 CONSTIPATION: ICD-10-CM

## 2022-11-22 DIAGNOSIS — I50.31 ACUTE DIASTOLIC (CONGESTIVE) HEART FAILURE (HCC): ICD-10-CM

## 2022-11-22 LAB
2HR DELTA HS TROPONIN: 73 NG/L
ALBUMIN SERPL BCP-MCNC: 2.6 G/DL (ref 3.5–5)
ALP SERPL-CCNC: 92 U/L (ref 46–116)
ALT SERPL W P-5'-P-CCNC: 27 U/L (ref 12–78)
AMORPH URATE CRY URNS QL MICRO: ABNORMAL /HPF
ANION GAP SERPL CALCULATED.3IONS-SCNC: 9 MMOL/L (ref 4–13)
ANION GAP SERPL CALCULATED.3IONS-SCNC: 9 MMOL/L (ref 4–13)
ANISOCYTOSIS BLD QL SMEAR: PRESENT
APTT PPP: 34 SECONDS (ref 23–37)
ARTERIAL PATENCY WRIST A: YES
ATRIAL RATE: 91 BPM
BACTERIA UR QL AUTO: ABNORMAL /HPF
BASE EXCESS BLDA CALC-SCNC: 2.7 MMOL/L
BASOPHILS # BLD MANUAL: 0 THOUSAND/UL (ref 0–0.1)
BASOPHILS NFR MAR MANUAL: 0 % (ref 0–1)
BILIRUB SERPL-MCNC: 0.57 MG/DL (ref 0.2–1)
BILIRUB UR QL STRIP: NEGATIVE
BODY TEMPERATURE: 98.1 DEGREES FEHRENHEIT
BUN SERPL-MCNC: 85 MG/DL (ref 5–25)
BUN SERPL-MCNC: 91 MG/DL (ref 5–25)
CALCIUM ALBUM COR SERPL-MCNC: 9.9 MG/DL (ref 8.3–10.1)
CALCIUM SERPL-MCNC: 8.2 MG/DL (ref 8.3–10.1)
CALCIUM SERPL-MCNC: 8.8 MG/DL (ref 8.3–10.1)
CARDIAC TROPONIN I PNL SERPL HS: 124 NG/L
CARDIAC TROPONIN I PNL SERPL HS: 197 NG/L
CARDIAC TROPONIN I PNL SERPL HS: 99 NG/L (ref 8–18)
CHLORIDE SERPL-SCNC: 103 MMOL/L (ref 96–108)
CHLORIDE SERPL-SCNC: 96 MMOL/L (ref 96–108)
CLARITY UR: CLEAR
CO2 SERPL-SCNC: 27 MMOL/L (ref 21–32)
CO2 SERPL-SCNC: 28 MMOL/L (ref 21–32)
COLOR UR: YELLOW
CREAT SERPL-MCNC: 2.68 MG/DL (ref 0.6–1.3)
CREAT SERPL-MCNC: 2.83 MG/DL (ref 0.6–1.3)
EOSINOPHIL # BLD MANUAL: 0 THOUSAND/UL (ref 0–0.4)
EOSINOPHIL NFR BLD MANUAL: 0 % (ref 0–6)
ERYTHROCYTE [DISTWIDTH] IN BLOOD BY AUTOMATED COUNT: 14.7 % (ref 11.6–15.1)
ERYTHROCYTE [DISTWIDTH] IN BLOOD BY AUTOMATED COUNT: 14.7 % (ref 11.6–15.1)
FLUAV RNA RESP QL NAA+PROBE: NEGATIVE
FLUBV RNA RESP QL NAA+PROBE: NEGATIVE
GFR SERPL CREATININE-BSD FRML MDRD: 18 ML/MIN/1.73SQ M
GFR SERPL CREATININE-BSD FRML MDRD: 19 ML/MIN/1.73SQ M
GLUCOSE SERPL-MCNC: 168 MG/DL (ref 65–140)
GLUCOSE SERPL-MCNC: 193 MG/DL (ref 65–140)
GLUCOSE SERPL-MCNC: 224 MG/DL (ref 65–140)
GLUCOSE SERPL-MCNC: 287 MG/DL (ref 65–140)
GLUCOSE SERPL-MCNC: 288 MG/DL (ref 65–140)
GLUCOSE UR STRIP-MCNC: NEGATIVE MG/DL
HCO3 BLDA-SCNC: 27.8 MMOL/L (ref 22–28)
HCT VFR BLD AUTO: 27.9 % (ref 36.5–49.3)
HCT VFR BLD AUTO: 29.9 % (ref 36.5–49.3)
HGB BLD-MCNC: 9.2 G/DL (ref 12–17)
HGB BLD-MCNC: 9.6 G/DL (ref 12–17)
HGB UR QL STRIP.AUTO: NEGATIVE
HYALINE CASTS #/AREA URNS LPF: ABNORMAL /LPF
INR PPP: 1.07 (ref 0.84–1.19)
KETONES UR STRIP-MCNC: NEGATIVE MG/DL
LACTATE SERPL-SCNC: 1 MMOL/L (ref 0.5–2)
LEUKOCYTE ESTERASE UR QL STRIP: ABNORMAL
LG PLATELETS BLD QL SMEAR: PRESENT
LYMPHOCYTES # BLD AUTO: 0.81 THOUSAND/UL (ref 0.6–4.47)
LYMPHOCYTES # BLD AUTO: 5 % (ref 14–44)
MAGNESIUM SERPL-MCNC: 3.2 MG/DL (ref 1.6–2.6)
MCH RBC QN AUTO: 29.6 PG (ref 26.8–34.3)
MCH RBC QN AUTO: 30.5 PG (ref 26.8–34.3)
MCHC RBC AUTO-ENTMCNC: 32.1 G/DL (ref 31.4–37.4)
MCHC RBC AUTO-ENTMCNC: 33 G/DL (ref 31.4–37.4)
MCV RBC AUTO: 92 FL (ref 82–98)
MCV RBC AUTO: 92 FL (ref 82–98)
METAMYELOCYTES NFR BLD MANUAL: 1 % (ref 0–1)
MONOCYTES # BLD AUTO: 0.97 THOUSAND/UL (ref 0–1.22)
MONOCYTES NFR BLD: 6 % (ref 4–12)
MYELOCYTES NFR BLD MANUAL: 1 % (ref 0–1)
NASAL CANNULA: 5
NEUTROPHILS # BLD MANUAL: 14.09 THOUSAND/UL (ref 1.85–7.62)
NEUTS SEG NFR BLD AUTO: 87 % (ref 43–75)
NITRITE UR QL STRIP: NEGATIVE
NON-SQ EPI CELLS URNS QL MICRO: ABNORMAL /HPF
NT-PROBNP SERPL-MCNC: ABNORMAL PG/ML
O2 CT BLDA-SCNC: 14.1 ML/DL (ref 16–23)
OXYHGB MFR BLDA: 93.9 % (ref 94–97)
P AXIS: 86 DEGREES
PCO2 BLDA: 45 MM HG (ref 36–44)
PCO2 TEMP ADJ BLDA: 44.4 MM HG (ref 36–44)
PH BLD: 7.41 [PH] (ref 7.35–7.45)
PH BLDA: 7.41 [PH] (ref 7.35–7.45)
PH UR STRIP.AUTO: 5 [PH]
PLATELET # BLD AUTO: 197 THOUSANDS/UL (ref 149–390)
PLATELET # BLD AUTO: 253 THOUSANDS/UL (ref 149–390)
PLATELET BLD QL SMEAR: ADEQUATE
PMV BLD AUTO: 10.7 FL (ref 8.9–12.7)
PMV BLD AUTO: 12 FL (ref 8.9–12.7)
PO2 BLD: 70.6 MM HG (ref 75–129)
PO2 BLDA: 72 MM HG (ref 75–129)
POTASSIUM SERPL-SCNC: 4.6 MMOL/L (ref 3.5–5.3)
POTASSIUM SERPL-SCNC: 4.9 MMOL/L (ref 3.5–5.3)
PR INTERVAL: 146 MS
PROCALCITONIN SERPL-MCNC: 0.39 NG/ML
PROT SERPL-MCNC: 6.9 G/DL (ref 6.4–8.4)
PROT UR STRIP-MCNC: NEGATIVE MG/DL
PROTHROMBIN TIME: 14.1 SECONDS (ref 11.6–14.5)
QRS AXIS: -44 DEGREES
QRSD INTERVAL: 124 MS
QT INTERVAL: 394 MS
QTC INTERVAL: 484 MS
RBC # BLD AUTO: 3.02 MILLION/UL (ref 3.88–5.62)
RBC # BLD AUTO: 3.24 MILLION/UL (ref 3.88–5.62)
RBC #/AREA URNS AUTO: ABNORMAL /HPF
RBC MORPH BLD: PRESENT
RSV RNA RESP QL NAA+PROBE: NEGATIVE
SARS-COV-2 RNA RESP QL NAA+PROBE: NEGATIVE
SODIUM SERPL-SCNC: 133 MMOL/L (ref 135–147)
SODIUM SERPL-SCNC: 139 MMOL/L (ref 135–147)
SP GR UR STRIP.AUTO: 1.02 (ref 1–1.03)
SPECIMEN SOURCE: ABNORMAL
T WAVE AXIS: 38 DEGREES
TOXIC GRANULES BLD QL SMEAR: PRESENT
UROBILINOGEN UR QL STRIP.AUTO: 0.2 E.U./DL
VENTRICULAR RATE: 91 BPM
WBC # BLD AUTO: 13.11 THOUSAND/UL (ref 4.31–10.16)
WBC # BLD AUTO: 16.19 THOUSAND/UL (ref 4.31–10.16)
WBC #/AREA URNS AUTO: ABNORMAL /HPF
WBC TOXIC VACUOLES BLD QL SMEAR: PRESENT

## 2022-11-22 RX ORDER — METRONIDAZOLE 500 MG/100ML
500 INJECTION, SOLUTION INTRAVENOUS EVERY 8 HOURS
Status: DISCONTINUED | OUTPATIENT
Start: 2022-11-22 | End: 2022-11-23

## 2022-11-22 RX ORDER — METHYLPREDNISOLONE SODIUM SUCCINATE 125 MG/2ML
125 INJECTION, POWDER, LYOPHILIZED, FOR SOLUTION INTRAMUSCULAR; INTRAVENOUS ONCE
Status: COMPLETED | OUTPATIENT
Start: 2022-11-22 | End: 2022-11-22

## 2022-11-22 RX ORDER — INSULIN LISPRO 100 [IU]/ML
4 INJECTION, SOLUTION INTRAVENOUS; SUBCUTANEOUS
Status: DISCONTINUED | OUTPATIENT
Start: 2022-11-22 | End: 2022-11-22 | Stop reason: HOSPADM

## 2022-11-22 RX ORDER — SACCHAROMYCES BOULARDII 250 MG
250 CAPSULE ORAL 2 TIMES DAILY
Qty: 14 CAPSULE | Refills: 0
Start: 2022-11-22 | End: 2022-12-10

## 2022-11-22 RX ORDER — METRONIDAZOLE 500 MG/1
500 TABLET ORAL EVERY 8 HOURS SCHEDULED
Qty: 15 TABLET | Refills: 0
Start: 2022-11-22 | End: 2022-12-10

## 2022-11-22 RX ORDER — CARVEDILOL 12.5 MG/1
12.5 TABLET ORAL 2 TIMES DAILY WITH MEALS
Status: DISCONTINUED | OUTPATIENT
Start: 2022-11-22 | End: 2022-11-22 | Stop reason: HOSPADM

## 2022-11-22 RX ORDER — BENZONATATE 100 MG/1
100 CAPSULE ORAL 3 TIMES DAILY
Qty: 20 CAPSULE | Refills: 0 | Status: ON HOLD
Start: 2022-11-22 | End: 2022-12-08 | Stop reason: SDUPTHER

## 2022-11-22 RX ORDER — CARVEDILOL 12.5 MG/1
12.5 TABLET ORAL 2 TIMES DAILY WITH MEALS
Qty: 14 TABLET | Refills: 0 | Status: ON HOLD
Start: 2022-11-22 | End: 2022-12-08

## 2022-11-22 RX ORDER — CEFEPIME HYDROCHLORIDE 2 G/50ML
2000 INJECTION, SOLUTION INTRAVENOUS ONCE
Status: COMPLETED | OUTPATIENT
Start: 2022-11-22 | End: 2022-11-22

## 2022-11-22 RX ORDER — FUROSEMIDE 10 MG/ML
40 INJECTION INTRAMUSCULAR; INTRAVENOUS ONCE
Status: COMPLETED | OUTPATIENT
Start: 2022-11-22 | End: 2022-11-22

## 2022-11-22 RX ORDER — HYDRALAZINE HYDROCHLORIDE 25 MG/1
100 TABLET, FILM COATED ORAL EVERY 8 HOURS SCHEDULED
Status: DISCONTINUED | OUTPATIENT
Start: 2022-11-22 | End: 2022-11-22 | Stop reason: HOSPADM

## 2022-11-22 RX ORDER — SIMETHICONE 80 MG
80 TABLET,CHEWABLE ORAL 4 TIMES DAILY PRN
Qty: 30 TABLET | Refills: 0
Start: 2022-11-22 | End: 2022-12-10

## 2022-11-22 RX ADMIN — AMLODIPINE BESYLATE 10 MG: 10 TABLET ORAL at 09:05

## 2022-11-22 RX ADMIN — METHYLPREDNISOLONE SODIUM SUCCINATE 125 MG: 125 INJECTION, POWDER, FOR SOLUTION INTRAMUSCULAR; INTRAVENOUS at 21:14

## 2022-11-22 RX ADMIN — TAMSULOSIN HYDROCHLORIDE 0.4 MG: 0.4 CAPSULE ORAL at 09:05

## 2022-11-22 RX ADMIN — NYSTATIN 500000 UNITS: 100000 SUSPENSION ORAL at 09:06

## 2022-11-22 RX ADMIN — CEFTRIAXONE 1000 MG: 1 INJECTION, SOLUTION INTRAVENOUS at 09:21

## 2022-11-22 RX ADMIN — HYDRALAZINE HYDROCHLORIDE 100 MG: 25 TABLET, FILM COATED ORAL at 14:08

## 2022-11-22 RX ADMIN — CARVEDILOL 3.12 MG: 3.12 TABLET, FILM COATED ORAL at 07:51

## 2022-11-22 RX ADMIN — CEFEPIME HYDROCHLORIDE 2000 MG: 2 INJECTION, SOLUTION INTRAVENOUS at 22:44

## 2022-11-22 RX ADMIN — METRONIDAZOLE 500 MG: 500 TABLET ORAL at 05:01

## 2022-11-22 RX ADMIN — METRONIDAZOLE 500 MG: 500 INJECTION, SOLUTION INTRAVENOUS at 23:18

## 2022-11-22 RX ADMIN — FUROSEMIDE 20 MG: 20 TABLET ORAL at 09:06

## 2022-11-22 RX ADMIN — HEPARIN SODIUM 5000 UNITS: 5000 INJECTION INTRAVENOUS; SUBCUTANEOUS at 05:01

## 2022-11-22 RX ADMIN — PANTOPRAZOLE SODIUM 40 MG: 40 TABLET, DELAYED RELEASE ORAL at 05:01

## 2022-11-22 RX ADMIN — OXYCODONE HYDROCHLORIDE AND ACETAMINOPHEN 1000 MG: 500 TABLET ORAL at 09:05

## 2022-11-22 RX ADMIN — HEPARIN SODIUM 5000 UNITS: 5000 INJECTION INTRAVENOUS; SUBCUTANEOUS at 14:09

## 2022-11-22 RX ADMIN — GABAPENTIN 100 MG: 100 CAPSULE ORAL at 09:06

## 2022-11-22 RX ADMIN — METRONIDAZOLE 500 MG: 500 TABLET ORAL at 14:08

## 2022-11-22 RX ADMIN — ISOSORBIDE MONONITRATE 60 MG: 60 TABLET, EXTENDED RELEASE ORAL at 09:06

## 2022-11-22 RX ADMIN — Medication 2000 UNITS: at 09:05

## 2022-11-22 RX ADMIN — INSULIN LISPRO 4 UNITS: 100 INJECTION, SOLUTION INTRAVENOUS; SUBCUTANEOUS at 09:07

## 2022-11-22 RX ADMIN — INSULIN LISPRO 2 UNITS: 100 INJECTION, SOLUTION INTRAVENOUS; SUBCUTANEOUS at 07:50

## 2022-11-22 RX ADMIN — POLYETHYLENE GLYCOL 3350 17 G: 17 POWDER, FOR SOLUTION ORAL at 09:06

## 2022-11-22 RX ADMIN — SIMETHICONE 80 MG: 80 TABLET, CHEWABLE ORAL at 07:48

## 2022-11-22 RX ADMIN — FUROSEMIDE 40 MG: 10 INJECTION, SOLUTION INTRAMUSCULAR; INTRAVENOUS at 21:53

## 2022-11-22 RX ADMIN — ATORVASTATIN CALCIUM 40 MG: 40 TABLET, FILM COATED ORAL at 09:05

## 2022-11-22 RX ADMIN — CYANOCOBALAMIN TAB 500 MCG 500 MCG: 500 TAB at 09:05

## 2022-11-22 RX ADMIN — Medication 250 MG: at 09:05

## 2022-11-22 RX ADMIN — ASPIRIN 81 MG CHEWABLE TABLET 81 MG: 81 TABLET CHEWABLE at 09:05

## 2022-11-22 RX ADMIN — HYDRALAZINE HYDROCHLORIDE 100 MG: 25 TABLET ORAL at 09:06

## 2022-11-22 NOTE — CASE MANAGEMENT
Case Management Discharge Planning Note    Patient name Krista Yarbrough  Location [address] [address]/[address]-* MRN [id number]  : 10/19/1929 Date 2022       Current Admission Date: 11/15/2022  Current Admission Diagnosis:Pneumonia   Patient Active Problem List    Diagnosis Date Noted   • Acute diastolic (congestive) heart failure ([facility] [address] ) 2022   • PVCs (premature ventricular contractions) 2022   • Hyperlipidemia 2022   • Primary hypertension 11/15/2022   • Pneumonia 11/15/2022   • CHF (congestive heart failure) ([facility] [address] ) 11/15/2022   • Stage 4 chronic kidney disease ([facility] [address] ) 11/15/2022   • Type 2 diabetes mellitus ([facility] [address] ) 11/15/2022   • Dysphagia 11/15/2022   • Thrush 11/15/2022   • Elevated troponin 11/15/2022      LOS (days): 7  Geometric Mean LOS (GMLOS) (days): 5 20  Days to GMLOS:-1 8     OBJECTIVE:  Risk of Unplanned Readmission Score: 21 43       Current admission status: Inpatient   Preferred Pharmacy:   [facility] [address] [facility]  [id number] [id number]  Phone: [phone] Fax: [phone]    Primary Care Provider: Brandon Snyder PA-C    Primary Insurance: MEDICARE  Secondary Insurance: [employer] [employer] OF [place]    DISCHARGE DETAILS:    Discharge planning discussed with[de-identified] Justen Kelley [place] of Choice: Yes     Comments - Freedom of Choice: Patient accepted at [facility] per family's preference  SAMSON called Bryan Escudero at [facility] to update on discharge planned for today       CM contacted family/caregiver?: Yes (SAMSON spoke with daughter Raghavendra Peterson by phone to update on discharge plan)  Were Treatment Team discharge recommendations reviewed with patient/caregiver?: Yes  Did patient/caregiver verbalize understanding of patient care needs?: N/A- going to facility  Were patient/caregiver advised of the risks associated with not following Treatment Team discharge recommendations?: Yes    Contacts  Patient Contacts: Uriel Myers (dtr)  Relationship to Patient[de-identified] Family  Contact Method: Phone  Phone Number: [phone]  Reason/Outcome: Continuity of Care, Discharge Planning, Referral    Requested [address]         Is the patient interested in [facility] at discharge?: No    DME Referral Provided  Referral made for DME?: No    Other Referral/Resources/Interventions Provided:  Interventions: Short Term Rehab, Transportation    Would you like to participate in our [address] service program?  : No - Declined    Treatment Team Recommendation: Short Term Rehab  Discharge Destination Plan[de-identified] Short Term Rehab  Transport at Discharge : [facility]  Dispatcher Contacted: Yes  Number/Name of Dispatcher: [facility] via [employer]  Transported by [employer] and Unit #): [place]  ETA of Transport (Date): 11/22/22  ETA of Transport (Time): 1600      IMM Given (Date):: 11/22/22  IMM Given to[de-identified] Family (IMM reviewed by phone with daughter Bibi Barahona and she verbalized acknowledgement  Daughter is in agreement with discharge plan    Copy left in patient's room for daughter and copy placed in scan bin for chart )     Accepting Facility Name, [address] : [facility]  Receiving Facility/Agency Phone Number: [phone]  Facility/Agency Fax Number: [phone]

## 2022-11-22 NOTE — PROGRESS NOTES
Patient complaining of burping and indigestion that had started overnight  Earlean Pillion NP made aware and EKG and Random Troponin ordered and completed  PRN simethicone given as per Earlean Pillion NP  Patient reported feeling better upon RN reassessment

## 2022-11-22 NOTE — PROGRESS NOTES
Patient refusing to eat lunch, states he "doesn't feel well and just wants to sleep"  Breanna Walters NP at bedside and said to hold lunchtime insulin

## 2022-11-22 NOTE — [place]IVERSAL TRANSF[place] FORM
[place]  (ALL ITEMS MUST BE COMPLETED)    1  TRANSFER FROM: [address]      TRANSFER TO: [facility] shawn    2  DATE OF TRANSFER: 11/22/2022                        TIME OF TRANSFER: 1600    3  PATIENT NAME: Zenaida Lind,        YOB: 1929                             GENDER: male    4  LANGUAGE:   English    5  PHYSICIAN NAME:  Clare Tavarez MD                   PHONE: [phone]    6  CODE STATUS: Level 3 - DNAR and DNI        Out of Hospital DNR Attached: No    7  :                                      :  Extended Emergency Contact Information  Primary Emergency Contact: Nba Elizabeth  Mobile Phone: [phone]  Relation: Daughter  Secondary Emergency Contact: Kaia Bella  Mobile Phone: [phone]  Relation: [address] Representative/Proxy:  No           Legal Guardian:  No             NAME OF:           HEALTH CARE REPRESENTATIVE/PROXY:                                         OR           LEGAL GUARDIAN, IF NOT :                                               PHONE:  (Day)           (Night)                        (Cell)    8  REASON FOR TRANSFER: (Must include brief medical history and recent changes in physical function or cognition ) rehab            V/S: /64   Pulse 82   Temp 98 5 °F (36 9 °C) (Axillary)   Resp 18   Ht 5' 5" (1 651 m)   Wt 84 5 kg (186 lb 4 8 oz)   SpO2 91%   BMI 31 00 kg/m²           PAIN: None    9  PRIMARY DIAGNOSIS: Pneumonia      Secondary Diagnosis:         Pacemaker: No      Internal Defib: No          Mental Health Diagnosis (if Applicable):    10  RESTRAINTS: No     11  RESPIRATORY NEEDS: Oxygen Device 2L,    12  ISOLATION/PRECAUTION: None    13  ALLERGY: Patient has no known allergies  14  SENSORY:       Hearing Poor    15  SKIN CONDITION: No Wounds    16  DIET: Regular    17  IV ACCESS: None    18  PERSONAL ITEMS SENT WITH PATIENT: None    19  ATTACHED DOCUMENTS: MUST ATTACH CURRENT MEDICATION INFORMATION Face Sheet, MAR, Labs, Code Status, Discharge Summary, PT Note, OT Note, ST Note and HX/PE    20  AT RISK ALERTS:Falls        HARM TO: N/A    21  WEIGHT BEARING STATUS:         Left Leg: Full        Right Leg: Full    22  MENTAL STATUS:Alert, Oriented and Forgetful    23  FUNCTION:        Walk: With Help        Transfer: With Help        Toilet: With Help        Feed: Self    24  IMMUNIZATIONS/SCREENING:     There is no immunization history on file for this patient  25  BOWEL: Continent    26  BLADDER: Continent    27   SENDING FACILITY CONTACT: [facility]                  Title: evelyn        Unit: [address]        Phone:           [address] (if known):        Title:        Unit:         Phone:         FORM PREFILLED BY (if applicable)       Title:       Unit:        Phone:         FORM COMPLETED BY Shirin Beverly RN      Title: 4n      Phone: [phone]

## 2022-11-22 NOTE — ASSESSMENT & PLAN NOTE
Suspected pneumonia, possibly aspiration secondary to patient reporting dysphagia with choking on pills and fluids over the past 2 weeks  Febrile three days PTA  Leukocytosis on admission  Chest x-ray with bilateral multifocal infiltrates  Lactic acid negative  Procalcitonin 0 57 today  Urine for strep and legionella negative   · Rocephin day # 7, flagyl; day 9   Patient will be discharged home on Flagyl x5 days to complete 14 days of treatment  · Blood cultures negative to date  · Procalcitonin trended down to 0 39  · Supportive therapies with duonebs, respiratory protocol  · Speech therapy recommendations appreciated; no modification to consistency or liquids

## 2022-11-22 NOTE — ASSESSMENT & PLAN NOTE
· Patient has a history of primary hypertension    · Home medications include Norvasc 10 mg, Cardura 2 mg hydralazine 100 mg b i d , Imdur 60 mg   · Discontinue Toprol-XL and started Coreg 3 125 mg b i d  which was increased to 12 5 mg b i d  per Cardiology

## 2022-11-22 NOTE — ASSESSMENT & PLAN NOTE
Lab Results   Component Value Date    HGBA1C 5 7 (H) 11/16/2022       Recent Labs     11/21/22  1554 11/21/22  2046 11/22/22  0711 11/22/22  1122   POCGLU 244* 201* 224* 193*       Blood Sugar Average: Last 72 hrs:  (P) 767 [id number]  Continue home regimen

## 2022-11-22 NOTE — DISCHARGE SUMMARY
Irene 45  Progress Note Ewa Mulligan 10/19/1929, 80 y o  male MRN: [id number]  Unit/Bed#: [address] [place] [address] Encounter: [id number]  Primary Care Provider: Irma Corcoran PA-C   Date and time admitted to hospital: 11/15/2022 11:27 AM    * Pneumonia  Assessment & Plan  Suspected pneumonia, possibly aspiration secondary to patient reporting dysphagia with choking on pills and fluids over the past 2 weeks  Febrile three days PTA  Leukocytosis on admission  Chest x-ray with bilateral multifocal infiltrates  Lactic acid negative  Procalcitonin 0 57 today  Urine for strep and legionella negative   · Rocephin day # 7, flagyl; day 9  Patient will be discharged home on Flagyl x5 days to complete 14 days of treatment  · Blood cultures negative to date  · Procalcitonin trended down to 0 39  · Supportive therapies with duonebs, respiratory protocol  · Speech therapy recommendations appreciated; no modification to consistency or liquids        Primary hypertension  Assessment & Plan  · Patient has a history of primary hypertension  · Home medications include Norvasc 10 mg, Cardura 2 mg hydralazine 100 mg b i d , Imdur 60 mg   · Discontinue Toprol-XL and started Coreg 3 125 mg b i d  which was increased to 12 5 mg b i d  per Cardiology      Elevated troponin  Assessment & Plan  Troponin elevated 120->140  Denies any chest discomfort  Sinus rhythm noted on monitor, right bundle branch block  Suspect may be elevated secondary to CHF? Cardiology consult appreciated     [address]  Patient noted to have white patches on his tongue, admits to some dysphagia, suspect thrush  Nystatin swish and swallow ordered  Improving     Dysphagia  Assessment & Plan  Patient reports that he has been having intermittent dysphagia over the past few weeks, having difficulty after drinking thin liquids, choking on pills  Speech consulted for bedside swallow eval  Aspiration precautions    · Patient passed video swallow, reports that he feels he is doing better with his pills crushed in applesauce  · GI consult appreciated  · EGD performed on 11/18 shows oropharyngeal dysphagia with esophageal dysmotility  · GI recommending if symptoms persist may need to consider PEG tube  · Family does not want PEG    Type 2 diabetes mellitus [facility])  Assessment & Plan  Lab Results   Component Value Date    HGBA1C 5 7 (H) 11/16/2022       Recent Labs     11/21/22  1554 11/21/22  2046 11/22/22  0711 11/22/22  1122   POCGLU 244* 201* 224* 193*       Blood Sugar Average: Last 72 hrs:  (P) 647 [id number]  Continue home regimen    Stage 4 chronic kidney disease [facility])  Assessment & Plan  Lab Results   Component Value Date    EGFR 19 11/22/2022    EGFR 18 11/21/2022    EGFR 18 11/20/2022    CREATININE 2 68 (H) 11/22/2022    CREATININE 2 85 (H) 11/21/2022    CREATININE 2 79 (H) 11/20/2022     Patient has a history of stage IV CKD, uncertain of baseline creatinine  Avoid periods of relative hypotension  Avoid nephrotoxin agents  Medical Problems     Resolved Problems  Date Reviewed: 11/22/2022          Resolved    Hyponatremia 11/21/2022     Resolved by  KEISHA Blankenship        Discharging Physician / Practitioner: Lupe Blankenship  PCP: Mary Ellen Vann PA-C  Admission Date:   Admission Orders (From admission, onward)     Ordered        11/15/22 1418  [facility] [facility] [place]  Once                      Discharge Date: 11/22/22    Consultations During Hospital Stay:  · Cardiology, GI    Procedures Performed:   · EGD    Significant Findings / Test Results:   Oropharyngeal dysphagia with esophageal dysmotility      Test Results Pending at Discharge (will require follow up): · None     Outpatient Tests Requested:  · None    Complications:  None    Reason for Admission:  Shortness of breath, feeling of something stuck in the throat      Hospital Course:   Bakari Mancera is a 80 y o  male patient who originally presented to the hospital on 11/15/2022 due to decreased intake, difficulty swallowing thin liquids and choking on pills and shortness of breath  Patient was noted to be hyponatremic in ED and BNP elevated at 9359  IV Lasix x1 dose was administered with good result  Chest x-ray showed bilateral multifocal infiltrates and small bilateral pleural effusions  ED administered IV cefepime and vancomycin which was changed to ceftriaxone and Flagyl for suspected aspiration pneumonia  GI was consulted for dysphagia and belching, done and showed EGD was done and showed oropharyngeal dysphagia and esophageal dysmotility  GI recommended PEG tube as a source control which family was against   Cardiology was consulted for the shortness of breath, elevated troponin and volume overload  Echocardiogram was done which showed systolic function of low normal with grade 2 diastolic dysfunction  Cardiology discontinue Toprol XL and started current 3 125 mg b i d  With an increase in dose upon discharged to 12 5 mg b i d  and hydralazine 100 mg 3 times daily  Patient already received Rocephin IV x7 days and Flagyl x 9 days  Will discharge on Flagyl x 5 days to complete the course of antibiotic treatment  Please see above list of diagnoses and related plan for additional information  Condition at Discharge: stable    Discharge Day Visit / Exam:     Subjective:  Patient seen and examined at bedside  with complaint of incessant been belching for which he says Mylicon was helpful  Reports tiredness for lack of sleep because of the belching overnight  Denies chest pain, shortness of breath, nausea or vomiting        Vitals: Blood Pressure: 168/64 (11/22/22 1407)  Pulse: 82 (11/22/22 1407)  Temperature: 98 5 °F (36 9 °C) (11/22/22 0738)  Temp Source: Axillary (11/22/22 0738)  Respirations: 18 (11/22/22 0738)  Height: 5' 5" (165 1 cm) (11/17/22 0735)  Weight - Scale: 84 5 kg (186 lb 4 8 oz) (11/22/22 0566)  SpO2: 91 % (11/22/22 4244)    Physical Exam:   Physical Exam  Constitutional:       Appearance: He is obese  HENT:      Head: Normocephalic  Nose: Nose normal       Mouth/Throat:      Mouth: Mucous membranes are moist    Eyes:      General: No scleral icterus  Cardiovascular:      Rate and Rhythm: Normal rate  Heart sounds: Murmur heard  Pulmonary:      Breath sounds: Normal breath sounds  Abdominal:      General: Bowel sounds are normal  There is no distension  Musculoskeletal:         General: Normal range of motion  Cervical back: Normal range of motion  Skin:     General: Skin is dry  Neurological:      Mental Status: He is alert  Mental status is at baseline  Psychiatric:         Behavior: Behavior normal      Discussion with Family: Updated  (daughter) at bedside  Discharge instructions/Information to patient and family:   See after visit summary for information provided to patient and family  Provisions for Follow-Up Care:  See after visit summary for information related to follow-up care and any pertinent home health orders  Disposition:   Other: [place]    Planned Readmission: No     Discharge Statement:  I spent 30 minutes discharging the patient  This time was spent on the day of discharge  I had direct contact with the patient on the day of discharge  Greater than 50% of the total time was spent examining patient, answering all patient questions, arranging and discussing plan of care with patient as well as directly providing post-discharge instructions  Additional time then spent on discharge activities  Discharge Medications:  See after visit summary for reconciled discharge medications provided to patient and/or family        **Please Note: This note may have been constructed using a voice recognition system**

## 2022-11-22 NOTE — LETTER
To: Nayan Ulloa at [place]  From:  [address] [place] [address] [phone]      Negative COVID results for Parul Ryan

## 2022-11-22 NOTE — ASSESSMENT & PLAN NOTE
Lab Results   Component Value Date    EGFR 19 11/22/2022    EGFR 18 11/21/2022    EGFR 18 11/20/2022    CREATININE 2 68 (H) 11/22/2022    CREATININE 2 85 (H) 11/21/2022    CREATININE 2 79 (H) 11/20/2022     Patient has a history of stage IV CKD, uncertain of baseline creatinine  Avoid periods of relative hypotension  Avoid nephrotoxin agents

## 2022-11-22 NOTE — LETTER
To: Kevin Gamble at [place]  From:  J[place] [phone]    Clinical information for [facility]

## 2022-11-22 NOTE — PROGRESS NOTES
Progress Note - Cardiology   [facility]     Janey Cochran 80 y o  male MRN: [id number]  : 10/19/1929  Unit/Bed#: [address] [place] [address] Encounter: [id number]    Assessment and Plan:   1  Pneumonia:  Managed per primary team    -  on IV Rocephin and p o  Flagyl     2  Chronic diastolic heart failure:  Echocardiogram performed on 2022 demonstrated systolic function of low normal with grade 2 diastolic dysfunction    -  continue Lasix 20 mg p o     -  monitor I&O, daily weights and labs    -  patient appears to be euvolemic at this time     3  Hypertension:  Patient with longstanding history of hypertension which has been difficult to control     -  goal for systolic blood pressure to be around 140 mm Hg to avoid falling in this elderly gentleman    -  Toprol XL changed to Coreg  Will increase Coreg to 12 5 mg b i d     -  increase hydralazine to 100 mg t i d     -  continue Imdur 60 mg once a day    -  continue Norvasc 10 mg once a day     -  unable to use ACE/ARB secondary to chronic kidney disease    -  patient currently on Cardura 2 mg daily at bedtime    -  monitor vital signs     4  Chronic kidney disease:  Stage IV     5  Dysphagia:  Followed by GI, may need PEG tube if he continues to aspirate    Subjective / Objective:   Patient seen and examined  He complains today of epigastric discomfort and excessive belching  He states that he has no interest in eating at this time  Nurse was at bedside and stated that he did not vomit or regurgitate any food he just had excessive belching  This seems to have been relieved after he had a Mylicon tablet  Vitals: Blood pressure (!) 186/80, pulse 87, temperature 98 5 °F (36 9 °C), temperature source Axillary, resp  rate 18, height 5' 5" (1 651 m), weight 84 5 kg (186 lb 4 8 oz), SpO2 90 %  Vitals:    22 0550 22 0549   Weight: 84 4 kg (186 lb) 84 5 kg (186 lb 4 8 oz)     Body mass index is 31 kg/m²    BP Readings from Last 3 Encounters:   11/22/22 (!) 186/80     Orthostatic Blood Pressures    Flowsheet Row Most Recent Value   Blood Pressure 186/80 filed at 11/22/2022 0858   Patient Position - Orthostatic VS Lying filed at 11/21/2022 0718        I/O       11/20 0701  11/21 0700 11/21 0701  11/22 0700 11/22 0701 11/23 0700    P  O  180      Total Intake(mL/kg) 180 (2 1)      Urine (mL/kg/hr) 850 (0 4) 400 (0 2)     Total Output 850 400     Net -670 -400                Invasive Devices     Peripheral Intravenous Line  Duration           Peripheral IV 11/18/22 Left Antecubital 3 days                  Intake/Output Summary (Last 24 hours) at 11/22/2022 5684  Last data filed at 11/22/2022 0101  Gross per 24 hour   Intake --   Output 400 ml   Net -400 ml         Physical Exam:   Physical Exam  Vitals and nursing note reviewed  Constitutional:       General: He is not in acute distress  Appearance: Normal appearance  He is obese  HENT:      Right Ear: External ear normal       Left Ear: External ear normal    Eyes:      General: No scleral icterus  Right eye: No discharge  Left eye: No discharge  Cardiovascular:      Rate and Rhythm: Normal rate and regular rhythm  Frequent extrasystoles are present  Pulses: Normal pulses  Pulmonary:      Effort: Pulmonary effort is normal  No respiratory distress  Breath sounds: Examination of the right-lower field reveals decreased breath sounds  Examination of the left-lower field reveals decreased breath sounds  Decreased breath sounds present  Abdominal:      General: Bowel sounds are normal  There is no distension  Palpations: Abdomen is soft  Musculoskeletal:      Right lower leg: No edema  Left lower leg: No edema  Skin:     General: Skin is warm and dry  Capillary Refill: Capillary refill takes less than 2 seconds  Neurological:      General: No focal deficit present  Mental Status: He is alert and oriented to person, place, and time  Mental status is at baseline     Psychiatric:         Mood and Affect: Mood normal             Medications/ Allergies:     Current Facility-Administered Medications   Medication Dose Route Frequency Provider Last Rate   • acetaminophen  650 mg Oral Q6H PRN Jolie Lord MD     • amLODIPine  10 mg Oral Daily Ronny Delgado MD     • vitamin C  1,000 mg Oral Daily Jolie Lord MD     • aspirin  81 mg Oral Daily Jolie Lord MD     • atorvastatin  40 mg Oral Daily Jolie Lord MD     • benzonatate  100 mg Oral TID PRN Jolie Lord MD     • carvedilol  12 5 mg Oral BID With Meals KEISHA Umaña     • cefTRIAXone  1,000 mg Intravenous Q24H Jolie Lord MD Stopped (11/22/22 8911)   • cholecalciferol  2,000 Units Oral Daily Jolie Lord MD     • vitamin B-12  500 mcg Oral Daily Jolie Lord MD     • doxazosin  2 mg Oral HS Jolie Lord MD     • furosemide  20 mg Oral Daily KEISHA Varela     • gabapentin  100 mg Oral BID Jolie Lord MD     • heparin (porcine)  5,000 Units Subcutaneous [facility] Ronny Delgado MD     • hydrALAZINE  5 mg Intravenous Q6H PRN KEISHA Varela     • hydrALAZINE  100 mg Oral [facility] KEISHA Umaña     • insulin lispro  1-5 Units Subcutaneous HS Jolie Lord MD     • insulin lispro  1-6 Units Subcutaneous TID AC Ronny Delgado MD     • insulin lispro  4 Units Subcutaneous Daily With Breakfast KEIHSA Ennis     • insulin lispro  4 Units Subcutaneous Daily With Lunch [place] D KEISHA Ambrocio     • insulin lispro  4 Units Subcutaneous Daily With Dinner KEISHA Ennis     • isosorbide mononitrate  60 mg Oral Daily Jolie Lord MD     • levalbuterol  1 25 mg Nebulization Q6H PRN Jolie Lord MD      And   • sodium chloride  3 mL Nebulization Q6H PRN Jolie Lord MD • metroNIDAZOLE  500 mg Oral Q8H Gina Deng MD     • nystatin  500,000 Units Swish & Swallow 4x Daily Ronny Vinayak Nielson MD     • ondansetron  4 mg Intravenous Q6H PRN Rikki Balderrama PA-C     • pantoprazole  40 mg Oral Early Morning Ronny Vinayak Nielson MD     • polyethylene glycol  17 g Oral Daily Ronny Vinayak Nielson MD     • saccharomyces boulardii  250 mg Oral BID Nury Brice MD     • simethicone  80 mg Oral 4x Daily PRN Nury Brice MD     • tamsulosin  0 4 mg Oral Daily Ronny Vinayak Nielson MD       acetaminophen, 650 mg, Q6H PRN  benzonatate, 100 mg, TID PRN  hydrALAZINE, 5 mg, Q6H PRN  levalbuterol, 1 25 mg, Q6H PRN   And  sodium chloride, 3 mL, Q6H PRN  ondansetron, 4 mg, Q6H PRN  simethicone, 80 mg, 4x Daily PRN      No Known Allergies    VTE Pharmacologic Prophylaxis:   Sequential compression device (Venodyne)     Labs:   Troponins:  Results from last 7 days   Lab Units 11/22/22  0757 11/15/22  1610 11/15/22  1404   HS TNI RAND ng/L 99*  --   --    HSTNI D2 ng/L  --   --  21*   HSTNI D4 ng/L  --  20*  --      CBC with diff:  Results from last 7 days   Lab Units 11/22/22  0453 11/21/22  0506 11/20/22  0555 11/19/22  0548 11/18/22  0510 11/17/22  0537 11/16/22  0522 11/15/22  1207   WBC Thousand/uL 13 11* 14 42* 15 88* 16 41* 13 45* 10 60* 13 33* 11 87*   HEMOGLOBIN g/dL 9 2* 9 3* 9 8* 10 4* 10 0* 9 9* 10 8* 10 2*   HEMATOCRIT % 27 9* 28 6* 30 2* 31 9* 30 7* 29 9* 31 9* 31 2*   MCV fL 92 92 92 92 90 90 90 90   PLATELETS Thousands/uL 197 188 195 179 190 182 207 186   MCH pg 30 5 29 8 29 7 30 1 29 3 29 9 30 6 29 6   MCHC g/dL 33 0 32 5 32 5 32 6 32 6 33 1 33 9 32 7   RDW % 14 7 14 6 14 6 14 6 14 2 13 9 14 2 14 1   MPV fL 10 7 11 4 11 7 12 6 12 7 11 7 12 1 12 1   NRBC AUTO /100 WBCs  --   --   --   --  0 0  --  0     CMP:  Results from last 7 days   Lab Units 11/22/22  0453 11/21/22  0506 11/20/22  0555 11/19/22  0548 11/18/22  0510 11/17/22  0537 11/16/22  0522 11/15/22  1207   SODIUM mmol/L 139 138 138 136 138 132* 134* 131*   POTASSIUM mmol/L 4 6 4 2 3 8 3 7 3 3* 3 6 3 8 4 4   CHLORIDE mmol/L 103 101 100 99 99 97 97 94*   CO2 mmol/L 27 28 29 26 28 26 26 28   ANION GAP mmol/L 9 9 9 11 11 9 11 9   BUN mg/dL 85* 75* 68* 53* 51* 55* 54* 53*   CREATININE mg/dL 2 68* 2 85* 2 79* 2 41* 2 45* 2 44* 2 59* 2 59*   CALCIUM mg/dL 8 2* 8 1* 8 4 8 5 8 5 8 4 8 5 8 6   AST U/L  --   --   --   --   --  35  --   --    ALT U/L  --   --   --   --   --  22  --  19   ALK PHOS U/L  --   --   --   --   --  72  --  84   TOTAL PROTEIN g/dL  --   --   --   --   --  6 3*  --  6 8   ALBUMIN g/dL  --   --   --   --   --  2 8*  --  3 0*   TOTAL BILIRUBIN mg/dL  --   --   --   --   --  0 64  --  0 79   EGFR ml/min/1 73sq m 19 18 18 22 21 21 20 20     Magnesium:  Results from last 7 days   Lab Units 11/16/22 0522 11/15/22  1207   MAGNESIUM mg/dL 2 7* 2 8*     Hgb A1c:  Results from last 7 days   Lab Units 11/16/22 0522   HEMOGLOBIN A1C % 5 7*         Imaging & Testing   I have personally reviewed pertinent reports  EGD    Result Date: 11/18/2022  Narrative: Table formatting from the original result was not included  [address] [facility] Operating Room [place] [address] [phone] DATE OF SERVICE: 11/18/22 PHYSICIAN(S): Attending: Lisa Cisneros MD Fellow: No Staff Documented Procedure :  EGD with biopsies INDICATION: Dysphagia POST-OP DIAGNOSIS: See the impression below  PREPROCEDURE: Informed consent was obtained for the procedure, including sedation  Risks of perforation, hemorrhage, adverse drug reaction and aspiration were discussed  The patient was placed in the left lateral decubitus position  Patient was explained about the risks and benefits of the procedure  Risks including but not limited to bleeding, infection, and perforation were explained in detail   Also explained about less than 100% sensitivity with the exam and other alternatives  DETAILS OF PROCEDURE: Patient was taken to the procedure room where a time out was performed to confirm correct patient and correct procedure  The patient underwent monitored anesthesia care, which was administered by an anesthesia professional  The patient's blood pressure, heart rate, level of consciousness, respirations and oxygen were monitored throughout the procedure  The scope was advanced to the second part of the duodenum  Retroflexion was performed in the fundus  Prior to the procedure, the patient's H  Pylori status was unknown  The patient experienced no blood loss  The procedure was not difficult  The patient tolerated the procedure well  There were no apparent complications  ANESTHESIA INFORMATION: ASA: III Anesthesia Type: IV Sedation with Anesthesia MEDICATIONS: No administrations occurring from 1456 to 1530 on 11/18/22 FINDINGS: Large amount of liquid and thick secretion in the esophagus suggests evidence of esophageal dysmotility, no obvious stricture was seen  GE junction appeared slightly erythematous, multiple lower esophageal biopsies were done Mild, localized erythematous mucosa in the antrum, consistent with gastritis The duodenal bulb, 1st part of the duodenum and 2nd part of the duodenum appeared normal  SPECIMENS: ID Type Source Tests Collected by Time Destination 1 : bxs lower esophagus Tissue Esophagus TISSUE EXAM Jun Santoro MD 11/18/2022  3:26 PM      Impression: 1  Oropharyngeal dysphagia with esophageal dysmotility 2  No obvious esophageal stricture 3  Mild erythema in the antrum 4  Normal duodenum RECOMMENDATION:  Await pathology results    Resume diet   If recurrent aspiration persists then consider for PEG tube placement   Jun Santoro MD     XR chest portable    Result Date: 11/20/2022  Narrative: CHEST INDICATION:   elevated WBC, fever, aspiration pneumonia  COMPARISON:  CXR 11/15/2022   EXAM PERFORMED/VIEWS:  XR CHEST PORTABLE FINDINGS: Cardiomediastinal silhouette appears unremarkable  Persistent bilateral pulmonary opacity, greatest in the left base  Small effusions  No pneumothorax  Osseous structures appear within normal limits for patient age  Impression: Persistent bilateral pulmonary opacity  With recent elevated BNP, this is at least likely in part due to pulmonary edema  Pneumonia not excluded  Workstation performed: [id number]     XR chest 1 view portable    Result Date: 11/16/2022  Narrative: CHEST INDICATION:   pneumonia  COMPARISON:  None EXAM PERFORMED/VIEWS:  XR CHEST PORTABLE FINDINGS: Cardiomediastinal silhouette appears unremarkable  Infiltrates in the bilateral upper lobes and left lower lobes  Small bilateral pleural effusions  Osseous structures appear within normal limits for patient age  Impression: Bilateral multifocal infiltrates  Small bilateral pleural effusions  Workstation performed: [id number]     FL esophagram complete    Result Date: 11/17/2022  Narrative: BARIUM SWALLOW-ESOPHAGRAM INDICATION:   esophageal dysphagia, suspect dysmotility  COMPARISON:  None IMAGES:  31 FLUOROSCOPY TIME:   2 6 MIN  TECHNIQUE: The patient was given effervescent granules and barium by mouth and images of the esophagus were obtained  FINDINGS: The esophagus is normal in caliber  Moderate to severe esophageal dysmotility with diffuse tertiary contractions  Significant residual contrast remaining in the esophagus with only a small amount in the stomach at the end of the study  No mucosal lesion, ulceration or evidence of fold thickening is seen  Gastroesophageal reflux was not observed  Small likely mixed type hiatal hernia  Impression: Moderate to severe esophageal dysmotility with significant residual contrast remaining in the esophagus at the end of the study  Small likely mixed type hiatal hernia   Workstation performed: [id number]     FL barium swallow video w speech    Result Date: 11/16/2022  Narrative: A video barium swallow study was performed by the Department of Speech Pathology  Please refer to the report for the official interpretation  The images are stored for archival purposes only  Study images were not formally reviewed by the Radiology Department  FL barium swallow video w speech    Result Date: 11/16/2022  Narrative: DOMITILA Gonzalez     11/18/2022 11:06 AM Speech Pathology Videofluoroscopic Swallow Study (VFSS/VBSS/MBSS) Patient Name: Kalie Babin Today's Date: 11/16/2022  Problem List Principal Problem:   Pneumonia Active Problems:   Primary hypertension   CHF (congestive heart failure) (HCC)   Hyponatremia   Stage 4 chronic kidney disease (HCC)   Type 2 diabetes mellitus (HCC)   Dysphagia   Thrush   Elevated troponin Past Medical History Past Medical History: Diagnosis Date • CHF (congestive heart failure) (HCC)  • Diabetes mellitus (HCC)  • Elevated lipids  • Hypertension  • Neuropathy  • Renal disorder  General Information; Pt is a 80 y o  male with a PMH as stated above  Pt admitted 11/15 with recent c/o dysphagia & b/l infiltrates  No overt s/s oropharyngeal dysphagia noted on clinical/bedside swallowing evaluation  A VFSS was recommended for formal/instrumental assessment of oropharyngeal stage swallowing skills at this time  Pt was viewed in lateral position and assessed with thin liquid, nectar thick liquid, puree, solid food (Sujata Doone cookie) and a13mm pill with thin liquid  I transported pt and and from study  Oral stage:  No significant oral stage dysphagia  Mastication was timely and grossly effective with materials administered today  Bolus formation and transfer were functional   Oral control appeared adequate with no gross premature spillage over the base of tongue  Pharyngeal stage:  No significant pharyngeal dysphagia  Velar elevation noted  Swallowing initiation was prompt  Laryngeal rise and anterior hyoid excursion appeared adequate   AIrway entrance closure appeared adequate Tongue base retraction appeared to be of adequate strength  Pharyngeal constriction presumed to be adequate  PES opening was adequate  Management of food/liquid/barium tablet follows: All food, liquid and the barium tablet passed through the pharynx safely and efficiently (ie no laryngeal penetration, aspiration or significant pharyngeal residue noted today)  Esophageal stage: Brief view of esophagus was completed after administration of the barium tablet  S/S suggestive of significant esophageal dysmotility (eg stasis throughout esophagus, tertiary contractions, retrograde intra-esophageal flow)  Pt also with frequent belching  Assessment Summary:  Pt presents with no significant s/s oropharyngeal dysphagia but did present with s/s suggest of esophageal dysphagia (stasis throughout esophagus, tertiary contractions, retrograde intra-esophageal flow,  belching and episode of reflux),  Note: Images are available for review in PACS as desired  Recommendations: Recommended Diet:  No modification to diet texture indicated  Recommended Form of Medications: as tolerated (tolerated them whole w/water today)  Precautions to promote esophageal clearance Consider referral to GI, ?trial of PPI vs other No SLP Dysphagia therapy recommended at this time Thank you for this referral   Please do not hesitate to contact me with any questions or concerns  Kamini Carrillo [facility]  Speech Pathologist [place] License [id number] [id number] [place] License [id number] Available via [employer]     Echo complete w/ contrast if indicated    Result Date: 11/17/2022  Narrative: •  Left Ventricle: Left ventricular cavity size is normal  Wall thickness is moderately increased  There is mild to moderate concentric hypertrophy  by visual estimation  Systolic function is low normal  Wall motion is normal  Diastolic function is moderately abnormal, consistent with grade II (pseudonormal) relaxation  Left atrial filling pressure is elevated   •  IVS: There is abnormal septal motion consistent with bundle branch block  •  Left Atrium: The atrium is moderately dilated  •  Right Atrium: The atrium is moderately dilated  •  Aortic Valve: The aortic valve is functionally bicuspid with commissural fusion of the left-noncoronary cusp  The leaflets are mildly thickened  The leaflets are moderately calcified  There is mildly reduced mobility  There is mild regurgitation  There is mild stenosis  The aortic valve peak velocity is 2 6 m/s  The aortic valve peak gradient is 27 0 mmHg  The aortic valve mean gradient is 12 0 mmHg  •  Tricuspid Valve: The right ventricular systolic pressure/pulmonary is moderately elevated  [facility]      "This note was completed in part utilizing [employer] direct voice recognition software  Grammatical errors, random word insertion, spelling mistakes, and incomplete sentences may be an occasional consequence of the system secondary to software limitations, ambient noise and hardware issues  Please read the chart carefully and recognize, using context, where substitutions have occurred    If you have any questions or concerns about the context, text or information contained within the body of this dictation, please contact myself, the provider, for further clarification "

## 2022-11-23 ENCOUNTER — APPOINTMENT (INPATIENT)
Dept: RADIOLOGY | Facility: HOSPITAL | Age: 87
End: 2022-11-23

## 2022-11-23 PROBLEM — J69.0 ASPIRATION PNEUMONIA (HCC): Status: ACTIVE | Noted: 2022-11-15

## 2022-11-23 PROBLEM — N18.9 ACUTE KIDNEY INJURY SUPERIMPOSED ON CKD (HCC): Status: ACTIVE | Noted: 2022-11-15

## 2022-11-23 PROBLEM — N40.0 BPH (BENIGN PROSTATIC HYPERPLASIA): Status: ACTIVE | Noted: 2022-11-23

## 2022-11-23 PROBLEM — J96.01 ACUTE RESPIRATORY FAILURE WITH HYPOXIA (HCC): Status: ACTIVE | Noted: 2022-11-23

## 2022-11-23 PROBLEM — N17.9 ACUTE KIDNEY INJURY SUPERIMPOSED ON CKD (HCC): Status: ACTIVE | Noted: 2022-11-15

## 2022-11-23 PROBLEM — I50.33 ACUTE ON CHRONIC DIASTOLIC CONGESTIVE HEART FAILURE (HCC): Status: ACTIVE | Noted: 2022-11-23

## 2022-11-23 PROBLEM — R13.19 ESOPHAGEAL DYSPHAGIA: Status: ACTIVE | Noted: 2022-11-15

## 2022-11-23 LAB
4HR DELTA HS TROPONIN: 125 NG/L
ANION GAP SERPL CALCULATED.3IONS-SCNC: 11 MMOL/L (ref 4–13)
BUN SERPL-MCNC: 93 MG/DL (ref 5–25)
CALCIUM SERPL-MCNC: 8.9 MG/DL (ref 8.3–10.1)
CARDIAC TROPONIN I PNL SERPL HS: 249 NG/L
CARDIAC TROPONIN I PNL SERPL HS: 305 NG/L (ref 8–18)
CHLORIDE SERPL-SCNC: 102 MMOL/L (ref 96–108)
CO2 SERPL-SCNC: 28 MMOL/L (ref 21–32)
CREAT SERPL-MCNC: 2.77 MG/DL (ref 0.6–1.3)
GFR SERPL CREATININE-BSD FRML MDRD: 18 ML/MIN/1.73SQ M
GLUCOSE SERPL-MCNC: 251 MG/DL (ref 65–140)
GLUCOSE SERPL-MCNC: 268 MG/DL (ref 65–140)
GLUCOSE SERPL-MCNC: 275 MG/DL (ref 65–140)
GLUCOSE SERPL-MCNC: 281 MG/DL (ref 65–140)
GLUCOSE SERPL-MCNC: 296 MG/DL (ref 65–140)
GLUCOSE SERPL-MCNC: 302 MG/DL (ref 65–140)
MAGNESIUM SERPL-MCNC: 2.9 MG/DL (ref 1.6–2.6)
POTASSIUM SERPL-SCNC: 4.8 MMOL/L (ref 3.5–5.3)
PROCALCITONIN SERPL-MCNC: 0.37 NG/ML
PROCALCITONIN SERPL-MCNC: 0.54 NG/ML
SODIUM SERPL-SCNC: 141 MMOL/L (ref 135–147)

## 2022-11-23 RX ORDER — LEVALBUTEROL INHALATION SOLUTION 0.63 MG/3ML
0.63 SOLUTION RESPIRATORY (INHALATION) EVERY 4 HOURS PRN
Status: DISCONTINUED | OUTPATIENT
Start: 2022-11-23 | End: 2022-12-10 | Stop reason: HOSPADM

## 2022-11-23 RX ORDER — INSULIN LISPRO 100 [IU]/ML
1-5 INJECTION, SOLUTION INTRAVENOUS; SUBCUTANEOUS
Status: DISCONTINUED | OUTPATIENT
Start: 2022-11-23 | End: 2022-11-23

## 2022-11-23 RX ORDER — LEVALBUTEROL 1.25 MG/.5ML
1.25 SOLUTION, CONCENTRATE RESPIRATORY (INHALATION)
Status: CANCELLED | OUTPATIENT
Start: 2022-11-23

## 2022-11-23 RX ORDER — ACETAMINOPHEN 325 MG/1
650 TABLET ORAL EVERY 6 HOURS PRN
Status: DISCONTINUED | OUTPATIENT
Start: 2022-11-23 | End: 2022-12-10 | Stop reason: HOSPADM

## 2022-11-23 RX ORDER — HEPARIN SODIUM 5000 [USP'U]/ML
5000 INJECTION, SOLUTION INTRAVENOUS; SUBCUTANEOUS EVERY 8 HOURS SCHEDULED
Status: DISCONTINUED | OUTPATIENT
Start: 2022-11-23 | End: 2022-12-10 | Stop reason: HOSPADM

## 2022-11-23 RX ORDER — ISOSORBIDE MONONITRATE 60 MG/1
60 TABLET, EXTENDED RELEASE ORAL DAILY
Status: DISCONTINUED | OUTPATIENT
Start: 2022-11-23 | End: 2022-12-10 | Stop reason: HOSPADM

## 2022-11-23 RX ORDER — CARVEDILOL 12.5 MG/1
12.5 TABLET ORAL 2 TIMES DAILY WITH MEALS
Status: DISCONTINUED | OUTPATIENT
Start: 2022-11-23 | End: 2022-12-10 | Stop reason: HOSPADM

## 2022-11-23 RX ORDER — TAMSULOSIN HYDROCHLORIDE 0.4 MG/1
0.4 CAPSULE ORAL
Status: DISCONTINUED | OUTPATIENT
Start: 2022-11-23 | End: 2022-12-02

## 2022-11-23 RX ORDER — DOXAZOSIN 2 MG/1
2 TABLET ORAL
Status: DISCONTINUED | OUTPATIENT
Start: 2022-11-23 | End: 2022-12-02

## 2022-11-23 RX ORDER — LABETALOL HYDROCHLORIDE 5 MG/ML
10 INJECTION, SOLUTION INTRAVENOUS EVERY 6 HOURS PRN
Status: DISCONTINUED | OUTPATIENT
Start: 2022-11-23 | End: 2022-12-10 | Stop reason: HOSPADM

## 2022-11-23 RX ORDER — ONDANSETRON 2 MG/ML
4 INJECTION INTRAMUSCULAR; INTRAVENOUS EVERY 6 HOURS PRN
Status: DISCONTINUED | OUTPATIENT
Start: 2022-11-23 | End: 2022-12-10 | Stop reason: HOSPADM

## 2022-11-23 RX ORDER — INSULIN LISPRO 100 [IU]/ML
1-6 INJECTION, SOLUTION INTRAVENOUS; SUBCUTANEOUS
Status: DISCONTINUED | OUTPATIENT
Start: 2022-11-23 | End: 2022-11-23

## 2022-11-23 RX ORDER — GABAPENTIN 100 MG/1
100 CAPSULE ORAL 2 TIMES DAILY
Status: DISCONTINUED | OUTPATIENT
Start: 2022-11-23 | End: 2022-12-10 | Stop reason: HOSPADM

## 2022-11-23 RX ORDER — FUROSEMIDE 10 MG/ML
40 INJECTION INTRAMUSCULAR; INTRAVENOUS DAILY
Status: DISCONTINUED | OUTPATIENT
Start: 2022-11-23 | End: 2022-11-23

## 2022-11-23 RX ORDER — MELATONIN
2000 DAILY
Status: DISCONTINUED | OUTPATIENT
Start: 2022-11-23 | End: 2022-12-10 | Stop reason: HOSPADM

## 2022-11-23 RX ORDER — INSULIN LISPRO 100 [IU]/ML
1-5 INJECTION, SOLUTION INTRAVENOUS; SUBCUTANEOUS
Status: DISCONTINUED | OUTPATIENT
Start: 2022-11-23 | End: 2022-12-10 | Stop reason: HOSPADM

## 2022-11-23 RX ORDER — LEVALBUTEROL 1.25 MG/.5ML
1.25 SOLUTION, CONCENTRATE RESPIRATORY (INHALATION)
Status: DISCONTINUED | OUTPATIENT
Start: 2022-11-23 | End: 2022-12-10 | Stop reason: HOSPADM

## 2022-11-23 RX ORDER — CEFEPIME HYDROCHLORIDE 1 G/50ML
1000 INJECTION, SOLUTION INTRAVENOUS EVERY 24 HOURS
Status: DISCONTINUED | OUTPATIENT
Start: 2022-11-23 | End: 2022-11-30

## 2022-11-23 RX ORDER — SIMETHICONE 80 MG
80 TABLET,CHEWABLE ORAL 4 TIMES DAILY PRN
Status: ACTIVE | OUTPATIENT
Start: 2022-11-23 | End: 2022-12-02

## 2022-11-23 RX ORDER — HYDRALAZINE HYDROCHLORIDE 25 MG/1
100 TABLET, FILM COATED ORAL 2 TIMES DAILY
Status: DISCONTINUED | OUTPATIENT
Start: 2022-11-23 | End: 2022-11-27

## 2022-11-23 RX ORDER — ASPIRIN 81 MG/1
81 TABLET, CHEWABLE ORAL DAILY
Status: DISCONTINUED | OUTPATIENT
Start: 2022-11-23 | End: 2022-12-03

## 2022-11-23 RX ORDER — SODIUM CHLORIDE FOR INHALATION 0.9 %
3 VIAL, NEBULIZER (ML) INHALATION
Status: DISCONTINUED | OUTPATIENT
Start: 2022-11-23 | End: 2022-12-10 | Stop reason: HOSPADM

## 2022-11-23 RX ORDER — ASCORBIC ACID 500 MG
1000 TABLET ORAL DAILY
Status: DISCONTINUED | OUTPATIENT
Start: 2022-11-23 | End: 2022-11-23

## 2022-11-23 RX ORDER — ATORVASTATIN CALCIUM 40 MG/1
40 TABLET, FILM COATED ORAL DAILY
Status: DISCONTINUED | OUTPATIENT
Start: 2022-11-23 | End: 2022-12-10 | Stop reason: HOSPADM

## 2022-11-23 RX ORDER — LEVALBUTEROL INHALATION SOLUTION 0.63 MG/3ML
0.63 SOLUTION RESPIRATORY (INHALATION) EVERY 4 HOURS PRN
Status: DISCONTINUED | OUTPATIENT
Start: 2022-11-23 | End: 2022-11-23

## 2022-11-23 RX ORDER — HEPARIN SODIUM 5000 [USP'U]/ML
5000 INJECTION, SOLUTION INTRAVENOUS; SUBCUTANEOUS EVERY 8 HOURS SCHEDULED
Status: DISCONTINUED | OUTPATIENT
Start: 2022-11-23 | End: 2022-11-23

## 2022-11-23 RX ORDER — NYSTATIN 100000 [USP'U]/G
POWDER TOPICAL 2 TIMES DAILY
Status: DISCONTINUED | OUTPATIENT
Start: 2022-11-23 | End: 2022-12-10 | Stop reason: HOSPADM

## 2022-11-23 RX ORDER — PANTOPRAZOLE SODIUM 40 MG/1
40 TABLET, DELAYED RELEASE ORAL
Status: DISCONTINUED | OUTPATIENT
Start: 2022-11-23 | End: 2022-12-10 | Stop reason: HOSPADM

## 2022-11-23 RX ORDER — SODIUM CHLORIDE FOR INHALATION 0.9 %
3 VIAL, NEBULIZER (ML) INHALATION
Status: CANCELLED | OUTPATIENT
Start: 2022-11-23

## 2022-11-23 RX ORDER — AMLODIPINE BESYLATE 10 MG/1
10 TABLET ORAL DAILY
Status: DISCONTINUED | OUTPATIENT
Start: 2022-11-23 | End: 2022-12-10 | Stop reason: HOSPADM

## 2022-11-23 RX ORDER — FUROSEMIDE 10 MG/ML
40 INJECTION INTRAMUSCULAR; INTRAVENOUS
Status: DISCONTINUED | OUTPATIENT
Start: 2022-11-23 | End: 2022-11-26

## 2022-11-23 RX ADMIN — ISODIUM CHLORIDE 3 ML: 0.03 SOLUTION RESPIRATORY (INHALATION) at 07:23

## 2022-11-23 RX ADMIN — INSULIN LISPRO 2 UNITS: 100 INJECTION, SOLUTION INTRAVENOUS; SUBCUTANEOUS at 16:08

## 2022-11-23 RX ADMIN — HEPARIN SODIUM 5000 UNITS: 5000 INJECTION INTRAVENOUS; SUBCUTANEOUS at 01:38

## 2022-11-23 RX ADMIN — HYDRALAZINE HYDROCHLORIDE 100 MG: 25 TABLET ORAL at 17:12

## 2022-11-23 RX ADMIN — INSULIN LISPRO 3 UNITS: 100 INJECTION, SOLUTION INTRAVENOUS; SUBCUTANEOUS at 21:28

## 2022-11-23 RX ADMIN — IPRATROPIUM BROMIDE 0.5 MG: 0.5 SOLUTION RESPIRATORY (INHALATION) at 13:18

## 2022-11-23 RX ADMIN — PANTOPRAZOLE SODIUM 40 MG: 40 TABLET, DELAYED RELEASE ORAL at 06:17

## 2022-11-23 RX ADMIN — ISODIUM CHLORIDE 3 ML: 0.03 SOLUTION RESPIRATORY (INHALATION) at 13:18

## 2022-11-23 RX ADMIN — LEVALBUTEROL HYDROCHLORIDE 1.25 MG: 1.25 SOLUTION, CONCENTRATE RESPIRATORY (INHALATION) at 07:23

## 2022-11-23 RX ADMIN — AMLODIPINE BESYLATE 10 MG: 10 TABLET ORAL at 08:11

## 2022-11-23 RX ADMIN — DOXAZOSIN 2 MG: 2 TABLET ORAL at 01:36

## 2022-11-23 RX ADMIN — ASPIRIN 81 MG CHEWABLE TABLET 81 MG: 81 TABLET CHEWABLE at 08:09

## 2022-11-23 RX ADMIN — IPRATROPIUM BROMIDE 0.5 MG: 0.5 SOLUTION RESPIRATORY (INHALATION) at 19:20

## 2022-11-23 RX ADMIN — DOXAZOSIN 2 MG: 2 TABLET ORAL at 21:29

## 2022-11-23 RX ADMIN — LEVALBUTEROL HYDROCHLORIDE 1.25 MG: 1.25 SOLUTION, CONCENTRATE RESPIRATORY (INHALATION) at 19:20

## 2022-11-23 RX ADMIN — HYDRALAZINE HYDROCHLORIDE 100 MG: 25 TABLET ORAL at 08:10

## 2022-11-23 RX ADMIN — TAMSULOSIN HYDROCHLORIDE 0.4 MG: 0.4 CAPSULE ORAL at 16:08

## 2022-11-23 RX ADMIN — INSULIN LISPRO 3 UNITS: 100 INJECTION, SOLUTION INTRAVENOUS; SUBCUTANEOUS at 11:15

## 2022-11-23 RX ADMIN — GABAPENTIN 100 MG: 100 CAPSULE ORAL at 17:12

## 2022-11-23 RX ADMIN — NYSTATIN: 100000 POWDER TOPICAL at 17:13

## 2022-11-23 RX ADMIN — ISOSORBIDE MONONITRATE 60 MG: 60 TABLET, EXTENDED RELEASE ORAL at 08:10

## 2022-11-23 RX ADMIN — CEFEPIME HYDROCHLORIDE 1000 MG: 1 INJECTION, SOLUTION INTRAVENOUS at 17:47

## 2022-11-23 RX ADMIN — CARVEDILOL 12.5 MG: 12.5 TABLET, FILM COATED ORAL at 08:09

## 2022-11-23 RX ADMIN — NYSTATIN: 100000 POWDER TOPICAL at 11:16

## 2022-11-23 RX ADMIN — INSULIN LISPRO 2 UNITS: 100 INJECTION, SOLUTION INTRAVENOUS; SUBCUTANEOUS at 02:53

## 2022-11-23 RX ADMIN — ATORVASTATIN CALCIUM 40 MG: 40 TABLET, FILM COATED ORAL at 08:11

## 2022-11-23 RX ADMIN — CYANOCOBALAMIN TAB 500 MCG 500 MCG: 500 TAB at 08:09

## 2022-11-23 RX ADMIN — HEPARIN SODIUM 5000 UNITS: 5000 INJECTION INTRAVENOUS; SUBCUTANEOUS at 21:29

## 2022-11-23 RX ADMIN — HEPARIN SODIUM 5000 UNITS: 5000 INJECTION INTRAVENOUS; SUBCUTANEOUS at 13:13

## 2022-11-23 RX ADMIN — FUROSEMIDE 40 MG: 10 INJECTION, SOLUTION INTRAMUSCULAR; INTRAVENOUS at 11:18

## 2022-11-23 RX ADMIN — INSULIN LISPRO 2 UNITS: 100 INJECTION, SOLUTION INTRAVENOUS; SUBCUTANEOUS at 07:57

## 2022-11-23 RX ADMIN — LEVALBUTEROL HYDROCHLORIDE 1.25 MG: 1.25 SOLUTION, CONCENTRATE RESPIRATORY (INHALATION) at 13:18

## 2022-11-23 RX ADMIN — FUROSEMIDE 40 MG: 10 INJECTION, SOLUTION INTRAMUSCULAR; INTRAVENOUS at 15:17

## 2022-11-23 RX ADMIN — LABETALOL HYDROCHLORIDE 10 MG: 5 INJECTION, SOLUTION INTRAVENOUS at 21:28

## 2022-11-23 RX ADMIN — IPRATROPIUM BROMIDE 0.5 MG: 0.5 SOLUTION RESPIRATORY (INHALATION) at 07:23

## 2022-11-23 RX ADMIN — GABAPENTIN 100 MG: 100 CAPSULE ORAL at 08:10

## 2022-11-23 RX ADMIN — ISODIUM CHLORIDE 3 ML: 0.03 SOLUTION RESPIRATORY (INHALATION) at 19:20

## 2022-11-23 RX ADMIN — Medication 2000 UNITS: at 08:10

## 2022-11-23 RX ADMIN — CARVEDILOL 12.5 MG: 12.5 TABLET, FILM COATED ORAL at 16:09

## 2022-11-23 NOTE — PLAN OF CARE
Problem: RESPIRATORY - ADULT  Goal: Achieves optimal ventilation and oxygenation  Description: INTERVENTIONS:  - Assess for changes in respiratory status  - Assess for changes in mentation and behavior  - Position to facilitate oxygenation and minimize respiratory effort  - Oxygen administered by appropriate delivery if ordered  - Initiate smoking cessation education as indicated  - Encourage broncho-pulmonary hygiene including cough, deep breathe, Incentive Spirometry  - Assess the need for suctioning and aspirate as needed  - Assess and instruct to report SOB or any respiratory difficulty  - Respiratory Therapy support as indicated  Outcome: Progressing     Problem: Prexisting or High Potential for Compromised Skin Integrity  Goal: Skin integrity is maintained or improved  Description: INTERVENTIONS:  - Identify patients at risk for skin breakdown  - Assess and monitor skin integrity  - Assess and monitor nutrition and hydration status  - Monitor labs   - Assess for incontinence   - Turn and reposition patient  - Assist with mobility/ambulation  - Relieve pressure over bony prominences  - Avoid friction and shearing  - Provide appropriate hygiene as needed including keeping skin clean and dry  - Evaluate need for skin moisturizer/barrier cream  - Collaborate with interdisciplinary team   - Patient/family teaching  - Consider wound care consult   Outcome: Progressing     Problem: MOBILITY - ADULT  Goal: Maintain or return to baseline ADL function  Description: INTERVENTIONS:  -  Assess patient's ability to carry out ADLs; assess patient's baseline for ADL function and identify physical deficits which impact ability to perform ADLs (bathing, care of mouth/teeth, toileting, grooming, dressing, etc )  - Assess/evaluate cause of self-care deficits   - Assess range of motion  - Assess patient's mobility; develop plan if impaired  - Assess patient's need for assistive devices and provide as appropriate  - Encourage maximum independence but intervene and supervise when necessary  - Involve family in performance of ADLs  - Assess for home care needs following discharge   - Consider OT consult to assist with ADL evaluation and planning for discharge  - Provide patient education as appropriate  Outcome: Progressing  Goal: Maintains/Returns to pre admission functional level  Description: INTERVENTIONS:  - Perform BMAT or MOVE assessment daily    - Set and communicate daily mobility goal to care team and patient/family/caregiver  - Collaborate with rehabilitation services on mobility goals if consulted  - Perform Range of Motion 3 times a day  - Reposition patient every 2 hours    - Dangle patient 3 times a day  - Stand patient 3 times a day  - Ambulate patient 3 times a day  - Out of bed to chair 3 times a day   - Out of bed for meals 3 times a day  - Out of bed for toileting  - Record patient progress and toleration of activity level   Outcome: Progressing     Problem: Potential for Falls  Goal: Patient will remain free of falls  Description: INTERVENTIONS:  - Educate patient/family on patient safety including physical limitations  - Instruct patient to call for assistance with activity   - Consult OT/PT to assist with strengthening/mobility   - Keep Call bell within reach  - Keep bed low and locked with side rails adjusted as appropriate  - Keep care items and personal belongings within reach  - Initiate and maintain comfort rounds  - Make Fall Risk Sign visible to staff  - Offer Toileting every 2 Hours, in advance of need  - Initiate/Maintain bed alarm  - Obtain necessary fall risk management equipment: walker, urinal  - Apply yellow socks and bracelet for high fall risk patients  - Consider moving patient to room near nurses station  Outcome: Progressing     Problem: SAFETY ADULT  Goal: Maintain or return to baseline ADL function  Description: INTERVENTIONS:  -  Assess patient's ability to carry out ADLs; assess patient's baseline for ADL function and identify physical deficits which impact ability to perform ADLs (bathing, care of mouth/teeth, toileting, grooming, dressing, etc )  - Assess/evaluate cause of self-care deficits   - Assess range of motion  - Assess patient's mobility; develop plan if impaired  - Assess patient's need for assistive devices and provide as appropriate  - Encourage maximum independence but intervene and supervise when necessary  - Involve family in performance of ADLs  - Assess for home care needs following discharge   - Consider OT consult to assist with ADL evaluation and planning for discharge  - Provide patient education as appropriate  Outcome: Progressing  Goal: Maintains/Returns to pre admission functional level  Description: INTERVENTIONS:  - Perform BMAT or MOVE assessment daily    - Set and communicate daily mobility goal to care team and patient/family/caregiver  - Collaborate with rehabilitation services on mobility goals if consulted  - Perform Range of Motion 3 times a day  - Reposition patient every 2 hours    - Dangle patient 3 times a day  - Stand patient 3 times a day  - Ambulate patient 3 times a day  - Out of bed to chair 3 times a day   - Out of bed for meals 3 times a day  - Out of bed for toileting  - Record patient progress and toleration of activity level   Outcome: Progressing  Goal: Patient will remain free of falls  Description: INTERVENTIONS:  - Educate patient/family on patient safety including physical limitations  - Instruct patient to call for assistance with activity   - Consult OT/PT to assist with strengthening/mobility   - Keep Call bell within reach  - Keep bed low and locked with side rails adjusted as appropriate  - Keep care items and personal belongings within reach  - Initiate and maintain comfort rounds  - Make Fall Risk Sign visible to staff  - Offer Toileting every 2 Hours, in advance of need  - Initiate/Maintain bed alarm  - Obtain necessary fall risk management equipment: walker, urinal  - Apply yellow socks and bracelet for high fall risk patients  - Consider moving patient to room near nurses station  Outcome: Progressing

## 2022-11-23 NOTE — ASSESSMENT & PLAN NOTE
Patient presents with acute respiratory distress shortly after eating a few spoonful of puree diet in STR, patient reported chest pressure and SOB  Patient was satting 85-95% on oxygen 3 L per STR transfer paper  Patient was belching after eating/drinking limited amount of diet/water per staff  · Patient was discharged today after being hospitalized for aspiration pneumonia, acute on chronic diastolic CHF, dysphagia  Patient received 7 days of IV Rocephin and Flagyl  Received IV Lasix  Seen by GI, underwent EGD, found to have oropharyngeal dysphagia with esophageal dysmotility, no obvious esophageal stricture  GI recommend to consider PEG tube placement if recurrent aspirations  Recommend pantoprazole daily  Underwent Barium swallow study which showed moderate to severe esophageal dysmotility with significant residual contrast remaining in the esophagus at the end of the study  Patient was recommend regular diet by speech and discharged on regular diet per STR  · Patient required BiPAP on ED arrival   ABG post BiPAP essentially unremarkable  Currently on oxygen 6 L, satting mid 90s  · Chest x-ray - Pulmonary edema pattern, worse when comparing to 11/19/2022  Underlying infection cannot be excluded  · ProBNP 15,094  · Repeat procalcitonin pending  · Patient received Lasix 40 IV in ED  Will continue Lasix 40 mg IV daily  · Received cefepime and Flagyl in ED  Will hold off on further antibiotic as patient just completed 7 days of antibiotic  · Received IV Solu-Medrol in ED  No wheezing on auscultation  · Send sputum Gram stain and culture  · Xopenex+ Atrovent t i d , Xopenex p r n    · Respiratory protocol  · Consult Pulmonary  · CHF and dysphagia management as below

## 2022-11-23 NOTE — H&P
Eric 110 10/19/1929, 80 y o  male MRN: [id number]  Unit/Bed#: [facility] Encounter: [id number]  Primary Care Provider: Severo Mixon PA-C   Date and time admitted to hospital: 11/22/2022  8:41 PM    * Acute respiratory failure with hypoxia [facility])  Assessment & Plan  Patient presents with acute respiratory distress shortly after eating a few spoonful of puree diet in STR, patient reported chest pressure and SOB  Patient was satting 85-95% on oxygen 3 L per STR transfer paper  Patient was belching after eating/drinking limited amount of diet/water per staff  · Patient was discharged today after being hospitalized for aspiration pneumonia, acute on chronic diastolic CHF, dysphagia  Patient received 7 days of IV Rocephin and Flagyl  Received IV Lasix  Seen by GI, underwent EGD, found to have oropharyngeal dysphagia with esophageal dysmotility, no obvious esophageal stricture  GI recommend to consider PEG tube placement if recurrent aspirations  Recommend pantoprazole daily  Underwent Barium swallow study which showed moderate to severe esophageal dysmotility with significant residual contrast remaining in the esophagus at the end of the study  Patient was recommend regular diet by speech and discharged on regular diet per STR  · Patient required BiPAP on ED arrival   ABG post BiPAP essentially unremarkable  Currently on oxygen 6 L, satting mid 90s  · Chest x-ray - Pulmonary edema pattern, worse when comparing to 11/19/2022  Underlying infection cannot be excluded  · ProBNP 15,094  · Repeat procalcitonin pending  · Patient received Lasix 40 IV in ED  Will continue Lasix 40 mg IV daily  · Received cefepime and Flagyl in ED  Will hold off on further antibiotic as patient just completed 7 days of antibiotic  · Received IV Solu-Medrol in ED  No wheezing on auscultation    · Send sputum Gram stain and culture  · Xopenex+ Atrovent t i d , Xopenex p r n  · Respiratory protocol  · Consult Pulmonary  · CHF and dysphagia management as below    Acute on chronic diastolic congestive heart failure (HCC)  Assessment & Plan  Wt Readings from Last 3 Encounters:   11/22/22 84 4 kg (186 lb)   11/22/22 84 5 kg (186 lb 4 8 oz)     Patient received IV Lasix in recent admission  · Chest x-ray today shows worsening pulmonary edema  · Recent 2D echo as below  · Mild edema to lower extremity, left forearm edema on exam   · Received Lasix 40 IV in ED, will continue Lasix 40 daily  · Telemetry  · Check left arm Doppler to rule out DVT  · Consult nephrology for diuretic management in view of CKD 4  · Daily weight, I&Os        Acute kidney injury superimposed on CKD [facility])  Assessment & Plan  Lab Results   Component Value Date    EGFR 18 11/22/2022    EGFR 19 11/22/2022    EGFR 18 11/21/2022    CREATININE 2 83 (H) 11/22/2022    CREATININE 2 68 (H) 11/22/2022    CREATININE 2 85 (H) 11/21/2022   History of CKD 4, baseline creatinine appears to be around 1 5-1 9 in past 2 years in care everywhere  · Creatinine today 2 83   · ? Cardiorenal  · Received IV Lasix 40 mg in ED, will continue  · Avoid nephrotoxin and hypotension  · Check PVR  · Consult nephrology for diuretic management  · Daily weight, I&Os    Aspiration pneumonia ([address] )  Assessment & Plan  Just completed 7 days antibiotic  · Repeat procalcitonin pending  · Hold off on further antibiotic  · Check sputum Gram stain and culture  · Consult Pulmonary    Elevated troponin  Assessment & Plan  Troponin 124-197  Elevated troponin in recent admission as well  Reports chest pressure when in respiratory distress  Denies history of CAD  · EKG no ischemic changes, read as AFib, rate 103,RBBB  · No history of AFib  Prior EKG shows sinus rhythm with PACs /PVCs  Will repeat EKG  · 2D echo last admission showed  EF low normal, normal wall motion, grade 2 diastolic dysfunction, moderately dilated atriums     · Most likely non MI troponin elevation due to # 1 and CHF  · Telemetry    Esophageal dysphagia  Assessment & Plan  Patient reports no appetite in past 2 days,did not eat all day yesterday  Loss of appetite could be secondary to antibiotic  · Keep patient NPO for now  · Re-Consult speech  · GI recommend to consider PEG tube if recurrent aspirations  Per recent GI note,family did not want PEG tube  · Continue pantoprazole daily  · Check OBS to r/o constipation in view of belching  · Aspiration precautions      BPH (benign prostatic hyperplasia)  Assessment & Plan  Continue Cardura and Flomax  · Check PVR    Hyperlipidemia  Assessment & Plan  Continue statin    Thrush  Assessment & Plan  Received 7 days of nystatin suspension  Monitor    Type 2 diabetes mellitus [facility])  Assessment & Plan  Lab Results   Component Value Date    HGBA1C 5 7 (H) 11/16/2022       Recent Labs     11/21/22  2046 11/22/22  0711 11/22/22  1122 11/22/22  2054   POCGLU 201* 224* 193* 288*       Blood Sugar Average: Last 72 hrs:  (P) 288   Patient was discharged on Prandin t i d  With meals  Will hold while inpatient  · A1c at goal  · SSI    Primary hypertension  Assessment & Plan  On hydralazine, Norvasc, Coreg, Imdur, Cardura  · Will continue above medications with holding parameter  · BP labile        VTE Prophylaxis: Heparin  / reason for no mechanical VTE prophylaxis On heparin subQ   Code Status:  Full code  POLST: POLST form is not discussed and not completed at this time  Anticipated Length of Stay:  Patient will be admitted on an Inpatient basis with an anticipated length of stay of  > 2 midnights  Justification for Hospital Stay:  Acute respiratory failure, acute on chronic CHF, dysphagia    Total Time for Visit, including Counseling / Coordination of Care: 1 hour  Greater than 50% of this total time spent on direct patient counseling and coordination of care      Chief Complaint:   SOB shortly after eating a few spoonful of puree diet and drinking liquids  History of Present Illness:    Calvert Scheuermann is a 80 y o  male with PMH of  recently diagnosed esophageal dysmotility, type 2 diabetes, CHF, hyperlipidemia, CKD 4, BPH who presents with acute respiratory distress shortly after eating a few spoonful of puree diet in STR, patient reported chest pressure and SOB  Patient was satting 85-95% on oxygen 3 L per STR transfer paper  Patient was belching after eating/drinking limited amount of diet/water per staff  Patient reports feeling better currently  Denies chest pressure, chest pain currently  Denies nausea vomiting abdominal pain  Reports last BM was 2 days ago  Patient reports no appetite for past few days, did not eat all day yesterday  Patient reports productive cough started recently, denies sore throat/runny nose/fever, chills  Patient denies headache, dizziness  Spoke to patient's daughter over the phone, patient was living in [facility] prior to recent hospitalization  Patient walks independently, exercise 5 times a week, does not use oxygen at baseline  Patient had rapid decline in his physical condition per daughter  Review of Systems:    Review of Systems   Reason unable to perform ROS: Spoke to staff at rehab as well  Respiratory: Positive for cough and shortness of breath  Cardiovascular:        Chest pressure   Gastrointestinal:        Belching   All other systems reviewed and are negative  Past Medical and Surgical History:     Past Medical History:   Diagnosis Date   • CHF (congestive heart failure) ([facility] )    • Diabetes mellitus ([facility] )    • Elevated lipids    • Hypertension    • Hyponatremia 11/15/2022   • Neuropathy    • Renal disorder        History reviewed  No pertinent surgical history  Meds/Allergies:    Prior to Admission medications    Medication Sig Start Date End Date Taking?  Authorizing Provider   amLODIPine (NORVASC) 10 mg tablet Take 10 mg by mouth daily    Historical Provider, MD Ascorbic Acid (vitamin C) 1000 MG tablet Take 1,000 mg by mouth daily    Historical Provider, MD   aspirin 81 mg chewable tablet Chew 81 mg daily    Historical Provider, MD   atorvastatin (LIPITOR) 40 mg tablet Take 40 mg by mouth daily    Historical Provider, MD   benzonatate (TESSALON PERLES) 100 mg capsule Take 1 capsule (100 mg total) by mouth 3 (three) times a day 11/22/22   Masoud CrampKEISHA   carvedilol (COREG) 12 5 mg tablet Take 1 tablet (12 5 mg total) by mouth 2 (two) times a day with meals for 7 days 11/22/22 11/29/22  Masoud Cramp, KEISHA   Cholecalciferol (Vitamin D3) 50 MCG (2000 UT) TABS Take 2,000 Units by mouth daily    Historical Provider, MD   doxazosin (CARDURA) 2 mg tablet Take 2 mg by mouth daily at bedtime    Historical Provider, MD   furosemide (LASIX) 20 mg tablet Take 20 mg by mouth daily    Historical Provider, MD   gabapentin (NEURONTIN) 100 mg capsule Take 100 mg by mouth 2 (two) times a day    Historical Provider, MD   hydrALAZINE (APRESOLINE) 100 MG tablet Take 100 mg by mouth 2 (two) times a day    Historical Provider, MD   isosorbide mononitrate (IMDUR) 60 mg 24 hr tablet Take 60 mg by mouth daily    Historical Provider, MD   metroNIDAZOLE (FLAGYL) 500 mg tablet Take 1 tablet (500 mg total) by mouth every 8 (eight) hours for 5 days 11/22/22 11/27/22  KEISHA Mcwilliams   nystatin (MYCOSTATIN) 500,000 units/5 mL suspension Swish and swallow 5 mL (500,000 Units total) 4 (four) times a day for 5 days 11/22/22 11/27/22  KEISHA Mcwilliams   repaglinide (PRANDIN) 0 5 mg tablet Take 0 5 mg by mouth 3 (three) times a day before meals    Historical Provider, MD   saccharomyces boulardii (FLORASTOR) 250 mg capsule Take 1 capsule (250 mg total) by mouth 2 (two) times a day for 7 days 11/22/22 11/29/22  KEISHA Mcwilliams   simethicone (MYLICON) 80 mg chewable tablet Chew 1 tablet (80 mg total) 4 (four) times a day as needed for flatulence (Belching) for up to 10 days 22  KEISHA Knutson   tamsulosin (FLOMAX) 0 4 mg Take 0 4 mg by mouth in the morning    Historical Provider, MD   vitamin B-12 (VITAMIN B-12) 500 mcg tablet Take 500 mcg by mouth daily    Historical Provider, MD     I have reviewed home medications using allscripts  Allergies: No Known Allergies    Social History:     Marital Status:    Occupation:  Retired  Patient Pre-hospital Living Situation:  Assisted living  Patient Pre-hospital Level of Mobility:  Went to rehab today  Patient Pre-hospital Diet Restrictions:  Regular diabetic diet  Substance Use History:   Social History     Substance and Sexual Activity   Alcohol Use Not Currently     Social History     Tobacco Use   Smoking Status Former   • Packs/day: 3 00   • Types: Cigarettes   • Quit date: 80   • Years since quittin 9   Smokeless Tobacco Never     Social History     Substance and Sexual Activity   Drug Use Not Currently       Family History:    non-contributory    Physical Exam:     Vitals:   Blood Pressure: 122/75 (22)  Pulse: 83 (22)  Temperature: 98 °F (36 7 °C) (22)  Temp Source: Oral (22)  Respirations: 18 (22)  Height: 5' 5" (165 1 cm) (22)  Weight - Scale: 84 4 kg (186 lb) (22)  SpO2: 95 % (22)    Physical Exam  Vitals and nursing note reviewed  Constitutional:       Appearance: He is well-developed and well-nourished  He is obese  HENT:      Head: Normocephalic and atraumatic  Neck:      Thyroid: No thyromegaly  Vascular: No JVD  Trachea: No tracheal deviation  Cardiovascular:      Rate and Rhythm: Normal rate and regular rhythm  Pulses: Intact distal pulses  Heart sounds: Normal heart sounds  Pulmonary:      Effort: No respiratory distress  Breath sounds: No wheezing or rales        Comments: Mild increased work of breathing on exam, satting mid 90s on oxygen 5 L currently, lungs very diminished in lower lobes  Abdominal:      General: Bowel sounds are normal  There is no distension  Palpations: Abdomen is soft  Tenderness: There is no abdominal tenderness  There is no guarding  Musculoskeletal:         General: Swelling present  Cervical back: Neck supple  Right lower leg: Edema present  Left lower leg: Edema present  Comments: Trace edema to lower extremity, left forearm edema with ecchymosis   Skin:     General: Skin is warm and dry  Neurological:      General: No focal deficit present  Mental Status: He is alert and oriented to person, place, and time  Psychiatric:         Mood and Affect: Mood and affect and mood normal          Judgment: Judgment normal          Additional Data:     Lab Results: I have personally reviewed pertinent reports  Results from last 7 days   Lab Units 11/22/22 2058 11/19/22  0548 11/18/22  0510   WBC Thousand/uL 16 19*   < > 13 45*   HEMOGLOBIN g/dL 9 6*   < > 10 0*   HEMATOCRIT % 29 9*   < > 30 7*   PLATELETS Thousands/uL 253   < > 190   NEUTROS PCT %  --   --  74   LYMPHS PCT %  --   --  12*   LYMPHO PCT % 5*  --   --    MONOS PCT %  --   --  12   MONO PCT % 6  --   --    EOS PCT % 0  --  1    < > = values in this interval not displayed  Results from last 7 days   Lab Units 11/22/22 2058 11/18/22  0510 11/17/22  0537   POTASSIUM mmol/L 4 9   < > 3 6   CHLORIDE mmol/L 96   < > 97   CO2 mmol/L 28   < > 26   BUN mg/dL 91*   < > 55*   CREATININE mg/dL 2 83*   < > 2 44*   CALCIUM mg/dL 8 8   < > 8 4   ALK PHOS U/L 92  --  72   ALT U/L 27  --  22   AST U/L  --   --  35    < > = values in this interval not displayed  Results from last 7 days   Lab Units 11/22/22 2058   INR  1 07       Imaging: I have personally reviewed pertinent reports  EGD    Result Date: 11/18/2022  Narrative: Table formatting from the original result was not included   [address] [facility] [address] [address] [phone] DATE OF SERVICE: 11/18/22 PHYSICIAN(S): Attending: Mike Johns MD Fellow: No Staff Documented Procedure :  EGD with biopsies INDICATION: Dysphagia POST-OP DIAGNOSIS: See the impression below  PREPROCEDURE: Informed consent was obtained for the procedure, including sedation  Risks of perforation, hemorrhage, adverse drug reaction and aspiration were discussed  The patient was placed in the left lateral decubitus position  Patient was explained about the risks and benefits of the procedure  Risks including but not limited to bleeding, infection, and perforation were explained in detail  Also explained about less than 100% sensitivity with the exam and other alternatives  DETAILS OF PROCEDURE: Patient was taken to the procedure room where a time out was performed to confirm correct patient and correct procedure  The patient underwent monitored anesthesia care, which was administered by an anesthesia professional  The patient's blood pressure, heart rate, level of consciousness, respirations and oxygen were monitored throughout the procedure  The scope was advanced to the second part of the duodenum  Retroflexion was performed in the fundus  Prior to the procedure, the patient's H  Pylori status was unknown  The patient experienced no blood loss  The procedure was not difficult  The patient tolerated the procedure well  There were no apparent complications  ANESTHESIA INFORMATION: ASA: III Anesthesia Type: IV Sedation with Anesthesia MEDICATIONS: No administrations occurring from 1456 to 1530 on 11/18/22 FINDINGS: Large amount of liquid and thick secretion in the esophagus suggests evidence of esophageal dysmotility, no obvious stricture was seen    GE junction appeared slightly erythematous, multiple lower esophageal biopsies were done Mild, localized erythematous mucosa in the antrum, consistent with gastritis The duodenal bulb, 1st part of the duodenum and 2nd part of the duodenum appeared normal  SPECIMENS: ID Type Source Tests Collected by Time Destination 1 : bxs lower esophagus Tissue Esophagus TISSUE EXAM Jhon Rutledge MD 11/18/2022  3:26 PM      Impression: 1  Oropharyngeal dysphagia with esophageal dysmotility 2  No obvious esophageal stricture 3  Mild erythema in the antrum 4  Normal duodenum RECOMMENDATION:  Await pathology results    Resume diet   If recurrent aspiration persists then consider for PEG tube placement   Jhon Rutledge MD     XR chest 1 view portable    Result Date: 11/22/2022  Narrative: CHEST INDICATION:   short of breaht  COMPARISON:  11/19/2022 EXAM PERFORMED/VIEWS:  XR CHEST PORTABLE FINDINGS: Heart shadow is obscured by adjacent opacity  Symmetric bilateral hazy airspace opacities  Increased bilateral reticular markings consistent with pulmonary edema secondary to CHF or fluid overload  Small bilateral pleural effusions  Osseous structures appear within normal limits for patient age  Impression: Pulmonary edema pattern, worse when comparing to 11/19/2022  Underlying infection cannot be excluded  Workstation performed: [id number]     XR chest portable    Result Date: 11/20/2022  Narrative: CHEST INDICATION:   elevated WBC, fever, aspiration pneumonia  COMPARISON:  CXR 11/15/2022  EXAM PERFORMED/VIEWS:  XR CHEST PORTABLE FINDINGS: Cardiomediastinal silhouette appears unremarkable  Persistent bilateral pulmonary opacity, greatest in the left base  Small effusions  No pneumothorax  Osseous structures appear within normal limits for patient age  Impression: Persistent bilateral pulmonary opacity  With recent elevated BNP, this is at least likely in part due to pulmonary edema  Pneumonia not excluded  Workstation performed: [id number]     XR chest 1 view portable    Result Date: 11/16/2022  Narrative: CHEST INDICATION:   pneumonia   COMPARISON:  None EXAM PERFORMED/VIEWS:  XR CHEST PORTABLE FINDINGS: Cardiomediastinal silhouette appears unremarkable  Infiltrates in the bilateral upper lobes and left lower lobes  Small bilateral pleural effusions  Osseous structures appear within normal limits for patient age  Impression: Bilateral multifocal infiltrates  Small bilateral pleural effusions  Workstation performed: [id number]     FL esophagram complete    Result Date: 11/17/2022  Narrative: BARIUM SWALLOW-ESOPHAGRAM INDICATION:   esophageal dysphagia, suspect dysmotility  COMPARISON:  None IMAGES:  31 FLUOROSCOPY TIME:   2 6 MIN  TECHNIQUE: The patient was given effervescent granules and barium by mouth and images of the esophagus were obtained  FINDINGS: The esophagus is normal in caliber  Moderate to severe esophageal dysmotility with diffuse tertiary contractions  Significant residual contrast remaining in the esophagus with only a small amount in the stomach at the end of the study  No mucosal lesion, ulceration or evidence of fold thickening is seen  Gastroesophageal reflux was not observed  Small likely mixed type hiatal hernia  Impression: Moderate to severe esophageal dysmotility with significant residual contrast remaining in the esophagus at the end of the study  Small likely mixed type hiatal hernia  Workstation performed: [id number]     FL barium swallow video w speech    Result Date: 11/16/2022  Narrative: A video barium swallow study was performed by the Department of Speech Pathology  Please refer to the report for the official interpretation  The images are stored for archival purposes only  Study images were not formally reviewed by the Radiology Department      FL barium swallow video w speech    Result Date: 11/16/2022  Narrative: DOMITILA Dixon     11/18/2022 11:06 AM Speech Pathology Videofluoroscopic Swallow Study (VFSS/VBSS/MBSS) Patient Name: Janice Mccarthy Today's Date: 11/16/2022  Problem List Principal Problem:   Pneumonia Active Problems:   Primary hypertension   CHF (congestive heart failure) (HCC)   Hyponatremia   Stage 4 chronic kidney disease (HCC)   Type 2 diabetes mellitus (HCC)   Dysphagia   Thrush   Elevated troponin Past Medical History Past Medical History: Diagnosis Date • CHF (congestive heart failure) (HCC)  • Diabetes mellitus (HCC)  • Elevated lipids  • Hypertension  • Neuropathy  • Renal disorder  General Information; Pt is a 80 y o  male with a PMH as stated above  Pt admitted 11/15 with recent c/o dysphagia & b/l infiltrates  No overt s/s oropharyngeal dysphagia noted on clinical/bedside swallowing evaluation  A VFSS was recommended for formal/instrumental assessment of oropharyngeal stage swallowing skills at this time  Pt was viewed in lateral position and assessed with thin liquid, nectar thick liquid, puree, solid food (Sujata Doone cookie) and a13mm pill with thin liquid  I transported pt and and from study  Oral stage:  No significant oral stage dysphagia  Mastication was timely and grossly effective with materials administered today  Bolus formation and transfer were functional   Oral control appeared adequate with no gross premature spillage over the base of tongue  Pharyngeal stage:  No significant pharyngeal dysphagia  Velar elevation noted  Swallowing initiation was prompt  Laryngeal rise and anterior hyoid excursion appeared adequate  AIrway entrance closure appeared adequate Tongue base retraction appeared to be of adequate strength  Pharyngeal constriction presumed to be adequate  PES opening was adequate  Management of food/liquid/barium tablet follows: All food, liquid and the barium tablet passed through the pharynx safely and efficiently (ie no laryngeal penetration, aspiration or significant pharyngeal residue noted today)  Esophageal stage: Brief view of esophagus was completed after administration of the barium tablet   S/S suggestive of significant esophageal dysmotility (eg stasis throughout esophagus, tertiary contractions, retrograde intra-esophageal flow)  Pt also with frequent belching  Assessment Summary:  Pt presents with no significant s/s oropharyngeal dysphagia but did present with s/s suggest of esophageal dysphagia (stasis throughout esophagus, tertiary contractions, retrograde intra-esophageal flow,  belching and episode of reflux),  Note: Images are available for review in PACS as desired  Recommendations: Recommended Diet:  No modification to diet texture indicated  Recommended Form of Medications: as tolerated (tolerated them whole w/water today)  Precautions to promote esophageal clearance Consider referral to GI, ?trial of PPI vs other No SLP Dysphagia therapy recommended at this time Thank you for this referral   Please do not hesitate to contact me with any questions or concerns  Teresa Falk [facility]  Speech Pathologist [place] License [id number] [id number] [place] License [id number] Available via [employer]     Echo complete w/ contrast if indicated    Result Date: 11/17/2022  Narrative: •  Left Ventricle: Left ventricular cavity size is normal  Wall thickness is moderately increased  There is mild to moderate concentric hypertrophy  by visual estimation  Systolic function is low normal  Wall motion is normal  Diastolic function is moderately abnormal, consistent with grade II (pseudonormal) relaxation  Left atrial filling pressure is elevated  •  IVS: There is abnormal septal motion consistent with bundle branch block  •  Left Atrium: The atrium is moderately dilated  •  Right Atrium: The atrium is moderately dilated  •  Aortic Valve: The aortic valve is functionally bicuspid with commissural fusion of the left-noncoronary cusp  The leaflets are mildly thickened  The leaflets are moderately calcified  There is mildly reduced mobility  There is mild regurgitation  There is mild stenosis  The aortic valve peak velocity is 2 6 m/s  The aortic valve peak gradient is 27 0 mmHg  The aortic valve mean gradient is 12 0 mmHg  •  Tricuspid Valve:  The right ventricular systolic pressure/pulmonary is moderately elevated  EKG, Pathology, and Other Studies Reviewed on Admission:   · EKG: ?  AFib    Allscripts Records Reviewed: Yes     ** Please Note: Dragon 360 Dictation voice to text software may have been used in the creation of this document   **

## 2022-11-23 NOTE — ASSESSMENT & PLAN NOTE
Just completed 7 days antibiotic    · Repeat procalcitonin pending  · Hold off on further antibiotic  · Check sputum Gram stain and culture  · Consult Pulmonary

## 2022-11-23 NOTE — ASSESSMENT & PLAN NOTE
Lab Results   Component Value Date    HGBA1C 5 7 (H) 11/16/2022       Recent Labs     11/23/22  0136 11/23/22  0722 11/23/22  1114 11/23/22  1542   POCGLU 296* 268* 281* 251*       Blood Sugar Average: Last 72 hrs:  (P) 276 8   Patient was discharged on Prandin t i d  With meals  Will hold while inpatient  · Continue Humalog sliding scale with Accu-Cheks q a c   And HS  · Patient was started on diabetic pureed Diet with thin liquids

## 2022-11-23 NOTE — ASSESSMENT & PLAN NOTE
Troponin [id number]  Elevated troponin in recent admission as well  Reports chest pressure when in respiratory distress  Denies history of CAD  · EKG no ischemic changes, read as AFib, rate 103,RBBB  · No history of AFib  Prior EKG shows sinus rhythm with PACs /PVCs  Will repeat EKG  · 2D echo last admission showed  EF low normal, normal wall motion, grade 2 diastolic dysfunction, moderately dilated atriums     · Most likely non MI troponin elevation due to # 1 and CHF  · Telemetry

## 2022-11-23 NOTE — PLAN OF CARE
Recommendations: puree/level 1 diet and thin liquids     Recommended Form of Meds: crushed with puree     Aspiration precautions and compensatory swallowing strategies: upright posture, slow rate of feeding ( do not take a bite if globus sensation is still present), small bites/sips and alternating bites and sips    Dysphagia Goals: pt will tolerate puree with thin liquids without s/s of aspiration x3 and pt will demonstrate accurate use of slow rate (waiting for bolus to clear prior to taking another bite) strategy with 80% accuracy given no cues

## 2022-11-23 NOTE — QUICK NOTE
Patient coughed up a tiny amount of blood overnight per nursing  Will order CT chest to better delineate possibly persistent pneumonia

## 2022-11-23 NOTE — ASSESSMENT & PLAN NOTE
Lab Results   Component Value Date    HGBA1C 5 7 (H) 11/16/2022       Recent Labs     11/21/22  2046 11/22/22  0711 11/22/22  1122 11/22/22 2054   POCGLU 201* 224* 193* 288*       Blood Sugar Average: Last 72 hrs:  (P) 288   Patient was discharged on Prandin t i d  With meals  Will hold while inpatient    · A1c at goal  · SSI

## 2022-11-23 NOTE — PROGRESS NOTES
Irene 45  Progress Note Joan Vu 10/19/1929, 80 y o  male MRN: [id number]  Unit/Bed#: [facility] Encounter: [id number]  Primary Care Provider: Moo Hassan PA-C   Date and time admitted to hospital: 11/22/2022  8:41 PM    * Acute respiratory failure with hypoxia [facility])  Assessment & Plan  Patient presents with acute respiratory distress shortly after eating a few spoonful of puree diet in STR, patient reported chest pressure and SOB  Patient was satting 85-95% on oxygen 3 L per STR transfer paper  Patient was belching after eating/drinking limited amount of diet/water per staff  · Patient was discharged today after being hospitalized for aspiration pneumonia, acute on chronic diastolic CHF, dysphagia  Patient received 7 days of IV Rocephin and Flagyl  Received IV Lasix  Seen by GI, underwent EGD, found to have oropharyngeal dysphagia with esophageal dysmotility, no obvious esophageal stricture  GI recommend to consider PEG tube placement if recurrent aspirations  Recommend pantoprazole daily  Underwent Barium swallow study which showed moderate to severe esophageal dysmotility with significant residual contrast remaining in the esophagus at the end of the study  Patient was recommend regular diet by speech and discharged on regular diet per STR  · Patient required BiPAP on ED arrival   ABG post BiPAP essentially unremarkable  Later patient was started on to 6 liters and currently patient is on 4 liters with O2 saturation in mid 90s  · Chest x-ray - Pulmonary edema pattern, worse when comparing to 11/19/2022  Underlying infection cannot be excluded  · ProBNP 15,094  · Repeat procalcitonin pending  · Patient received Lasix 40 IV in ED  Will continue Lasix 40 mg IV daily  · Received cefepime and Flagyl in ED  · Received IV Solu-Medrol in ED  No wheezing on auscultation  · Send sputum Gram stain and culture  · Xopenex+ Atrovent t i d , Xopenex p r n    · CT of the chest showed pulmonary edema with moderate size bilateral pleural effusions with significant bibasilar atelectasis and concomitant multifocal pneumonia  · Patient restarted back on cefepime 1 gram IV daily as procalcitonin level is increasing    Esophageal dysphagia  Assessment & Plan  Patient reports no appetite in past 2 days,did not eat all day yesterday  Loss of appetite could be secondary to antibiotic  · Keep patient NPO for now  · Discussed with speech therapy-patient at high risk for aspiration despite the level of diet  For now patient started on pureed diet with thin liquids with medications crushed with puree  · GI recommend to consider PEG tube if recurrent aspirations  Per recent GI note,family did not want PEG tube during prior hospitalization  · Continue pantoprazole daily  · Obstructive series showed barium throughout the colon with modest amount of stool along the rectosigmoid region  · Prolonged discussion held with patient's daughter and patient's PA regarding risk of recurrent aspiration and the patient possibly needing PEG  Family to discuss and let us know about final decision      Acute on chronic diastolic congestive heart failure ([facility] [address] )  Assessment & Plan  Wt Readings from Last 3 Encounters:   11/23/22 83 9 kg (185 lb)   11/22/22 84 5 kg (186 lb 4 8 oz)     Patient received IV Lasix in recent admission     · Chest x-ray today shows worsening pulmonary edema  · Recent 2D echo as below  · Mild edema to lower extremity, left forearm edema on exam   · Patient received 40 milligrams of Lasix IV in the ED and was continue Lasix 40 milligram IV daily which was later increased to 40 milligram IV q 12 hours  · Left upper extremity venous Doppler showed no evidence of DVT  · CT chest showed pulmonary edema with moderate size bilateral pleural effusions left more than right  · Patient started on Lasix 40 milligram IV q 12 hours        Acute kidney injury superimposed on CKD [facility])  Assessment & Plan  Lab Results   Component Value Date    EGFR 18 11/23/2022    EGFR 18 11/22/2022    EGFR 19 11/22/2022    CREATININE 2 77 (H) 11/23/2022    CREATININE 2 83 (H) 11/22/2022    CREATININE 2 68 (H) 11/22/2022   History of CKD 4, baseline creatinine appears to be around 1 5-1 9 in past 2 years in care everywhere  Creatinine during previous hospitalization ranged in 2 4-2 8  · Possible acute kidney injury versus progression of chronic kidney disease  · Urinalysis was bland  · Received IV Lasix 40 mg in ED, will continue  · Avoid nephrotoxin and hypotension  · Check PVR  · Check renal ultrasound to rule out hydronephrosis  · Nephrology input appreciated  · Monitor creatinine and Lasix 40 milligram IV q 12 hours    Aspiration pneumonia ([address] )  Assessment & Plan  Just completed 7 days antibiotic  · Repeat procalcitonin level was 0 54  · Patient received cefepime and Flagyl in the ED which will be continued at 1 gram IV daily  · Follow-up for sputum cultures  · Pulmonary input appreciated    BPH (benign prostatic hyperplasia)  Assessment & Plan  Continue Cardura and Flomax  · Check PVR    Hyperlipidemia  Assessment & Plan  Continue statin    Elevated troponin  Assessment & Plan  Troponin [id number]  Elevated troponin in recent admission as well  Reports chest pressure when in respiratory distress  Denies history of CAD  · EKG no ischemic changes, read as AFib, rate 103,RBBB  · No history of AFib  Prior EKG shows sinus rhythm with PACs /PVCs  Will repeat EKG  · 2D echo last admission showed  EF low normal, normal wall motion, grade 2 diastolic dysfunction, moderately dilated atriums  · Most likely non MI troponin elevation due to # 1 and CHF  · Telemetry    Thrush  Assessment & Plan  Received 7 days of nystatin suspension    Monitor    Type 2 diabetes mellitus [facility])  Assessment & Plan  Lab Results   Component Value Date    HGBA1C 5 7 (H) 11/16/2022       Recent Labs     11/23/22  0136 11/23/22  8954 22  1114 22  1542   POCGLU 296* 268* 281* 251*       Blood Sugar Average: Last 72 hrs:  (P) 276 8   Patient was discharged on Prandin t i d  With meals  Will hold while inpatient  · Continue Humalog sliding scale with Accu-Cheks q a c  And HS  · Patient was started on diabetic pureed Diet with thin liquids    Primary hypertension  Assessment & Plan  On hydralazine, Norvasc, Coreg, Imdur, Cardura  · Will continue above medications with holding parameter  · BP labile        VTE Pharmacologic Prophylaxis:   High Risk (Score >/= 5) - Pharmacological DVT Prophylaxis Ordered: heparin  Sequential Compression Devices Ordered  Patient Centered Rounds: I performed bedside rounds with nursing staff today  Discussions with Specialists or Other Care Team Provider:  Speech therapy, pulmonology, Nephrology    Education and Discussions with Family / Patient: Updated  (daughter) via phone  Time Spent for Care: 45 minutes  More than 50% of total time spent on counseling and coordination of care as described above  Current Length of Stay: 1 day(s)  Current Patient Status: Inpatient   Certification Statement: The patient will continue to require additional inpatient hospital stay due to Acute respiratory failure, acute CHF, acute kidney injury, aspiration pneumonia  Discharge Plan: Anticipate discharge in >72 hrs to rehab facility  Code Status: Level 1 - Full Code    Subjective:   Patient complains of tiredness and fatigue  Denies any chest pain  Improved shortness of breath  Denies any abdominal pain    Objective:     Vitals:   Temp (24hrs), Av °F (36 7 °C), Min:97 8 °F (36 6 °C), Max:98 1 °F (36 7 °C)    Temp:  [97 8 °F (36 6 °C)-98 1 °F (36 7 °C)] 98 °F (36 7 °C)  HR:  [75-83] 83  Resp:  [16-19] 19  BP: (122-199)/(68-82) 190/80  SpO2:  [93 %-100 %] 94 %  Body mass index is 30 79 kg/m²  Input and Output Summary (last 24 hours):      Intake/Output Summary (Last 24 hours) at 11/23/2022 1720  Last data filed at 11/23/2022 1526  Gross per 24 hour   Intake 50 ml   Output 1725 ml   Net -1675 ml       Physical Exam:   Physical Exam  Constitutional:       Appearance: Normal appearance  HENT:      Head: Normocephalic and atraumatic  Eyes:      Extraocular Movements: Extraocular movements intact  Pupils: Pupils are equal, round, and reactive to light  Cardiovascular:      Rate and Rhythm: Normal rate and regular rhythm  Heart sounds: No murmur heard  No gallop  Pulmonary:      Effort: Pulmonary effort is normal       Breath sounds: Normal breath sounds  Comments: Decreased breath sounds at the bases  Abdominal:      General: Bowel sounds are normal       Palpations: Abdomen is soft  Tenderness: There is no abdominal tenderness  Musculoskeletal:         General: No swelling or deformity  Normal range of motion  Cervical back: Normal range of motion and neck supple  Comments: LUE edema   Skin:     General: Skin is warm and dry  Neurological:      General: No focal deficit present  Mental Status: He is alert  Additional Data:     Labs:  Results from last 7 days   Lab Units 11/22/22 2058 11/19/22  0548 11/18/22  0510   WBC Thousand/uL 16 19*   < > 13 45*   HEMOGLOBIN g/dL 9 6*   < > 10 0*   HEMATOCRIT % 29 9*   < > 30 7*   PLATELETS Thousands/uL 253   < > 190   NEUTROS PCT %  --   --  74   LYMPHS PCT %  --   --  12*   LYMPHO PCT % 5*  --   --    MONOS PCT %  --   --  12   MONO PCT % 6  --   --    EOS PCT % 0  --  1    < > = values in this interval not displayed       Results from last 7 days   Lab Units 11/23/22  0825 11/22/22 2058 11/18/22  0510 11/17/22  0537   SODIUM mmol/L 141 133*   < > 132*   POTASSIUM mmol/L 4 8 4 9   < > 3 6   CHLORIDE mmol/L 102 96   < > 97   CO2 mmol/L 28 28   < > 26   BUN mg/dL 93* 91*   < > 55*   CREATININE mg/dL 2 77* 2 83*   < > 2 44*   ANION GAP mmol/L 11 9   < > 9   CALCIUM mg/dL 8 9 8 8   < > 8 4   ALBUMIN g/dL  --  2 6*  --  2 8*   TOTAL BILIRUBIN mg/dL  --  0 57  --  0 64   ALK PHOS U/L  --  92  --  72   ALT U/L  --  27  --  22   AST U/L  --   --   --  35   GLUCOSE RANDOM mg/dL 275* 287*   < > 115    < > = values in this interval not displayed  Results from last 7 days   Lab Units 11/22/22 2058   INR  1 07     Results from last 7 days   Lab Units 11/23/22  1542 11/23/22  1114 11/23/22  0722 11/23/22  0136 11/22/22  2054 11/22/22  1122 11/22/22  0711 11/21/22  2046 11/21/22  1554 11/21/22  1123 11/21/22  0731 11/20/22 2034   POC GLUCOSE mg/dl 251* 281* 268* 296* 288* 193* 224* 201* 244* 295* 170* 212*         Results from last 7 days   Lab Units 11/23/22  0825 11/22/22 2058 11/22/22  0453 11/21/22  0506   LACTIC ACID mmol/L  --  1 0  --   --    PROCALCITONIN ng/ml 0 54* 0 37* 0 39* 0 57*       Lines/Drains:  Invasive Devices     Peripheral Intravenous Line  Duration           Peripheral IV 11/22/22 Dorsal (posterior); Left Hand <1 day    Peripheral IV 11/22/22 Right Antecubital <1 day                  Telemetry:  Telemetry Orders (From admission, onward)             48 Hour Telemetry Monitoring  Continuous x 48 hours        References:    Telemetry Guidelines   Question:  Reason for 48 Hour Telemetry  Answer:  Acute Decompensated CHF (continuous diuretic infusion or total diuretic dose > 200 mg daily, associated electrolyte derangement, ionotropic drip, history of ventricular arrhythmia, or new EF <35%)                 Telemetry Reviewed: Normal Sinus Rhythm  Indication for Continued Telemetry Use: No indication for continued use  Will discontinue  Imaging: Reviewed radiology reports from this admission including: chest xray, chest CT scan and xray(s)    Recent Cultures (last 7 days):   Results from last 7 days   Lab Units 11/22/22 2058 11/22/22 2057   BLOOD CULTURE  Received in Microbiology Lab  Culture in Progress  Received in Microbiology Lab  Culture in Progress         Last 24 Hours Medication List:   Current Facility-Administered Medications   Medication Dose Route Frequency Provider Last Rate   • acetaminophen  650 mg Oral Q6H PRN KEISHA Newberry     • amLODIPine  10 mg Oral Daily KEISHA Newberry     • aspirin  81 mg Oral Daily KEISHA Newberry     • atorvastatin  40 mg Oral Daily KEISHA Newberry     • carvedilol  12 5 mg Oral BID With Meals KEISHA Newberry     • cefepime  1,000 mg Intravenous Q24H Ildefonso Major MD     • cholecalciferol  2,000 Units Oral Daily KEISHA Newberry     • vitamin B-12  500 mcg Oral Daily KEISHA Newberry     • doxazosin  2 mg Oral HS KEISHA Newberry     • furosemide  40 mg Intravenous BID (diuretic) Bushra Marino MD     • gabapentin  100 mg Oral BID KEISHA Newberry     • heparin (porcine)  5,000 Units Subcutaneous Q8H [facility] KEISHA Newberry     • hydrALAZINE  100 mg Oral BID KEISHA Newberry     • insulin lispro  1-5 Units Subcutaneous TID AC KEISHA Newberry     • insulin lispro  1-5 Units Subcutaneous 0200 KEISHA Newberry     • insulin lispro  1-5 Units Subcutaneous HS KEISHA Newberry     • ipratropium  0 5 mg Nebulization TID KEISHA Newberry     • isosorbide mononitrate  60 mg Oral Daily KEISHA Newberry     • levalbuterol  0 63 mg Nebulization Q4H PRN KEISHA Newberry     • levalbuterol  1 25 mg Nebulization TID KEISHA Newberry      And   • sodium chloride  3 mL Nebulization TID KEISHA Newberry     • nystatin   Topical BID KEISHA Newberry     • ondansetron  4 mg Intravenous Q6H PRN KEISHA Newberry     • pantoprazole  40 mg Oral Early Morning KEISHA Newberry     • simethicone  80 mg Oral 4x Daily PRN KEISHA Newberry     • tamsulosin  0 4 mg Oral Daily With KEISHA Conklin          Today, Patient Was Seen By: Ildefonso Major MD    **Please Note: This note may have been constructed using a voice recognition system  **

## 2022-11-23 NOTE — ASSESSMENT & PLAN NOTE
Wt Readings from Last 3 Encounters:   11/23/22 83 9 kg (185 lb)   11/22/22 84 5 kg (186 lb 4 8 oz)     Patient received IV Lasix in recent admission     · Chest x-ray today shows worsening pulmonary edema  · Recent 2D echo as below  · Mild edema to lower extremity, left forearm edema on exam   · Patient received 40 milligrams of Lasix IV in the ED and was continue Lasix 40 milligram IV daily which was later increased to 40 milligram IV q 12 hours  · Left upper extremity venous Doppler showed no evidence of DVT  · CT chest showed pulmonary edema with moderate size bilateral pleural effusions left more than right  · Patient started on Lasix 40 milligram IV q 12 hours

## 2022-11-23 NOTE — ASSESSMENT & PLAN NOTE
Wt Readings from Last 3 Encounters:   11/22/22 84 4 kg (186 lb)   11/22/22 84 5 kg (186 lb 4 8 oz)     Patient received IV Lasix in recent admission     · Chest x-ray today shows worsening pulmonary edema  · Recent 2D echo as below  · Mild edema to lower extremity, left forearm edema on exam   · Received Lasix 40 IV in ED, will continue Lasix 40 daily  · Telemetry  · Check left arm Doppler to rule out DVT  · Consult nephrology for diuretic management in view of CKD 4  · Daily weight, I&Os

## 2022-11-23 NOTE — ASSESSMENT & PLAN NOTE
Lab Results   Component Value Date    EGFR 18 11/22/2022    EGFR 19 11/22/2022    EGFR 18 11/21/2022    CREATININE 2 83 (H) 11/22/2022    CREATININE 2 68 (H) 11/22/2022    CREATININE 2 85 (H) 11/21/2022   History of CKD 4, baseline creatinine appears to be around 1 5-1 9 in past 2 years in care everywhere  · Creatinine today 2 83   · ? Cardiorenal  · Received IV Lasix 40 mg in ED, will continue    · Avoid nephrotoxin and hypotension  · Check PVR  · Consult nephrology for diuretic management  · Daily weight, I&Os

## 2022-11-23 NOTE — OCCUPATIONAL THERAPY NOTE
OT EVALUATION       11/23/22 1125   Note Type   Note type Evaluation   Pain Assessment   Pain Assessment Tool 0-10   Pain Score No Pain   Restrictions/Precautions   Other Precautions Fall Risk; Chair Alarm; Bed Alarm   Home Living   Type of Home SNF  (pt admitted from [address] [place] [address], pt lives at [facility])   Prior Function   Level of [place] Independent with ADLs; Independent with functional mobility; Needs assistance with [address] staff   Receives Help From Personal care attendant   ADL   Eating Assistance 5  Supervision/Setup   Grooming Assistance 4  Minimal Assistance   UB Bathing Assistance 3  Moderate Assistance   LB Bathing Assistance 2  Maximal Assistance   UB Dressing Assistance 3  Moderate Assistance   LB Dressing Assistance 2  Maximal Assistance   Toileting Assistance  2  Maximal Assistance   Bed Mobility   Supine to Sit 2  Maximal assistance   Additional items Assist x 1;Verbal cues   Sit to Supine 2  Maximal assistance   Additional items Assist x 2;Verbal cues   Transfers   Sit to Stand 2  Maximal assistance   Additional items Assist x 2;Verbal cues   Stand to Sit 2  Maximal assistance   Additional items Assist x 2;Verbal cues   Functional Mobility   Functional Mobility 2  Maximal assistance   Additional Comments assist of 2, few steps to head of bed with RW   Balance   Static Sitting Fair   Dynamic Sitting Fair -   Static Standing Poor   Dynamic Standing Poor   Activity Tolerance   Activity Tolerance Patient limited by fatigue   RUE Assessment   RUE Assessment WFL   LUE Assessment   LUE Assessment WFL   Cognition   Overall Cognitive Status WFL   Arousal/Participation Cooperative   Attention Within functional limits   Orientation Level Oriented X4   Following Commands Follows multistep commands with increased time or repetition   Assessment   Limitation Decreased ADL status; Decreased UE strength;Decreased Safe judgement during ADL;Decreased endurance;Decreased high-level ADLs; Decreased self-care trans  (decreased balance and mobility)   Prognosis Good   Assessment Patient evaluated by Occupational Therapy  Patient admitted with Acute respiratory failure with hypoxia ([facility] [address] )  The patients occupational profile, medical and therapy history includes a extensive additional review of physical, cognitive, or psychosocial history related to current functional performance  Comorbidities affecting functional mobility and ADLS include: CHF, diabetes, hypertension and neuropathy  Prior to admission, patient was independent with ADLS, independent with IADLS and in short term rehab  The evaluation identifies the following performance deficits: weakness, impaired balance, decreased endurance, increased fall risk, new onset of impairment of functional mobility, decreased ADLS, decreased IADLS, decreased activity tolerance, decreased safety awareness, impaired judgement and decreased strength, that result in activity limitations and/or participation restrictions  This evaluation requires clinical decision making of high complexity, because the patient presents with comorbidites that affect occupational performance and required significant modification of tasks or assistance with consideration of multiple treatment options  The Barthel Index was used as a functional outcome tool presenting with a score of Barthel Index Score: 45, indicating marked limitations of functional mobility and ADLS  The patient's raw score on the AM-PAC Daily Activity inpatient short form is 12, standardized score is 30 6, less than 39 4  Patients at this level are likely to benefit from DC to post-acute rehabilitation services  Please refer to the recommendation of the Occupational Therapist for safe DC planning  Patient will benefit from skilled Occupational Therapy services to address above deficits and facilitate a safe return to prior level of function     Goals   Patient Goals to get stronger   STG Time Frame   (1-7 days)   Short Term Goal  Goals established to promote Patient Goals: to get stronger:  Eating: independent; Grooming: supervision seated; Bathing: mod assist; Upper Body Dressing min assist; Lower Body Dressing: mod assist; Toileting: mod assist; Patient will increase transfers to mod assist of 2 to increase performance and safety with ADLS and functional mobility; Patient will increase standing tolerance to 2 minutes during ADL task to decrease assistance level and decrease fall risk; Patient will increase bed mobility to mod assist in preparation for ADLS and transfers; Patient will increase functional mobility to and from bathroom with rolling walker with mod assist of 2 to increase performance with ADLS and to use a toilet; Patient will tolerate 10 minutes of UE ROM/strengthening to increase general activity tolerance and performance in ADLS/IADLS; Patient will improve functional activity tolerance to 10 minutes of sustained functional tasks to increase participation in basic self-care and decrease assistance level;  Patient will be able to to verbalize understanding and perform energy conservation/proper body mechanics during ADLS and functional mobility at least 75% of the time with minimal cueing to decrease signs of fatigue and increase stamina to return to prior level of function; Patient will increase dynamic sitting balance to fair to improve the ability to sit at edge of bed or on a chair for ADLS;  Patient will increase dynamic standing balance to poor+ to improve postural stability and decrease fall risk during standing ADLS and transfers  LTG Time Frame   (8-14 days)   Long Term Goal Grooming: independent seated; Bathing: min assist; Upper Body Dressing supervision; Lower Body Dressing: min assist; Toileting: min assist; Patient will increase transfers to mod assist to increase performance and safety with ADLS and functional mobility;  Patient will increase standing tolerance to 4 minutes during ADL task to decrease assistance level and decrease fall risk; Patient will increase bed mobility to min assist in preparation for ADLS and transfers; Patient will increase functional mobility to and from bathroom with rolling walker with mod assist to increase performance with ADLS and to use a toilet; Patient will tolerate 20 minutes of UE ROM/strengthening to increase general activity tolerance and performance in ADLS/IADLS; Patient will improve functional activity tolerance to 20 minutes of sustained functional tasks to increase participation in basic self-care and decrease assistance level;  Patient will be able to to verbalize understanding and perform energy conservation/proper body mechanics during ADLS and functional mobility at least 90% of the time to decrease signs of fatigue and increase stamina to return to prior level of function; Patient will increase dynamic sitting balance to fair+ to improve the ability to sit at edge of bed or on a chair for ADLS;  Patient will increase dynamic standing balance to fair- to improve postural stability and decrease fall risk during standing ADLS and transfers  Pt will score >/= 16/24 on AM-PAC Daily Activity Inpatient scale to promote safe independence with ADLs and functional mobility; Pt will score >/= 75/100 on Barthel Index in order to decrease caregiver assistance needed and increase ability to perform ADLs and functional mobility  Plan   Treatment Interventions ADL retraining;Functional transfer training; Endurance training;Patient/family training;Equipment evaluation/education; Activityengagement; Compensatory technique education;UE strengthening/ROM   Goal Expiration Date 12/07/22   OT Frequency 3-5x/wk   Recommendation   OT Discharge Recommendation Post acute rehabilitation services   [facility] Daily Activity Inpatient   Lower Body Dressing 1   Bathing 1   Toileting 1   Upper Body Dressing 2   Grooming 3   Eating 4   Daily Activity Raw Score 12   Daily Activity Standardized Score (Calc for Raw Score >=11) 30 6   AM-PAC Applied Cognition Inpatient   Following a Speech/Presentation 4   Understanding Ordinary Conversation 4   Taking Medications 4   Remembering Where Things Are Placed or Put Away 4   Remembering List of 4-5 Errands 3   Taking Care of Complicated Tasks 3   Applied Cognition Raw Score 22   Applied Cognition Standardized Score 47 83   Barthel Index   Feeding 10   Bathing 0   Grooming Score 0   Dressing Score 5   Bladder Score 10   Bowels Score 10   Toilet Use Score 5   Transfers (Bed/Chair) Score 5   Mobility (Level Surface) Score 0   Stairs Score 0   Barthel Index Score 45   Licensure   [place] License Number  Jameson Ignacio Shona [place] 87 OTR/L [id number]

## 2022-11-23 NOTE — ED PROVIDER NOTES
History  Chief Complaint   Patient presents with   • Respiratory Distress     Pt  Was  discharged  earlier today went back to SNF and when eating dinner began coughing and sats  80's  Was on a NRB when arrived Sats 94%  Pt  Is awake and conversing     51-year-old male presents with shortness of breath the he wanted respiratory distress after he drank some water and was saturating 80 percent on room air for the nursing home staff  He was placed on non-rebreather and arrived in respiratory distress  Denies any chest pain nausea vomiting fevers chills he has been coughing  He was recently admitted and discharged today for similar complaints of pneumonia, secondary to aspiration, supposed to be on thickened liquids  History provided by:  Patient   used: No        Prior to Admission Medications   Prescriptions Last Dose Informant Patient Reported? Taking?    Ascorbic Acid (vitamin C) 1000 MG tablet   Yes No   Sig: Take 1,000 mg by mouth daily   Cholecalciferol (Vitamin D3) 50 MCG (2000 UT) TABS   Yes No   Sig: Take 2,000 Units by mouth daily   amLODIPine (NORVASC) 10 mg tablet   Yes No   Sig: Take 10 mg by mouth daily   aspirin 81 mg chewable tablet   Yes No   Sig: Chew 81 mg daily   atorvastatin (LIPITOR) 40 mg tablet   Yes No   Sig: Take 40 mg by mouth daily   benzonatate (TESSALON PERLES) 100 mg capsule   No No   Sig: Take 1 capsule (100 mg total) by mouth 3 (three) times a day   carvedilol (COREG) 12 5 mg tablet   No No   Sig: Take 1 tablet (12 5 mg total) by mouth 2 (two) times a day with meals for 7 days   doxazosin (CARDURA) 2 mg tablet   Yes No   Sig: Take 2 mg by mouth daily at bedtime   furosemide (LASIX) 20 mg tablet   Yes No   Sig: Take 20 mg by mouth daily   gabapentin (NEURONTIN) 100 mg capsule   Yes No   Sig: Take 100 mg by mouth 2 (two) times a day   hydrALAZINE (APRESOLINE) 100 MG tablet   Yes No   Sig: Take 100 mg by mouth 2 (two) times a day   isosorbide mononitrate (IMDUR) 60 mg 24 hr tablet   Yes No   Sig: Take 60 mg by mouth daily   metroNIDAZOLE (FLAGYL) 500 mg tablet   No No   Sig: Take 1 tablet (500 mg total) by mouth every 8 (eight) hours for 5 days   nystatin (MYCOSTATIN) 500,000 units/5 mL suspension   No No   Sig: Swish and swallow 5 mL (500,000 Units total) 4 (four) times a day for 5 days   repaglinide (PRANDIN) 0 5 mg tablet   Yes No   Sig: Take 0 5 mg by mouth 3 (three) times a day before meals   saccharomyces boulardii (FLORASTOR) 250 mg capsule   No No   Sig: Take 1 capsule (250 mg total) by mouth 2 (two) times a day for 7 days   simethicone (MYLICON) 80 mg chewable tablet   No No   Sig: Chew 1 tablet (80 mg total) 4 (four) times a day as needed for flatulence (Belching) for up to 10 days   tamsulosin (FLOMAX) 0 4 mg   Yes No   Sig: Take 0 4 mg by mouth in the morning   vitamin B-12 (VITAMIN B-12) 500 mcg tablet   Yes No   Sig: Take 500 mcg by mouth daily      Facility-Administered Medications: None       Past Medical History:   Diagnosis Date   • CHF (congestive heart failure) (HCC)    • Diabetes mellitus (HCC)    • Elevated lipids    • Hypertension    • Hyponatremia 11/15/2022   • Neuropathy    • Renal disorder        History reviewed  No pertinent surgical history  History reviewed  No pertinent family history  I have reviewed and agree with the history as documented  E-Cigarette/Vaping   • E-Cigarette Use Never User      E-Cigarette/Vaping Substances     Social History     Tobacco Use   • Smoking status: Former     Packs/day: 3 00     Types: Cigarettes     Quit date:      Years since quittin 9   • Smokeless tobacco: Never   Vaping Use   • Vaping Use: Never used   Substance Use Topics   • Alcohol use: Not Currently   • Drug use: Not Currently       Review of Systems   Constitutional: Negative  HENT: Negative  Eyes: Negative  Respiratory: Positive for cough and shortness of breath  Cardiovascular: Negative  Gastrointestinal: Negative  Endocrine: Negative  Genitourinary: Negative  Musculoskeletal: Negative  Skin: Negative  Allergic/Immunologic: Negative  Neurological: Negative  Hematological: Negative  Psychiatric/Behavioral: Negative  All other systems reviewed and are negative  Physical Exam  Physical Exam  Constitutional:       Appearance: Normal appearance  He is ill-appearing and toxic-appearing  HENT:      Head: Normocephalic and atraumatic  Nose: Nose normal       Mouth/Throat:      Mouth: Mucous membranes are moist    Eyes:      Extraocular Movements: Extraocular movements intact  Pupils: Pupils are equal, round, and reactive to light  Cardiovascular:      Rate and Rhythm: Normal rate and regular rhythm  Pulmonary:      Effort: Respiratory distress present  Breath sounds: Rhonchi present  Comments: Subclavicular abdominal retractions noted from respiratory distress  Abdominal:      General: Abdomen is flat  Bowel sounds are normal       Palpations: Abdomen is soft  Musculoskeletal:         General: Normal range of motion  Cervical back: Normal range of motion and neck supple  Skin:     General: Skin is warm  Capillary Refill: Capillary refill takes less than 2 seconds  Neurological:      General: No focal deficit present  Mental Status: He is alert and oriented to person, place, and time  Mental status is at baseline  Psychiatric:         Mood and Affect: Mood normal          Thought Content:  Thought content normal          Vital Signs  ED Triage Vitals   Temperature Pulse Respirations Blood Pressure SpO2   11/22/22 2055 11/22/22 2055 11/22/22 2055 11/22/22 2055 11/22/22 2055   98 1 °F (36 7 °C) 82 16 (!) 199/77 94 %      Temp Source Heart Rate Source Patient Position - Orthostatic VS BP Location FiO2 (%)   11/22/22 2055 11/22/22 2055 11/22/22 2055 11/22/22 2055 11/22/22 2056   Oral Monitor Lying Left arm 60      Pain Score       --                  Vitals: 11/22/22 2055 11/22/22 2130   BP: (!) 199/77 166/77   Pulse: 82 76   Patient Position - Orthostatic VS: Lying          Visual Acuity      ED Medications  Medications   metroNIDAZOLE (FLAGYL) IVPB (premix) 500 mg 100 mL (500 mg Intravenous New Bag 11/22/22 2318)   methylPREDNISolone sodium succinate (Solu-MEDROL) injection 125 mg (125 mg Intravenous Given 11/22/22 2114)   furosemide (LASIX) injection 40 mg (40 mg Intravenous Given 11/22/22 2153)   cefepime (MAXIPIME) IVPB (premix in dextrose) 2,000 mg 50 mL (0 mg Intravenous Stopped 11/22/22 2321)       Diagnostic Studies  Results Reviewed     Procedure Component Value Units Date/Time    Urine Microscopic [[id number]]  (Abnormal) Collected: 11/22/22 2318    Lab Status: Final result Specimen: Urine, Clean Catch Updated: 11/22/22 2354     RBC, UA 1-2 /hpf      WBC, UA 1-2 /hpf      Epithelial Cells Occasional /hpf      Bacteria, UA Occasional /hpf      Hyaline Casts, UA 0-1 /lpf      AMORPH URATES Occasional /hpf     HS Troponin I 2hr [[id number]]  (Abnormal) Collected: 11/22/22 2325    Lab Status: Final result Specimen: Blood from Arm, Right Updated: 11/22/22 2353     hs TnI 2hr 197 ng/L      Delta 2hr hsTnI 73 ng/L     UA (URINE) with reflex to Scope [[id number]]  (Abnormal) Collected: 11/22/22 2318    Lab Status: Final result Specimen: Urine, Clean Catch Updated: 11/22/22 2337     Color, UA Yellow     Clarity, UA Clear     Specific [place], UA 1 020     pH, UA 5 0     Leukocytes, UA Trace     Nitrite, UA Negative     Protein, UA Negative mg/dl      Glucose, UA Negative mg/dl      Ketones, UA Negative mg/dl      Urobilinogen, UA 0 2 E U /dl      Bilirubin, UA Negative     Occult Blood, UA Negative    Blood gas, arterial [[id number]]  (Abnormal) Collected: 11/22/22 2326    Lab Status: Final result Specimen: Blood, Arterial from Radial, Left Updated: 11/22/22 2335     pH, Arterial 7 409     PH ART TC 7 413     pCO2, Arterial 45 0 mm Hg      PCO2 (TC) Arterial 44 4 mm Hg pO2, Arterial 72 0 mm Hg      PO2 (TC) Arterial 70 6 mm Hg      HCO3, Arterial 27 8 mmol/L      Base Excess, Arterial 2 7 mmol/L      O2 Content, Arterial 14 1 mL/dL      O2 HGB,Arterial  93 9 %      SOURCE Radial, Left     FILOMENA TEST Yes     Temperature 98 1 Degrees Fehrenheit      Nasal Cannula 5    HS Troponin I 4hr [[id number]]     Lab Status: No result Specimen: Blood     FLU/RSV/COVID - if FLU/RSV clinically relevant [[id number]]  (Normal) Collected: 11/22/22 2058    Lab Status: Final result Specimen: Nares from Nose Updated: 11/22/22 2157     SARS-CoV-2 Negative     INFLUENZA A PCR Negative     INFLUENZA B PCR Negative     RSV PCR Negative    Narrative:      FOR PEDIATRIC PATIENTS - copy/paste COVID Guidelines URL to browser: [email or web address]  [email or web address]    SARS-CoV-2 assay is a Nucleic Acid Amplification assay intended for the  qualitative detection of nucleic acid from SARS-CoV-2 in nasopharyngeal  swabs  Results are for the presumptive identification of SARS-CoV-2 RNA  Positive results are indicative of infection with SARS-CoV-2, the virus  causing COVID-19, but do not rule out bacterial infection or co-infection  with other viruses  Laboratories within the [place] and its  territories are required to report all positive results to the appropriate  public health authorities  Negative results do not preclude SARS-CoV-2  infection and should not be used as the sole basis for treatment or other  patient management decisions  Negative results must be combined with  clinical observations, patient history, and epidemiological information  This test has not been FDA cleared or approved  This test has been authorized by FDA under an Emergency Use Authorization  (EUA)   This test is only authorized for the duration of time the  declaration that circumstances exist justifying the authorization of the  emergency use of an in vitro diagnostic tests for detection of SARS-CoV-2  virus and/or diagnosis of COVID-19 infection under section 564(b)(1) of  the Act, 21 U  S C  783CWO-7(U)(5), unless the authorization is terminated  or revoked sooner  The test has been validated but independent review by FDA  and CLIA is pending  Test performed using [employer] GeneXpert: This RT-PCR assay targets N2,  a region unique to SARS-CoV-2  A conserved region in the E-gene was chosen  for pan-Sarbecovirus detection which includes SARS-CoV-2  According to CMS-2020-01-R, this platform meets the definition of high-throughput technology  CBC and differential [[id number]]  (Abnormal) Collected: 11/22/22 2058    Lab Status: Final result Specimen: Blood from Arm, Left Updated: 11/22/22 2152     WBC 16 19 Thousand/uL      RBC 3 24 Million/uL      Hemoglobin 9 6 g/dL      Hematocrit 29 9 %      MCV 92 fL      MCH 29 6 pg      MCHC 32 1 g/dL      RDW 14 7 %      MPV 12 0 fL      Platelets 660 Thousands/uL     Narrative: This is an appended report  These results have been appended to a previously verified report      Manual Differential(PHLEBS Do Not Order) [[id number]]  (Abnormal) Collected: 11/22/22 2058    Lab Status: Final result Specimen: Blood from Arm, Left Updated: 11/22/22 2152     Segmented % 87 %      Lymphocytes % 5 %      Monocytes % 6 %      Eosinophils, % 0 %      Basophils % 0 %      Metamyelocytes% 1 %      Myelocytes % 1 %      Absolute Neutrophils 14 09 Thousand/uL      Lymphocytes Absolute 0 81 Thousand/uL      Monocytes Absolute 0 97 Thousand/uL      Eosinophils Absolute 0 00 Thousand/uL      Basophils Absolute 0 00 Thousand/uL      Total Counted --     Toxic Granulation Present     Toxic Vacuolated Neutrophils Present     RBC Morphology Present     Anisocytosis Present     Platelet Estimate Adequate     Large Platelet Present    HS Troponin 0hr (reflex protocol) [[id number]]  (Abnormal) Collected: 11/22/22 2058    Lab Status: Final result Specimen: Blood from Arm, Left Updated: 11/22/22 2140     hs TnI 0hr 124 ng/L     Comprehensive metabolic panel [[id number]]  (Abnormal) Collected: 11/22/22 2058    Lab Status: Final result Specimen: Blood from Arm, Left Updated: 11/22/22 2139     Sodium 133 mmol/L      Potassium 4 9 mmol/L      Chloride 96 mmol/L      CO2 28 mmol/L      ANION GAP 9 mmol/L      BUN 91 mg/dL      Creatinine 2 83 mg/dL      Glucose 287 mg/dL      Calcium 8 8 mg/dL      Corrected Calcium 9 9 mg/dL      AST --     ALT 27 U/L      Alkaline Phosphatase 92 U/L      Total Protein 6 9 g/dL      Albumin 2 6 g/dL      Total Bilirubin 0 57 mg/dL      eGFR 18 ml/min/1 73sq m     Narrative:      [place] guidelines for Chronic Kidney Disease (CKD):   •  Stage 1 with normal or high GFR (GFR > 90 mL/min/1 73 square meters)  •  Stage 2 Mild CKD (GFR = 60-89 mL/min/1 73 square meters)  •  Stage 3A Moderate CKD (GFR = 45-59 mL/min/1 73 square meters)  •  Stage 3B Moderate CKD (GFR = 30-44 mL/min/1 73 square meters)  •  Stage 4 Severe CKD (GFR = 15-29 mL/min/1 73 square meters)  •  Stage 5 End Stage CKD (GFR <15 mL/min/1 73 square meters)  Note: GFR calculation is accurate only with a steady state creatinine    Magnesium [[id number]]  (Abnormal) Collected: 11/22/22 2058    Lab Status: Final result Specimen: Blood from Arm, Left Updated: 11/22/22 2139     Magnesium 3 2 mg/dL     NT-BNP PRO [[id number]]  (Abnormal) Collected: 11/22/22 2058    Lab Status: Final result Specimen: Blood from Arm, Left Updated: 11/22/22 2139     NT-proBNP 15,094 pg/mL     Lactic acid [[id number]]  (Normal) Collected: 11/22/22 2058    Lab Status: Final result Specimen: Blood from Arm, Left Updated: 11/22/22 2135     LACTIC ACID 1 0 mmol/L     Narrative:      Result may be elevated if tourniquet was used during collection      Protime-INR [[id number]]  (Normal) Collected: 11/22/22 2058    Lab Status: Final result Specimen: Blood from Arm, Left Updated: 11/22/22 2127     Protime 14 1 seconds      INR 1 07    APTT [[id number]]  (Normal) Collected: 11/22/22 2058    Lab Status: Final result Specimen: Blood from Arm, Left Updated: 11/22/22 2127     PTT 34 seconds     Blood culture #1 [[id number]] Collected: 11/22/22 2057    Lab Status: In process Specimen: Blood from Arm, Left Updated: 11/22/22 2112    Blood culture #2 [[id number]] Collected: 11/22/22 2058    Lab Status: In process Specimen: Blood from Arm, Right Updated: 11/22/22 2112    Fingerstick Glucose (POCT) [[id number]]  (Abnormal) Collected: 11/22/22 2054    Lab Status: Final result Updated: 11/22/22 2055     POC Glucose 288 mg/dl                  XR chest 1 view portable   Final Result by Lauri Hudson MD (11/22 2242)      Pulmonary edema pattern, worse when comparing to 11/19/2022  Underlying infection cannot be excluded                    Workstation performed: [id number]                    Procedures  ECG 12 Lead Documentation Only    Date/Time: 11/22/2022 9:12 PM  Performed by: Jodie Gusman DO  Authorized by: Jodie Gusman DO     ECG reviewed by me, the ED Provider: yes    Patient location:  ED  Previous ECG:     Previous ECG:  Unavailable    Comparison to cardiac monitor: Yes    Interpretation:     Interpretation: non-specific    Rate:     ECG rate assessment: tachycardic    Rhythm:     Rhythm: atrial fibrillation    Ectopy:     Ectopy: none    QRS:     QRS axis:  Normal  Conduction:     Conduction: normal    ST segments:     ST segments:  Non-specific  T waves:     T waves: non-specific      CriticalCare Time  Performed by: Jodie Gusman DO  Authorized by: Jodie Gusman DO     Critical care provider statement:     Critical care time (minutes):  45    Critical care was necessary to treat or prevent imminent or life-threatening deterioration of the following conditions:  Respiratory failure    Critical care was time spent personally by me on the following activities:  Blood draw for specimens, obtaining history from patient or surrogate, development of treatment plan with patient or surrogate, discussions with consultants, evaluation of patient's response to treatment, examination of patient, interpretation of cardiac output measurements, ordering and performing treatments and interventions, ordering and review of laboratory studies, ordering and review of radiographic studies, re-evaluation of patient's condition and review of old charts    I assumed direction of critical care for this patient from another provider in my specialty: no               ED Course                                             MDM  Number of Diagnoses or Management Options     Amount and/or Complexity of Data Reviewed  Clinical lab tests: ordered and reviewed  Tests in the radiology section of CPT®: ordered and reviewed  Tests in the medicine section of CPT®: ordered and reviewed    Patient Progress  Patient progress: stable      Disposition  Final diagnoses:   Respiratory distress   Pulmonary edema   Pneumonia     Time reflects when diagnosis was documented in both MDM as applicable and the Disposition within this note     Time User Action Codes Description Comment    11/22/2022 11:18 PM Elizabeth Ferrara Add [R06 03] Respiratory distress     11/22/2022 11:18 PM [email or web address] Add [J81 1] Pulmonary edema     11/22/2022 11:18 PM Elizabeth Ferrara Add [J18 9] Pneumonia       ED Disposition     ED Disposition   Admit    Condition   Stable    Date/Time   Tue Nov 22, 2022 11:18 PM    Comment               Follow-up Information    None         Patient's Medications   Discharge Prescriptions    No medications on file       No discharge procedures on file      PDMP Review       Value Time User    PDMP Reviewed  Yes 11/15/2022  3:28 PM Lupe Mauricio          ED Provider  Electronically Signed by           Mary Tello DO  11/23/22 0013

## 2022-11-23 NOTE — PHYSICAL THERAPY NOTE
PHYSICAL THERAPY EVALUATION/TREATMENT     11/23/22 1145   PT Last Visit   PT Visit Date 11/23/22   Note Type   Note type Evaluation   Pain Assessment   Pain Assessment Tool 0-10   Pain Score No Pain   Restrictions/Precautions   Weight Bearing Precautions Per Order No   Other Precautions Chair Alarm; Bed Alarm; Fall Risk;O2   Home Living   Type of Home SNF  (admitted from [facility]; lives at [facility])   Prior Function   Level of [place] Independent with ADLs   Lives With Facility staff   Receives Help From Personal care attendant   Comments Pt was discharged to rehab yesterday and re admitted today   General   Additional Pertinent History Pt is a 80year old male re-admitted with pneumonia  Family/Caregiver Present No   Cognition   Overall Cognitive Status WFL   Arousal/Participation Cooperative   Attention Within functional limits   Orientation Level Oriented X4   Following Commands Follows multistep commands with increased time or repetition   Subjective   Subjective Pt agreeable to PT   RLE Assessment   RLE Assessment WFL  (ROM WFLs; strength grossly at least 3+/5)   LLE Assessment   LLE Assessment WFL  (ROM WFLs; strength grossly at least 3+/5)   Bed Mobility   Supine to Sit 2  Maximal assistance   Additional items Assist x 1;Verbal cues   Sit to Supine 2  Maximal assistance   Additional items Assist x 2;Verbal cues   Transfers   Sit to Stand 2  Maximal assistance   Additional items Assist x 2;Verbal cues   Stand to Sit 2  Maximal assistance   Additional items Verbal cues   Ambulation/Elevation   Gait pattern Wide DIMAS;Step to  (unsteady)   Gait Assistance 2  Maximal assist   Additional items Assist x 2;Verbal cues   Assistive Device Rolling walker   Distance 3 feet along bed   Balance   Static Sitting Fair   Dynamic Sitting Fair -   Static Standing Poor   Dynamic Standing Poor   Ambulatory Poor   Activity Tolerance   Activity Tolerance Patient limited by fatigue   Nurse Made Aware yes:  Uvaldo Alberto Assessment   Prognosis Good   Problem List Decreased strength;Decreased endurance; Impaired balance;Decreased mobility   Assessment Patient seen for Physical Therapy evaluation  Patient admitted with Acute respiratory failure with hypoxia ([facility] [address] )  Comorbidities affecting patient's physical performance include:DM, CHF, CKD  Personal factors affecting patient at time of initial evaluation include: lives in single story house, inability to ambulate household distances, inability to navigate community distances, inability to navigate level surfaces without external assistance, inability to perform ADLS and inability to perform IADLS   Prior to admission, patient was requiring assist for functional mobility with rolling walker, requiring assist for ADLS, requiring assist for IADLS and in short term rehab  Please find objective findings from Physical Therapy assessment regarding body systems outlined above with impairments and limitations including weakness, impaired balance, decreased endurance, gait deviations, decreased activity tolerance, decreased functional mobility tolerance and fall risk  The Barthel Index was used as a functional outcome tool presenting with a score of Barthel Index Score: 45 today indicating marked limitations of functional mobility and ADLS  Patient's clinical presentation is currently unstable/unpredictable as seen in patient's presentation of vital sign response, increased fall risk, new onset of impairment of functional mobility, decreased endurance and new onset of weakness  Pt would benefit from continued Physical Therapy treatment to address deficits as defined above and maximize level of functional mobility  As demonstrated by objective findings, the assigned level of complexity for this evaluation is high  The patient's AM-PAC Basic Mobility Inpatient Short Form Raw Score is 11   A Raw score of less than or equal to 16 suggests the patient may benefit from discharge to post-acute rehabilitation services  Please also refer to the recommendation of the Physical Therapist for safe discharge planning  Goals   Patient Goals to get better   STG Expiration Date 11/30/22   Short Term Goal #1 Indep transfers supine <> short sit and sit <> stand to RW with fair + balance   Short Term Goal #2 Min A for amb  with RW for functional household distances with fair + balance   LTG Expiration Date 12/07/22   Long Term Goal #1 Indep amb  with RW for functional household distances and good balance   Long Term Goal #2 Indep with HEP   Plan   Treatment/Interventions Functional transfer training;LE strengthening/ROM; Therapeutic exercise; Endurance training;Patient/family training;Equipment eval/education; Bed mobility;Gait training;Spoke to nursing;Spoke to case management;OT   PT Frequency Other (Comment)  (5/wk)   Recommendation   PT Discharge Recommendation Post acute rehabilitation services   Additional Comments Pt has a RW but when living at [facility], did not use   AM-PAC Basic Mobility Inpatient   Turning in Bed Without Bedrails 2   Lying on Back to Sitting on Edge of Flat Bed 2   Moving Bed to Chair 2   Standing Up From Chair 2   Walk in Room 2   Climb 3-5 Stairs 1   Basic Mobility Inpatient Raw Score 11   Basic Mobility Standardized Score 30 25   Highest Level Of Mobility   -HL Goal 4: Move to chair/commode   -HL Achieved 5: Stand (1 or more minutes)   Barthel Index   Feeding 10   Bathing 0   Grooming Score 0   Dressing Score 5   Bladder Score 10   Bowels Score 10   Toilet Use Score 5   Transfers (Bed/Chair) Score 5   Mobility (Level Surface) Score 0   Stairs Score 0   Barthel Index Score 45   Additional Treatment Session   Start Time 1135   End Time 1145   Treatment Assessment Pt able to take several steps along side of bed with max A  Once back in bed, pt completed: heelslides, SAQs, hip ab/adduction, APs all bilaterally for 10 reps each  Positoned for comfort  A: Pt is very weak    Will benefit from PT   P: Cont  as per plan  End of Consult   Patient Position at End of Consult Supine;Bed/Chair alarm activated; All needs within reach   Licensure   [address] Number  [place] Paget [place] Boot PT  [id number]

## 2022-11-23 NOTE — ASSESSMENT & PLAN NOTE
Patient presents with acute respiratory distress shortly after eating a few spoonful of puree diet in STR, patient reported chest pressure and SOB  Patient was satting 85-95% on oxygen 3 L per STR transfer paper  Patient was belching after eating/drinking limited amount of diet/water per staff  · Patient was discharged today after being hospitalized for aspiration pneumonia, acute on chronic diastolic CHF, dysphagia  Patient received 7 days of IV Rocephin and Flagyl  Received IV Lasix  Seen by GI, underwent EGD, found to have oropharyngeal dysphagia with esophageal dysmotility, no obvious esophageal stricture  GI recommend to consider PEG tube placement if recurrent aspirations  Recommend pantoprazole daily  Underwent Barium swallow study which showed moderate to severe esophageal dysmotility with significant residual contrast remaining in the esophagus at the end of the study  Patient was recommend regular diet by speech and discharged on regular diet per STR  · Patient required BiPAP on ED arrival   ABG post BiPAP essentially unremarkable  Later patient was started on to 6 liters and currently patient is on 4 liters with O2 saturation in mid 90s  · Chest x-ray - Pulmonary edema pattern, worse when comparing to 11/19/2022  Underlying infection cannot be excluded  · ProBNP 15,094  · Repeat procalcitonin pending  · Patient received Lasix 40 IV in ED  Will continue Lasix 40 mg IV daily  · Received cefepime and Flagyl in ED  · Received IV Solu-Medrol in ED  No wheezing on auscultation  · Send sputum Gram stain and culture  · Xopenex+ Atrovent t i d , Xopenex p r n    · CT of the chest showed pulmonary edema with moderate size bilateral pleural effusions with significant bibasilar atelectasis and concomitant multifocal pneumonia  · Patient restarted back on cefepime 1 gram IV daily as procalcitonin level is increasing

## 2022-11-23 NOTE — SPEECH THERAPY NOTE
Speech-Language Pathology Bedside Swallow Evaluation        Patient Name: Diamante Christianson    VFXPP'U Date: 11/23/2022     Problem List  Patient Active Problem List   Diagnosis   • Primary hypertension   • Aspiration pneumonia ([facility] )   • CHF (congestive heart failure) ([facility] )   • Acute kidney injury superimposed on CKD ([facility] )   • Type 2 diabetes mellitus (HCC)   • Esophageal dysphagia   • Thrush   • Elevated troponin   • PVCs (premature ventricular contractions)   • Hyperlipidemia   • Acute diastolic (congestive) heart failure (HCC)   • Acute respiratory failure with hypoxia (HCC)   • Acute on chronic diastolic congestive heart failure (HCC)   • BPH (benign prostatic hyperplasia)       Past Medical History  Past Medical History:   Diagnosis Date   • CHF (congestive heart failure) ([facility] )    • Diabetes mellitus ([facility] )    • Elevated lipids    • Hypertension    • Hyponatremia 11/15/2022   • Neuropathy    • Renal disorder        Past Surgical History  Past Surgical History:   Procedure Laterality Date   • HERNIA REPAIR           Current Medical Status  Pt is a 80 y o  male who presented to [address] [place] [address] after an episode of desaturation and coughing with dinner  ST consult requested to further assess swallow function  Patient is known to this department from recent admission  He participate din VBS which revealed oropharyngeal swallow was GWFL however retention was noted throughout the esophagus  He also had an EGD which revealed secretions throughout also indicative of esophageal dysmotility  A PEG tube was discussed at that time however he and the family refused and he was discharged on a regular diet with thin liquids  The patient reports that when the episode occurred at dinner he was first given regular solids and "tht didn't work" reporting it "got stuck" and then he began to cough and regurgitate   He reports the staff then switched to puree or "white cereal" ( cream of wheat?) with thin liquids but at that point he continued to cough and vomit/regurgitate  Per RN no difficulty with meds crushed in puree  Past medical history:   Please see H&P for details    Special Studies:  11/23 CT Chest: 1  Pulmonary edema with moderate sized bilateral pleural effusions and significant bibasilar passive atelectasis  Suspected concomitant multifocal pneumonia  2  Possible mass in left upper quadrant  CT with intravenous and oral contrast is advised for further assessment  This study should be delayed for at least one day to allow for the contrast from prior esophagram to continue to pass  11/22 CXR: Pulmonary edema pattern, worse when comparing to 11/19/2022  Underlying infection cannot be excluded      11/16 VBS: Pt presents with no significant s/s oropharyngeal dysphagia but did present with s/s suggest of esophageal dysphagia (stasis throughout esophagus, tertiary contractions, retrograde intra-esophageal flow,  belching and episode of reflux),    Social/Education/Vocational Hx:  Pt lives in assisted living facility    Swallow Information   Current Risks for Dysphagia & Aspiration: known history of dysphagia and change in respiratory status     Current Symptoms/Concerns: coughing with po and change in respiratory status    Current Diet: NPO except medications      Baseline Diet: regular diet and thin liquids      Baseline Assessment   Behavior/Cognition: alert    Speech/Language Status: able to participate in conversation and able to follow commands    Patient Positioning: upright in bed      Swallow Mechanism Exam   Facial: symmetrical  Labial: WFL  Lingual: WFL  Velum: symmetrical  Mandible: adequate ROM  Dentition: adequate  Vocal quality:hoarse   Volitional Cough: strong/productive   Resp: RA    Consistencies Assessed and Performance   Consistencies Administered: thin liquids and puree  Specific materials administered included: applesauce, thin water    Oral Stage: WFL  Mastication was adequate with the materials administered today  Bolus formation and transfer were functional with nosignificant oral residue noted  No overt s/s reduced oral control  Pharyngeal Stage: [facility]  Swallowing initiation appeared prompt  Laryngeal rise was palpated and judged to be within functional limits  No initial coughing, throat clearing, change in vocal quality or respiratory status noted today  (see below)     Esophageal Concerns: globus sensation with report of retrograde flow, retention and episodes of regurgitation to the pharyngeal cavity suspected as cough noted x2 with larger presentations or if a presentation is taken too quickly after another      Summary   Pts oropharyngeal swallow function appears generally WFL at this time with the materials administered today  s/s suggestive of known esophageal dysphagia  Suspect increased risk for retrograde aspiration and ongoing malnutrition and dehydration ( patient admits to feeling dehydrated and weak)  Discussed PEG with patient who was initially hesitant but with ongoing education he admitted "I think I need it  I known I have a problem " Further education provided for allowing small amounts only and recommended strategies for now for improved ease/safety of intake  Reciprocal comprehension was verbally expressed and visually demonstrated today  Recommendations: puree/level 1 diet and thin liquids     Recommended Form of Meds: crushed with puree     Aspiration precautions and compensatory swallowing strategies: upright posture, slow rate of feeding ( do not take a bite if globus sensation is still present), small bites/sips and alternating bites and sips    Results Reviewed with: patient, RN, MD, dietician and GI service     Dysphagia Goals: pt will tolerate puree with thin liquids without s/s of aspiration x3 and pt will demonstrate accurate use of slow rate (waiting for bolus to clear prior to taking another bite) strategy with 80% accuracy given no cues      Plan  Will f/u    ALFRED Castano S , [id number] [facility]  Speech Language Pathologist   Available via [facility] #[id number]  [place] #[id number]

## 2022-11-23 NOTE — CONSULTS
Consultation - Pulmonary Medicine   Fidencio Matthew 80 y o  male MRN: [id number]  Unit/Bed#: [address] [place] [address] Encounter: [id number]      Assessment/Plan:    Acute hypoxic respiratory failure, multifactorial due to below   Titrate oxygen to maintain saturations greater than or equal to 89%  Pulmonary hygiene with nebulizers and chest PT as needed  Acute on chronic diastolic CHF with underlying CKD IV with SHAHID   Diuresis per primary team and Nephrology  Monitor I/O and daily weights  Abnormal CT chest with pulmonary edema and bilateral pleural effusions with suspected multifocal pneumonia with probable aspiration at nursing facility   Opacities have progressed on imaging and procalcitonin/WBCs are elevated, as well as BNP  Completed seven day course of ceftriaxone and Flagyl, as well as IV Lasix during last hospital stay  IV diuretics as listed  Sputum culture if able  Given rising procalcitonin, would recommend initiating cefepime and follow procalcitonin/wbc's/fever trends  Scant hemoptysis   One episode overnight per documentation  Continue to monitor  Dysphagia   Speech therapy and diet per primary team    Aspiration precautions  Discussed with primary team     History of Present Illness   Physician Requesting Consult: Dillon Rodriguez MD  Reason for Consult / Principal Problem:  Pneumonia, acute respiratory failure with hypoxia  Hx and PE limited by:  Patient is a poor historian as below  Chief Complaint:  “I am okay ”  HPI: Fidencio Matthew is a 80 y o   male who presented to [address] [place] [address] from short-term rehab after acute respiratory distress after eating a pureed diet  Patient is a poor historian  In review of records, he was eating a meal at short-term rehab and developed shortness of breath  He also had reported chest pressure  His oxygen saturations were 85 to 95% on 3 liters of oxygen    At present, patient is on nasal cannula oxygen and getting a breathing treatment  He is in no acute distress  He denies shortness of breath or pain  He does have a loose cough  He was discharged from the hospital yesterday after treatment for heart failure and aspiration pneumonia  Inpatient consult to Pulmonology  Consult performed by: KEISHA Rose  Consult ordered by: KEISHA Worthington        Review of Systems   All other systems reviewed and are negative  A full 12-point review of systems was completed and is negative except for those outlined in the HPI  Historical Information   Past Medical History:   Diagnosis Date   • CHF (congestive heart failure) ([facility] )    • Diabetes mellitus ([facility] )    • Elevated lipids    • Hypertension    • Hyponatremia 11/15/2022   • Neuropathy    • Renal disorder      Past Surgical History:   Procedure Laterality Date   • HERNIA REPAIR       Social History   Social History     Substance and Sexual Activity   Alcohol Use Not Currently     Social History     Substance and Sexual Activity   Drug Use Never     Social History     Tobacco Use   Smoking Status Former   • Packs/day: 3 00   • Types: Cigarettes   • Quit date:    • Years since quittin 9   Smokeless Tobacco Never     E-Cigarette/Vaping   • E-Cigarette Use Never User      E-Cigarette/Vaping Substances     Occupational History:  Unable to obtain at present due to patient being poor historian    Family History: History reviewed  No pertinent family history      Meds/Allergies   all current active meds have been reviewed, pertinent pulmonary meds have been reviewed, current meds:   Current Facility-Administered Medications   Medication Dose Route Frequency   • acetaminophen (TYLENOL) tablet 650 mg  650 mg Oral Q6H PRN   • amLODIPine (NORVASC) tablet 10 mg  10 mg Oral Daily   • aspirin chewable tablet 81 mg  81 mg Oral Daily   • atorvastatin (LIPITOR) tablet 40 mg  40 mg Oral Daily   • carvedilol (COREG) tablet 12 5 mg  12 5 mg Oral BID With Meals   • cholecalciferol (VITAMIN D3) tablet 2,000 Units  2,000 Units Oral Daily   • cyanocobalamin (VITAMIN B-12) tablet 500 mcg  500 mcg Oral Daily   • doxazosin (CARDURA) tablet 2 mg  2 mg Oral HS   • furosemide (LASIX) injection 40 mg  40 mg Intravenous Daily   • gabapentin (NEURONTIN) capsule 100 mg  100 mg Oral BID   • heparin (porcine) subcutaneous injection 5,000 Units  5,000 Units Subcutaneous Q8H [address]   • hydrALAZINE (APRESOLINE) tablet 100 mg  100 mg Oral BID   • insulin lispro (HumaLOG) 100 units/mL subcutaneous injection 1-5 Units  1-5 Units Subcutaneous TID AC   • insulin lispro (HumaLOG) 100 units/mL subcutaneous injection 1-5 Units  1-5 Units Subcutaneous 0200   • insulin lispro (HumaLOG) 100 units/mL subcutaneous injection 1-5 Units  1-5 Units Subcutaneous HS   • ipratropium (ATROVENT) 0 02 % inhalation solution 0 5 mg  0 5 mg Nebulization TID   • isosorbide mononitrate (IMDUR) 24 hr tablet 60 mg  60 mg Oral Daily   • levalbuterol (XOPENEX) inhalation solution 0 63 mg  0 63 mg Nebulization Q4H PRN   • levalbuterol (XOPENEX) inhalation solution 1 25 mg  1 25 mg Nebulization TID    And   • sodium chloride 0 9 % inhalation solution 3 mL  3 mL Nebulization TID   • nystatin (MYCOSTATIN) powder   Topical BID   • ondansetron (ZOFRAN) injection 4 mg  4 mg Intravenous Q6H PRN   • pantoprazole (PROTONIX) EC tablet 40 mg  40 mg Oral Early Morning   • simethicone (MYLICON) chewable tablet 80 mg  80 mg Oral 4x Daily PRN   • tamsulosin (FLOMAX) capsule 0 4 mg  0 4 mg Oral Daily With Dinner    and PTA meds:   Prior to Admission Medications   Prescriptions Last Dose Informant Patient Reported? Taking?    Ascorbic Acid (vitamin C) 1000 MG tablet   Yes Yes   Sig: Take 1,000 mg by mouth daily Takes 500 mcg   Cholecalciferol (Vitamin D3) 50 MCG (2000 UT) TABS   Yes Yes   Sig: Take 2,000 Units by mouth daily   amLODIPine (NORVASC) 10 mg tablet   Yes Yes   Sig: Take 10 mg by mouth daily   aspirin 81 mg chewable tablet   Yes Yes   Sig: Chew 81 mg daily atorvastatin (LIPITOR) 40 mg tablet   Yes Yes   Sig: Take 40 mg by mouth daily   benzonatate (TESSALON PERLES) 100 mg capsule   No Yes   Sig: Take 1 capsule (100 mg total) by mouth 3 (three) times a day   carvedilol (COREG) 12 5 mg tablet   No Yes   Sig: Take 1 tablet (12 5 mg total) by mouth 2 (two) times a day with meals for 7 days   doxazosin (CARDURA) 2 mg tablet   Yes Yes   Sig: Take 2 mg by mouth daily at bedtime   furosemide (LASIX) 20 mg tablet   Yes Yes   Sig: Take 20 mg by mouth daily   gabapentin (NEURONTIN) 100 mg capsule   Yes Yes   Sig: Take 100 mg by mouth 2 (two) times a day   hydrALAZINE (APRESOLINE) 100 MG tablet   Yes Yes   Sig: Take 100 mg by mouth 2 (two) times a day   isosorbide mononitrate (IMDUR) 60 mg 24 hr tablet   Yes Yes   Sig: Take 60 mg by mouth daily   metroNIDAZOLE (FLAGYL) 500 mg tablet   No Yes   Sig: Take 1 tablet (500 mg total) by mouth every 8 (eight) hours for 5 days   nystatin (MYCOSTATIN) 500,000 units/5 mL suspension   No No   Sig: Swish and swallow 5 mL (500,000 Units total) 4 (four) times a day for 5 days   Patient taking differently: Swish and swallow 500,000 Units 4 (four) times a day TAKES 100,000 UNITS/ml   repaglinide (PRANDIN) 0 5 mg tablet   Yes Yes   Sig: Take 0 5 mg by mouth 3 (three) times a day before meals   saccharomyces boulardii (FLORASTOR) 250 mg capsule   No Yes   Sig: Take 1 capsule (250 mg total) by mouth 2 (two) times a day for 7 days   simethicone (MYLICON) 80 mg chewable tablet   No Yes   Sig: Chew 1 tablet (80 mg total) 4 (four) times a day as needed for flatulence (Belching) for up to 10 days   tamsulosin (FLOMAX) 0 4 mg   Yes Yes   Sig: Take 0 4 mg by mouth in the morning   vitamin B-12 (VITAMIN B-12) 500 mcg tablet   Yes Yes   Sig: Take 500 mcg by mouth daily      Facility-Administered Medications: None       No Known Allergies    Objective   Vitals: Blood pressure (!) 199/76, pulse 75, temperature 98 1 °F (36 7 °C), temperature source Oral, resp  rate 18, height 5' 5" (1 651 m), weight 83 9 kg (185 lb), SpO2 95 %  4 liters nasal cannula,Body mass index is 30 79 kg/m²  Intake/Output Summary (Last 24 hours) at 11/23/2022 1039  Last data filed at 11/23/2022 0500  Gross per 24 hour   Intake 50 ml   Output 975 ml   Net -925 ml     Invasive Devices     Peripheral Intravenous Line  Duration           Peripheral IV 11/22/22 Dorsal (posterior); Left Hand <1 day    Peripheral IV 11/22/22 Right Antecubital <1 day                Physical Exam  Vitals reviewed  Constitutional:       General: He is not in acute distress  Appearance: He is well-developed and overweight  He is not toxic-appearing or diaphoretic  Interventions: Nasal cannula in place  HENT:      Head: Normocephalic and atraumatic  Eyes:      General: No scleral icterus  Extraocular Movements: EOM normal    Neck:      Trachea: No tracheal deviation  Cardiovascular:      Rate and Rhythm: Normal rate and regular rhythm  Heart sounds: S1 normal and S2 normal  No murmur heard  No friction rub  No gallop  Pulmonary:      Effort: Pulmonary effort is normal  No tachypnea, accessory muscle usage or respiratory distress  Breath sounds: Normal breath sounds  No stridor  No decreased breath sounds, wheezing, rhonchi or rales  Chest:      Chest wall: No tenderness  Abdominal:      General: Bowel sounds are normal  There is no distension  Palpations: Abdomen is soft  Tenderness: There is no abdominal tenderness  Musculoskeletal:         General: No tenderness or edema  Cervical back: Neck supple  Skin:     General: Skin is warm and dry  Findings: No rash  Nails: There is no cyanosis  Neurological:      Mental Status: He is alert  Motor: Motor strength is normal    Psychiatric:         Mood and Affect: Mood and affect normal          Behavior: Behavior is cooperative         Lab Results:   CBC:   Lab Results   Component Value Date    WBC 16 19 (H) 2022    HGB 9 6 (L) 2022    HCT 29 9 (L) 2022    MCV 92 2022     2022    MCH 29 6 2022    MCHC 32 1 2022    RDW 14 7 2022    MPV 12 0 2022   , CMP:   Lab Results   Component Value Date    SODIUM 141 2022    K 4 8 2022     2022    CO2 28 2022    BUN 93 (H) 2022    CREATININE 2 77 (H) 2022    CALCIUM 8 9 2022    AST  2022      Comment:      No result  If an AST is required for patient care, it must be ordered separately  Specimen collection should occur prior to Sulfasalazine administration due to the potential for falsely depressed results  ALT 27 2022    ALKPHOS 92 2022    EGFR 18 2022     AB 41/44/70    Procalcitonin: 0 54    Lactic Acid:  1 0    Flu/COVID/RSV PCR:  Negative    Culture Data:  Blood cultures x2 pending    BNP:  15,094    Imaging Studies: I have personally reviewed pertinent reports  and I have personally reviewed pertinent films in PACS   Chest x-ray shows bilateral hazy airspace opacities concerning for heart failure, worsened from   CT chest shows pulmonary edema with moderate bilateral pleural effusions and passive atelectasis  Possible concomitant multifocal pneumonia  EKG, Pathology, and Other Studies: I have personally reviewed pertinent reports  Echocardiogram from 2022 shows EF low normal with grade 2 diastolic dysfunction  Pulmonary Results (PFTs, PSG): None    VTE Prophylaxis: Sequential compression device (Venodyne)  and Heparin    Code Status: Level 1 - Full Code    None    Portions of the record may have been created with voice recognition software  Occasional wrong word or "sound a like" substitutions may have occurred due to the inherent limitations of voice recognition software  Read the chart carefully and recognize, using context, where substitutions have occurred

## 2022-11-23 NOTE — PLAN OF CARE
Problem: RESPIRATORY - ADULT  Goal: Achieves optimal ventilation and oxygenation  Description: INTERVENTIONS:  - Assess for changes in respiratory status  - Assess for changes in mentation and behavior  - Position to facilitate oxygenation and minimize respiratory effort  - Oxygen administered by appropriate delivery if ordered  - Initiate smoking cessation education as indicated  - Encourage broncho-pulmonary hygiene including cough, deep breathe, Incentive Spirometry  - Assess the need for suctioning and aspirate as needed  - Assess and instruct to report SOB or any respiratory difficulty  - Respiratory Therapy support as indicated  Outcome: Progressing     Problem: Prexisting or High Potential for Compromised Skin Integrity  Goal: Skin integrity is maintained or improved  Description: INTERVENTIONS:  - Identify patients at risk for skin breakdown  - Assess and monitor skin integrity  - Assess and monitor nutrition and hydration status  - Monitor labs   - Assess for incontinence   - Turn and reposition patient  - Assist with mobility/ambulation  - Relieve pressure over bony prominences  - Avoid friction and shearing  - Provide appropriate hygiene as needed including keeping skin clean and dry  - Evaluate need for skin moisturizer/barrier cream  - Collaborate with interdisciplinary team   - Patient/family teaching  - Consider wound care consult   Outcome: Progressing     Problem: MOBILITY - ADULT  Goal: Maintain or return to baseline ADL function  Description: INTERVENTIONS:  -  Assess patient's ability to carry out ADLs; assess patient's baseline for ADL function and identify physical deficits which impact ability to perform ADLs (bathing, care of mouth/teeth, toileting, grooming, dressing, etc )  - Assess/evaluate cause of self-care deficits   - Assess range of motion  - Assess patient's mobility; develop plan if impaired  - Assess patient's need for assistive devices and provide as appropriate  - Encourage maximum independence but intervene and supervise when necessary  - Involve family in performance of ADLs  - Assess for home care needs following discharge   - Consider OT consult to assist with ADL evaluation and planning for discharge  - Provide patient education as appropriate  Outcome: Progressing  Goal: Maintains/Returns to pre admission functional level  Description: INTERVENTIONS:  - Perform BMAT or MOVE assessment daily    - Set and communicate daily mobility goal to care team and patient/family/caregiver  - Collaborate with rehabilitation services on mobility goals if consulted  - Perform Range of Motion   times a day  - Reposition patient every   hours    - Dangle patient   times a day  - Stand patient   times a day  - Ambulate patient   times a day  - Out of bed to chair   times a day   - Out of bed for meals   times a day  - Out of bed for toileting  - Record patient progress and toleration of activity level   Outcome: Progressing     Problem: Potential for Falls  Goal: Patient will remain free of falls  Description: INTERVENTIONS:  - Educate patient/family on patient safety including physical limitations  - Instruct patient to call for assistance with activity   - Consult OT/PT to assist with strengthening/mobility   - Keep Call bell within reach  - Keep bed low and locked with side rails adjusted as appropriate  - Keep care items and personal belongings within reach  - Initiate and maintain comfort rounds  - Make Fall Risk Sign visible to staff  - Offer Toileting every   Hours, in advance of need  - Initiate/Maintain  alarm  - Obtain necessary fall risk management equipment:    - Apply yellow socks and bracelet for high fall risk patients  - Consider moving patient to room near nurses station  Outcome: Progressing     Problem: PAIN - ADULT  Goal: Verbalizes/displays adequate comfort level or baseline comfort level  Description: Interventions:  - Encourage patient to monitor pain and request assistance  - Assess pain using appropriate pain scale  - Administer analgesics based on type and severity of pain and evaluate response  - Implement non-pharmacological measures as appropriate and evaluate response  - Consider cultural and social influences on pain and pain management  - Notify physician/advanced practitioner if interventions unsuccessful or patient reports new pain  Outcome: Progressing     Problem: INFECTION - ADULT  Goal: Absence or prevention of progression during hospitalization  Description: INTERVENTIONS:  - Assess and monitor for signs and symptoms of infection  - Monitor lab/diagnostic results  - Monitor all insertion sites, i e  indwelling lines, tubes, and drains  - Monitor endotracheal if appropriate and nasal secretions for changes in amount and color  - Bena appropriate cooling/warming therapies per order  - Administer medications as ordered  - Instruct and encourage patient and family to use good hand hygiene technique  - Identify and instruct in appropriate isolation precautions for identified infection/condition  Outcome: Progressing  Goal: Absence of fever/infection during neutropenic period  Description: INTERVENTIONS:  - Monitor WBC    Outcome: Progressing     Problem: SAFETY ADULT  Goal: Maintain or return to baseline ADL function  Description: INTERVENTIONS:  -  Assess patient's ability to carry out ADLs; assess patient's baseline for ADL function and identify physical deficits which impact ability to perform ADLs (bathing, care of mouth/teeth, toileting, grooming, dressing, etc )  - Assess/evaluate cause of self-care deficits   - Assess range of motion  - Assess patient's mobility; develop plan if impaired  - Assess patient's need for assistive devices and provide as appropriate  - Encourage maximum independence but intervene and supervise when necessary  - Involve family in performance of ADLs  - Assess for home care needs following discharge   - Consider OT consult to assist with ADL evaluation and planning for discharge  - Provide patient education as appropriate  Outcome: Progressing  Goal: Maintains/Returns to pre admission functional level  Description: INTERVENTIONS:  - Perform BMAT or MOVE assessment daily    - Set and communicate daily mobility goal to care team and patient/family/caregiver  - Collaborate with rehabilitation services on mobility goals if consulted  - Perform Range of Motion   times a day  - Reposition patient every    hours    - Dangle patient   times a day  - Stand patient   times a day  - Ambulate patient   times a day  - Out of bed to chair   times a day   - Out of bed for meals   times a day  - Out of bed for toileting  - Record patient progress and toleration of activity level   Outcome: Progressing  Goal: Patient will remain free of falls  Description: INTERVENTIONS:  - Educate patient/family on patient safety including physical limitations  - Instruct patient to call for assistance with activity   - Consult OT/PT to assist with strengthening/mobility   - Keep Call bell within reach  - Keep bed low and locked with side rails adjusted as appropriate  - Keep care items and personal belongings within reach  - Initiate and maintain comfort rounds  - Make Fall Risk Sign visible to staff  - Offer Toileting every   Hours, in advance of need  - Initiate/Maintain  alarm  - Obtain necessary fall risk management equipment:    - Apply yellow socks and bracelet for high fall risk patients  - Consider moving patient to room near nurses station  Outcome: Progressing     Problem: DISCHARGE PLANNING  Goal: Discharge to home or other facility with appropriate resources  Description: INTERVENTIONS:  - Identify barriers to discharge w/patient and caregiver  - Arrange for needed discharge resources and transportation as appropriate  - Identify discharge learning needs (meds, wound care, etc )  - Arrange for interpretive services to assist at discharge as needed  - Refer to Case Management Department for coordinating discharge planning if the patient needs post-hospital services based on physician/advanced practitioner order or complex needs related to functional status, cognitive ability, or social support system  Outcome: Progressing     Problem: Knowledge Deficit  Goal: Patient/family/caregiver demonstrates understanding of disease process, treatment plan, medications, and discharge instructions  Description: Complete learning assessment and assess knowledge base    Interventions:  - Provide teaching at level of understanding  - Provide teaching via preferred learning methods  Outcome: Progressing

## 2022-11-23 NOTE — ASSESSMENT & PLAN NOTE
Troponin 124-197  Elevated troponin in recent admission as well  Reports chest pressure when in respiratory distress  Denies history of CAD  · EKG no ischemic changes, read as AFib, rate 103,RBBB  · No history of AFib  Prior EKG shows sinus rhythm with PACs /PVCs  Will repeat EKG  · 2D echo last admission showed  EF low normal, normal wall motion, grade 2 diastolic dysfunction, moderately dilated atriums     · Most likely non MI troponin elevation due to # 1 and CHF  · Telemetry

## 2022-11-23 NOTE — CONSULTS
[address] [facility] Genny 80 y o  male MRN: [id number]  Unit/Bed#: 207 Reese Genny Encounter: [id number]    ASSESSMENT and PLAN:  1  Elevated creatinine:  · Creatinine during previous hospital stay ranged between 2 4 and 2 8  · Creatinine on admission was 2 83 on 11/22/22  · Unclear if this is truly SHAHID or progression of disease - possibly due to CRS  · Urinalysis is bland  · Check renal ultrasound  · On exam, he appears to be mildly hypervolemic  · Will treat with furosemide 40 mg IV BID  · Trend creatinine  2  Chronic kidney disease, stage III  · Baseline creatinine was 1 6-2 0 in 2021  · No prior renal follow-up  3  Hypertension:  · BP intermittently high  · Current medications:  Amlodipine 10 mg daily, carvedilol 12 5 mg twice a day, doxazosin 2 mg at bedtime, hydralazine 100 mg twice a day, Imdur 60 mg daily  · Monitor with diuresis  4  Respiratory failure:  · Due to multifocal pneumonia, pleural effusions, and possible congestive heart failure  · Monitor with diuresis  5  Congestive heart failure  · As above, treat with furosemide 40 mg IV BID  6  Esophageal dysmotility     SUMMARY OF RECOMMENDATIONS:  · Start Furosemide 40 mg IV BID  · Keep O>I  · Check renal ultrasound  · Trend creatinine  The above plan was discussed with Dr Lane David and daughter, Kannan Diggs  Previous records were personally reviewed by me to obtain a baseline creatinine  The images (CT) were personally reviewed by me in PACS    HISTORY OF PRESENT ILLNESS:  Requesting Physician: Rhonda Rodas MD  Reason for Consult: CKD, diuretic management    Payton Romero is a 80 y o  male who was admitted to [facility] on 11/22/22 after presenting with SOB  A renal consultation is requested today for assistance in the management of CKD       Maty Kaur has a history of hypertension, diabetes mellitus, hyperlipidemia, chronic kidney disease, congestive heart failure, recently diagnosed esophageal dysmotility, and BPH  He is unaware of a history of renal disease  However, upon my discussion with his daughter, Sylvia Hastings, she reports that Tim Snow was seen by a nephrologist during a hospital admission in [place], [place] in 2021  However, he does not follow with Nephrology as an outpatient  Based on my review of the records, I note that his creatinine ranged between 1 6 and 2 0 based on blood work from 2021 in [facility]  He was recently admitted to [facility] between November 15 and November 22, 2022 for respiratory failure which was felt to be due to pneumonia and fluid overload  There was a concern for aspiration pneumonia but his oropharyngeal evaluation was unremarkable  He was, however, found to have esophageal dysmotility  For his fluid overload, he was treated with furosemide 40 mg IV daily and was discharged on furosemide 20 mg daily  During the previous hospital stay, his creatinine on admission was 2 59 and he was discharged with a creatinine of 2 68  He now presents back to [facility] after having shortness of breath which was noted after oral intake  Creatinine on presentation was 2 83  Due to the renal disease and possible fluid overload, a renal consultation was requested  He denied any chest pain but admitted to chest pressure  (+) cough, shortness of breath  No fever, chills, abdominal pain, diarrhea, leg swelling       PAST MEDICAL HISTORY:  Past Medical History:   Diagnosis Date   • CHF (congestive heart failure) ([facility] [address] )    • [facility]    • Elevated lipids    • Hypertension    • Hyponatremia 11/15/2022   • Neuropathy    • Renal disorder      PAST SURGICAL HISTORY:  Past Surgical History:   Procedure Laterality Date   • HERNIA REPAIR       ALLERGIES:  No Known Allergies    SOCIAL HISTORY:  Social History     Substance and Sexual Activity   Alcohol Use Not Currently     Social History     Substance and Sexual Activity   Drug Use Never     Social History Tobacco Use   Smoking Status Former   • Packs/day: 3 00   • Types: Cigarettes   • Quit date: 80   • Years since quittin 9   Smokeless Tobacco Never     FAMILY HISTORY:  History reviewed  No pertinent family history      MEDICATIONS:    Current Facility-Administered Medications:   •  acetaminophen (TYLENOL) tablet 650 mg, 650 mg, Oral, Q6H PRN, Emilee Link, APOLONIANP  •  amLODIPine (NORVASC) tablet 10 mg, 10 mg, Oral, Daily, Cuiyicarlos Zunigarik, CRNP, 10 mg at 22 6304  •  aspirin chewable tablet 81 mg, 81 mg, Oral, Daily, Cuiyin Nadiarik, CRNP, 81 mg at 22 0809  •  atorvastatin (LIPITOR) tablet 40 mg, 40 mg, Oral, Daily, Cuiyin Nadiarik, CRNP, 40 mg at 22 7227  •  carvedilol (COREG) tablet 12 5 mg, 12 5 mg, Oral, BID With Meals, Cuidoc Zunigarijose, CRNP, 12 5 mg at 22 0809  •  cholecalciferol (VITAMIN D3) tablet 2,000 Units, 2,000 Units, Oral, Daily, Cuiyin Nadiarik, CRNP, 2,000 Units at 22 0810  •  cyanocobalamin (VITAMIN B-12) tablet 500 mcg, 500 mcg, Oral, Daily, Cuiyin Nadiarik, CRNP, 500 mcg at 22 0809  •  doxazosin (CARDURA) tablet 2 mg, 2 mg, Oral, HS, Cuiyin Nadiarik, CRNP, 2 mg at 22 0136  •  furosemide (LASIX) injection 40 mg, 40 mg, Intravenous, Daily, Cuiyicarlos Zunigarik, CRNP, 40 mg at 22 1118  •  gabapentin (NEURONTIN) capsule 100 mg, 100 mg, Oral, BID, Cuiyin Nadiarik, CRNP, 100 mg at 22 0810  •  heparin (porcine) subcutaneous injection 5,000 Units, 5,000 Units, Subcutaneous, Q8H [facility] **AND** [CANCELED] Platelet count, , , Once, APOLONIA NewberryNP  •  hydrALAZINE (APRESOLINE) tablet 100 mg, 100 mg, Oral, BID, KEISHA Newberry, 100 mg at 22 0810  •  insulin lispro (HumaLOG) 100 units/mL subcutaneous injection 1-5 Units, 1-5 Units, Subcutaneous, TID AC, 3 Units at 22 1115 **AND** Fingerstick Glucose (POCT), , , TID AC, KEISHA Newberry  •  insulin lispro (HumaLOG) 100 units/mL subcutaneous injection 1-5 Units, 1-5 Units, Subcutaneous, 0200, KEISHA Newberry, 2 Units at 11/23/22 0253  •  insulin lispro (HumaLOG) 100 units/mL subcutaneous injection 1-5 Units, 1-5 Units, Subcutaneous, HS, KEISHA Newberry  •  ipratropium (ATROVENT) 0 02 % inhalation solution 0 5 mg, 0 5 mg, Nebulization, TID, Emilee Link, KEISHA, 0 5 mg at 11/23/22 0849  •  isosorbide mononitrate (IMDUR) 24 hr tablet 60 mg, 60 mg, Oral, Daily, KEISHA Newberry, 60 mg at 11/23/22 0810  •  levalbuterol (XOPENEX) inhalation solution 0 63 mg, 0 63 mg, Nebulization, Q4H PRN, KEISHA Newberry  •  levalbuterol (XOPENEX) inhalation solution 1 25 mg, 1 25 mg, Nebulization, TID, 1 25 mg at 11/23/22 0723 **AND** sodium chloride 0 9 % inhalation solution 3 mL, 3 mL, Nebulization, TID, KEISHA Newberry, 3 mL at 11/23/22 5236  •  nystatin (MYCOSTATIN) powder, , Topical, BID, KEISHA Newberry, Given at 11/23/22 1116  •  ondansetron (ZOFRAN) injection 4 mg, 4 mg, Intravenous, Q6H PRN, KEISHA Newberry  •  pantoprazole (PROTONIX) EC tablet 40 mg, 40 mg, Oral, Early Morning, KEISHA Newberry, 40 mg at 11/23/22 0617  •  simethicone (MYLICON) chewable tablet 80 mg, 80 mg, Oral, 4x Daily PRN, KEISHA Newberry  •  tamsulosin (FLOMAX) capsule 0 4 mg, 0 4 mg, Oral, Daily With Dinner, Rajendra and KEISHA Peñaloza    REVIEW OF SYSTEMS:  All the systems were reviewed and were negative except as documented on the HPI      PHYSICAL EXAM:  Current Weight: Weight - Scale: 83 9 kg (185 lb)  First Weight: Weight - Scale: 84 4 kg (186 lb)  Vitals:    11/23/22 0343 11/23/22 0600 11/23/22 0723 11/23/22 0735   BP: 128/68   (!) 199/76   BP Location: Left arm   Left arm   Pulse: 75   75   Resp: 18   18   Temp: 97 8 °F (36 6 °C)   98 1 °F (36 7 °C)   TempSrc: Oral   Oral   SpO2: 95%  95% 95%   Weight:  83 9 kg (185 lb)     Height:           Intake/Output Summary (Last 24 hours) at 11/23/2022 1202  Last data filed at 11/23/2022 1048  Gross per 24 hour   Intake 50 ml   Output 1275 ml   Net -1225 ml     Physical Exam  General: conscious, coherent, cooperative, not in distress  Skin: warm, dry, good turgor  Eyes: pink conjunctivae, no scleral icterus  ENT: moist lips and mucous membranes  Respiratory: equal chest expansion, decreased in bases with some crackles on the lower lung fields  Cardiovascular: distinct heart sounds, normal rate, regular rhythm, no rub  Abdomen: soft, non-tender, non-distended, normoactive bowel sounds  Extremities: no edema  Genitourinary: no lock catheter  Neuro: awake, alert, oriented to time, place and person  Psych: appropriate affect  Lab Results:   Results from last 7 days   Lab Units 11/23/22  0825 11/22/22 2058 11/22/22  0453 11/21/22  0506 11/18/22  0510 11/17/22  0537   WBC Thousand/uL  --  16 19* 13 11* 14 42*   < > 10 60*   HEMOGLOBIN g/dL  --  9 6* 9 2* 9 3*   < > 9 9*   HEMATOCRIT %  --  29 9* 27 9* 28 6*   < > 29 9*   PLATELETS Thousands/uL  --  253 197 188   < > 182   POTASSIUM mmol/L 4 8 4 9 4 6 4 2   < > 3 6   CHLORIDE mmol/L 102 96 103 101   < > 97   CO2 mmol/L 28 28 27 28   < > 26   BUN mg/dL 93* 91* 85* 75*   < > 55*   CREATININE mg/dL 2 77* 2 83* 2 68* 2 85*   < > 2 44*   CALCIUM mg/dL 8 9 8 8 8 2* 8 1*   < > 8 4   MAGNESIUM mg/dL 2 9* 3 2*  --   --   --   --    ALK PHOS U/L  --  92  --   --   --  72   ALT U/L  --  27  --   --   --  22   AST U/L  --   --   --   --   --  35    < > = values in this interval not displayed  Other Studies:   CT scan personally reviewed by me in PACS showed bilateral pleural effusions and multifocal infiltrates

## 2022-11-23 NOTE — ASSESSMENT & PLAN NOTE
Just completed 7 days antibiotic    · Repeat procalcitonin level was 0 54  · Patient received cefepime and Flagyl in the ED which will be continued at 1 gram IV daily  · Follow-up for sputum cultures  · Pulmonary input appreciated

## 2022-11-23 NOTE — ASSESSMENT & PLAN NOTE
Lab Results   Component Value Date    EGFR 18 11/23/2022    EGFR 18 11/22/2022    EGFR 19 11/22/2022    CREATININE 2 77 (H) 11/23/2022    CREATININE 2 83 (H) 11/22/2022    CREATININE 2 68 (H) 11/22/2022   History of CKD 4, baseline creatinine appears to be around 1 5-1 9 in past 2 years in care everywhere  Creatinine during previous hospitalization ranged in 2 4-2 8  · Possible acute kidney injury versus progression of chronic kidney disease  · Urinalysis was bland  · Received IV Lasix 40 mg in ED, will continue    · Avoid nephrotoxin and hypotension  · Check PVR  · Check renal ultrasound to rule out hydronephrosis  · Nephrology input appreciated  · Monitor creatinine and Lasix 40 milligram IV q 12 hours

## 2022-11-23 NOTE — ED NOTES
Trial off of bipap on 4L o2 NC  Patient saturations at 92-94%  Patient using accessory muscles to breathe   MD notified pt put back on bipap     Matt Newman RN  11/22/22 2306

## 2022-11-23 NOTE — ASSESSMENT & PLAN NOTE
Patient reports no appetite in past 2 days,did not eat all day yesterday  Loss of appetite could be secondary to antibiotic  · Keep patient NPO for now  · Re-Consult speech  · GI recommend to consider PEG tube if recurrent aspirations  Per recent GI note,family did not want PEG tube  · Continue pantoprazole daily  · Check OBS to r/o constipation in view of belching    · Aspiration precautions

## 2022-11-23 NOTE — ASSESSMENT & PLAN NOTE
On hydralazine, Norvasc, Coreg, Imdur, Cardura    · Will continue above medications with holding parameter  · BP labile

## 2022-11-23 NOTE — ASSESSMENT & PLAN NOTE
Patient reports no appetite in past 2 days,did not eat all day yesterday  Loss of appetite could be secondary to antibiotic  · Keep patient NPO for now  · Discussed with speech therapy-patient at high risk for aspiration despite the level of diet  For now patient started on pureed diet with thin liquids with medications crushed with puree  · GI recommend to consider PEG tube if recurrent aspirations  Per recent GI note,family did not want PEG tube during prior hospitalization  · Continue pantoprazole daily  · Obstructive series showed barium throughout the colon with modest amount of stool along the rectosigmoid region  · Prolonged discussion held with patient's daughter and patient's PA regarding risk of recurrent aspiration and the patient possibly needing PEG    Family to discuss and let us know about final decision

## 2022-11-24 LAB
ANION GAP SERPL CALCULATED.3IONS-SCNC: 9 MMOL/L (ref 4–13)
BASOPHILS # BLD AUTO: 0.02 THOUSANDS/ÂΜL (ref 0–0.1)
BASOPHILS NFR BLD AUTO: 0 % (ref 0–1)
BUN SERPL-MCNC: 101 MG/DL (ref 5–25)
CALCIUM SERPL-MCNC: 8.8 MG/DL (ref 8.3–10.1)
CHLORIDE SERPL-SCNC: 105 MMOL/L (ref 96–108)
CO2 SERPL-SCNC: 30 MMOL/L (ref 21–32)
CREAT SERPL-MCNC: 2.76 MG/DL (ref 0.6–1.3)
EOSINOPHIL # BLD AUTO: 0.01 THOUSAND/ÂΜL (ref 0–0.61)
EOSINOPHIL NFR BLD AUTO: 0 % (ref 0–6)
ERYTHROCYTE [DISTWIDTH] IN BLOOD BY AUTOMATED COUNT: 14.7 % (ref 11.6–15.1)
GFR SERPL CREATININE-BSD FRML MDRD: 18 ML/MIN/1.73SQ M
GLUCOSE SERPL-MCNC: 152 MG/DL (ref 65–140)
GLUCOSE SERPL-MCNC: 173 MG/DL (ref 65–140)
GLUCOSE SERPL-MCNC: 216 MG/DL (ref 65–140)
GLUCOSE SERPL-MCNC: 325 MG/DL (ref 65–140)
GLUCOSE SERPL-MCNC: 326 MG/DL (ref 65–140)
GLUCOSE SERPL-MCNC: 344 MG/DL (ref 65–140)
GLUCOSE SERPL-MCNC: 346 MG/DL (ref 65–140)
HCT VFR BLD AUTO: 26.8 % (ref 36.5–49.3)
HGB BLD-MCNC: 8.8 G/DL (ref 12–17)
IMM GRANULOCYTES # BLD AUTO: 0.35 THOUSAND/UL (ref 0–0.2)
IMM GRANULOCYTES NFR BLD AUTO: 2 % (ref 0–2)
LYMPHOCYTES # BLD AUTO: 1.35 THOUSANDS/ÂΜL (ref 0.6–4.47)
LYMPHOCYTES NFR BLD AUTO: 9 % (ref 14–44)
MCH RBC QN AUTO: 30.3 PG (ref 26.8–34.3)
MCHC RBC AUTO-ENTMCNC: 32.8 G/DL (ref 31.4–37.4)
MCV RBC AUTO: 92 FL (ref 82–98)
MONOCYTES # BLD AUTO: 1.44 THOUSAND/ÂΜL (ref 0.17–1.22)
MONOCYTES NFR BLD AUTO: 10 % (ref 4–12)
NEUTROPHILS # BLD AUTO: 11.67 THOUSANDS/ÂΜL (ref 1.85–7.62)
NEUTS SEG NFR BLD AUTO: 79 % (ref 43–75)
NRBC BLD AUTO-RTO: 0 /100 WBCS
PLATELET # BLD AUTO: 230 THOUSANDS/UL (ref 149–390)
PMV BLD AUTO: 11.6 FL (ref 8.9–12.7)
POTASSIUM SERPL-SCNC: 4.3 MMOL/L (ref 3.5–5.3)
RBC # BLD AUTO: 2.9 MILLION/UL (ref 3.88–5.62)
SODIUM SERPL-SCNC: 144 MMOL/L (ref 135–147)
WBC # BLD AUTO: 14.84 THOUSAND/UL (ref 4.31–10.16)

## 2022-11-24 RX ADMIN — Medication 2000 UNITS: at 09:22

## 2022-11-24 RX ADMIN — LABETALOL HYDROCHLORIDE 10 MG: 5 INJECTION, SOLUTION INTRAVENOUS at 02:41

## 2022-11-24 RX ADMIN — IPRATROPIUM BROMIDE 0.5 MG: 0.5 SOLUTION RESPIRATORY (INHALATION) at 13:10

## 2022-11-24 RX ADMIN — IPRATROPIUM BROMIDE 0.5 MG: 0.5 SOLUTION RESPIRATORY (INHALATION) at 20:05

## 2022-11-24 RX ADMIN — ASPIRIN 81 MG CHEWABLE TABLET 81 MG: 81 TABLET CHEWABLE at 09:21

## 2022-11-24 RX ADMIN — FUROSEMIDE 40 MG: 10 INJECTION, SOLUTION INTRAMUSCULAR; INTRAVENOUS at 09:21

## 2022-11-24 RX ADMIN — AMLODIPINE BESYLATE 10 MG: 10 TABLET ORAL at 09:21

## 2022-11-24 RX ADMIN — NYSTATIN: 100000 POWDER TOPICAL at 09:24

## 2022-11-24 RX ADMIN — INSULIN LISPRO 1 UNITS: 100 INJECTION, SOLUTION INTRAVENOUS; SUBCUTANEOUS at 02:17

## 2022-11-24 RX ADMIN — ISOSORBIDE MONONITRATE 60 MG: 60 TABLET, EXTENDED RELEASE ORAL at 09:21

## 2022-11-24 RX ADMIN — IPRATROPIUM BROMIDE 0.5 MG: 0.5 SOLUTION RESPIRATORY (INHALATION) at 07:12

## 2022-11-24 RX ADMIN — FUROSEMIDE 40 MG: 10 INJECTION, SOLUTION INTRAMUSCULAR; INTRAVENOUS at 16:27

## 2022-11-24 RX ADMIN — LEVALBUTEROL HYDROCHLORIDE 1.25 MG: 1.25 SOLUTION, CONCENTRATE RESPIRATORY (INHALATION) at 13:10

## 2022-11-24 RX ADMIN — GABAPENTIN 100 MG: 100 CAPSULE ORAL at 17:40

## 2022-11-24 RX ADMIN — HYDRALAZINE HYDROCHLORIDE 100 MG: 25 TABLET ORAL at 09:21

## 2022-11-24 RX ADMIN — TAMSULOSIN HYDROCHLORIDE 0.4 MG: 0.4 CAPSULE ORAL at 16:28

## 2022-11-24 RX ADMIN — LEVALBUTEROL HYDROCHLORIDE 1.25 MG: 1.25 SOLUTION, CONCENTRATE RESPIRATORY (INHALATION) at 20:05

## 2022-11-24 RX ADMIN — HYDRALAZINE HYDROCHLORIDE 100 MG: 25 TABLET ORAL at 17:39

## 2022-11-24 RX ADMIN — CYANOCOBALAMIN TAB 500 MCG 500 MCG: 500 TAB at 09:21

## 2022-11-24 RX ADMIN — ISODIUM CHLORIDE 3 ML: 0.03 SOLUTION RESPIRATORY (INHALATION) at 20:05

## 2022-11-24 RX ADMIN — INSULIN LISPRO 4 UNITS: 100 INJECTION, SOLUTION INTRAVENOUS; SUBCUTANEOUS at 11:59

## 2022-11-24 RX ADMIN — HEPARIN SODIUM 5000 UNITS: 5000 INJECTION INTRAVENOUS; SUBCUTANEOUS at 06:04

## 2022-11-24 RX ADMIN — CEFEPIME HYDROCHLORIDE 1000 MG: 1 INJECTION, SOLUTION INTRAVENOUS at 17:39

## 2022-11-24 RX ADMIN — INSULIN LISPRO 1 UNITS: 100 INJECTION, SOLUTION INTRAVENOUS; SUBCUTANEOUS at 09:23

## 2022-11-24 RX ADMIN — INSULIN LISPRO 3 UNITS: 100 INJECTION, SOLUTION INTRAVENOUS; SUBCUTANEOUS at 21:30

## 2022-11-24 RX ADMIN — GABAPENTIN 100 MG: 100 CAPSULE ORAL at 09:21

## 2022-11-24 RX ADMIN — HEPARIN SODIUM 5000 UNITS: 5000 INJECTION INTRAVENOUS; SUBCUTANEOUS at 16:28

## 2022-11-24 RX ADMIN — CARVEDILOL 12.5 MG: 12.5 TABLET, FILM COATED ORAL at 09:23

## 2022-11-24 RX ADMIN — NYSTATIN: 100000 POWDER TOPICAL at 17:41

## 2022-11-24 RX ADMIN — ISODIUM CHLORIDE 3 ML: 0.03 SOLUTION RESPIRATORY (INHALATION) at 07:12

## 2022-11-24 RX ADMIN — LEVALBUTEROL HYDROCHLORIDE 1.25 MG: 1.25 SOLUTION, CONCENTRATE RESPIRATORY (INHALATION) at 07:12

## 2022-11-24 RX ADMIN — PANTOPRAZOLE SODIUM 40 MG: 40 TABLET, DELAYED RELEASE ORAL at 06:05

## 2022-11-24 RX ADMIN — ATORVASTATIN CALCIUM 40 MG: 40 TABLET, FILM COATED ORAL at 09:21

## 2022-11-24 RX ADMIN — HEPARIN SODIUM 5000 UNITS: 5000 INJECTION INTRAVENOUS; SUBCUTANEOUS at 21:28

## 2022-11-24 RX ADMIN — CARVEDILOL 12.5 MG: 12.5 TABLET, FILM COATED ORAL at 16:28

## 2022-11-24 RX ADMIN — ISODIUM CHLORIDE 3 ML: 0.03 SOLUTION RESPIRATORY (INHALATION) at 13:10

## 2022-11-24 RX ADMIN — INSULIN LISPRO 3 UNITS: 100 INJECTION, SOLUTION INTRAVENOUS; SUBCUTANEOUS at 16:29

## 2022-11-24 RX ADMIN — DOXAZOSIN 2 MG: 2 TABLET ORAL at 21:28

## 2022-11-24 NOTE — PROGRESS NOTES
[facility] [address]  Progress Note Didi Chicho 10/19/1929, 80 y o  male MRN: [id number]  Unit/Bed#: [facility] Encounter: [id number]  Primary Care Provider: Yudi Casarez PA-C   Date and time admitted to hospital: 11/22/2022  8:41 PM    * Acute respiratory failure with hypoxia [facility])  Assessment & Plan  Patient presents with acute respiratory distress shortly after eating a few spoonful of puree diet in STR, patient reported chest pressure and SOB  Patient was satting 85-95% on oxygen 3 L per STR transfer paper  Patient was belching after eating/drinking limited amount of diet/water per staff  · Patient was discharged on the day of admission after being hospitalized for aspiration pneumonia, acute on chronic diastolic CHF, dysphagia  Patient received 7 days of IV Rocephin and Flagyl  Received IV Lasix  Seen by GI, underwent EGD, found to have oropharyngeal dysphagia with esophageal dysmotility, no obvious esophageal stricture  GI recommend to consider PEG tube placement if recurrent aspirations  Recommend pantoprazole daily  Underwent Barium swallow study which showed moderate to severe esophageal dysmotility with significant residual contrast remaining in the esophagus at the end of the study  Patient was recommend regular diet by speech and discharged on regular diet per STR  · Likely secondary to multifocal pneumonia and also CHF  · Patient required BiPAP on ED arrival   ABG post BiPAP essentially unremarkable  Later patient was titrated down to 6 liters and currently patient is down to 2 5 liters with O2 saturation in mid 90s  · Chest x-ray - Pulmonary edema pattern, worse when comparing to 11/19/2022  Underlying infection cannot be excluded  · ProBNP 15,094  · Patient received Lasix 40 IV in ED  Will continue Lasix 40 mg IV daily  · Received cefepime and Flagyl in ED  · Received IV Solu-Medrol in ED  No wheezing on auscultation    · Send sputum Gram stain and culture  · Urine for Legionella pneumococcal antigens were negative  · Continue Xopenex+ Atrovent t i d , Xopenex p r n  · CT of the chest showed pulmonary edema with moderate size bilateral pleural effusions with significant bibasilar atelectasis and concomitant multifocal pneumonia  · Patient restarted back on cefepime 1 gram IV daily as procalcitonin level is increasing    Esophageal dysphagia  Assessment & Plan  Patient reports no appetite in past 2 days,did not eat all day yesterday  Loss of appetite could be secondary to antibiotic  · Keep patient NPO for now  · Discussed with speech therapy-patient at high risk for aspiration despite the level of diet  For now patient started on pureed diet with thin liquids with medications crushed with puree  · GI recommend to consider PEG tube if recurrent aspirations  Per recent GI note,family did not want PEG tube during prior hospitalization  · Continue pantoprazole daily  · Obstructive series showed barium throughout the colon with modest amount of stool along the rectosigmoid region  · Prolonged discussion held with patient's daughter and patient's PA on November 23, 2022 regarding risk of recurrent aspiration and the patient possibly needing PEG  Family to discuss and let us know about final decision      Acute on chronic diastolic congestive heart failure ([facility] [address] )  Assessment & Plan  Wt Readings from Last 3 Encounters:   11/24/22 82 8 kg (182 lb 8 oz)   11/22/22 84 5 kg (186 lb 4 8 oz)     Patient received IV Lasix in recent admission     · Chest x-ray today shows worsening pulmonary edema  · Recent 2D echo as below  · Mild edema to lower extremity, left forearm edema on exam   · Patient received 40 milligrams of Lasix IV in the ED and was continue Lasix 40 milligram IV daily which was later increased to 40 milligram IV q 12 hours  · Left upper extremity venous Doppler showed no evidence of DVT  · CT chest showed pulmonary edema with moderate size bilateral pleural effusions left more than right  · Continue Lasix 40 milligram IV q 12 hours        Acute kidney injury superimposed on CKD [facility])  Assessment & Plan  Lab Results   Component Value Date    EGFR 18 11/24/2022    EGFR 18 11/23/2022    EGFR 18 11/22/2022    CREATININE 2 76 (H) 11/24/2022    CREATININE 2 77 (H) 11/23/2022    CREATININE 2 83 (H) 11/22/2022   History of CKD 4, baseline creatinine appears to be around 1 5-1 9 in past 2 years in care everywhere  Creatinine during previous hospitalization ranged in 2 4-2 8  · Possible acute kidney injury versus progression of chronic kidney disease  · Urinalysis was bland  · Received IV Lasix 40 mg in ED, will continue  · Avoid nephrotoxin and hypotension  · Renal ultrasound showed no hydronephrosis moderate left renal atrophy  · Nephrology input appreciated  · Monitor creatinine and Lasix 40 milligram IV q 12 hours    Aspiration pneumonia ([address] )  Assessment & Plan  Just completed 7 days antibiotic  · Repeat procalcitonin level was 0 54  · Patient received cefepime and Flagyl in the ED   Patient will be continued on cefepime  1 gram IV daily  · Follow-up for sputum cultures  · Urine for Legionella and pneumococcal antigens were negative  · Pulmonary input appreciated    BPH (benign prostatic hyperplasia)  Assessment & Plan  Continue Cardura and Flomax  · Check PVR    Hyperlipidemia  Assessment & Plan  Continue statin    Elevated troponin  Assessment & Plan  Troponin [phone]  Elevated troponin in recent admission as well  Reports chest pressure when in respiratory distress  Denies history of CAD  · EKG no ischemic changes, read as AFib, rate 103,RBBB  · No history of AFib  Prior EKG shows sinus rhythm with PACs /PVCs  Will repeat EKG  · 2D echo last admission showed  EF low normal, normal wall motion, grade 2 diastolic dysfunction, moderately dilated atriums     · Most likely non MI troponin elevation due to # 1 and CHF      Thrush  Assessment & Plan  Completed 7 days of nystatin suspension  Monitor    Type 2 diabetes mellitus [facility])  Assessment & Plan  Lab Results   Component Value Date    HGBA1C 5 7 (H) 2022       Recent Labs     22  0159 22  0748 22  1132 22  1135   POCGLU 216* 173* 346* 344*       Blood Sugar Average: Last 72 hrs:  (P) 276 5   Patient was discharged on Prandin t i d  With meals  Will hold while inpatient  · Continue Humalog sliding scale with Accu-Cheks q a c  And HS  · Patient was started on diabetic pureed Diet with thin liquids  Blood sugars are running high    Primary hypertension  Assessment & Plan  On hydralazine, Norvasc, Coreg, Imdur, Cardura  · Will continue above medications with holding parameter  · BP labile          VTE Pharmacologic Prophylaxis:   High Risk (Score >/= 5) - Pharmacological DVT Prophylaxis Ordered: heparin  Sequential Compression Devices Ordered  Patient Centered Rounds: I performed bedside rounds with nursing staff today  Discussions with Specialists or Other Care Team Provider: Yes-pulmonary    Education and Discussions with Family / Patient: Updated  (daughter) via phone  Time Spent for Care: 45 minutes  More than 50% of total time spent on counseling and coordination of care as described above  Current Length of Stay: 2 day(s)  Current Patient Status: Inpatient   Certification Statement: The patient will continue to require additional inpatient hospital stay due to Acute respiratory failure, acute CHF, multifocal pneumonia  Discharge Plan: Anticipate discharge in 48-72 hrs to rehab facility  Code Status: Level 1 - Full Code    Subjective:   Patient is feeling better  Improved shortness of breath    Denies any chest pain, abdominal pain, nausea vomiting  Objective:     Vitals:   Temp (24hrs), Av °F (36 7 °C), Min:97 7 °F (36 5 °C), Max:98 2 °F (36 8 °C)    Temp:  [97 7 °F (36 5 °C)-98 2 °F (36 8 °C)] 98 2 °F (36 8 °C)  HR:  [54-83] 64  Resp: [18-19] 18  BP: (154-191)/(61-82) 154/61  SpO2:  [93 %-99 %] 95 %  Body mass index is 30 37 kg/m²  Input and Output Summary (last 24 hours): Intake/Output Summary (Last 24 hours) at 11/24/2022 1447  Last data filed at 11/24/2022 1301  Gross per 24 hour   Intake --   Output 550 ml   Net -550 ml       Physical Exam:   Physical Exam  Constitutional:       Appearance: Normal appearance  HENT:      Head: Normocephalic and atraumatic  Eyes:      Extraocular Movements: Extraocular movements intact  Pupils: Pupils are equal, round, and reactive to light  Cardiovascular:      Rate and Rhythm: Normal rate and regular rhythm  Heart sounds: Murmur heard  No gallop  Pulmonary:      Effort: Pulmonary effort is normal       Breath sounds: Normal breath sounds  Abdominal:      General: Bowel sounds are normal       Palpations: Abdomen is soft  Tenderness: There is no abdominal tenderness  Musculoskeletal:         General: No swelling or deformity  Normal range of motion  Cervical back: Normal range of motion and neck supple  Skin:     General: Skin is warm and dry  Neurological:      General: No focal deficit present  Mental Status: He is alert            Additional Data:     Labs:  Results from last 7 days   Lab Units 11/24/22  0534   WBC Thousand/uL 14 84*   HEMOGLOBIN g/dL 8 8*   HEMATOCRIT % 26 8*   PLATELETS Thousands/uL 230   NEUTROS PCT % 79*   LYMPHS PCT % 9*   MONOS PCT % 10   EOS PCT % 0     Results from last 7 days   Lab Units 11/24/22  0534 11/23/22  0825 11/22/22  2058   SODIUM mmol/L 144   < > 133*   POTASSIUM mmol/L 4 3   < > 4 9   CHLORIDE mmol/L 105   < > 96   CO2 mmol/L 30   < > 28   BUN mg/dL 101*   < > 91*   CREATININE mg/dL 2 76*   < > 2 83*   ANION GAP mmol/L 9   < > 9   CALCIUM mg/dL 8 8   < > 8 8   ALBUMIN g/dL  --   --  2 6*   TOTAL BILIRUBIN mg/dL  --   --  0 57   ALK PHOS U/L  --   --  92   ALT U/L  --   --  27   GLUCOSE RANDOM mg/dL 152*   < > 287* < > = values in this interval not displayed  Results from last 7 days   Lab Units 11/22/22 2058   INR  1 07     Results from last 7 days   Lab Units 11/24/22  1135 11/24/22  1132 11/24/22  0748 11/24/22  0159 11/23/22 2028 11/23/22  1542 11/23/22  1114 11/23/22  0722 11/23/22  0136 11/22/22  2054 11/22/22  1122 11/22/22  0711   POC GLUCOSE mg/dl 344* 346* 173* 216* 302* 251* 281* 268* 296* 288* 193* 224*         Results from last 7 days   Lab Units 11/23/22  0825 11/22/22 2058 11/22/22  0453 11/21/22  0506   LACTIC ACID mmol/L  --  1 0  --   --    PROCALCITONIN ng/ml 0 54* 0 37* 0 39* 0 57*       Lines/Drains:  Invasive Devices     Peripheral Intravenous Line  Duration           Peripheral IV 11/22/22 Dorsal (posterior); Left Hand 1 day    Peripheral IV 11/22/22 Right Antecubital 1 day                  Telemetry:  Telemetry Orders (From admission, onward)             48 Hour Telemetry Monitoring  Continuous x 48 hours        Expiring   References:    Telemetry Guidelines   Question:  Reason for 48 Hour Telemetry  Answer:  Acute Decompensated CHF (continuous diuretic infusion or total diuretic dose > 200 mg daily, associated electrolyte derangement, ionotropic drip, history of ventricular arrhythmia, or new EF <35%)                 Telemetry Reviewed: Normal Sinus Rhythm  Indication for Continued Telemetry Use: No indication for continued use  Will discontinue  Imaging: Reviewed radiology reports from this admission including: chest xray and chest CT scan    Recent Cultures (last 7 days):   Results from last 7 days   Lab Units 11/22/22 2058 11/22/22 2057   BLOOD CULTURE  No Growth at 24 hrs  No Growth at 24 hrs         Last 24 Hours Medication List:   Current Facility-Administered Medications   Medication Dose Route Frequency Provider Last Rate   • acetaminophen  650 mg Oral Q6H PRN KEISHA Newberry     • amLODIPine  10 mg Oral Daily KEISHA Newberry     • aspirin  81 mg Oral Daily Emilee Rosalind Guzman     • atorvastatin  40 mg Oral Daily KEISHA Newberry     • carvedilol  12 5 mg Oral BID With Meals KEISHA Newberry     • cefepime  1,000 mg Intravenous Q24H Elissa Dunlap MD 1,000 mg (11/23/22 1747)   • cholecalciferol  2,000 Units Oral Daily KEISHA Newberry     • vitamin B-12  500 mcg Oral Daily KEISHA Newberry     • doxazosin  2 mg Oral HS KEISHA Newberry     • furosemide  40 mg Intravenous BID (diuretic) Collin Hernandez MD     • gabapentin  100 mg Oral BID KEISHA Newberry     • heparin (porcine)  5,000 Units Subcutaneous Q8H [address] KEISHA Newberry     • hydrALAZINE  100 mg Oral BID KEISHA Newberry     • insulin lispro  1-5 Units Subcutaneous TID AC KEISHA Newberry     • insulin lispro  1-5 Units Subcutaneous 0200 KEISHA Newberry     • insulin lispro  1-5 Units Subcutaneous HS KEISHA Newberry     • ipratropium  0 5 mg Nebulization TID KEISHA Newberry     • isosorbide mononitrate  60 mg Oral Daily KEISHA Newberry     • labetalol  10 mg Intravenous Q6H PRN Elda Henriquez PA-C     • levalbuterol  0 63 mg Nebulization Q4H PRN KEISHA Newberry     • levalbuterol  1 25 mg Nebulization TID KEISHA Newberry      And   • sodium chloride  3 mL Nebulization TID KEISHA Newberry     • nystatin   Topical BID KEISHA Newberry     • ondansetron  4 mg Intravenous Q6H PRN KEISHA Newberry     • pantoprazole  40 mg Oral Early Morning KEISHA Newberry     • simethicone  80 mg Oral 4x Daily PRN KEISHA Newberry     • tamsulosin  0 4 mg Oral Daily With Dinner Rajendra and KEISHA Peñaloza          Today, Patient Was Seen By: Elissa Dunlap MD    **Please Note: This note may have been constructed using a voice recognition system  **

## 2022-11-24 NOTE — PROGRESS NOTES
Progress Note - Pulmonary   Redgie Bee 80 y o  male MRN: [id number]  Unit/Bed#: [address] [place] [address] Encounter: [id number]    Assessment:  Acute hypoxemic respiratory failure secondary to CHF, bilateral lower lobe atelectasis and also aspiration pneumonia  He does have history of oropharyngeal dysphagia with esophageal dysmotility  Plan:  Continue IV cefepime  Neb treatments with levalbuterol 1 25 mg and Atrovent 0 5 mg t i d   Oxygen 2 5 liters/minute  He is receiving furosemide 40 mg IV twice a day    Subjective:   Not having any shortness of breath    Objective:     Vitals: Blood pressure 153/62, pulse 64, temperature 97 9 °F (36 6 °C), resp  rate 18, height 5' 5" (1 651 m), weight 82 8 kg (182 lb 8 oz), SpO2 96 %  ,Body mass index is 30 37 kg/m²  Intake/Output Summary (Last 24 hours) at 11/24/2022 1701  Last data filed at 11/24/2022 1301  Gross per 24 hour   Intake --   Output 300 ml   Net -300 ml       Physical Exam: Physical Exam  Vitals reviewed  Constitutional:       General: He is not in acute distress  Appearance: Normal appearance  He is well-developed and well-nourished  Comments: On 2 5 liter/minute nasal cannula oxygen and O2 saturation is 95%   HENT:      Head: Normocephalic  Right Ear: External ear normal       Left Ear: External ear normal       Nose: Nose normal       Mouth/Throat:      Mouth: Mucous membranes are moist       Pharynx: No oropharyngeal exudate  Eyes:      Conjunctiva/sclera: Conjunctivae normal       Pupils: Pupils are equal, round, and reactive to light  Neck:      Vascular: No JVD  Trachea: No tracheal deviation  Cardiovascular:      Rate and Rhythm: Normal rate and regular rhythm  Heart sounds: Normal heart sounds  Pulmonary:      Effort: Pulmonary effort is normal       Comments: Lung sounds are clear anteriorly  Abdominal:      General: There is no distension  Palpations: Abdomen is soft  Tenderness:  There is no abdominal tenderness  There is no guarding  Musculoskeletal:         General: No edema  Cervical back: Neck supple  Comments: No edema   Lymphadenopathy:      Cervical: No cervical adenopathy  Skin:     General: Skin is warm and dry  Findings: No rash  Neurological:      Mental Status: He is alert and oriented to person, place, and time  Psychiatric:         Mood and Affect: Mood and affect normal          Behavior: Behavior normal          Thought Content: Thought content normal           Labs: I have personally reviewed pertinent lab results  , ABG:   Lab Results   Component Value Date    PHART 7 409 11/22/2022    IKU1ICC 45 0 (H) 11/22/2022    PO2ART 72 0 (L) 11/22/2022    FMF9MMR 27 8 11/22/2022    BEART 2 7 11/22/2022    SOURCE Radial, Left 11/22/2022   , BNP: No results found for: BNP, CBC:   Lab Results   Component Value Date    WBC 14 84 (H) 11/24/2022    HGB 8 8 (L) 11/24/2022    HCT 26 8 (L) 11/24/2022    MCV 92 11/24/2022     11/24/2022    MCH 30 3 11/24/2022    MCHC 32 8 11/24/2022    RDW 14 7 11/24/2022    MPV 11 6 11/24/2022    NRBC 0 11/24/2022   , CMP:   Lab Results   Component Value Date    K 4 3 11/24/2022     11/24/2022    CO2 30 11/24/2022     (H) 11/24/2022    CREATININE 2 76 (H) 11/24/2022    CALCIUM 8 8 11/24/2022    AST  11/22/2022      Comment:      No result  If an AST is required for patient care, it must be ordered separately  Specimen collection should occur prior to Sulfasalazine administration due to the potential for falsely depressed results  ALT 27 11/22/2022    ALKPHOS 92 11/22/2022    EGFR 18 11/24/2022   , PT/INR:   Lab Results   Component Value Date    INR 1 07 11/22/2022   , Troponin: No results found for: TROPONIN    Imaging and other studies: I have personally reviewed pertinent reports     and I have personally reviewed pertinent films in PACS

## 2022-11-24 NOTE — PLAN OF CARE
Problem: RESPIRATORY - ADULT  Goal: Achieves optimal ventilation and oxygenation  Description: INTERVENTIONS:  - Assess for changes in respiratory status  - Assess for changes in mentation and behavior  - Position to facilitate oxygenation and minimize respiratory effort  - Oxygen administered by appropriate delivery if ordered  - Initiate smoking cessation education as indicated  - Encourage broncho-pulmonary hygiene including cough, deep breathe, Incentive Spirometry  - Assess the need for suctioning and aspirate as needed  - Assess and instruct to report SOB or any respiratory difficulty  - Respiratory Therapy support as indicated  Outcome: Progressing     Problem: Prexisting or High Potential for Compromised Skin Integrity  Goal: Skin integrity is maintained or improved  Description: INTERVENTIONS:  - Identify patients at risk for skin breakdown  - Assess and monitor skin integrity  - Assess and monitor nutrition and hydration status  - Monitor labs   - Assess for incontinence   - Turn and reposition patient  - Assist with mobility/ambulation  - Relieve pressure over bony prominences  - Avoid friction and shearing  - Provide appropriate hygiene as needed including keeping skin clean and dry  - Evaluate need for skin moisturizer/barrier cream  - Collaborate with interdisciplinary team   - Patient/family teaching  - Consider wound care consult   Outcome: Progressing     Problem: MOBILITY - ADULT  Goal: Maintain or return to baseline ADL function  Description: INTERVENTIONS:  -  Assess patient's ability to carry out ADLs; assess patient's baseline for ADL function and identify physical deficits which impact ability to perform ADLs (bathing, care of mouth/teeth, toileting, grooming, dressing, etc )  - Assess/evaluate cause of self-care deficits   - Assess range of motion  - Assess patient's mobility; develop plan if impaired  - Assess patient's need for assistive devices and provide as appropriate  - Encourage maximum independence but intervene and supervise when necessary  - Involve family in performance of ADLs  - Assess for home care needs following discharge   - Consider OT consult to assist with ADL evaluation and planning for discharge  - Provide patient education as appropriate  Outcome: Progressing  Goal: Maintains/Returns to pre admission functional level  Description: INTERVENTIONS:  - Perform BMAT or MOVE assessment daily    - Set and communicate daily mobility goal to care team and patient/family/caregiver  - Collaborate with rehabilitation services on mobility goals if consulted  - Perform Range of Motion  3times a day  - Reposition patient every 2 hours    - Dangle patient 3 times a day  - Stand patient 3 times a day  - Ambulate patient 3 times a day  - Out of bed to chair 3 times a day   - Out of bed for meals 3 times a day  - Out of bed for toileting  - Record patient progress and toleration of activity level   Outcome: Progressing     Problem: Potential for Falls  Goal: Patient will remain free of falls  Description: INTERVENTIONS:  - Educate patient/family on patient safety including physical limitations  - Instruct patient to call for assistance with activity   - Consult OT/PT to assist with strengthening/mobility   - Keep Call bell within reach  - Keep bed low and locked with side rails adjusted as appropriate  - Keep care items and personal belongings within reach  - Initiate and maintain comfort rounds  - Make Fall Risk Sign visible to staff  - Offer Toileting every 2 Hours, in advance of need  - Initiate/Maintain bed alarm  - Obtain necessary fall risk management equipment: yellow socks  - Apply yellow socks and bracelet for high fall risk patients  - Consider moving patient to room near nurses station  Outcome: Progressing     Problem: PAIN - ADULT  Goal: Verbalizes/displays adequate comfort level or baseline comfort level  Description: Interventions:  - Encourage patient to monitor pain and request assistance  - Assess pain using appropriate pain scale  - Administer analgesics based on type and severity of pain and evaluate response  - Implement non-pharmacological measures as appropriate and evaluate response  - Consider cultural and social influences on pain and pain management  - Notify physician/advanced practitioner if interventions unsuccessful or patient reports new pain  Outcome: Progressing     Problem: INFECTION - ADULT  Goal: Absence or prevention of progression during hospitalization  Description: INTERVENTIONS:  - Assess and monitor for signs and symptoms of infection  - Monitor lab/diagnostic results  - Monitor all insertion sites, i e  indwelling lines, tubes, and drains  - Monitor endotracheal if appropriate and nasal secretions for changes in amount and color  - [place] appropriate cooling/warming therapies per order  - Administer medications as ordered  - Instruct and encourage patient and family to use good hand hygiene technique  - Identify and instruct in appropriate isolation precautions for identified infection/condition  Outcome: Progressing  Goal: Absence of fever/infection during neutropenic period  Description: INTERVENTIONS:  - Monitor WBC    Outcome: Progressing     Problem: SAFETY ADULT  Goal: Maintain or return to baseline ADL function  Description: INTERVENTIONS:  -  Assess patient's ability to carry out ADLs; assess patient's baseline for ADL function and identify physical deficits which impact ability to perform ADLs (bathing, care of mouth/teeth, toileting, grooming, dressing, etc )  - Assess/evaluate cause of self-care deficits   - Assess range of motion  - Assess patient's mobility; develop plan if impaired  - Assess patient's need for assistive devices and provide as appropriate  - Encourage maximum independence but intervene and supervise when necessary  - Involve family in performance of ADLs  - Assess for home care needs following discharge   - Consider OT consult to assist with ADL evaluation and planning for discharge  - Provide patient education as appropriate  Outcome: Progressing  Goal: Maintains/Returns to pre admission functional level  Description: INTERVENTIONS:  - Perform BMAT or MOVE assessment daily    - Set and communicate daily mobility goal to care team and patient/family/caregiver  - Collaborate with rehabilitation services on mobility goals if consulted  - Perform Range of Motion 3 times a day  - Reposition patient every 2 hours    - Dangle patient 3 times a day  - Stand patient 3 times a day  - Ambulate patient 3 times a day  - Out of bed to chair 3 times a day   - Out of bed for meals 3 times a day  - Out of bed for toileting  - Record patient progress and toleration of activity level   Outcome: Progressing  Goal: Patient will remain free of falls  Description: INTERVENTIONS:  - Educate patient/family on patient safety including physical limitations  - Instruct patient to call for assistance with activity   - Consult OT/PT to assist with strengthening/mobility   - Keep Call bell within reach  - Keep bed low and locked with side rails adjusted as appropriate  - Keep care items and personal belongings within reach  - Initiate and maintain comfort rounds  - Make Fall Risk Sign visible to staff  - Offer Toileting every 2 Hours, in advance of need  - Initiate/Maintain bed alarm  - Obtain necessary fall risk management equipment: yellow socks  - Apply yellow socks and bracelet for high fall risk patients  - Consider moving patient to room near nurses station  Outcome: Progressing

## 2022-11-24 NOTE — ASSESSMENT & PLAN NOTE
Just completed 7 days antibiotic  · Repeat procalcitonin level was 0 54  · Patient received cefepime and Flagyl in the ED     Patient will be continued on cefepime  1 gram IV daily  · Follow-up for sputum cultures  · Urine for Legionella and pneumococcal antigens were negative  · Pulmonary input appreciated

## 2022-11-24 NOTE — ASSESSMENT & PLAN NOTE
Wt Readings from Last 3 Encounters:   11/24/22 82 8 kg (182 lb 8 oz)   11/22/22 84 5 kg (186 lb 4 8 oz)     Patient received IV Lasix in recent admission     · Chest x-ray today shows worsening pulmonary edema  · Recent 2D echo as below  · Mild edema to lower extremity, left forearm edema on exam   · Patient received 40 milligrams of Lasix IV in the ED and was continue Lasix 40 milligram IV daily which was later increased to 40 milligram IV q 12 hours  · Left upper extremity venous Doppler showed no evidence of DVT  · CT chest showed pulmonary edema with moderate size bilateral pleural effusions left more than right  · Continue Lasix 40 milligram IV q 12 hours

## 2022-11-24 NOTE — ASSESSMENT & PLAN NOTE
Lab Results   Component Value Date    HGBA1C 5 7 (H) 11/16/2022       Recent Labs     11/24/22  0159 11/24/22  0748 11/24/22  1132 11/24/22  1135   POCGLU 216* 173* 346* 344*       Blood Sugar Average: Last 72 hrs:  (P) 276 5   Patient was discharged on Prandin t i d  With meals  Will hold while inpatient  · Continue Humalog sliding scale with Accu-Cheks q a c  And HS  · Patient was started on diabetic pureed Diet with thin liquids    Blood sugars are running high

## 2022-11-24 NOTE — ASSESSMENT & PLAN NOTE
Patient presents with acute respiratory distress shortly after eating a few spoonful of puree diet in STR, patient reported chest pressure and SOB  Patient was satting 85-95% on oxygen 3 L per STR transfer paper  Patient was belching after eating/drinking limited amount of diet/water per staff  · Patient was discharged on the day of admission after being hospitalized for aspiration pneumonia, acute on chronic diastolic CHF, dysphagia  Patient received 7 days of IV Rocephin and Flagyl  Received IV Lasix  Seen by GI, underwent EGD, found to have oropharyngeal dysphagia with esophageal dysmotility, no obvious esophageal stricture  GI recommend to consider PEG tube placement if recurrent aspirations  Recommend pantoprazole daily  Underwent Barium swallow study which showed moderate to severe esophageal dysmotility with significant residual contrast remaining in the esophagus at the end of the study  Patient was recommend regular diet by speech and discharged on regular diet per STR  · Likely secondary to multifocal pneumonia and also CHF  · Patient required BiPAP on ED arrival   ABG post BiPAP essentially unremarkable  Later patient was titrated down to 6 liters and currently patient is down to 2 5 liters with O2 saturation in mid 90s  · Chest x-ray - Pulmonary edema pattern, worse when comparing to 11/19/2022  Underlying infection cannot be excluded  · ProBNP 15,094  · Patient received Lasix 40 IV in ED  Will continue Lasix 40 mg IV daily  · Received cefepime and Flagyl in ED  · Received IV Solu-Medrol in ED  No wheezing on auscultation  · Send sputum Gram stain and culture  · Urine for Legionella pneumococcal antigens were negative  · Continue Xopenex+ Atrovent t i d , Xopenex p r n    · CT of the chest showed pulmonary edema with moderate size bilateral pleural effusions with significant bibasilar atelectasis and concomitant multifocal pneumonia  · Patient restarted back on cefepime 1 gram IV daily as procalcitonin level is increasing

## 2022-11-24 NOTE — ASSESSMENT & PLAN NOTE
Lab Results   Component Value Date    EGFR 18 11/24/2022    EGFR 18 11/23/2022    EGFR 18 11/22/2022    CREATININE 2 76 (H) 11/24/2022    CREATININE 2 77 (H) 11/23/2022    CREATININE 2 83 (H) 11/22/2022   History of CKD 4, baseline creatinine appears to be around 1 5-1 9 in past 2 years in care everywhere  Creatinine during previous hospitalization ranged in 2 4-2 8  · Possible acute kidney injury versus progression of chronic kidney disease  · Urinalysis was bland  · Received IV Lasix 40 mg in ED, will continue    · Avoid nephrotoxin and hypotension  · Renal ultrasound showed no hydronephrosis moderate left renal atrophy  · Nephrology input appreciated  · Monitor creatinine and Lasix 40 milligram IV q 12 hours

## 2022-11-24 NOTE — PLAN OF CARE
Problem: RESPIRATORY - ADULT  Goal: Achieves optimal ventilation and oxygenation  Description: INTERVENTIONS:  - Assess for changes in respiratory status  - Assess for changes in mentation and behavior  - Position to facilitate oxygenation and minimize respiratory effort  - Oxygen administered by appropriate delivery if ordered  - Initiate smoking cessation education as indicated  - Encourage broncho-pulmonary hygiene including cough, deep breathe, Incentive Spirometry  - Assess the need for suctioning and aspirate as needed  - Assess and instruct to report SOB or any respiratory difficulty  - Respiratory Therapy support as indicated  Outcome: Progressing     Problem: Prexisting or High Potential for Compromised Skin Integrity  Goal: Skin integrity is maintained or improved  Description: INTERVENTIONS:  - Identify patients at risk for skin breakdown  - Assess and monitor skin integrity  - Assess and monitor nutrition and hydration status  - Monitor labs   - Assess for incontinence   - Turn and reposition patient  - Assist with mobility/ambulation  - Relieve pressure over bony prominences  - Avoid friction and shearing  - Provide appropriate hygiene as needed including keeping skin clean and dry  - Evaluate need for skin moisturizer/barrier cream  - Collaborate with interdisciplinary team   - Patient/family teaching  - Consider wound care consult   Outcome: Progressing     Problem: MOBILITY - ADULT  Goal: Maintain or return to baseline ADL function  Description: INTERVENTIONS:  -  Assess patient's ability to carry out ADLs; assess patient's baseline for ADL function and identify physical deficits which impact ability to perform ADLs (bathing, care of mouth/teeth, toileting, grooming, dressing, etc )  - Assess/evaluate cause of self-care deficits   - Assess range of motion  - Assess patient's mobility; develop plan if impaired  - Assess patient's need for assistive devices and provide as appropriate  - Encourage maximum independence but intervene and supervise when necessary  - Involve family in performance of ADLs  - Assess for home care needs following discharge   - Consider OT consult to assist with ADL evaluation and planning for discharge  - Provide patient education as appropriate  Outcome: Progressing  Goal: Maintains/Returns to pre admission functional level  Description: INTERVENTIONS:  - Perform BMAT or MOVE assessment daily    - Set and communicate daily mobility goal to care team and patient/family/caregiver  - Collaborate with rehabilitation services on mobility goals if consulted  - Perform Range of Motion 3 times a day  - Reposition patient every 2 hours    - Dangle patient 3 times a day  - Stand patient 3 times a day  - Ambulate patient 3 times a day  - Out of bed to chair 3 times a day   - Out of bed for meals 3  Problem: Potential for Falls  Goal: Patient will remain free of falls  Description: INTERVENTIONS:  - Educate patient/family on patient safety including physical limitations  - Instruct patient to call for assistance with activity   - Consult OT/PT to assist with strengthening/mobility   - Keep Call bell within reach  - Keep bed low and locked with side rails adjusted as appropriate  - Keep care items and personal belongings within reach  - Initiate and maintain comfort rounds  - Make Fall Risk Sign visible to staff  - Offer Toileting every 2 Hours, in advance of need  - Initiate/Maintain bed alarm  - Obtain necessary fall risk management equipment:   Problem: INFECTION - ADULT  Goal: Absence or prevention of progression during hospitalization  Description: INTERVENTIONS:  - Assess and monitor for signs and symptoms of infection  - Monitor lab/diagnostic results  - Monitor all insertion sites, i e  indwelling lines, tubes, and drains  - Monitor endotracheal if appropriate and nasal secretions for changes in amount and color  - [place] appropriate cooling/warming therapies per order  - Administer medications as ordered  - Instruct and encourage patient and family to use good hand hygiene technique  - Identify and instruct in appropriate isolation precautions for identified infection/condition  Outcome: Progressing  Goal: Absence of fever/infection during neutropenic period  Description: INTERVENTIONS:  - Monitor WBC    Outcome: Progressing     Problem: INFECTION - ADULT  Goal: Absence of fever/infection during neutropenic period  Description: INTERVENTIONS:  - Monitor WBC    Outcome: Progressing     Problem: DISCHARGE PLANNING  Goal: Discharge to home or other facility with appropriate resources  Description: INTERVENTIONS:  - Identify barriers to discharge w/patient and caregiver  - Arrange for needed discharge resources and transportation as appropriate  - Identify discharge learning needs (meds, wound care, etc )  - Arrange for interpretive services to assist at discharge as needed  - Refer to Case Management Department for coordinating discharge planning if the patient needs post-hospital services based on physician/advanced practitioner order or complex needs related to functional status, cognitive ability, or social support system  Outcome: Progressing     Problem: Nutrition/Hydration-ADULT  Goal: Nutrient/Hydration intake appropriate for improving, restoring or maintaining nutritional needs  Description: Monitor and assess patient's nutrition/hydration status for malnutrition  Collaborate with interdisciplinary team and initiate plan and interventions as ordered  Monitor patient's weight and dietary intake as ordered or per policy  Utilize nutrition screening tool and intervene as necessary  Determine patient's food preferences and provide high-protein, high-caloric foods as appropriate       INTERVENTIONS:  - Monitor oral intake, urinary output, labs, and treatment plans  - Assess nutrition and hydration status and recommend course of action  - Evaluate amount of meals eaten  - Assist patient with eating if necessary   - Allow adequate time for meals  - Recommend/ encourage appropriate diets, oral nutritional supplements, and vitamin/mineral supplements  - Order, calculate, and assess calorie counts as needed  - Recommend, monitor, and adjust tube feedings and TPN/PPN based on assessed needs  - Assess need for intravenous fluids  - Provide specific nutrition/hydration education as appropriate  - Include patient/family/caregiver in decisions related to nutrition  Outcome: Progressing     - Apply yellow socks and bracelet for high fall risk patients  - Consider moving patient to room near nurses station  Outcome: Progressing    times a day  - Out of bed for toileting  - Record patient progress and toleration of activity level   Outcome: Progressing

## 2022-11-24 NOTE — ASSESSMENT & PLAN NOTE
Troponin [id number]  Elevated troponin in recent admission as well  Reports chest pressure when in respiratory distress  Denies history of CAD  · EKG no ischemic changes, read as AFib, rate 103,RBBB  · No history of AFib  Prior EKG shows sinus rhythm with PACs /PVCs  Will repeat EKG  · 2D echo last admission showed  EF low normal, normal wall motion, grade 2 diastolic dysfunction, moderately dilated atriums     · Most likely non MI troponin elevation due to # 1 and CHF

## 2022-11-24 NOTE — PROGRESS NOTES
NEPHROLOGY PROGRESS NOTE    Jose A Rodriguez 80 y o  male MRN: [id number]  Unit/Bed#: [address] [place] [address] Encounter: [id number]  Reason for Consult:  Chronic kidney disease    The patient is awake and alert says he ate breakfast okay this morning  Feeling better has a cough but it has improved  Otherwise no complaints for me  ASSESSMENT/PLAN:  1  Renal    Patient appears to have chronic kidney disease a creatinine appeared to range 2 4-2 8 from recent hospitalizations from review of my partners note  Renal ultrasound was reviewed from yesterday showing a right kidney of 10 1 cm but an atrophic left kidney of 7 cm  No hydronephrosis seen  The patient's creatinine is stable at 2 7  He was placed on IV diuretic by my partner given the stability of creatinine can continue for 24 hours  Suspect creatinine likely is at baseline  Continue Lasix b i d  As ordered today  BMP a m     2  Pulmonary    Patient being treated for pneumonia with antibiotics feels that his symptoms are improved with less cough  SUBJECTIVE:  Review of Systems   Constitutional: Negative for chills, diaphoresis, fever and night sweats  HENT: Negative  Eyes: Negative  Cardiovascular: Negative for chest pain, dyspnea on exertion, leg swelling and orthopnea  Respiratory: Positive for cough  Negative for shortness of breath, sputum production and wheezing  Cough is improved  Gastrointestinal: Negative for abdominal pain, diarrhea, nausea and vomiting  Genitourinary: Negative  Neurological: Negative for dizziness, focal weakness, headaches and light-headedness  Psychiatric/Behavioral: Negative for altered mental status, depression, hallucinations and hypervigilance         OBJECTIVE:  Current Weight: Weight - Scale: 82 8 kg (182 lb 8 oz)  Vitals:Temp (24hrs), Av °F (36 7 °C), Min:97 7 °F (36 5 °C), Max:98 2 °F (36 8 °C)  Current: Temperature: 98 2 °F (36 8 °C)   Blood pressure (!) 179/74, pulse 66, temperature 98 2 °F (36 8 °C), resp  rate 18, height 5' 5" (1 651 m), weight 82 8 kg (182 lb 8 oz), SpO2 99 %  , Body mass index is 30 37 kg/m²  Intake/Output Summary (Last 24 hours) at 11/24/2022 0819  Last data filed at 11/24/2022 0151  Gross per 24 hour   Intake --   Output 950 ml   Net -950 ml       Physical Exam: BP (!) 179/74   Pulse 66   Temp 98 2 °F (36 8 °C)   Resp 18   Ht 5' 5" (1 651 m)   Wt 82 8 kg (182 lb 8 oz)   SpO2 99%   BMI 30 37 kg/m²   Physical Exam  Constitutional:       General: He is not in acute distress  Appearance: He is not toxic-appearing or diaphoretic  HENT:      Head: Normocephalic and atraumatic  Nose: Nose normal       Mouth/Throat:      Mouth: Mucous membranes are moist    Eyes:      General: No scleral icterus  Extraocular Movements: Extraocular movements intact  Cardiovascular:      Rate and Rhythm: Normal rate and regular rhythm  Heart sounds: No friction rub  No gallop  Comments: No significant edema  Pulmonary:      Effort: Pulmonary effort is normal  No respiratory distress  Breath sounds: No wheezing, rhonchi or rales  Abdominal:      General: Bowel sounds are normal  There is no distension  Palpations: Abdomen is soft  Tenderness: There is no abdominal tenderness  There is no rebound  Musculoskeletal:      Cervical back: Normal range of motion and neck supple  Neurological:      Mental Status: He is alert and oriented to person, place, and time  Mental status is at baseline  Psychiatric:         Mood and Affect: Mood normal          Behavior: Behavior normal          Thought Content:  Thought content normal          Judgment: Judgment normal          Medications:    Current Facility-Administered Medications:   •  acetaminophen (TYLENOL) tablet 650 mg, 650 mg, Oral, Q6H PRN, KEISHA Newberry  •  amLODIPine (NORVASC) tablet 10 mg, 10 mg, Oral, Daily, KEISHA Newberry, 10 mg at 11/23/22 3824  •  aspirin chewable tablet 81 mg, 81 mg, Oral, Daily, Cuidoc Link, CRNP, 81 mg at 11/23/22 0809  •  atorvastatin (LIPITOR) tablet 40 mg, 40 mg, Oral, Daily, Cuiyicarlos Zunigarik, CRNP, 40 mg at 11/23/22 8722  •  carvedilol (COREG) tablet 12 5 mg, 12 5 mg, Oral, BID With Meals, Cujeane Zunigarijose, CRNP, 12 5 mg at 11/23/22 1609  •  cefepime (MAXIPIME) IVPB (premix in dextrose) 1,000 mg 50 mL, 1,000 mg, Intravenous, Q24H, Steve Yan MD, Last Rate: 100 mL/hr at 11/23/22 1747, 1,000 mg at 11/23/22 1747  •  cholecalciferol (VITAMIN D3) tablet 2,000 Units, 2,000 Units, Oral, Daily, APOLONIA NewberryNP, 2,000 Units at 11/23/22 6059  •  cyanocobalamin (VITAMIN B-12) tablet 500 mcg, 500 mcg, Oral, Daily, Emilee Link CRNP, 500 mcg at 11/23/22 0809  •  doxazosin (CARDURA) tablet 2 mg, 2 mg, Oral, HS, Cuidoc Link, CRNP, 2 mg at 11/23/22 2129  •  furosemide (LASIX) injection 40 mg, 40 mg, Intravenous, BID (diuretic), Alexandra Hutchinson MD, 40 mg at 11/23/22 1517  •  gabapentin (NEURONTIN) capsule 100 mg, 100 mg, Oral, BID, Emilee Link, CRNP, 100 mg at 11/23/22 1712  •  heparin (porcine) subcutaneous injection 5,000 Units, 5,000 Units, Subcutaneous, Q8H [address], 5,000 Units at 11/24/22 0604 **AND** [CANCELED] Platelet count, , , Once, CuAPOLONIA EverettNP  •  hydrALAZINE (APRESOLINE) tablet 100 mg, 100 mg, Oral, BID, Cuidoc Zunigarijose, CRNP, 100 mg at 11/23/22 1712  •  insulin lispro (HumaLOG) 100 units/mL subcutaneous injection 1-5 Units, 1-5 Units, Subcutaneous, TID AC, 2 Units at 11/23/22 1608 **AND** Fingerstick Glucose (POCT), , , TID AC, KEISHA Newberry  •  insulin lispro (HumaLOG) 100 units/mL subcutaneous injection 1-5 Units, 1-5 Units, Subcutaneous, 0200, KEISHA Newberry, 1 Units at 11/24/22 0217  •  insulin lispro (HumaLOG) 100 units/mL subcutaneous injection 1-5 Units, 1-5 Units, Subcutaneous, HS, KEISHA Newberry, 3 Units at 11/23/22 2128  •  ipratropium (ATROVENT) 0 02 % inhalation solution 0 5 mg, 0 5 mg, Nebulization, TID, KEISHA Newberry, 0 5 mg at 11/24/22 0712  •  isosorbide mononitrate (IMDUR) 24 hr tablet 60 mg, 60 mg, Oral, Daily, KEISHA Newberry, 60 mg at 11/23/22 0810  •  labetalol (NORMODYNE) injection 10 mg, 10 mg, Intravenous, Q6H PRN, Agustina Vecellio, PA-C, 10 mg at 11/24/22 0241  •  levalbuterol (XOPENEX) inhalation solution 0 63 mg, 0 63 mg, Nebulization, Q4H PRN, KEISHA Newberry  •  levalbuterol (XOPENEX) inhalation solution 1 25 mg, 1 25 mg, Nebulization, TID, 1 25 mg at 11/24/22 0712 **AND** sodium chloride 0 9 % inhalation solution 3 mL, 3 mL, Nebulization, TID, KEISHA Newberry, 3 mL at 11/24/22 0712  •  nystatin (MYCOSTATIN) powder, , Topical, BID, KEISHA Newberry, Given at 11/23/22 1713  •  ondansetron (ZOFRAN) injection 4 mg, 4 mg, Intravenous, Q6H PRN, KEISHA Newberry  •  pantoprazole (PROTONIX) EC tablet 40 mg, 40 mg, Oral, Early Morning, KEISHA Newberry, 40 mg at 11/24/22 0605  •  simethicone (MYLICON) chewable tablet 80 mg, 80 mg, Oral, 4x Daily PRN, KEISHA Newberry  •  tamsulosin (FLOMAX) capsule 0 4 mg, 0 4 mg, Oral, Daily With Dinner, KEISHA Newberry, 0 4 mg at 11/23/22 1608    Laboratory Results:  Lab Results   Component Value Date    WBC 14 84 (H) 11/24/2022    HGB 8 8 (L) 11/24/2022    HCT 26 8 (L) 11/24/2022    MCV 92 11/24/2022     11/24/2022     Lab Results   Component Value Date    SODIUM 144 11/24/2022    K 4 3 11/24/2022     11/24/2022    CO2 30 11/24/2022     (H) 11/24/2022    CREATININE 2 76 (H) 11/24/2022    GLUC 152 (H) 11/24/2022    CALCIUM 8 8 11/24/2022     Lab Results   Component Value Date    CALCIUM 8 8 11/24/2022     No results found for: LABPROT

## 2022-11-24 NOTE — ASSESSMENT & PLAN NOTE
Patient reports no appetite in past 2 days,did not eat all day yesterday  Loss of appetite could be secondary to antibiotic  · Keep patient NPO for now  · Discussed with speech therapy-patient at high risk for aspiration despite the level of diet  For now patient started on pureed diet with thin liquids with medications crushed with puree  · GI recommend to consider PEG tube if recurrent aspirations  Per recent GI note,family did not want PEG tube during prior hospitalization  · Continue pantoprazole daily  · Obstructive series showed barium throughout the colon with modest amount of stool along the rectosigmoid region  · Prolonged discussion held with patient's daughter and patient's PA on November 23, 2022 regarding risk of recurrent aspiration and the patient possibly needing PEG    Family to discuss and let us know about final decision

## 2022-11-25 ENCOUNTER — APPOINTMENT (INPATIENT)
Dept: RADIOLOGY | Facility: HOSPITAL | Age: 87
End: 2022-11-25

## 2022-11-25 PROBLEM — R19.02 LEFT UPPER QUADRANT ABDOMINAL MASS: Status: ACTIVE | Noted: 2022-11-25

## 2022-11-25 LAB
ANION GAP SERPL CALCULATED.3IONS-SCNC: 9 MMOL/L (ref 4–13)
ATRIAL RATE: 87 BPM
BUN SERPL-MCNC: 112 MG/DL (ref 5–25)
CALCIUM SERPL-MCNC: 8.9 MG/DL (ref 8.3–10.1)
CHLORIDE SERPL-SCNC: 101 MMOL/L (ref 96–108)
CO2 SERPL-SCNC: 29 MMOL/L (ref 21–32)
CREAT SERPL-MCNC: 2.81 MG/DL (ref 0.6–1.3)
ERYTHROCYTE [DISTWIDTH] IN BLOOD BY AUTOMATED COUNT: 14.8 % (ref 11.6–15.1)
GFR SERPL CREATININE-BSD FRML MDRD: 18 ML/MIN/1.73SQ M
GLUCOSE SERPL-MCNC: 104 MG/DL (ref 65–140)
GLUCOSE SERPL-MCNC: 145 MG/DL (ref 65–140)
GLUCOSE SERPL-MCNC: 183 MG/DL (ref 65–140)
GLUCOSE SERPL-MCNC: 286 MG/DL (ref 65–140)
GLUCOSE SERPL-MCNC: 366 MG/DL (ref 65–140)
GLUCOSE SERPL-MCNC: 390 MG/DL (ref 65–140)
HCT VFR BLD AUTO: 26.9 % (ref 36.5–49.3)
HGB BLD-MCNC: 8.8 G/DL (ref 12–17)
MCH RBC QN AUTO: 30.1 PG (ref 26.8–34.3)
MCHC RBC AUTO-ENTMCNC: 32.7 G/DL (ref 31.4–37.4)
MCV RBC AUTO: 92 FL (ref 82–98)
NRBC BLD AUTO-RTO: 0 /100 WBCS
P AXIS: 87 DEGREES
PLATELET # BLD AUTO: 209 THOUSANDS/UL (ref 149–390)
PMV BLD AUTO: 12.6 FL (ref 8.9–12.7)
POTASSIUM SERPL-SCNC: 3.7 MMOL/L (ref 3.5–5.3)
PR INTERVAL: 138 MS
PROCALCITONIN SERPL-MCNC: 0.44 NG/ML
QRS AXIS: -29 DEGREES
QRS AXIS: -32 DEGREES
QRS AXIS: -35 DEGREES
QRSD INTERVAL: 132 MS
QRSD INTERVAL: 132 MS
QRSD INTERVAL: 134 MS
QT INTERVAL: 372 MS
QT INTERVAL: 418 MS
QT INTERVAL: 428 MS
QTC INTERVAL: 487 MS
QTC INTERVAL: 514 MS
QTC INTERVAL: 515 MS
RBC # BLD AUTO: 2.92 MILLION/UL (ref 3.88–5.62)
SODIUM SERPL-SCNC: 139 MMOL/L (ref 135–147)
T WAVE AXIS: 27 DEGREES
T WAVE AXIS: 30 DEGREES
T WAVE AXIS: 48 DEGREES
VENTRICULAR RATE: 103 BPM
VENTRICULAR RATE: 87 BPM
VENTRICULAR RATE: 91 BPM
WBC # BLD AUTO: 15.84 THOUSAND/UL (ref 4.31–10.16)

## 2022-11-25 RX ORDER — POLYETHYLENE GLYCOL 3350 17 G/17G
17 POWDER, FOR SOLUTION ORAL DAILY PRN
Status: DISCONTINUED | OUTPATIENT
Start: 2022-11-25 | End: 2022-11-26

## 2022-11-25 RX ORDER — DOCUSATE SODIUM 100 MG/1
100 CAPSULE, LIQUID FILLED ORAL 2 TIMES DAILY
Status: DISCONTINUED | OUTPATIENT
Start: 2022-11-25 | End: 2022-11-28

## 2022-11-25 RX ADMIN — DOCUSATE SODIUM 100 MG: 100 CAPSULE, LIQUID FILLED ORAL at 12:46

## 2022-11-25 RX ADMIN — INSULIN LISPRO 1 UNITS: 100 INJECTION, SOLUTION INTRAVENOUS; SUBCUTANEOUS at 03:19

## 2022-11-25 RX ADMIN — INSULIN LISPRO 4 UNITS: 100 INJECTION, SOLUTION INTRAVENOUS; SUBCUTANEOUS at 21:01

## 2022-11-25 RX ADMIN — HEPARIN SODIUM 5000 UNITS: 5000 INJECTION INTRAVENOUS; SUBCUTANEOUS at 15:30

## 2022-11-25 RX ADMIN — HYDRALAZINE HYDROCHLORIDE 100 MG: 25 TABLET ORAL at 09:50

## 2022-11-25 RX ADMIN — DOXAZOSIN 2 MG: 2 TABLET ORAL at 21:01

## 2022-11-25 RX ADMIN — INSULIN LISPRO 4 UNITS: 100 INJECTION, SOLUTION INTRAVENOUS; SUBCUTANEOUS at 11:44

## 2022-11-25 RX ADMIN — PANTOPRAZOLE SODIUM 40 MG: 40 TABLET, DELAYED RELEASE ORAL at 06:19

## 2022-11-25 RX ADMIN — CYANOCOBALAMIN TAB 500 MCG 500 MCG: 500 TAB at 09:50

## 2022-11-25 RX ADMIN — CARVEDILOL 12.5 MG: 12.5 TABLET, FILM COATED ORAL at 09:50

## 2022-11-25 RX ADMIN — ISOSORBIDE MONONITRATE 60 MG: 60 TABLET, EXTENDED RELEASE ORAL at 09:50

## 2022-11-25 RX ADMIN — NYSTATIN: 100000 POWDER TOPICAL at 17:39

## 2022-11-25 RX ADMIN — INSULIN LISPRO 3 UNITS: 100 INJECTION, SOLUTION INTRAVENOUS; SUBCUTANEOUS at 16:29

## 2022-11-25 RX ADMIN — IPRATROPIUM BROMIDE 0.5 MG: 0.5 SOLUTION RESPIRATORY (INHALATION) at 08:07

## 2022-11-25 RX ADMIN — ISODIUM CHLORIDE 3 ML: 0.03 SOLUTION RESPIRATORY (INHALATION) at 08:07

## 2022-11-25 RX ADMIN — GABAPENTIN 100 MG: 100 CAPSULE ORAL at 17:35

## 2022-11-25 RX ADMIN — AMLODIPINE BESYLATE 10 MG: 10 TABLET ORAL at 09:50

## 2022-11-25 RX ADMIN — FUROSEMIDE 40 MG: 10 INJECTION, SOLUTION INTRAMUSCULAR; INTRAVENOUS at 09:50

## 2022-11-25 RX ADMIN — ISODIUM CHLORIDE 3 ML: 0.03 SOLUTION RESPIRATORY (INHALATION) at 13:18

## 2022-11-25 RX ADMIN — Medication 2000 UNITS: at 09:50

## 2022-11-25 RX ADMIN — ASPIRIN 81 MG CHEWABLE TABLET 81 MG: 81 TABLET CHEWABLE at 09:50

## 2022-11-25 RX ADMIN — ISODIUM CHLORIDE 3 ML: 0.03 SOLUTION RESPIRATORY (INHALATION) at 19:58

## 2022-11-25 RX ADMIN — HEPARIN SODIUM 5000 UNITS: 5000 INJECTION INTRAVENOUS; SUBCUTANEOUS at 06:19

## 2022-11-25 RX ADMIN — CARVEDILOL 12.5 MG: 12.5 TABLET, FILM COATED ORAL at 15:30

## 2022-11-25 RX ADMIN — LEVALBUTEROL HYDROCHLORIDE 1.25 MG: 1.25 SOLUTION, CONCENTRATE RESPIRATORY (INHALATION) at 13:18

## 2022-11-25 RX ADMIN — HEPARIN SODIUM 5000 UNITS: 5000 INJECTION INTRAVENOUS; SUBCUTANEOUS at 21:01

## 2022-11-25 RX ADMIN — TAMSULOSIN HYDROCHLORIDE 0.4 MG: 0.4 CAPSULE ORAL at 15:30

## 2022-11-25 RX ADMIN — NYSTATIN: 100000 POWDER TOPICAL at 09:51

## 2022-11-25 RX ADMIN — DOCUSATE SODIUM 100 MG: 100 CAPSULE, LIQUID FILLED ORAL at 17:35

## 2022-11-25 RX ADMIN — FUROSEMIDE 40 MG: 10 INJECTION, SOLUTION INTRAMUSCULAR; INTRAVENOUS at 15:30

## 2022-11-25 RX ADMIN — IPRATROPIUM BROMIDE 0.5 MG: 0.5 SOLUTION RESPIRATORY (INHALATION) at 19:59

## 2022-11-25 RX ADMIN — LEVALBUTEROL HYDROCHLORIDE 1.25 MG: 1.25 SOLUTION, CONCENTRATE RESPIRATORY (INHALATION) at 08:07

## 2022-11-25 RX ADMIN — CEFEPIME HYDROCHLORIDE 1000 MG: 1 INJECTION, SOLUTION INTRAVENOUS at 17:35

## 2022-11-25 RX ADMIN — GABAPENTIN 100 MG: 100 CAPSULE ORAL at 09:50

## 2022-11-25 RX ADMIN — IPRATROPIUM BROMIDE 0.5 MG: 0.5 SOLUTION RESPIRATORY (INHALATION) at 13:18

## 2022-11-25 RX ADMIN — ATORVASTATIN CALCIUM 40 MG: 40 TABLET, FILM COATED ORAL at 09:50

## 2022-11-25 RX ADMIN — LEVALBUTEROL HYDROCHLORIDE 1.25 MG: 1.25 SOLUTION, CONCENTRATE RESPIRATORY (INHALATION) at 19:58

## 2022-11-25 NOTE — ASSESSMENT & PLAN NOTE
Troponin [id number]  Elevated troponin in recent admission as well  Reports chest pressure when in respiratory distress  Denies history of CAD  · EKG no ischemic changes, read as AFib, rate 103,RBBB  · No history of AFib  Prior EKG shows sinus rhythm with PACs /PVCs  Will repeat EKG  · 2D echo last admission showed  EF low normal, normal wall motion, grade 2 diastolic dysfunction, moderately dilated atriums     · Most likely non MI troponin elevation due to # 1 and CHF

## 2022-11-25 NOTE — PLAN OF CARE
Problem: RESPIRATORY - ADULT  Goal: Achieves optimal ventilation and oxygenation  Description: INTERVENTIONS:  - Assess for changes in respiratory status  - Assess for changes in mentation and behavior  - Position to facilitate oxygenation and minimize respiratory effort  - Oxygen administered by appropriate delivery if ordered  - Initiate smoking cessation education as indicated  - Encourage broncho-pulmonary hygiene including cough, deep breathe, Incentive Spirometry  - Assess the need for suctioning and aspirate as needed  - Assess and instruct to report SOB or any respiratory difficulty  - Respiratory Therapy support as indicated  Outcome: Progressing     Problem: Prexisting or High Potential for Compromised Skin Integrity  Goal: Skin integrity is maintained or improved  Description: INTERVENTIONS:  - Identify patients at risk for skin breakdown  - Assess and monitor skin integrity  - Assess and monitor nutrition and hydration status  - Monitor labs   - Assess for incontinence   - Turn and reposition patient  - Assist with mobility/ambulation  - Relieve pressure over bony prominences  - Avoid friction and shearing  - Provide appropriate hygiene as needed including keeping skin clean and dry  - Evaluate need for skin moisturizer/barrier cream  - Collaborate with interdisciplinary team   - Patient/family teaching  - Consider wound care consult   Outcome: Progressing     Problem: MOBILITY - ADULT  Goal: Maintain or return to baseline ADL function  Description: INTERVENTIONS:  -  Assess patient's ability to carry out ADLs; assess patient's baseline for ADL function and identify physical deficits which impact ability to perform ADLs (bathing, care of mouth/teeth, toileting, grooming, dressing, etc )  - Assess/evaluate cause of self-care deficits   - Assess range of motion  - Assess patient's mobility; develop plan if impaired  - Assess patient's need for assistive devices and provide as appropriate  - Encourage maximum independence but intervene and supervise when necessary  - Involve family in performance of ADLs  - Assess for home care needs following discharge   - Consider OT consult to assist with ADL evaluation and planning for discharge  - Provide patient education as appropriate  Outcome: Progressing  Goal: Maintains/Returns to pre admission functional level  Description: INTERVENTIONS:  - Perform BMAT or MOVE assessment daily    - Set and communicate daily mobility goal to care team and patient/family/caregiver  - Collaborate with rehabilitation services on mobility goals if consulted  - Perform Range of Motion 3times a day  - Reposition patient every 2 hours    - Dangle patient 3 times a day  - Stand patient 3 times a day  - Ambulate patient 3 times a day  - Out of bed to chair 3 times a day   - Out of bed for meals 3 times a day  - Out of bed for toileting  - Record patient progress and toleration of activity level   Outcome: Progressing     Problem: Potential for Falls  Goal: Patient will remain free of falls  Description: INTERVENTIONS:  - Educate patient/family on patient safety including physical limitations  - Instruct patient to call for assistance with activity   - Consult OT/PT to assist with strengthening/mobility   - Keep Call bell within reach  - Keep bed low and locked with side rails adjusted as appropriate  - Keep care items and personal belongings within reach  - Initiate and maintain comfort rounds  - Make Fall Risk Sign visible to staff  - Offer Toileting every 2 Hours, in advance of need  - Initiate/Maintain bed alarm  - Obtain necessary fall risk management equipment: yellow socks  - Apply yellow socks and bracelet for high fall risk patients  - Consider moving patient to room near nurses station  Outcome: Progressing     Problem: INFECTION - ADULT  Goal: Absence or prevention of progression during hospitalization  Description: INTERVENTIONS:  - Assess and monitor for signs and symptoms of infection  - Monitor lab/diagnostic results  - Monitor all insertion sites, i e  indwelling lines, tubes, and drains  - Monitor endotracheal if appropriate and nasal secretions for changes in amount and color  - [place] appropriate cooling/warming therapies per order  - Administer medications as ordered  - Instruct and encourage patient and family to use good hand hygiene technique  - Identify and instruct in appropriate isolation precautions for identified infection/condition  Outcome: Progressing  Goal: Absence of fever/infection during neutropenic period  Description: INTERVENTIONS:  - Monitor WBC    Outcome: Progressing     Problem: SAFETY ADULT  Goal: Maintain or return to baseline ADL function  Description: INTERVENTIONS:  -  Assess patient's ability to carry out ADLs; assess patient's baseline for ADL function and identify physical deficits which impact ability to perform ADLs (bathing, care of mouth/teeth, toileting, grooming, dressing, etc )  - Assess/evaluate cause of self-care deficits   - Assess range of motion  - Assess patient's mobility; develop plan if impaired  - Assess patient's need for assistive devices and provide as appropriate  - Encourage maximum independence but intervene and supervise when necessary  - Involve family in performance of ADLs  - Assess for home care needs following discharge   - Consider OT consult to assist with ADL evaluation and planning for discharge  - Provide patient education as appropriate  Outcome: Progressing  Goal: Maintains/Returns to pre admission functional level  Description: INTERVENTIONS:  - Perform BMAT or MOVE assessment daily    - Set and communicate daily mobility goal to care team and patient/family/caregiver  - Collaborate with rehabilitation services on mobility goals if consulted  - Perform Range of Motion 3 times a day  - Reposition patient every 2 hours    - Dangle patient 3 times a day  - Stand patient 3 times a day  - Ambulate patient 3 times a day  - Out of bed to chair 3 times a day   - Out of bed for meals 3 times a day  - Out of bed for toileting  - Record patient progress and toleration of activity level   Outcome: Progressing  Goal: Patient will remain free of falls  Description: INTERVENTIONS:  - Educate patient/family on patient safety including physical limitations  - Instruct patient to call for assistance with activity   - Consult OT/PT to assist with strengthening/mobility   - Keep Call bell within reach  - Keep bed low and locked with side rails adjusted as appropriate  - Keep care items and personal belongings within reach  - Initiate and maintain comfort rounds  - Make Fall Risk Sign visible to staff  - Offer Toileting every 2 Hours, in advance of need  - Initiate/Maintain bed alarm  - Obtain necessary fall risk management equipment: yellow socks  - Apply yellow socks and bracelet for high fall risk patients  - Consider moving patient to room near nurses station  Outcome: Progressing     Problem: DISCHARGE PLANNING  Goal: Discharge to home or other facility with appropriate resources  Description: INTERVENTIONS:  - Identify barriers to discharge w/patient and caregiver  - Arrange for needed discharge resources and transportation as appropriate  - Identify discharge learning needs (meds, wound care, etc )  - Arrange for interpretive services to assist at discharge as needed  - Refer to Case Management Department for coordinating discharge planning if the patient needs post-hospital services based on physician/advanced practitioner order or complex needs related to functional status, cognitive ability, or social support system  Outcome: Progressing     Problem: Knowledge Deficit  Goal: Patient/family/caregiver demonstrates understanding of disease process, treatment plan, medications, and discharge instructions  Description: Complete learning assessment and assess knowledge base    Interventions:  - Provide teaching at level of understanding  - Provide teaching via preferred learning methods  Outcome: Progressing

## 2022-11-25 NOTE — ASSESSMENT & PLAN NOTE
CT scan of the chest and also renal ultrasound showed left upper quadrant pass medial to the spleen  Patient might need CT of the abdomen pelvis with contrast and follow-up on the abdominal mass

## 2022-11-25 NOTE — ASSESSMENT & PLAN NOTE
Lab Results   Component Value Date    EGFR 18 11/25/2022    EGFR 18 11/24/2022    EGFR 18 11/23/2022    CREATININE 2 81 (H) 11/25/2022    CREATININE 2 76 (H) 11/24/2022    CREATININE 2 77 (H) 11/23/2022   History of CKD 4, baseline creatinine appears to be around 1 5-1 9 in past 2 years in care everywhere    Creatinine during previous hospitalization ranged in 2 4-2 8  · Possible acute kidney injury versus progression of chronic kidney disease  · Urinalysis was bland  · Received IV Lasix 40 mg in ED  · Avoid nephrotoxin and hypotension  · Renal ultrasound showed no hydronephrosis moderate left renal atrophy  · Nephrology input appreciated  · Monitor creatinine and Lasix 40 milligram IV q 12 hours for possibly another day

## 2022-11-25 NOTE — PROGRESS NOTES
Irene 45  Progress Note Joelle Lehman 10/19/1929, 80 y o  male MRN: [id number]  Unit/Bed#: [address]  Encounter: [id number]  Primary Care Provider: Sally Fuentes PA-C   Date and time admitted to hospital: 11/22/2022  8:41 PM    * Acute respiratory failure with hypoxia [facility])  Assessment & Plan  Patient presents with acute respiratory distress shortly after eating a few spoonful of puree diet in STR, patient reported chest pressure and SOB  Patient was satting 85-95% on oxygen 3 L per STR transfer paper  Patient was belching after eating/drinking limited amount of diet/water per staff  · Patient was discharged on the day of admission after being hospitalized for aspiration pneumonia, acute on chronic diastolic CHF, dysphagia  Patient received 7 days of IV Rocephin and Flagyl  Received IV Lasix  Seen by GI, underwent EGD, found to have oropharyngeal dysphagia with esophageal dysmotility, no obvious esophageal stricture  GI recommend to consider PEG tube placement if recurrent aspirations  Recommend pantoprazole daily  Underwent Barium swallow study which showed moderate to severe esophageal dysmotility with significant residual contrast remaining in the esophagus at the end of the study  Patient was recommend regular diet by speech and discharged on regular diet per STR  · Likely secondary to multifocal pneumonia and also CHF  · Patient required BiPAP on ED arrival   ABG post BiPAP essentially unremarkable  Later patient was titrated down to 6 liters and currently patient is down to 2 5 liters with O2 saturation in mid 90s  · Chest x-ray - Pulmonary edema pattern, worse when comparing to 11/19/2022  Underlying infection cannot be excluded  · ProBNP 15,094  · Patient received Lasix 40 IV in ED  Will continue Lasix 40 mg IV daily  · Received cefepime and Flagyl in ED  · Received IV Solu-Medrol in ED  No wheezing on auscultation    · Send sputum Gram stain and culture  · Urine for Legionella pneumococcal antigens were negative  · Continue Xopenex+ Atrovent t i d , Xopenex p r n  · CT of the chest showed pulmonary edema with moderate size bilateral pleural effusions with significant bibasilar atelectasis and concomitant multifocal pneumonia  · Patient restarted back on cefepime 1 gram IV daily as procalcitonin level is increasing compared to prior admission  Procalcitonin level now improving    Esophageal dysphagia  Assessment & Plan  Patient reports no appetite in past 2 days,did not eat all day yesterday  Loss of appetite could be secondary to antibiotic  · Keep patient NPO for now  · Discussed with speech therapy-patient at high risk for aspiration despite the level of diet  For now patient started on pureed diet with thin liquids with medications crushed with puree  · GI recommend to consider PEG tube if recurrent aspirations  Per recent GI note,family did not want PEG tube during prior hospitalization  · Continue pantoprazole daily  · Obstructive series showed barium throughout the colon with modest amount of stool along the rectosigmoid region  · Prolonged discussion held with patient's daughter and patient's PA on November 23, 2022 regarding risk of recurrent aspiration and the patient possibly needing PEG  Family to discuss and let us know about final decision regarding PEG      Acute on chronic diastolic congestive heart failure ([facility] [address] )  Assessment & Plan  Wt Readings from Last 3 Encounters:   11/25/22 82 7 kg (182 lb 6 4 oz)   11/22/22 84 5 kg (186 lb 4 8 oz)     Patient received IV Lasix in recent admission     · Chest x-ray today shows worsening pulmonary edema  · Recent 2D echo as below  · Mild edema to lower extremity, left forearm edema on exam   · Patient received 40 milligrams of Lasix IV in the ED and was continued on  Lasix 40 milligram IV daily which was later increased to 40 milligram IV q 12 hours  · Left upper extremity venous Doppler showed no evidence of DVT  · CT chest showed pulmonary edema with moderate size bilateral pleural effusions left more than right  · Continue Lasix 40 milligram IV q 12 hours possibly for another day  · Creatinine continues to remain stable        Acute kidney injury superimposed on CKD [facility])  Assessment & Plan  Lab Results   Component Value Date    EGFR 18 11/25/2022    EGFR 18 11/24/2022    EGFR 18 11/23/2022    CREATININE 2 81 (H) 11/25/2022    CREATININE 2 76 (H) 11/24/2022    CREATININE 2 77 (H) 11/23/2022   History of CKD 4, baseline creatinine appears to be around 1 5-1 9 in past 2 years in care everywhere  Creatinine during previous hospitalization ranged in 2 4-2 8  · Possible acute kidney injury versus progression of chronic kidney disease  · Urinalysis was bland  · Received IV Lasix 40 mg in ED  · Avoid nephrotoxin and hypotension  · Renal ultrasound showed no hydronephrosis moderate left renal atrophy  · Nephrology input appreciated  · Monitor creatinine and Lasix 40 milligram IV q 12 hours for possibly another day    Aspiration pneumonia ([address] )  Assessment & Plan  Just completed 7 days antibiotic  · Repeat procalcitonin level was 0 54  · Patient received cefepime and Flagyl in the ED   Patient will be continued on cefepime  1 gram IV daily  · Sputum cultures could not be obtained  · Urine for Legionella and pneumococcal antigens were negative  · Pulmonary input appreciated  · Blood cultures have been negative    Left upper quadrant abdominal mass  Assessment & Plan  CT scan of the chest and also renal ultrasound showed left upper quadrant pass medial to the spleen  Patient might need CT of the abdomen pelvis with contrast and follow-up on the abdominal mass    BPH (benign prostatic hyperplasia)  Assessment & Plan  Continue Cardura and Flomax  · Check PVR    Hyperlipidemia  Assessment & Plan  Continue statin    Elevated troponin  Assessment & Plan  Troponin [id number]    Elevated troponin in recent admission as well   Reports chest pressure when in respiratory distress  Denies history of CAD  · EKG no ischemic changes, read as AFib, rate 103,RBBB  · No history of AFib  Prior EKG shows sinus rhythm with PACs /PVCs  Will repeat EKG  · 2D echo last admission showed  EF low normal, normal wall motion, grade 2 diastolic dysfunction, moderately dilated atriums  · Most likely non MI troponin elevation due to # 1 and CHF      Thrush  Assessment & Plan  Completed 7 days of nystatin suspension  Monitor    Type 2 diabetes mellitus [facility])  Assessment & Plan  Lab Results   Component Value Date    HGBA1C 5 7 (H) 11/16/2022       Recent Labs     11/24/22  2103 11/25/22  0316 11/25/22  0735 11/25/22  1117   POCGLU 325* 183* 104 366*       Blood Sugar Average: Last 72 hrs:  (P) 384 [id number]   Patient was discharged on Prandin t i d  With meals  Will hold while inpatient  · Continue Humalog sliding scale with Accu-Cheks q a c  And HS  · Patient was started on diabetic pureed Diet with thin liquids  Blood sugars are labile    Primary hypertension  Assessment & Plan  On hydralazine, Norvasc, Coreg, Imdur, Cardura  · Will continue above medications with holding parameter  · Blood pressure better        VTE Pharmacologic Prophylaxis:   High Risk (Score >/= 5) - Pharmacological DVT Prophylaxis Ordered: heparin  Sequential Compression Devices Ordered  Patient Centered Rounds: I performed bedside rounds with nursing staff today  Discussions with Specialists or Other Care Team Provider:  Nephrology    Education and Discussions with Family / Patient: Updated  (daughter) via phone  Time Spent for Care: 45 minutes  More than 50% of total time spent on counseling and coordination of care as described above      Current Length of Stay: 3 day(s)  Current Patient Status: Inpatient   Certification Statement: The patient will continue to require additional inpatient hospital stay due to Acute CHF, aspiration pneumonia  Discharge Plan: Anticipate discharge in >72 hrs to rehab facility  Code Status: Level 1 - Full Code    Subjective:       Objective:     Vitals:   Temp (24hrs), Av 3 °F (36 8 °C), Min:97 9 °F (36 6 °C), Max:99 °F (37 2 °C)    Temp:  [97 9 °F (36 6 °C)-99 °F (37 2 °C)] 98 °F (36 7 °C)  HR:  [61-70] 68  Resp:  [18-20] 18  BP: (151-165)/(60-73) 154/60  SpO2:  [92 %-100 %] 100 %  Body mass index is 30 35 kg/m²  Input and Output Summary (last 24 hours): Intake/Output Summary (Last 24 hours) at 2022 1504  Last data filed at 2022 1201  Gross per 24 hour   Intake --   Output 1400 ml   Net -1400 ml       Physical Exam:   Physical Exam  Constitutional:       Appearance: Normal appearance  HENT:      Head: Normocephalic and atraumatic  Eyes:      Extraocular Movements: Extraocular movements intact  Pupils: Pupils are equal, round, and reactive to light  Cardiovascular:      Rate and Rhythm: Normal rate and regular rhythm  Heart sounds: No murmur heard  No gallop  Pulmonary:      Effort: Pulmonary effort is normal       Breath sounds: Normal breath sounds  Abdominal:      General: Bowel sounds are normal       Palpations: Abdomen is soft  Tenderness: There is no abdominal tenderness  Musculoskeletal:         General: No swelling or deformity  Normal range of motion  Cervical back: Normal range of motion and neck supple  Skin:     General: Skin is warm and dry  Neurological:      General: No focal deficit present  Mental Status: He is alert          Additional Data:     Labs:  Results from last 7 days   Lab Units 22  0710 22  0534   WBC Thousand/uL 15 84* 14 84*   HEMOGLOBIN g/dL 8 8* 8 8*   HEMATOCRIT % 26 9* 26 8*   PLATELETS Thousands/uL 209 230   NEUTROS PCT %  --  79*   LYMPHS PCT %  --  9*   MONOS PCT %  --  10   EOS PCT %  --  0     Results from last 7 days   Lab Units 22  0450 22  0825 22  2058   SODIUM mmol/L 139   < > 133*   POTASSIUM mmol/L 3 7   < > 4 9   CHLORIDE mmol/L 101   < > 96   CO2 mmol/L 29   < > 28   BUN mg/dL 112*   < > 91*   CREATININE mg/dL 2 81*   < > 2 83*   ANION GAP mmol/L 9   < > 9   CALCIUM mg/dL 8 9   < > 8 8   ALBUMIN g/dL  --   --  2 6*   TOTAL BILIRUBIN mg/dL  --   --  0 57   ALK PHOS U/L  --   --  92   ALT U/L  --   --  27   GLUCOSE RANDOM mg/dL 145*   < > 287*    < > = values in this interval not displayed  Results from last 7 days   Lab Units 11/22/22 2058   INR  1 07     Results from last 7 days   Lab Units 11/25/22  1117 11/25/22  0735 11/25/22  0316 11/24/22  2103 11/24/22  1606 11/24/22  1135 11/24/22  1132 11/24/22  0748 11/24/22  0159 11/23/22  2028 11/23/22  1542 11/23/22  1114   POC GLUCOSE mg/dl 366* 104 183* 325* 326* 344* 346* 173* 216* 302* 251* 281*         Results from last 7 days   Lab Units 11/25/22  0450 11/23/22  0825 11/22/22 2058 11/22/22  0453 11/21/22  0506   LACTIC ACID mmol/L  --   --  1 0  --   --    PROCALCITONIN ng/ml 0 44* 0 54* 0 37* 0 39* 0 57*       Lines/Drains:  Invasive Devices     Peripheral Intravenous Line  Duration           Peripheral IV 11/22/22 Dorsal (posterior); Left Hand 2 days    Peripheral IV 11/22/22 Right Antecubital 2 days                      Imaging: Reviewed radiology reports from this admission including: chest CT scan and ultrasound(s)    Recent Cultures (last 7 days):   Results from last 7 days   Lab Units 11/22/22 2058 11/22/22 2057   BLOOD CULTURE  No Growth at 48 hrs  No Growth at 48 hrs         Last 24 Hours Medication List:   Current Facility-Administered Medications   Medication Dose Route Frequency Provider Last Rate   • acetaminophen  650 mg Oral Q6H PRN KEISHA Newberry     • amLODIPine  10 mg Oral Daily KEISHA Newberry     • aspirin  81 mg Oral Daily Cuiyin Yurik, CRNP     • atorvastatin  40 mg Oral Daily KEISHA Newberry     • carvedilol  12 5 mg Oral BID With Meals KEISHA Newberry     • cefepime  1,000 mg Intravenous Q24H Mirian Sheikh MD 1,000 mg (11/24/22 9551)   • cholecalciferol  2,000 Units Oral Daily KEISHA Newberry     • vitamin B-12  500 mcg Oral Daily KEISHA Newberry     • docusate sodium  100 mg Oral BID Mirian Sheikh MD     • doxazosin  2 mg Oral HS KEISHA Newberry     • furosemide  40 mg Intravenous BID (diuretic) Ady Da Silva MD     • gabapentin  100 mg Oral BID KEISHA Newberry     • heparin (porcine)  5,000 Units Subcutaneous Q8H [address] KEISHA Newberry     • hydrALAZINE  100 mg Oral BID KEISHA Newberry     • insulin lispro  1-5 Units Subcutaneous TID AC KEISHA Newberry     • insulin lispro  1-5 Units Subcutaneous 0200 KEISHA Newberry     • insulin lispro  1-5 Units Subcutaneous HS KEISHA Newberry     • ipratropium  0 5 mg Nebulization TID KEISHA Newberry     • isosorbide mononitrate  60 mg Oral Daily KEISHA Newberry     • labetalol  10 mg Intravenous Q6H PRN John Benitez PA-C     • levalbuterol  0 63 mg Nebulization Q4H PRN KEISHA Newberry     • levalbuterol  1 25 mg Nebulization TID KEISHA Newberry      And   • sodium chloride  3 mL Nebulization TID KEISHA Newberry     • nystatin   Topical BID KEISHA Newberry     • ondansetron  4 mg Intravenous Q6H PRN KEISHA Newberry     • pantoprazole  40 mg Oral Early Morning KEISHA Newberry     • polyethylene glycol  17 g Oral Daily PRN Mirian Sheikh MD     • simethicone  80 mg Oral 4x Daily PRN KEISHA Newberry     • tamsulosin  0 4 mg Oral Daily With Dinner Rohreyna and KEISHA Peñaloza          Today, Patient Was Seen By: Mirian Sheikh MD    **Please Note: This note may have been constructed using a voice recognition system  **

## 2022-11-25 NOTE — ASSESSMENT & PLAN NOTE
Lab Results   Component Value Date    HGBA1C 5 7 (H) 11/16/2022       Recent Labs     11/24/22  2103 11/25/22  0316 11/25/22  0735 11/25/22  1117   POCGLU 325* 183* 104 366*       Blood Sugar Average: Last 72 hrs:  (P) 149 [id number]   Patient was discharged on Prandin t i d  With meals  Will hold while inpatient  · Continue Humalog sliding scale with Accu-Cheks q a c  And HS  · Patient was started on diabetic pureed Diet with thin liquids    Blood sugars are labile

## 2022-11-25 NOTE — SPEECH THERAPY NOTE
Speech/Language Pathology Progress Note    Patient Name: Joanie Alonso  [id number]'[id number] Date: 11/25/2022       Subjective:  "I used to do everything quick" (including eating  Objective:  Pt was seen for dysphagia therap and education to to promote safe and effective management of oral diet  Pt was alert and positioned upright to begin c RN  I reviewed results from recent VBS and all precautions to promote clearance of food/liquid through the esophagus (eg  SLOW RATE, SMALL FEEDINGS, SINGLE SIPS, UPRIGHT POSITION DURING AND FOR 30-45 MINS AFTER MEALS  Pt was assessed with small amts of pureed beef/gravy, mashed potatos and a few sips of diet soda  Pt fed self at slow rate     Bolus formation and transfer were effective  Swallow initiation appeared prompt  Laryngeal rise was good by palpation  No coughing, throat clearing, c/o stasis, multiple swallows, change in vocal quality noted c po intake today  All precautions written on board and again reviewed c pt  Plan/Recommendations:  Continue as per MD (continue all precautions if continuing with oral diet)       Wanda Cabreraost [address] [phone]

## 2022-11-25 NOTE — ASSESSMENT & PLAN NOTE
Wt Readings from Last 3 Encounters:   11/25/22 82 7 kg (182 lb 6 4 oz)   11/22/22 84 5 kg (186 lb 4 8 oz)     Patient received IV Lasix in recent admission     · Chest x-ray today shows worsening pulmonary edema  · Recent 2D echo as below  · Mild edema to lower extremity, left forearm edema on exam   · Patient received 40 milligrams of Lasix IV in the ED and was continued on  Lasix 40 milligram IV daily which was later increased to 40 milligram IV q 12 hours  · Left upper extremity venous Doppler showed no evidence of DVT  · CT chest showed pulmonary edema with moderate size bilateral pleural effusions left more than right  · Continue Lasix 40 milligram IV q 12 hours possibly for another day  · Creatinine continues to remain stable

## 2022-11-25 NOTE — ASSESSMENT & PLAN NOTE
Patient presents with acute respiratory distress shortly after eating a few spoonful of puree diet in STR, patient reported chest pressure and SOB  Patient was satting 85-95% on oxygen 3 L per STR transfer paper  Patient was belching after eating/drinking limited amount of diet/water per staff  · Patient was discharged on the day of admission after being hospitalized for aspiration pneumonia, acute on chronic diastolic CHF, dysphagia  Patient received 7 days of IV Rocephin and Flagyl  Received IV Lasix  Seen by GI, underwent EGD, found to have oropharyngeal dysphagia with esophageal dysmotility, no obvious esophageal stricture  GI recommend to consider PEG tube placement if recurrent aspirations  Recommend pantoprazole daily  Underwent Barium swallow study which showed moderate to severe esophageal dysmotility with significant residual contrast remaining in the esophagus at the end of the study  Patient was recommend regular diet by speech and discharged on regular diet per STR  · Likely secondary to multifocal pneumonia and also CHF  · Patient required BiPAP on ED arrival   ABG post BiPAP essentially unremarkable  Later patient was titrated down to 6 liters and currently patient is down to 2 5 liters with O2 saturation in mid 90s  · Chest x-ray - Pulmonary edema pattern, worse when comparing to 11/19/2022  Underlying infection cannot be excluded  · ProBNP 15,094  · Patient received Lasix 40 IV in ED  Will continue Lasix 40 mg IV daily  · Received cefepime and Flagyl in ED  · Received IV Solu-Medrol in ED  No wheezing on auscultation  · Send sputum Gram stain and culture  · Urine for Legionella pneumococcal antigens were negative  · Continue Xopenex+ Atrovent t i d , Xopenex p r n    · CT of the chest showed pulmonary edema with moderate size bilateral pleural effusions with significant bibasilar atelectasis and concomitant multifocal pneumonia  · Patient restarted back on cefepime 1 gram IV daily as procalcitonin level is increasing compared to prior admission    Procalcitonin level now improving

## 2022-11-25 NOTE — ASSESSMENT & PLAN NOTE
Just completed 7 days antibiotic  · Repeat procalcitonin level was 0 54  · Patient received cefepime and Flagyl in the ED     Patient will be continued on cefepime  1 gram IV daily  · Sputum cultures could not be obtained  · Urine for Legionella and pneumococcal antigens were negative  · Pulmonary input appreciated  · Blood cultures have been negative

## 2022-11-25 NOTE — PROGRESS NOTES
[address] [facility] NOTE   Joy Wilburn 80 y o  male MRN: [id number]  Unit/Bed#: Rosana Royal Encounter: [id number]  Reason for Consult: Elevated creatinine    ASSESSMENT and PLAN:  1  Elevated creatinine:  • Creatinine during previous hospital stay (11/15/22 to 11/22/22) ranged between 2 4 and 2 8  • Creatinine on admission was 2 83 on 11/22/22  • Unclear if this is truly SHAHID or progression of disease - possibly due to CRS  • Urinalysis is bland  Renal US without hydronephrosis but with L renal atrophy  • Creatinine stable at 2 81 today  • Would continue with Furosemide 40 mg IV BID possibly for 1 more day  2  Chronic kidney disease, stage III  • Baseline creatinine was 1 6-2 0 in 2021  • No prior renal follow-up      3  Hypertension:  • BP was high but improving  • Current medications:  Amlodipine 10 mg OD, Carvedilol 12 5 mg BID, Doxazosin 2 mg qHS, Hydralazine 100 mg BID, Imdur 60 mg OD, Furosemide  • Monitor with diuresis      4  Respiratory failure:  • Due to multifocal pneumonia, pleural effusions, congestive heart failure  • Seems improving  5  Congestive heart failure  • Continue Furosemide 40 mg IV BID  • Transition to Torsemide 40 mg OD in next 24 hours  6  Esophageal dysmotility        DISPO:  • Continue Furosemide 40 mg IV BID  • Possibly change to Torsemide 40 mg OD in 24 hours  • Stable renal function  • Not yet stable for discharge  SUBJECTIVE / 24H INTERVAL HISTORY:  (+) cough but no SOB  Appetite is good  No CP       OBJECTIVE:  Current Weight: Weight - Scale: 82 7 kg (182 lb 6 4 oz)  Vitals:    11/25/22 0600 11/25/22 0755 11/25/22 0808 11/25/22 0822   BP:  154/60     BP Location:       Pulse:  62 68    Resp:   18    Temp:  98 °F (36 7 °C)     TempSrc:       SpO2:  93% 96% 100%   Weight: 82 7 kg (182 lb 6 4 oz)      Height:           Intake/Output Summary (Last 24 hours) at 11/25/2022 0902  Last data filed at 11/25/2022 0330  Gross per 24 hour   Intake --   Output 1100 ml   Net -1100 ml     General: conscious, cooperative, no distress  Skin: dry  Eyes: pink conjunctivae  ENT: moist mucous membranes  Respiratory: equal chest expansion, decreased in lower LF  Cardiovascular: distinct heart sounds, normal rate, regular rhythm  Abdomen: soft, non tender, non distended, normal bowel sounds  Extremities: no edema  Genitourinary: no lock catheter  Neuro: awake, alert     Psych: appropriate affect    Medications:    Current Facility-Administered Medications:   •  acetaminophen (TYLENOL) tablet 650 mg, 650 mg, Oral, Q6H PRN, Migdaliaidoc Zunigarik, CRNP  •  amLODIPine (NORVASC) tablet 10 mg, 10 mg, Oral, Daily, Cuiyin Yurik, CRNP, 10 mg at 11/24/22 2277  •  aspirin chewable tablet 81 mg, 81 mg, Oral, Daily, Cuiyin Yurik, CRNP, 81 mg at 11/24/22 3260  •  atorvastatin (LIPITOR) tablet 40 mg, 40 mg, Oral, Daily, Cuiyin Nadiarik, CRNP, 40 mg at 11/24/22 2635  •  carvedilol (COREG) tablet 12 5 mg, 12 5 mg, Oral, BID With Meals, Cuiyin Nadiarik, CRNP, 12 5 mg at 11/24/22 1628  •  cefepime (MAXIPIME) IVPB (premix in dextrose) 1,000 mg 50 mL, 1,000 mg, Intravenous, Q24H, Steve Yan MD, Last Rate: 100 mL/hr at 11/24/22 1739, 1,000 mg at 11/24/22 1739  •  cholecalciferol (VITAMIN D3) tablet 2,000 Units, 2,000 Units, Oral, Daily, Cuidoc Zunigarik, CRNP, 2,000 Units at 11/24/22 1299  •  cyanocobalamin (VITAMIN B-12) tablet 500 mcg, 500 mcg, Oral, Daily, Cuiyin Nadiarik, CRNP, 500 mcg at 11/24/22 8252  •  doxazosin (CARDURA) tablet 2 mg, 2 mg, Oral, HS, Cuiyin Yurik, CRNP, 2 mg at 11/24/22 2128  •  furosemide (LASIX) injection 40 mg, 40 mg, Intravenous, BID (diuretic), Prem Books, MD, 40 mg at 11/24/22 1627  •  gabapentin (NEURONTIN) capsule 100 mg, 100 mg, Oral, BID, KEISHA Newberry, 100 mg at 11/24/22 1740  •  heparin (porcine) subcutaneous injection 5,000 Units, 5,000 Units, Subcutaneous, Q8H [facility], 5,000 Units at 11/25/22 0619 **AND** [CANCELED] Platelet count, , , Once, Emilee KEISHA Link  •  hydrALAZINE (APRESOLINE) tablet 100 mg, 100 mg, Oral, BID, KEISHA Newberry, 100 mg at 11/24/22 1739  •  insulin lispro (HumaLOG) 100 units/mL subcutaneous injection 1-5 Units, 1-5 Units, Subcutaneous, TID AC, 3 Units at 11/24/22 1629 **AND** Fingerstick Glucose (POCT), , , TID AC, KEISHA Newberry  •  insulin lispro (HumaLOG) 100 units/mL subcutaneous injection 1-5 Units, 1-5 Units, Subcutaneous, 0200, KEISHA Newberry, 1 Units at 11/25/22 0319  •  insulin lispro (HumaLOG) 100 units/mL subcutaneous injection 1-5 Units, 1-5 Units, Subcutaneous, HS, KEISHA Newberry, 3 Units at 11/24/22 2130  •  ipratropium (ATROVENT) 0 02 % inhalation solution 0 5 mg, 0 5 mg, Nebulization, TID, APOLONIA NewberryNP, 0 5 mg at 11/25/22 0807  •  isosorbide mononitrate (IMDUR) 24 hr tablet 60 mg, 60 mg, Oral, Daily, KEISHA Newberry, 60 mg at 11/24/22 8908  •  labetalol (NORMODYNE) injection 10 mg, 10 mg, Intravenous, Q6H PRN, Agustina JUSTUS SchneiderC, 10 mg at 11/24/22 0241  •  levalbuterol (XOPENEX) inhalation solution 0 63 mg, 0 63 mg, Nebulization, Q4H PRN, KEISHA Newberry  •  levalbuterol (XOPENEX) inhalation solution 1 25 mg, 1 25 mg, Nebulization, TID, 1 25 mg at 11/25/22 0807 **AND** sodium chloride 0 9 % inhalation solution 3 mL, 3 mL, Nebulization, TID, KEISHA Newberry, 3 mL at 11/25/22 0807  •  nystatin (MYCOSTATIN) powder, , Topical, BID, KEISHA Newberry, Given at 11/24/22 1741  •  ondansetron (ZOFRAN) injection 4 mg, 4 mg, Intravenous, Q6H PRN, KEISHA Newberry  •  pantoprazole (PROTONIX) EC tablet 40 mg, 40 mg, Oral, Early Morning, KEISHA Newberry, 40 mg at 11/25/22 9199  •  simethicone (MYLICON) chewable tablet 80 mg, 80 mg, Oral, 4x Daily PRN, KEISHA Newberry  •  tamsulosin (FLOMAX) capsule 0 4 mg, 0 4 mg, Oral, Daily With Dinner, KEISHA Newberry, 0 4 mg at 11/24/22 1628    Laboratory Results:  Results from last 7 days   Lab Units 11/25/22  0710 11/25/22  0450 11/24/22  9073 11/23/22  0825 11/22/22 2058 11/22/22  0453 11/21/22  0506 11/20/22  0555 11/19/22  0548   WBC Thousand/uL 15 84*  --  14 84*  --  16 19* 13 11* 14 42* 15 88* 16 41*   HEMOGLOBIN g/dL 8 8*  --  8 8*  --  9 6* 9 2* 9 3* 9 8* 10 4*   HEMATOCRIT % 26 9*  --  26 8*  --  29 9* 27 9* 28 6* 30 2* 31 9*   PLATELETS Thousands/uL 209  --  230  --  253 197 188 195 179   POTASSIUM mmol/L  --  3 7 4 3 4 8 4 9 4 6 4 2 3 8 3 7   CHLORIDE mmol/L  --  101 105 102 96 103 101 100 99   CO2 mmol/L  --  29 30 28 28 27 28 29 26   BUN mg/dL  --  112* 101* 93* 91* 85* 75* 68* 53*   CREATININE mg/dL  --  2 81* 2 76* 2 77* 2 83* 2 68* 2 85* 2 79* 2 41*   CALCIUM mg/dL  --  8 9 8 8 8 9 8 8 8 2* 8 1* 8 4 8 5   MAGNESIUM mg/dL  --   --   --  2 9* 3 2*  --   --   --   --

## 2022-11-25 NOTE — CASE MANAGEMENT
Case Management Assessment & Discharge Planning Note    Patient name Fidencio Purchase  Location [address] [address] MRN [id number]  : 10/19/1929 Date 2022       Current Admission Date: 2022  Current Admission Diagnosis:Acute respiratory failure with hypoxia [facility])   Patient Active Problem List    Diagnosis Date Noted   • Chronic kidney disease    • Acute respiratory failure with hypoxia ([facility] ) 2022   • Acute on chronic diastolic congestive heart failure ([address] ) 2022   • BPH (benign prostatic hyperplasia) 2022   • Acute diastolic (congestive) heart failure ([address] ) 2022   • PVCs (premature ventricular contractions) 2022   • Hyperlipidemia 2022   • Primary hypertension 11/15/2022   • Aspiration pneumonia ([facility] ) 11/15/2022   • CHF (congestive heart failure) ([facility] ) 11/15/2022   • Acute kidney injury superimposed on CKD ([facility] ) 11/15/2022   • Type 2 diabetes mellitus ([facility] ) 11/15/2022   • Esophageal dysphagia 11/15/2022   • Thrush 11/15/2022   • Elevated troponin 11/15/2022      LOS (days): 3  Geometric Mean LOS (GMLOS) (days): 5 20  Days to GMLOS:2 6     OBJECTIVE:    Risk of Unplanned Readmission Score: 21 66      Current admission status: Inpatient  Referral Reason: Other (Post-Acute Placement)    Preferred Pharmacy:   [address]  [place] [id number]  Phone: [phone] Fax: [phone]    Primary Care Provider: Tiesha Luke PA-C    Primary Insurance: MEDICARE  Secondary Insurance: [employer] RAJAN [facility]    ASSESSMENT:  Velma Ford Proxies    There are no active Health Care Proxies on file  Patient Information  Admitted from[de-identified] Facility ([facility])  Mental Status: Alert  During Assessment patient was accompanied by: Not accompanied during assessment  Assessment information provided by[de-identified] Daughter  Primary Caregiver:  Other (Comment) ([facility] at [address] [place] [address])  Support Systems: Daughter, Home care staff, Family members  [place] of Residence: [address] [place] [address] [place] do you live in?: Irene Navarro 45 entry access options   Select all that apply : No steps to enter home  Type of Current Residence: Facility ([facility] at [address])  Upon entering residence, is there a bedroom on the main floor (no further steps)?: Yes  Upon entering residence, is there a bathroom on the main floor (no further steps)?: Yes  In the last 12 months, was there a time when you were not able to pay the mortgage or rent on time?: No  In the last 12 months, how many places have you lived?: 2  In the last 12 months, was there a time when you did not have a steady place to sleep or slept in a shelter (including now)?: No  Homeless/housing insecurity resource given?: N/A  Living Arrangements: Other (Comment)  Is patient a ?: Yes  Is patient active with [facility])?: Yes    Activities of Daily Living Prior to Admission  Functional Status: Assistance  Completes ADLs independently?: No  Level of ADL dependence: Assistance  Ambulates independently?: Yes  Does patient use assisted devices?: Yes  Assisted Devices (DME) used: Straight Cane  Does patient currently own DME?: Yes  What DME does the patient currently own?: Straight Cane  Does patient have a history of HHC?: Yes (Admitted to [address] [place] [address] 11/22-returned same day)     Patient Information Continued  Income Source: Pension/[employer]  Does patient have prescription coverage?: Yes  Within the past 12 months, you worried that your food would run out before you got the money to buy more : Never true  Within the past 12 months, the food you bought just didn't last and you didn't have money to get more : Never true  Food insecurity resource given?: N/A  Does patient receive dialysis treatments?: No  Does patient have a history of substance abuse?: No  Does patient have a history of Mental Health Diagnosis?: No     Means of Transportation  Means of Transport to Appts[de-identified] Family transport  In the past 12 months, has lack of transportation kept you from medical appointments or from getting medications?: No  In the past 12 months, has lack of transportation kept you from meetings, work, or from getting things needed for daily living?: No  Was application for public transport provided?: N/A    DISCHARGE DETAILS:    Discharge planning discussed with[de-identified] Daughter Ted [place] of Choice: Yes  Comments - Freedom of Choice: SW contacted dtr Dana Haas to introduce role and discuss discharge plan  Dana Haas confirmed that choice is for patient to go to short term rehab before returning back to his LTC residence at [facility]  Dana [place] notes that they have concerns with  Eugenian Mio and prefer for patient to go to another facility on discharge  SAMSON discussed sending blanket referrals to local area to determine whom is in-network and can offer a bed  Dana Haas aware she will be provided with an accepting STR list to review and choose from  Dana Haas consenting to blanket referrals at this time  CM contacted family/caregiver?: Yes  Were Treatment Team discharge recommendations reviewed with patient/caregiver?: Yes  Did patient/caregiver verbalize understanding of patient care needs?: N/A- going to facility  Were patient/caregiver advised of the risks associated with not following Treatment Team discharge recommendations?: Yes    Contacts  Patient Contacts: Skyler Marrero (dtr)  Relationship to Patient[de-identified] Family  Contact Method: Phone  Phone Number: [phone]  Reason/Outcome: Continuity of Care, Discharge Planning, Referral    Other Referral/Resources/Interventions Provided:  Interventions: Short Term Rehab  Referral Comments: [facility] STR referrals sent in [address] [place] [address]  Responses pending      Would you like to participate in our [address] service program?  : No - Declined    Treatment Team Recommendation: Short Term Rehab  Discharge Destination Plan[de-identified] Short Term Rehab

## 2022-11-25 NOTE — ASSESSMENT & PLAN NOTE
On hydralazine, Norvasc, Coreg, Imdur, Cardura    · Will continue above medications with holding parameter  · Blood pressure better

## 2022-11-25 NOTE — ASSESSMENT & PLAN NOTE
Patient reports no appetite in past 2 days,did not eat all day yesterday  Loss of appetite could be secondary to antibiotic  · Keep patient NPO for now  · Discussed with speech therapy-patient at high risk for aspiration despite the level of diet  For now patient started on pureed diet with thin liquids with medications crushed with puree  · GI recommend to consider PEG tube if recurrent aspirations  Per recent GI note,family did not want PEG tube during prior hospitalization  · Continue pantoprazole daily  · Obstructive series showed barium throughout the colon with modest amount of stool along the rectosigmoid region  · Prolonged discussion held with patient's daughter and patient's PA on November 23, 2022 regarding risk of recurrent aspiration and the patient possibly needing PEG    Family to discuss and let us know about final decision regarding PEG Pt requesting an antibiotic due to an infection in her mouth/gums. Her dentist will not work on her until she has started one. Please send to [facility].

## 2022-11-25 NOTE — PLAN OF CARE
Problem: RESPIRATORY - ADULT  Goal: Achieves optimal ventilation and oxygenation  Description: INTERVENTIONS:  - Assess for changes in respiratory status  - Assess for changes in mentation and behavior  - Position to facilitate oxygenation and minimize respiratory effort  - Oxygen administered by appropriate delivery if ordered  - Initiate smoking cessation education as indicated  - Encourage broncho-pulmonary hygiene including cough, deep breathe, Incentive Spirometry  - Assess the need for suctioning and aspirate as needed  - Assess and instruct to report SOB or any respiratory difficulty  - Respiratory Therapy support as indicated  Outcome: Progressing     Problem: Prexisting or High Potential for Compromised Skin Integrity  Goal: Skin integrity is maintained or improved  Description: INTERVENTIONS:  - Identify patients at risk for skin breakdown  - Assess and monitor skin integrity  - Assess and monitor nutrition and hydration status  - Monitor labs   - Assess for incontinence   - Turn and reposition patient  - Assist with mobility/ambulation  - Relieve pressure over bony prominences  - Avoid friction and shearing  - Provide appropriate hygiene as needed including keeping skin clean and dry  - Evaluate need for skin moisturizer/barrier cream  - Collaborate with interdisciplinary team   - Patient/family teaching  - Consider wound care consult   Outcome: Progressing     Problem: MOBILITY - ADULT  Goal: Maintain or return to baseline ADL function  Description: INTERVENTIONS:  -  Assess patient's ability to carry out ADLs; assess patient's baseline for ADL function and identify physical deficits which impact ability to perform ADLs (bathing, care of mouth/teeth, toileting, grooming, dressing, etc )  - Assess/evaluate cause of self-care deficits   - Assess range of motion  - Assess patient's mobility; develop plan if impaired  - Assess patient's need for assistive devices and provide as appropriate  - Encourage maximum independence but intervene and supervise when necessary  - Involve family in performance of ADLs  - Assess for home care needs following discharge   - Consider OT consult to assist with ADL evaluation and planning for discharge  - Provide patient education as appropriate  Outcome: Progressing  Goal: Maintains/Returns to pre admission functional level  Description: INTERVENTIONS:  - Perform BMAT or MOVE assessment daily    - Set and communicate daily mobility goal to care team and patient/family/caregiver  - Collaborate with rehabilitation services on mobility goals if consulted  - Perform Range of Motion 3 times a day  - Reposition patient every 2 hours    - Dangle patient 3 times a day  - Stand patient 3 times a day  - Ambulate patient 3 times a day  - Out of bed to chair 3 times a day   - Out of bed for meals 3 times a day  - Out of bed for toileting  - Record patient progress and toleration of activity level   Outcome: Progressing

## 2022-11-26 LAB
ANION GAP SERPL CALCULATED.3IONS-SCNC: 8 MMOL/L (ref 4–13)
BUN SERPL-MCNC: 111 MG/DL (ref 5–25)
CALCIUM SERPL-MCNC: 8.8 MG/DL (ref 8.3–10.1)
CHLORIDE SERPL-SCNC: 100 MMOL/L (ref 96–108)
CO2 SERPL-SCNC: 30 MMOL/L (ref 21–32)
CREAT SERPL-MCNC: 2.64 MG/DL (ref 0.6–1.3)
GFR SERPL CREATININE-BSD FRML MDRD: 19 ML/MIN/1.73SQ M
GLUCOSE SERPL-MCNC: 210 MG/DL (ref 65–140)
GLUCOSE SERPL-MCNC: 212 MG/DL (ref 65–140)
GLUCOSE SERPL-MCNC: 247 MG/DL (ref 65–140)
GLUCOSE SERPL-MCNC: 252 MG/DL (ref 65–140)
GLUCOSE SERPL-MCNC: 278 MG/DL (ref 65–140)
GLUCOSE SERPL-MCNC: 319 MG/DL (ref 65–140)
POTASSIUM SERPL-SCNC: 5.2 MMOL/L (ref 3.5–5.3)
SODIUM SERPL-SCNC: 138 MMOL/L (ref 135–147)

## 2022-11-26 RX ORDER — INSULIN GLARGINE 100 [IU]/ML
10 INJECTION, SOLUTION SUBCUTANEOUS
Status: DISCONTINUED | OUTPATIENT
Start: 2022-11-26 | End: 2022-12-10 | Stop reason: HOSPADM

## 2022-11-26 RX ORDER — FLUCONAZOLE 100 MG/1
200 TABLET ORAL ONCE
Status: COMPLETED | OUTPATIENT
Start: 2022-11-26 | End: 2022-11-26

## 2022-11-26 RX ORDER — SENNOSIDES 8.6 MG
1 TABLET ORAL
Status: DISCONTINUED | OUTPATIENT
Start: 2022-11-26 | End: 2022-12-05

## 2022-11-26 RX ORDER — BENZONATATE 100 MG/1
200 CAPSULE ORAL 3 TIMES DAILY
Status: DISCONTINUED | OUTPATIENT
Start: 2022-11-26 | End: 2022-12-10 | Stop reason: HOSPADM

## 2022-11-26 RX ORDER — FLUCONAZOLE 100 MG/1
100 TABLET ORAL DAILY
Status: DISCONTINUED | OUTPATIENT
Start: 2022-11-27 | End: 2022-12-02

## 2022-11-26 RX ORDER — POLYETHYLENE GLYCOL 3350 17 G/17G
17 POWDER, FOR SOLUTION ORAL DAILY
Status: DISCONTINUED | OUTPATIENT
Start: 2022-11-26 | End: 2022-12-02

## 2022-11-26 RX ORDER — HYDROCODONE POLISTIREX AND CHLORPHENIRAMINE POLISTIREX 10; 8 MG/5ML; MG/5ML
5 SUSPENSION, EXTENDED RELEASE ORAL
Status: DISCONTINUED | OUTPATIENT
Start: 2022-11-26 | End: 2022-12-10 | Stop reason: HOSPADM

## 2022-11-26 RX ORDER — TORSEMIDE 20 MG/1
40 TABLET ORAL DAILY
Status: DISCONTINUED | OUTPATIENT
Start: 2022-11-26 | End: 2022-11-29

## 2022-11-26 RX ADMIN — INSULIN GLARGINE 10 UNITS: 100 INJECTION, SOLUTION SUBCUTANEOUS at 21:19

## 2022-11-26 RX ADMIN — BENZONATATE 200 MG: 100 CAPSULE ORAL at 15:54

## 2022-11-26 RX ADMIN — HEPARIN SODIUM 5000 UNITS: 5000 INJECTION INTRAVENOUS; SUBCUTANEOUS at 15:54

## 2022-11-26 RX ADMIN — ACETAMINOPHEN 650 MG: 325 TABLET, FILM COATED ORAL at 09:01

## 2022-11-26 RX ADMIN — SENNOSIDES 8.6 MG: 8.6 TABLET, FILM COATED ORAL at 21:20

## 2022-11-26 RX ADMIN — AMLODIPINE BESYLATE 10 MG: 10 TABLET ORAL at 09:01

## 2022-11-26 RX ADMIN — DOCUSATE SODIUM 100 MG: 100 CAPSULE, LIQUID FILLED ORAL at 17:17

## 2022-11-26 RX ADMIN — IPRATROPIUM BROMIDE 0.5 MG: 0.5 SOLUTION RESPIRATORY (INHALATION) at 19:33

## 2022-11-26 RX ADMIN — HEPARIN SODIUM 5000 UNITS: 5000 INJECTION INTRAVENOUS; SUBCUTANEOUS at 21:16

## 2022-11-26 RX ADMIN — GABAPENTIN 100 MG: 100 CAPSULE ORAL at 17:17

## 2022-11-26 RX ADMIN — ATORVASTATIN CALCIUM 40 MG: 40 TABLET, FILM COATED ORAL at 09:01

## 2022-11-26 RX ADMIN — HEPARIN SODIUM 5000 UNITS: 5000 INJECTION INTRAVENOUS; SUBCUTANEOUS at 05:08

## 2022-11-26 RX ADMIN — IPRATROPIUM BROMIDE 0.5 MG: 0.5 SOLUTION RESPIRATORY (INHALATION) at 14:32

## 2022-11-26 RX ADMIN — NYSTATIN: 100000 POWDER TOPICAL at 17:24

## 2022-11-26 RX ADMIN — ASPIRIN 81 MG CHEWABLE TABLET 81 MG: 81 TABLET CHEWABLE at 09:01

## 2022-11-26 RX ADMIN — DOXAZOSIN 2 MG: 2 TABLET ORAL at 21:20

## 2022-11-26 RX ADMIN — FLUCONAZOLE 200 MG: 100 TABLET ORAL at 11:52

## 2022-11-26 RX ADMIN — LEVALBUTEROL HYDROCHLORIDE 1.25 MG: 1.25 SOLUTION, CONCENTRATE RESPIRATORY (INHALATION) at 14:32

## 2022-11-26 RX ADMIN — PANTOPRAZOLE SODIUM 40 MG: 40 TABLET, DELAYED RELEASE ORAL at 05:09

## 2022-11-26 RX ADMIN — BENZONATATE 200 MG: 100 CAPSULE ORAL at 21:20

## 2022-11-26 RX ADMIN — INSULIN LISPRO 3 UNITS: 100 INJECTION, SOLUTION INTRAVENOUS; SUBCUTANEOUS at 16:01

## 2022-11-26 RX ADMIN — LEVALBUTEROL HYDROCHLORIDE 1.25 MG: 1.25 SOLUTION, CONCENTRATE RESPIRATORY (INHALATION) at 19:33

## 2022-11-26 RX ADMIN — TAMSULOSIN HYDROCHLORIDE 0.4 MG: 0.4 CAPSULE ORAL at 15:54

## 2022-11-26 RX ADMIN — IPRATROPIUM BROMIDE 0.5 MG: 0.5 SOLUTION RESPIRATORY (INHALATION) at 07:59

## 2022-11-26 RX ADMIN — HYDRALAZINE HYDROCHLORIDE 100 MG: 25 TABLET ORAL at 09:01

## 2022-11-26 RX ADMIN — HYDRALAZINE HYDROCHLORIDE 100 MG: 25 TABLET ORAL at 17:17

## 2022-11-26 RX ADMIN — POLYETHYLENE GLYCOL 3350 17 G: 17 POWDER, FOR SOLUTION ORAL at 09:02

## 2022-11-26 RX ADMIN — ISOSORBIDE MONONITRATE 60 MG: 60 TABLET, EXTENDED RELEASE ORAL at 09:01

## 2022-11-26 RX ADMIN — FUROSEMIDE 40 MG: 10 INJECTION, SOLUTION INTRAMUSCULAR; INTRAVENOUS at 09:02

## 2022-11-26 RX ADMIN — Medication 5 ML: at 21:05

## 2022-11-26 RX ADMIN — ISODIUM CHLORIDE 3 ML: 0.03 SOLUTION RESPIRATORY (INHALATION) at 14:32

## 2022-11-26 RX ADMIN — INSULIN LISPRO 3 UNITS: 100 INJECTION, SOLUTION INTRAVENOUS; SUBCUTANEOUS at 11:50

## 2022-11-26 RX ADMIN — TORSEMIDE 40 MG: 20 TABLET ORAL at 15:54

## 2022-11-26 RX ADMIN — INSULIN LISPRO 2 UNITS: 100 INJECTION, SOLUTION INTRAVENOUS; SUBCUTANEOUS at 02:30

## 2022-11-26 RX ADMIN — CARVEDILOL 12.5 MG: 12.5 TABLET, FILM COATED ORAL at 15:54

## 2022-11-26 RX ADMIN — GABAPENTIN 100 MG: 100 CAPSULE ORAL at 09:01

## 2022-11-26 RX ADMIN — NYSTATIN: 100000 POWDER TOPICAL at 09:03

## 2022-11-26 RX ADMIN — DOCUSATE SODIUM 100 MG: 100 CAPSULE, LIQUID FILLED ORAL at 09:01

## 2022-11-26 RX ADMIN — LEVALBUTEROL HYDROCHLORIDE 1.25 MG: 1.25 SOLUTION, CONCENTRATE RESPIRATORY (INHALATION) at 07:59

## 2022-11-26 RX ADMIN — CARVEDILOL 12.5 MG: 12.5 TABLET, FILM COATED ORAL at 09:04

## 2022-11-26 RX ADMIN — Medication 2000 UNITS: at 09:01

## 2022-11-26 RX ADMIN — CEFEPIME HYDROCHLORIDE 1000 MG: 1 INJECTION, SOLUTION INTRAVENOUS at 17:17

## 2022-11-26 RX ADMIN — ISODIUM CHLORIDE 3 ML: 0.03 SOLUTION RESPIRATORY (INHALATION) at 19:33

## 2022-11-26 RX ADMIN — INSULIN LISPRO 2 UNITS: 100 INJECTION, SOLUTION INTRAVENOUS; SUBCUTANEOUS at 09:02

## 2022-11-26 RX ADMIN — CYANOCOBALAMIN TAB 500 MCG 500 MCG: 500 TAB at 09:01

## 2022-11-26 RX ADMIN — INSULIN LISPRO 2 UNITS: 100 INJECTION, SOLUTION INTRAVENOUS; SUBCUTANEOUS at 21:19

## 2022-11-26 NOTE — CASE MANAGEMENT
Case Management Discharge Planning Note    Patient name Maurice Perez  Location [address] [place] [address] Vivian Taylor MRN [id number]  : 10/19/1929 Date 2022       Current Admission Date: 2022  Current Admission Diagnosis:Acute respiratory failure with hypoxia [facility])   Patient Active Problem List    Diagnosis Date Noted   • Left upper quadrant abdominal mass 2022   • Chronic kidney disease    • Acute respiratory failure with hypoxia ([facility] ) 2022   • Acute on chronic diastolic congestive heart failure ([address] ) 2022   • BPH (benign prostatic hyperplasia) 2022   • Acute diastolic (congestive) heart failure ([facility] ) 2022   • PVCs (premature ventricular contractions) 2022   • Hyperlipidemia 2022   • Primary hypertension 11/15/2022   • Aspiration pneumonia ([facility] ) 11/15/2022   • CHF (congestive heart failure) ([facility] ) 11/15/2022   • Acute kidney injury superimposed on CKD ([facility] ) 11/15/2022   • Type 2 diabetes mellitus ([facility] ) 11/15/2022   • Esophageal dysphagia 11/15/2022   • Thrush 11/15/2022   • Elevated troponin 11/15/2022      LOS (days): 4  Geometric Mean LOS (GMLOS) (days): 5 20  Days to GMLOS:1 5     OBJECTIVE:  Risk of Unplanned Readmission Score: 22 17     Current admission status: Inpatient   Preferred Pharmacy:   [facility] [address] [facility]  [place] [address]  Phone: [phone] Fax: [phone]    Primary Care Provider: Lory López PA-C    Primary Insurance: MEDICARE  Secondary Insurance: [employer] BC [place]AN OF [place]    DISCHARGE DETAILS:    Discharge planning discussed with[de-identified] DaughterHansa of Choice: Yes  Comments - Freedom of Choice: SW following to assist with DCP  STR placement is being recommended  SW met with pt's daughter to discuss plans and review facility options  SW printed out referral list from Wendi at request of daughter so she could review all facility options   Daughter inquired about having referral made to [facility] because a friend recommended it  SW explained level of rehab at [facility] and how it differs from the skilled facilities on list   Daughter requested referral be made to [facility]  After reviewing list of skilled facilities daughter requested follow up with [facility] on Monday since facility had not responded in [address] yet  Daughter is also going to continue to consider other facilities on list  Daughter said her goal is to have pt return to [facility] after rehab  SW offered ongoing support and assistance with planning  SW will follow  CM contacted family/caregiver?: Yes  Were Treatment Team discharge recommendations reviewed with patient/caregiver?: Yes  Did patient/caregiver verbalize understanding of patient care needs?: N/A- going to facility  Were patient/caregiver advised of the risks associated with not following Treatment Team discharge recommendations?: Yes    Contacts  Patient Contacts: Jamar Crhistianson (ariellar)  Relationship to Patient[de-identified] Family  Contact Method: In Person  Reason/Outcome: Discharge Planning    Other Referral/Resources/Interventions Provided:  Interventions: Short Term Rehab  Referral Comments: Facility list given to daughter  SW will follow up with [facility] as requested by daughter  Referral made to [facility] as requested by daughter      Treatment Team Recommendation: Short Term Rehab  Discharge Destination Plan[de-identified] Short Term Rehab  Transport at Discharge : BLS Ambulance

## 2022-11-26 NOTE — ASSESSMENT & PLAN NOTE
CT scan of the chest and also renal ultrasound showed left upper quadrant pass medial to the spleen  CT abdomen pelvis was ordered but could not be performed due to retained barium  Will need to reorder CT abdomen pelvis at a later date after clearing up barium

## 2022-11-26 NOTE — PROGRESS NOTES
Progress Note - Pulmonary   Mohamud Osorio 80 y o  male MRN: [id number]  Unit/Bed#: 2 [place] [address] Encounter: [id number]    Assessment:  Aspiration pneumonia - clinically improving  Procalcitonin level decreased slightly to 0 44  Acute hypoxemic respiratory respiratory failure secondary to CHF, bilateral lower lobe atelectasis also aspiration, small pleural effusions  Oropharyngeal dysphagia with esophageal dysmotility  Questionable left upper quadrant abdominal mass seen on CT scan chest on 11/23  Chronic kidney disease stage 3    Plan:  Continue cefepime day #4  Incentive spirometry  CT scan of abdomen has been ordered but this has been on hold until patient is able to clear varying from his GI tract  I spoke with patient's daughter at bedside and reviewed CT images of chest done 11/23 with her  Patient is on IV furosemide 40 mg twice a day for acute on chronic diastolic congestive heart failure    Subjective:   Not having shortness of breath  Has periodic cough    Objective:     Vitals: Blood pressure 169/63, pulse 70, temperature 98 5 °F (36 9 °C), resp  rate 16, height 5' 5" (1 651 m), weight 82 8 kg (182 lb 8 oz), SpO2 94 %  ,Body mass index is 30 37 kg/m²  Intake/Output Summary (Last 24 hours) at 11/26/2022 0644  Last data filed at 11/25/2022 1842  Gross per 24 hour   Intake --   Output 600 ml   Net -600 ml       Physical Exam: Physical Exam  Vitals reviewed  Constitutional:       General: He is not in acute distress  Appearance: He is well-developed and well-nourished  Comments: On 2 5 L  95%   HENT:      Head: Normocephalic  Right Ear: External ear normal       Left Ear: External ear normal       Nose: Nose normal       Mouth/Throat:      Mouth: Oropharynx is clear and moist  Mucous membranes are moist       Pharynx: No oropharyngeal exudate  Eyes:      Conjunctiva/sclera: Conjunctivae normal       Pupils: Pupils are equal, round, and reactive to light  Neck:      Vascular: No JVD  Trachea: No tracheal deviation  Cardiovascular:      Rate and Rhythm: Normal rate and regular rhythm  Heart sounds: Normal heart sounds  Pulmonary:      Effort: Pulmonary effort is normal       Comments: Lung sounds clear  No wheezing, rhonchi, or crackles  Abdominal:      General: There is no distension  Palpations: Abdomen is soft  Tenderness: There is no abdominal tenderness  There is no guarding  Musculoskeletal:         General: No edema  Cervical back: Neck supple  Comments: No edema   Lymphadenopathy:      Cervical: No cervical adenopathy  Skin:     General: Skin is warm and dry  Findings: No rash  Neurological:      Mental Status: He is alert and oriented to person, place, and time  Psychiatric:         Mood and Affect: Mood and affect normal          Behavior: Behavior normal          Thought Content: Thought content normal           Labs: I have personally reviewed pertinent lab results  , ABG:   Lab Results   Component Value Date    PHART 7 409 11/22/2022    YBM9UGZ 45 0 (H) 11/22/2022    PO2ART 72 0 (L) 11/22/2022    DJE4THO 27 8 11/22/2022    BEART 2 7 11/22/2022    SOURCE Radial, Left 11/22/2022   , BNP: No results found for: BNP, CBC:   Lab Results   Component Value Date    WBC 15 84 (H) 11/25/2022    HGB 8 8 (L) 11/25/2022    HCT 26 9 (L) 11/25/2022    MCV 92 11/25/2022     11/25/2022    MCH 30 1 11/25/2022    MCHC 32 7 11/25/2022    RDW 14 8 11/25/2022    MPV 12 6 11/25/2022    NRBC 0 11/25/2022   , CMP:   Lab Results   Component Value Date    K 5 2 11/26/2022     11/26/2022    CO2 30 11/26/2022     (H) 11/26/2022    CREATININE 2 64 (H) 11/26/2022    CALCIUM 8 8 11/26/2022    AST  11/22/2022      Comment:      No result  If an AST is required for patient care, it must be ordered separately  Specimen collection should occur prior to Sulfasalazine administration due to the potential for falsely depressed results       ALT 27 11/22/2022 ALKPHOS 92 11/22/2022    EGFR 19 11/26/2022   , PT/INR:   Lab Results   Component Value Date    INR 1 07 11/22/2022   , Troponin: No results found for: TROPONIN    Imaging and other studies: I have personally reviewed pertinent reports     and I have personally reviewed pertinent films in PACS

## 2022-11-26 NOTE — ASSESSMENT & PLAN NOTE
Patient presents with acute respiratory distress shortly after eating a few spoonful of puree diet in STR, patient reported chest pressure and SOB  Patient was satting 85-95% on oxygen 3 L per STR transfer paper  Patient was belching after eating/drinking limited amount of diet/water per staff  · Patient was discharged on the day of admission after being hospitalized for aspiration pneumonia, acute on chronic diastolic CHF, dysphagia  Patient received 7 days of IV Rocephin and Flagyl  Received IV Lasix  Seen by GI, underwent EGD, found to have oropharyngeal dysphagia with esophageal dysmotility, no obvious esophageal stricture  GI recommend to consider PEG tube placement if recurrent aspirations  Recommend pantoprazole daily  Underwent Barium swallow study which showed moderate to severe esophageal dysmotility with significant residual contrast remaining in the esophagus at the end of the study  Patient was recommend regular diet by speech and discharged on regular diet per STR  · Likely secondary to multifocal pneumonia and also CHF  · Patient required BiPAP on ED arrival   ABG post BiPAP essentially unremarkable  Later patient was titrated down to 6 liters oxygen upon admission  Patient is currently down to 1 liter oxygen with O2 saturation in low 90s  · Chest x-ray - Pulmonary edema pattern, worse when comparing to 11/19/2022  Underlying infection cannot be excluded  · ProBNP 15,094  · Patient received Lasix 40 IV in ED  Will continue Lasix 40 mg IV daily  · Received cefepime and Flagyl in ED  · Received IV Solu-Medrol in ED  No wheezing on auscultation  · Send sputum Gram stain and culture  · Urine for Legionella pneumococcal antigens were negative  · Continue Xopenex+ Atrovent t i d , Xopenex p r n    · CT of the chest showed pulmonary edema with moderate size bilateral pleural effusions with significant bibasilar atelectasis and concomitant multifocal pneumonia  · Patient restarted back on cefepime 1 gram IV daily as procalcitonin level is increasing compared to prior admission    Procalcitonin level now improving  · Continue cefepime to finish a 1 week course

## 2022-11-26 NOTE — ASSESSMENT & PLAN NOTE
Just completed 7 days antibiotic  · Repeat procalcitonin level was 0 54  · Patient received cefepime and Flagyl in the ED     Patient will be continued on cefepime  1 gram IV daily to finish 1 week course  · Sputum cultures could not be obtained  · Urine for Legionella and pneumococcal antigens were negative  · Pulmonary input appreciated  · Blood cultures have been negative

## 2022-11-26 NOTE — ASSESSMENT & PLAN NOTE
Patient completed 7 days of nystatin suspension  Continues to have white plaques  Patient was given Diflucan 20 milligrams 1 dose and will be continued on Diflucan 100 milligram p o   Daily

## 2022-11-26 NOTE — PROGRESS NOTES
[address] [address]  Progress Note Larry Fisher 10/19/1929, 80 y o  male MRN: [id number]  Unit/Bed#: [facility] Encounter: [id number]  Primary Care Provider: Sultana Escalera PA-C   Date and time admitted to hospital: 11/22/2022  8:41 PM    * Acute respiratory failure with hypoxia [facility])  Assessment & Plan  Patient presents with acute respiratory distress shortly after eating a few spoonful of puree diet in STR, patient reported chest pressure and SOB  Patient was satting 85-95% on oxygen 3 L per STR transfer paper  Patient was belching after eating/drinking limited amount of diet/water per staff  · Patient was discharged on the day of admission after being hospitalized for aspiration pneumonia, acute on chronic diastolic CHF, dysphagia  Patient received 7 days of IV Rocephin and Flagyl  Received IV Lasix  Seen by GI, underwent EGD, found to have oropharyngeal dysphagia with esophageal dysmotility, no obvious esophageal stricture  GI recommend to consider PEG tube placement if recurrent aspirations  Recommend pantoprazole daily  Underwent Barium swallow study which showed moderate to severe esophageal dysmotility with significant residual contrast remaining in the esophagus at the end of the study  Patient was recommend regular diet by speech and discharged on regular diet per STR  · Likely secondary to multifocal pneumonia and also CHF  · Patient required BiPAP on ED arrival   ABG post BiPAP essentially unremarkable  Later patient was titrated down to 6 liters oxygen upon admission  Patient is currently down to 1 liter oxygen with O2 saturation in low 90s  · Chest x-ray - Pulmonary edema pattern, worse when comparing to 11/19/2022  Underlying infection cannot be excluded  · ProBNP 15,094  · Patient received Lasix 40 IV in ED  Will continue Lasix 40 mg IV daily  · Received cefepime and Flagyl in ED  · Received IV Solu-Medrol in ED  No wheezing on auscultation    · Send sputum Gram stain and culture  · Urine for Legionella pneumococcal antigens were negative  · Continue Xopenex+ Atrovent t i d , Xopenex p r n  · CT of the chest showed pulmonary edema with moderate size bilateral pleural effusions with significant bibasilar atelectasis and concomitant multifocal pneumonia  · Patient restarted back on cefepime 1 gram IV daily as procalcitonin level is increasing compared to prior admission  Procalcitonin level now improving  · Continue cefepime to finish a 1 week course    Esophageal dysphagia  Assessment & Plan  Patient reports no appetite in past 2 days,did not eat all day yesterday  Loss of appetite could be secondary to antibiotic  · Keep patient NPO for now  · Discussed with speech therapy-patient at high risk for aspiration despite the level of diet  For now patient started on pureed diet with thin liquids with medications crushed with puree  · GI recommend to consider PEG tube if recurrent aspirations  Per recent GI note,family did not want PEG tube during prior hospitalization  · Continue pantoprazole daily  · Obstructive series showed barium throughout the colon with modest amount of stool along the rectosigmoid region  · Prolonged discussion held with patient's daughter and patient's PA on November 23, 2022 regarding risk of recurrent aspiration and the patient possibly needing PEG  Family to discuss and let us know about final decision regarding PEG      Acute on chronic diastolic congestive heart failure (HCC)  Assessment & Plan  Wt Readings from Last 3 Encounters:   11/26/22 82 8 kg (182 lb 8 oz)   11/22/22 84 5 kg (186 lb 4 8 oz)     Patient received IV Lasix in recent admission     · Chest x-ray today shows worsening pulmonary edema  · Recent 2D echo as below  · Mild edema to lower extremity, left forearm edema on exam   · Patient received 40 milligrams of Lasix IV in the ED and was continued on  Lasix 40 milligram IV daily which was later increased to 40 milligram IV q 12 hours  · Left upper extremity venous Doppler showed no evidence of DVT  · CT chest showed pulmonary edema with moderate size bilateral pleural effusions left more than right  · Patient received Lasix 40 milligram q 12 hours and was transitioned to torsemide 40 milligram p o  Daily  · Creatinine continues to remain stable        Acute kidney injury superimposed on CKD [facility])  Assessment & Plan  Lab Results   Component Value Date    EGFR 19 11/26/2022    EGFR 18 11/25/2022    EGFR 18 11/24/2022    CREATININE 2 64 (H) 11/26/2022    CREATININE 2 81 (H) 11/25/2022    CREATININE 2 76 (H) 11/24/2022   History of CKD 4, baseline creatinine appears to be around 1 5-1 9 in past 2 years in care everywhere  Creatinine during previous hospitalization ranged in 2 4-2 8  · Possible acute kidney injury versus progression of chronic kidney disease  · Urinalysis was bland  · Received IV Lasix 40 mg in ED  · Avoid nephrotoxin and hypotension  · Renal ultrasound showed no hydronephrosis moderate left renal atrophy  · Nephrology input appreciated  · Patient was continued on Lasix 40 milligram IV q 12 hours with good diuresis  · Patient to be transitioned to torsemide 40 milligram p o  Daily    Aspiration pneumonia [facility])  Assessment & Plan  Just completed 7 days antibiotic  · Repeat procalcitonin level was 0 54  · Patient received cefepime and Flagyl in the ED     Patient will be continued on cefepime  1 gram IV daily to finish 1 week course  · Sputum cultures could not be obtained  · Urine for Legionella and pneumococcal antigens were negative  · Pulmonary input appreciated  · Blood cultures have been negative    Left upper quadrant abdominal mass  Assessment & Plan  CT scan of the chest and also renal ultrasound showed left upper quadrant pass medial to the spleen  CT abdomen pelvis was ordered but could not be performed due to retained barium  Will need to reorder CT abdomen pelvis at a later date after clearing up barium    BPH (benign prostatic hyperplasia)  Assessment & Plan  Continue Cardura and Flomax  · Check PVR    Hyperlipidemia  Assessment & Plan  Continue statin    Elevated troponin  Assessment & Plan  Troponin [id number]  Elevated troponin in recent admission as well  Reports chest pressure when in respiratory distress  Denies history of CAD  · EKG no ischemic changes, read as AFib, rate 103,RBBB  · No history of AFib  Prior EKG shows sinus rhythm with PACs /PVCs  Will repeat EKG  · 2D echo last admission showed  EF low normal, normal wall motion, grade 2 diastolic dysfunction, moderately dilated atriums  · Most likely non MI troponin elevation due to # 1 and CHF      Thrush  Assessment & Plan  Patient completed 7 days of nystatin suspension  Continues to have white plaques  Patient was given Diflucan 20 milligrams 1 dose and will be continued on Diflucan 100 milligram p o  Daily    Type 2 diabetes mellitus [facility])  Assessment & Plan  Lab Results   Component Value Date    HGBA1C 5 7 (H) 11/16/2022       Recent Labs     11/25/22  2042 11/26/22  0218 11/26/22  0717 11/26/22  1114   POCGLU 390* 247* 212* 278*       Blood Sugar Average: Last 72 hrs:  (P) 138 [id number]   Patient was discharged on Prandin t i d  With meals  Will hold while inpatient  · Continue Humalog sliding scale with Accu-Cheks q a c  And HS  · Patient was started on diabetic pureed Diet with thin liquids  Blood sugars are labile  · Patient to be started on small dose Lantus in the hospital at 10 units daily    Primary hypertension  Assessment & Plan  On hydralazine, Norvasc, Coreg, Imdur, Cardura  · Will continue above medications with holding parameter  · Blood pressure better          VTE Pharmacologic Prophylaxis:   High Risk (Score >/= 5) - Pharmacological DVT Prophylaxis Ordered: heparin  Sequential Compression Devices Ordered  Patient Centered Rounds: I performed bedside rounds with nursing staff today    Discussions with Specialists or Other Care Team Provider:  Nephrology    Education and Discussions with Family / Patient: Updated  (daughter) via phone  Time Spent for Care: 45 minutes  More than 50% of total time spent on counseling and coordination of care as described above  Current Length of Stay: 4 day(s)  Current Patient Status: Inpatient   Certification Statement: The patient will continue to require additional inpatient hospital stay due to Acute respiratory failure, aspiration pneumonia, acute CHF  Discharge Plan: Anticipate discharge in 48-72 hrs to rehab facility  Code Status: Level 1 - Full Code    Subjective:   Patient had some cough last night  Patient denies any significant shortness of breath or chest pain or abdominal pain  Patient still did not have a bowel movement  Objective:     Vitals:   Temp (24hrs), Av 5 °F (36 9 °C), Min:97 5 °F (36 4 °C), Max:100 1 °F (37 8 °C)    Temp:  [97 5 °F (36 4 °C)-100 1 °F (37 8 °C)] 100 1 °F (37 8 °C)  HR:  [64-70] 64  Resp:  [16-18] 18  BP: (100-171)/(40-63) 170/63  SpO2:  [90 %-96 %] 90 %  Body mass index is 30 37 kg/m²  Input and Output Summary (last 24 hours): Intake/Output Summary (Last 24 hours) at 2022 1433  Last data filed at 2022 1100  Gross per 24 hour   Intake --   Output 410 ml   Net -410 ml       Physical Exam:   Physical Exam  Constitutional:       Appearance: Normal appearance  HENT:      Head: Normocephalic and atraumatic  Mouth/Throat:      Comments: White plaques  Eyes:      Extraocular Movements: Extraocular movements intact  Pupils: Pupils are equal, round, and reactive to light  Cardiovascular:      Rate and Rhythm: Normal rate and regular rhythm  Heart sounds: No murmur heard  No gallop  Pulmonary:      Effort: Pulmonary effort is normal       Breath sounds: Normal breath sounds  Abdominal:      General: Bowel sounds are normal       Palpations: Abdomen is soft  Tenderness:  There is no abdominal tenderness  Musculoskeletal:         General: No swelling or deformity  Normal range of motion  Cervical back: Normal range of motion and neck supple  Skin:     General: Skin is warm and dry  Neurological:      General: No focal deficit present  Mental Status: He is alert  Additional Data:     Labs:  Results from last 7 days   Lab Units 11/25/22  0710 11/24/22  0534   WBC Thousand/uL 15 84* 14 84*   HEMOGLOBIN g/dL 8 8* 8 8*   HEMATOCRIT % 26 9* 26 8*   PLATELETS Thousands/uL 209 230   NEUTROS PCT %  --  79*   LYMPHS PCT %  --  9*   MONOS PCT %  --  10   EOS PCT %  --  0     Results from last 7 days   Lab Units 11/26/22  0524 11/23/22  0825 11/22/22 2058   SODIUM mmol/L 138   < > 133*   POTASSIUM mmol/L 5 2   < > 4 9   CHLORIDE mmol/L 100   < > 96   CO2 mmol/L 30   < > 28   BUN mg/dL 111*   < > 91*   CREATININE mg/dL 2 64*   < > 2 83*   ANION GAP mmol/L 8   < > 9   CALCIUM mg/dL 8 8   < > 8 8   ALBUMIN g/dL  --   --  2 6*   TOTAL BILIRUBIN mg/dL  --   --  0 57   ALK PHOS U/L  --   --  92   ALT U/L  --   --  27   GLUCOSE RANDOM mg/dL 210*   < > 287*    < > = values in this interval not displayed       Results from last 7 days   Lab Units 11/22/22  2058   INR  1 07     Results from last 7 days   Lab Units 11/26/22  1114 11/26/22  0717 11/26/22  0218 11/25/22  2042 11/25/22  1620 11/25/22  1117 11/25/22  0735 11/25/22  0316 11/24/22  2103 11/24/22  1606 11/24/22  1135 11/24/22  1132   POC GLUCOSE mg/dl 278* 212* 247* 390* 286* 366* 104 183* 325* 326* 344* 346*         Results from last 7 days   Lab Units 11/25/22  0450 11/23/22  0825 11/22/22  2058 11/22/22  0453 11/21/22  0506   LACTIC ACID mmol/L  --   --  1 0  --   --    PROCALCITONIN ng/ml 0 44* 0 54* 0 37* 0 39* 0 57*       Lines/Drains:  Invasive Devices     Peripheral Intravenous Line  Duration           Peripheral IV 11/26/22 Distal;Left;Upper;Ventral (anterior) Arm <1 day                      Imaging: Reviewed radiology reports from this admission including: chest xray    Recent Cultures (last 7 days):   Results from last 7 days   Lab Units 11/22/22 2058 11/22/22 2057   BLOOD CULTURE  No Growth at 72 hrs  No Growth at 72 hrs         Last 24 Hours Medication List:   Current Facility-Administered Medications   Medication Dose Route Frequency Provider Last Rate   • acetaminophen  650 mg Oral Q6H PRN KEISHA Newberry     • amLODIPine  10 mg Oral Daily KEISHA Newberry     • aspirin  81 mg Oral Daily KEISHA Newberry     • atorvastatin  40 mg Oral Daily KEISHA Newberry     • benzonatate  200 mg Oral TID KEISHA Villeda     • carvedilol  12 5 mg Oral BID With Meals KEISHA Newberry     • cefepime  1,000 mg Intravenous Q24H Servando Arambula MD 1,000 mg (11/25/22 1735)   • cholecalciferol  2,000 Units Oral Daily KEISHA Newberyr     • vitamin B-12  500 mcg Oral Daily KEISHA Newberry     • docusate sodium  100 mg Oral BID Servando Arambula MD     • doxazosin  2 mg Oral HS KEISHA Newberry     • [START ON 11/27/2022] fluconazole  100 mg Oral Daily Steve Yan MD     • gabapentin  100 mg Oral BID KEISHA Newberry     • heparin (porcine)  5,000 Units Subcutaneous Q8H [facility] KEISHA Newberry     • hydrALAZINE  100 mg Oral BID KEISHA Newberry     • insulin glargine  10 Units Subcutaneous HS Servando Arambula MD     • insulin lispro  1-5 Units Subcutaneous TID AC KEISHA Newberry     • insulin lispro  1-5 Units Subcutaneous 0200 KEISHA Newberry     • insulin lispro  1-5 Units Subcutaneous HS KEISHA Newberry     • ipratropium  0 5 mg Nebulization TID KEISHA Newberry     • isosorbide mononitrate  60 mg Oral Daily KEISHA Newberry     • labetalol  10 mg Intravenous Q6H PRN Héctor Seals PA-C     • levalbuterol  0 63 mg Nebulization Q4H PRN KEISHA Newberry     • levalbuterol  1 25 mg Nebulization TID KEISHA Newberry      And   • sodium chloride  3 mL Nebulization TID KEISHA Frederick • nystatin   Topical BID KEISHA Newberry     • ondansetron  4 mg Intravenous Q6H PRN KEISHA Newberry     • pantoprazole  40 mg Oral Early Morning KEISHA Newberry     • polyethylene glycol  17 g Oral Daily Dillon Rodriguez MD     • simethicone  80 mg Oral 4x Daily PRN KEISHA Newberry     • tamsulosin  0 4 mg Oral Daily With KEISHA Aguilar     • torsemide  40 mg Oral Daily Alexandra Hutchinson MD          Today, Patient Was Seen By: Dillon Rodriguez MD    **Please Note: This note may have been constructed using a voice recognition system  **

## 2022-11-26 NOTE — ASSESSMENT & PLAN NOTE
Wt Readings from Last 3 Encounters:   11/26/22 82 8 kg (182 lb 8 oz)   11/22/22 84 5 kg (186 lb 4 8 oz)     Patient received IV Lasix in recent admission  · Chest x-ray today shows worsening pulmonary edema  · Recent 2D echo as below  · Mild edema to lower extremity, left forearm edema on exam   · Patient received 40 milligrams of Lasix IV in the ED and was continued on  Lasix 40 milligram IV daily which was later increased to 40 milligram IV q 12 hours  · Left upper extremity venous Doppler showed no evidence of DVT  · CT chest showed pulmonary edema with moderate size bilateral pleural effusions left more than right  · Patient received Lasix 40 milligram q 12 hours and was transitioned to torsemide 40 milligram p o   Daily  · Creatinine continues to remain stable

## 2022-11-26 NOTE — ASSESSMENT & PLAN NOTE
Troponin [id number]  Elevated troponin in recent admission as well  Reports chest pressure when in respiratory distress  Denies history of CAD  · EKG no ischemic changes, read as AFib, rate 103,RBBB  · No history of AFib  Prior EKG shows sinus rhythm with PACs /PVCs  Will repeat EKG  · 2D echo last admission showed  EF low normal, normal wall motion, grade 2 diastolic dysfunction, moderately dilated atriums     · Most likely non MI troponin elevation due to # 1 and CHF

## 2022-11-26 NOTE — ASSESSMENT & PLAN NOTE
Lab Results   Component Value Date    EGFR 19 11/26/2022    EGFR 18 11/25/2022    EGFR 18 11/24/2022    CREATININE 2 64 (H) 11/26/2022    CREATININE 2 81 (H) 11/25/2022    CREATININE 2 76 (H) 11/24/2022   History of CKD 4, baseline creatinine appears to be around 1 5-1 9 in past 2 years in care everywhere  Creatinine during previous hospitalization ranged in 2 4-2 8  · Possible acute kidney injury versus progression of chronic kidney disease  · Urinalysis was bland  · Received IV Lasix 40 mg in ED  · Avoid nephrotoxin and hypotension  · Renal ultrasound showed no hydronephrosis moderate left renal atrophy  · Nephrology input appreciated  · Patient was continued on Lasix 40 milligram IV q 12 hours with good diuresis  · Patient to be transitioned to torsemide 40 milligram p o   Daily

## 2022-11-26 NOTE — ASSESSMENT & PLAN NOTE
Patient reports no appetite in past 2 days,did not eat all day yesterday  Loss of appetite could be secondary to antibiotic  · Keep patient NPO for now  · Discussed with speech therapy-patient at high risk for aspiration despite the level of diet  For now patient started on pureed diet with thin liquids with medications crushed with puree  · GI recommend to consider PEG tube if recurrent aspirations  Per recent GI note,family did not want PEG tube during prior hospitalization  · Continue pantoprazole daily  · Obstructive series showed barium throughout the colon with modest amount of stool along the rectosigmoid region  · Prolonged discussion held with patient's daughter and patient's PA on November 23, 2022 regarding risk of recurrent aspiration and the patient possibly needing PEG    Family to discuss and let us know about final decision regarding PEG

## 2022-11-26 NOTE — PLAN OF CARE
Problem: RESPIRATORY - ADULT  Goal: Achieves optimal ventilation and oxygenation  Description: INTERVENTIONS:  - Assess for changes in respiratory status  - Assess for changes in mentation and behavior  - Position to facilitate oxygenation and minimize respiratory effort  - Oxygen administered by appropriate delivery if ordered  - Initiate smoking cessation education as indicated  - Encourage broncho-pulmonary hygiene including cough, deep breathe, Incentive Spirometry  - Assess the need for suctioning and aspirate as needed  - Assess and instruct to report SOB or any respiratory difficulty  - Respiratory Therapy support as indicated  Outcome: Progressing     Problem: Prexisting or High Potential for Compromised Skin Integrity  Goal: Skin integrity is maintained or improved  Description: INTERVENTIONS:  - Identify patients at risk for skin breakdown  - Assess and monitor skin integrity  - Assess and monitor nutrition and hydration status  - Monitor labs   - Assess for incontinence   - Turn and reposition patient  - Assist with mobility/ambulation  - Relieve pressure over bony prominences  - Avoid friction and shearing  - Provide appropriate hygiene as needed including keeping skin clean and dry  - Evaluate need for skin moisturizer/barrier cream  - Collaborate with interdisciplinary team   - Patient/family teaching  - Consider wound care consult   Outcome: Progressing     Problem: MOBILITY - ADULT  Goal: Maintain or return to baseline ADL function  Description: INTERVENTIONS:  -  Assess patient's ability to carry out ADLs; assess patient's baseline for ADL function and identify physical deficits which impact ability to perform ADLs (bathing, care of mouth/teeth, toileting, grooming, dressing, etc )  - Assess/evaluate cause of self-care deficits   - Assess range of motion  - Assess patient's mobility; develop plan if impaired  - Assess patient's need for assistive devices and provide as appropriate  - Encourage maximum independence but intervene and supervise when necessary  - Involve family in performance of ADLs  - Assess for home care needs following discharge   - Consider OT consult to assist with ADL evaluation and planning for discharge  - Provide patient education as appropriate  Outcome: Progressing  Goal: Maintains/Returns to pre admission functional level  Description: INTERVENTIONS:  - Perform BMAT or MOVE assessment daily    - Set and communicate daily mobility goal to care team and patient/family/caregiver  - Collaborate with rehabilitation services on mobility goals if consulted  - Perform Range of Motion 3 times a day  - Reposition patient every 2 hours    - Dangle patient 3 times a day  - Stand patient 3 times a day  - Ambulate patient 3 times a day  - Out of bed to chair 3 times a day   - Out of bed for meals 3 times a day  - Out of bed for toileting  - Record patient progress and toleration of activity level   Outcome: Progressing     Problem: Potential for Falls  Goal: Patient will remain free of falls  Description: INTERVENTIONS:  - Educate patient/family on patient safety including physical limitations  - Instruct patient to call for assistance with activity   - Consult OT/PT to assist with strengthening/mobility   - Keep Call bell within reach  - Keep bed low and locked with side rails adjusted as appropriate  - Keep care items and personal belongings within reach  - Initiate and maintain comfort rounds  - Make Fall Risk Sign visible to staff  - Offer Toileting every 2 Hours, in advance of need  - Initiate/Maintain bed alarm  - Obtain necessary fall risk management equipment: yellow socks  - Apply yellow socks and bracelet for high fall risk patients  - Consider moving patient to room near nurses station  Outcome: Progressing     Problem: PAIN - ADULT  Goal: Verbalizes/displays adequate comfort level or baseline comfort level  Description: Interventions:  - Encourage patient to monitor pain and request assistance  - Assess pain using appropriate pain scale  - Administer analgesics based on type and severity of pain and evaluate response  - Implement non-pharmacological measures as appropriate and evaluate response  - Consider cultural and social influences on pain and pain management  - Notify physician/advanced practitioner if interventions unsuccessful or patient reports new pain  Outcome: Progressing     Problem: INFECTION - ADULT  Goal: Absence or prevention of progression during hospitalization  Description: INTERVENTIONS:  - Assess and monitor for signs and symptoms of infection  - Monitor lab/diagnostic results  - Monitor all insertion sites, i e  indwelling lines, tubes, and drains  - Monitor endotracheal if appropriate and nasal secretions for changes in amount and color  - [place] appropriate cooling/warming therapies per order  - Administer medications as ordered  - Instruct and encourage patient and family to use good hand hygiene technique  - Identify and instruct in appropriate isolation precautions for identified infection/condition  Outcome: Progressing  Goal: Absence of fever/infection during neutropenic period  Description: INTERVENTIONS:  - Monitor WBC    Outcome: Progressing     Problem: SAFETY ADULT  Goal: Maintain or return to baseline ADL function  Description: INTERVENTIONS:  -  Assess patient's ability to carry out ADLs; assess patient's baseline for ADL function and identify physical deficits which impact ability to perform ADLs (bathing, care of mouth/teeth, toileting, grooming, dressing, etc )  - Assess/evaluate cause of self-care deficits   - Assess range of motion  - Assess patient's mobility; develop plan if impaired  - Assess patient's need for assistive devices and provide as appropriate  - Encourage maximum independence but intervene and supervise when necessary  - Involve family in performance of ADLs  - Assess for home care needs following discharge   - Consider OT consult to assist with ADL evaluation and planning for discharge  - Provide patient education as appropriate  Outcome: Progressing  Goal: Maintains/Returns to pre admission functional level  Description: INTERVENTIONS:  - Perform BMAT or MOVE assessment daily    - Set and communicate daily mobility goal to care team and patient/family/caregiver  - Collaborate with rehabilitation services on mobility goals if consulted  - Perform Range of Motion 3 times a day  - Reposition patient every 2 hours    - Dangle patient 3 times a day  - Stand patient 3 times a day  - Ambulate patient 3 times a day  - Out of bed to chair 3 times a day   - Out of bed for meals 3 times a day  - Out of bed for toileting  - Record patient progress and toleration of activity level   Outcome: Progressing  Goal: Patient will remain free of falls  Description: INTERVENTIONS:  - Educate patient/family on patient safety including physical limitations  - Instruct patient to call for assistance with activity   - Consult OT/PT to assist with strengthening/mobility   - Keep Call bell within reach  - Keep bed low and locked with side rails adjusted as appropriate  - Keep care items and personal belongings within reach  - Initiate and maintain comfort rounds  - Make Fall Risk Sign visible to staff  - Offer Toileting every 3 Hours, in advance of need  - Initiate/Maintain bed alarm  - Obtain necessary fall risk management equipment: yellow socks  - Apply yellow socks and bracelet for high fall risk patients  - Consider moving patient to room near nurses station  Outcome: Progressing

## 2022-11-26 NOTE — PROGRESS NOTES
[address] [facility] NOTE   Ulysses Carroll 80 y o  male MRN: [id number]  Unit/Bed#: [facility] Encounter: [id number]  Reason for Consult: Elevated creatinine    ASSESSMENT and PLAN:  1  Elevated creatinine:  • Creatinine during previous hospital stay (11/15/22 to 11/22/22) ranged between 2 4 and 2 8  • Creatinine on admission was 2 83 on 11/22/22  • Unclear if this is truly SHAHID or progression of disease - possibly due to CRS  • Urinalysis is bland  Renal US without hydronephrosis but with L renal atrophy  • Creatinine has been 2 6 to 2 9 x 1 week now  • SCr stable today at 2 64  • Being diuresed with furosemide 40 mg IV BID  • Change to Torsemide 40 mg daily  2  Chronic kidney disease, stage III  • Baseline creatinine was 1 6-2 0 in 2021  • No prior renal follow-up      3  Hypertension:  • BP seems labile  • Current medications:  Amlodipine 10 mg OD, Carvedilol 12 5 mg BID, Doxazosin 2 mg qHS, Hydralazine 100 mg BID, Imdur 60 mg OD, Furosemide  • Change to Torsemide 40 mg OD  4  Respiratory failure:  • Due to multifocal pneumonia, pleural effusions, congestive heart failure  • Improving  5  Congestive heart failure  • On Furosemide 40 mg IV BID  • Transition to Torsemide 40 mg OD today  6  Esophageal dysmotility     · Family deciding on PEG     DISPO:  • Change to Torsemide 40 mg OD today  • Stop IV Furosemide  The above plan was discussed with SLIM  SUBJECTIVE / 24H INTERVAL HISTORY:  Fair appetite  No CP or SOB  No new acute events       OBJECTIVE:  Current Weight: Weight - Scale: 82 8 kg (182 lb 8 oz)  Vitals:    11/25/22 2000 11/25/22 2228 11/26/22 0600 11/26/22 0730   BP: (!) 171/62 169/63  170/63   BP Location:       Pulse: 69 70  64   Resp: 17 16     Temp: 97 5 °F (36 4 °C) 98 5 °F (36 9 °C)  100 1 °F (37 8 °C)   TempSrc:       SpO2: 96% 94%  90%   Weight:   82 8 kg (182 lb 8 oz)    Height:           Intake/Output Summary (Last 24 hours) at 11/26/2022 1001  Last data filed at 11/25/2022 1842  Gross per 24 hour   Intake --   Output 400 ml   Net -400 ml     General: conscious, cooperative, no distress  Skin: dry  Eyes: pink conjunctivae  ENT: moist mucous membranes  Respiratory: equal chest expansion, fine crackles on L base  Cardiovascular: distinct heart sounds, normal rate, regular rhythm, no rub  Abdomen: soft, non tender, non distended, normal bowel sounds  Extremities: no edema  Genitourinary: no lock catheter  Neuro: awake, alert     Psych: appropriate affect    Medications:    Current Facility-Administered Medications:   •  acetaminophen (TYLENOL) tablet 650 mg, 650 mg, Oral, Q6H PRN, KEISHA Newberry, 650 mg at 11/26/22 0901  •  amLODIPine (NORVASC) tablet 10 mg, 10 mg, Oral, Daily, KEISHA Newberry, 10 mg at 11/26/22 0901  •  aspirin chewable tablet 81 mg, 81 mg, Oral, Daily, KEISHA Newberry, 81 mg at 11/26/22 0901  •  atorvastatin (LIPITOR) tablet 40 mg, 40 mg, Oral, Daily, KEISHA Newberry, 40 mg at 11/26/22 0901  •  carvedilol (COREG) tablet 12 5 mg, 12 5 mg, Oral, BID With Meals, KEISHA Newberry, 12 5 mg at 11/26/22 0904  •  cefepime (MAXIPIME) IVPB (premix in dextrose) 1,000 mg 50 mL, 1,000 mg, Intravenous, Q24H, Steve Yan MD, Last Rate: 100 mL/hr at 11/25/22 1735, 1,000 mg at 11/25/22 1735  •  cholecalciferol (VITAMIN D3) tablet 2,000 Units, 2,000 Units, Oral, Daily, KEISHA Newberry, 2,000 Units at 11/26/22 0901  •  cyanocobalamin (VITAMIN B-12) tablet 500 mcg, 500 mcg, Oral, Daily, KEISHA Newberry, 500 mcg at 11/26/22 0901  •  docusate sodium (COLACE) capsule 100 mg, 100 mg, Oral, BID, Steve Yan MD, 100 mg at 11/26/22 0901  •  doxazosin (CARDURA) tablet 2 mg, 2 mg, Oral, HS, KEISHA Newberry, 2 mg at 11/25/22 2101  •  furosemide (LASIX) injection 40 mg, 40 mg, Intravenous, BID (diuretic), Julius Wright MD, 40 mg at 11/26/22 9206  •  gabapentin (NEURONTIN) capsule 100 mg, 100 mg, Oral, BID, Emilee KEISHA Link, 100 mg at 11/26/22 0901  •  heparin (porcine) subcutaneous injection 5,000 Units, 5,000 Units, Subcutaneous, Q8H [address], 5,000 Units at 11/26/22 0508 **AND** [CANCELED] Platelet count, , , Once, KEISHA Newberry  •  hydrALAZINE (APRESOLINE) tablet 100 mg, 100 mg, Oral, BID, KEISHA Newberry, 100 mg at 11/26/22 0901  •  insulin lispro (HumaLOG) 100 units/mL subcutaneous injection 1-5 Units, 1-5 Units, Subcutaneous, TID AC, 2 Units at 11/26/22 0902 **AND** Fingerstick Glucose (POCT), , , TID AC, KEISHA Newberry  •  insulin lispro (HumaLOG) 100 units/mL subcutaneous injection 1-5 Units, 1-5 Units, Subcutaneous, 0200, KEISHA Newberry, 2 Units at 11/26/22 0230  •  insulin lispro (HumaLOG) 100 units/mL subcutaneous injection 1-5 Units, 1-5 Units, Subcutaneous, HS, KEISHA Newberry, 4 Units at 11/25/22 2101  •  ipratropium (ATROVENT) 0 02 % inhalation solution 0 5 mg, 0 5 mg, Nebulization, TID, KEISHA Newberry, 0 5 mg at 11/26/22 0759  •  isosorbide mononitrate (IMDUR) 24 hr tablet 60 mg, 60 mg, Oral, Daily, KEISHA Newberry, 60 mg at 11/26/22 0901  •  labetalol (NORMODYNE) injection 10 mg, 10 mg, Intravenous, Q6H PRN, Agustina Schneider PA-C, 10 mg at 11/24/22 0241  •  levalbuterol (XOPENEX) inhalation solution 0 63 mg, 0 63 mg, Nebulization, Q4H PRN, KEISHA Newberry  •  levalbuterol (XOPENEX) inhalation solution 1 25 mg, 1 25 mg, Nebulization, TID, 1 25 mg at 11/26/22 0759 **AND** sodium chloride 0 9 % inhalation solution 3 mL, 3 mL, Nebulization, TID, KEISHA Newberry, 3 mL at 11/25/22 1958  •  nystatin (MYCOSTATIN) powder, , Topical, BID, KEISHA Newberry, Given at 11/26/22 0903  •  ondansetron (ZOFRAN) injection 4 mg, 4 mg, Intravenous, Q6H PRN, KEISHA Newberry  •  pantoprazole (PROTONIX) EC tablet 40 mg, 40 mg, Oral, Early Morning, KEISHA Newberry, 40 mg at 11/26/22 0509  •  polyethylene glycol (MIRALAX) packet 17 g, 17 g, Oral, Daily, Steve Yan MD, 17 g at 11/26/22 0902  •  simethicone (MYLICON) chewable tablet 80 mg, 80 mg, Oral, 4x Daily PRN, KEISHA Newberry  •  tamsulosin (FLOMAX) capsule 0 4 mg, 0 4 mg, Oral, Daily With Flori BaldwinKEISHA Parra, 0 4 mg at 11/25/22 1530    Laboratory Results:  Results from last 7 days   Lab Units 11/26/22  0524 11/25/22  0710 11/25/22  0450 11/24/22  0534 11/23/22  0825 11/22/22  2058 11/22/22  0453 11/21/22  0506 11/20/22  0555   WBC Thousand/uL  --  15 84*  --  14 84*  --  16 19* 13 11* 14 42* 15 88*   HEMOGLOBIN g/dL  --  8 8*  --  8 8*  --  9 6* 9 2* 9 3* 9 8*   HEMATOCRIT %  --  26 9*  --  26 8*  --  29 9* 27 9* 28 6* 30 2*   PLATELETS Thousands/uL  --  209  --  230  --  253 197 188 195   POTASSIUM mmol/L 5 2  --  3 7 4 3 4 8 4 9 4 6 4 2 3 8   CHLORIDE mmol/L 100  --  101 105 102 96 103 101 100   CO2 mmol/L 30  --  29 30 28 28 27 28 29   BUN mg/dL 111*  --  112* 101* 93* 91* 85* 75* 68*   CREATININE mg/dL 2 64*  --  2 81* 2 76* 2 77* 2 83* 2 68* 2 85* 2 79*   CALCIUM mg/dL 8 8  --  8 9 8 8 8 9 8 8 8 2* 8 1* 8 4   MAGNESIUM mg/dL  --   --   --   --  2 9* 3 2*  --   --   --

## 2022-11-26 NOTE — ASSESSMENT & PLAN NOTE
Lab Results   Component Value Date    HGBA1C 5 7 (H) 11/16/2022       Recent Labs     11/25/22  2042 11/26/22  0218 11/26/22  0717 11/26/22  1114   POCGLU 390* 247* 212* 278*       Blood Sugar Average: Last 72 hrs:  (P) 552 [id number]   Patient was discharged on Prandin t i d  With meals  Will hold while inpatient  · Continue Humalog sliding scale with Accu-Cheks q a c  And HS  · Patient was started on diabetic pureed Diet with thin liquids  Blood sugars are labile    · Patient to be started on small dose Lantus in the hospital at 10 units daily

## 2022-11-26 NOTE — PROGRESS NOTES
Progress Note - Pulmonary   [place] 80 y o  male MRN: [id number]  Unit/Bed#: [address] [place] [address] Encounter: [id number]    Assessment/Plan:    Acute hypoxic respiratory failure, multifactorial due to below              Titrate oxygen to maintain saturations greater than or equal to 89%  Pulmonary hygiene with nebulizers and chest PT as needed      Acute on chronic diastolic CHF with underlying CKD IV with SHAHID              Diuresis per primary team and Nephrology  Monitor I/O and daily weights      Abnormal CT chest with pulmonary edema and bilateral pleural effusions with suspected multifocal pneumonia with probable aspiration at nursing facility   Complete course of cefepime  Add Tessalon for cough  Diuresis as above  Repeat imaging in 6 to 8 weeks      Oropharyngeal dysphagia with esophageal dysmotility              Speech therapy and diet per primary team               Aspiration precautions  Family is considering PEG tube per notes  Daughter present at bedside states that they are likely not going to do a PEG tube because they prefer quality of life over quantity of life and they would like him to go back to his current residence which does not accept PEG tubes      Chief Complaint:    “I am doing okay ”    Subjective:    Roberto Lara is resting in bed between disturbances  His daughter is at bedside  He reports his breathing is improved  He is having significant cough at night which keeps him awake, but otherwise no complaints  Objective:    Vitals: Blood pressure 170/63, pulse 64, temperature 100 1 °F (37 8 °C), resp  rate 16, height 5' 5" (1 651 m), weight 82 8 kg (182 lb 8 oz), SpO2 90 %  1L NC,Body mass index is 30 37 kg/m²        Intake/Output Summary (Last 24 hours) at 11/26/2022 1357  Last data filed at 11/26/2022 1100  Gross per 24 hour   Intake --   Output 410 ml   Net -410 ml       Invasive Devices     Peripheral Intravenous Line  Duration           Peripheral IV 11/26/22 Distal;Left;Upper;Ventral (anterior) Arm <1 day                Physical Exam:     Physical Exam  Vitals reviewed  Constitutional:       General: He is not in acute distress  Appearance: He is well-developed and overweight  He is not toxic-appearing or diaphoretic  Interventions: Nasal cannula in place  HENT:      Head: Normocephalic and atraumatic  Eyes:      General: No scleral icterus  Extraocular Movements: EOM normal    Neck:      Trachea: No tracheal deviation  Cardiovascular:      Rate and Rhythm: Normal rate and regular rhythm  Heart sounds: S1 normal and S2 normal  No murmur heard  No friction rub  No gallop  Pulmonary:      Effort: Pulmonary effort is normal  No tachypnea, accessory muscle usage or respiratory distress  Breath sounds: No stridor  Decreased breath sounds present  No wheezing, rhonchi or rales  Chest:      Chest wall: No tenderness  Abdominal:      General: Bowel sounds are normal  There is no distension  Palpations: Abdomen is soft  Tenderness: There is no abdominal tenderness  Musculoskeletal:         General: No tenderness or edema  Cervical back: Neck supple  Skin:     General: Skin is warm and dry  Findings: No rash  Nails: There is no cyanosis  Neurological:      Mental Status: He is alert and oriented to person, place, and time  GCS: GCS eye subscore is 4  GCS verbal subscore is 5  GCS motor subscore is 6  Motor: Motor strength is normal    Psychiatric:         Mood and Affect: Mood and affect normal          Speech: Speech normal          Behavior: Behavior normal  Behavior is cooperative           Labs:   CMP:   Lab Results   Component Value Date    SODIUM 138 11/26/2022    K 5 2 11/26/2022     11/26/2022    CO2 30 11/26/2022     (H) 11/26/2022    CREATININE 2 64 (H) 11/26/2022    CALCIUM 8 8 11/26/2022    EGFR 19 11/26/2022     Imaging and other studies: None new

## 2022-11-27 LAB
ANION GAP SERPL CALCULATED.3IONS-SCNC: 7 MMOL/L (ref 4–13)
BASOPHILS # BLD AUTO: 0.02 THOUSANDS/ÂΜL (ref 0–0.1)
BASOPHILS NFR BLD AUTO: 0 % (ref 0–1)
BUN SERPL-MCNC: 109 MG/DL (ref 5–25)
CALCIUM SERPL-MCNC: 8.5 MG/DL (ref 8.3–10.1)
CHLORIDE SERPL-SCNC: 101 MMOL/L (ref 96–108)
CO2 SERPL-SCNC: 32 MMOL/L (ref 21–32)
CREAT SERPL-MCNC: 2.73 MG/DL (ref 0.6–1.3)
EOSINOPHIL # BLD AUTO: 0.32 THOUSAND/ÂΜL (ref 0–0.61)
EOSINOPHIL NFR BLD AUTO: 2 % (ref 0–6)
ERYTHROCYTE [DISTWIDTH] IN BLOOD BY AUTOMATED COUNT: 14.9 % (ref 11.6–15.1)
GFR SERPL CREATININE-BSD FRML MDRD: 19 ML/MIN/1.73SQ M
GLUCOSE SERPL-MCNC: 156 MG/DL (ref 65–140)
GLUCOSE SERPL-MCNC: 168 MG/DL (ref 65–140)
GLUCOSE SERPL-MCNC: 203 MG/DL (ref 65–140)
GLUCOSE SERPL-MCNC: 219 MG/DL (ref 65–140)
GLUCOSE SERPL-MCNC: 230 MG/DL (ref 65–140)
GLUCOSE SERPL-MCNC: 244 MG/DL (ref 65–140)
HCT VFR BLD AUTO: 26 % (ref 36.5–49.3)
HGB BLD-MCNC: 8.2 G/DL (ref 12–17)
IMM GRANULOCYTES # BLD AUTO: 0.35 THOUSAND/UL (ref 0–0.2)
IMM GRANULOCYTES NFR BLD AUTO: 3 % (ref 0–2)
LYMPHOCYTES # BLD AUTO: 1.24 THOUSANDS/ÂΜL (ref 0.6–4.47)
LYMPHOCYTES NFR BLD AUTO: 9 % (ref 14–44)
MCH RBC QN AUTO: 28.8 PG (ref 26.8–34.3)
MCHC RBC AUTO-ENTMCNC: 31.5 G/DL (ref 31.4–37.4)
MCV RBC AUTO: 91 FL (ref 82–98)
MONOCYTES # BLD AUTO: 0.98 THOUSAND/ÂΜL (ref 0.17–1.22)
MONOCYTES NFR BLD AUTO: 7 % (ref 4–12)
NEUTROPHILS # BLD AUTO: 10.51 THOUSANDS/ÂΜL (ref 1.85–7.62)
NEUTS SEG NFR BLD AUTO: 79 % (ref 43–75)
NRBC BLD AUTO-RTO: 0 /100 WBCS
PLATELET # BLD AUTO: 221 THOUSANDS/UL (ref 149–390)
PMV BLD AUTO: 12.2 FL (ref 8.9–12.7)
POTASSIUM SERPL-SCNC: 3.6 MMOL/L (ref 3.5–5.3)
PROCALCITONIN SERPL-MCNC: 0.36 NG/ML
RBC # BLD AUTO: 2.85 MILLION/UL (ref 3.88–5.62)
SODIUM SERPL-SCNC: 140 MMOL/L (ref 135–147)
WBC # BLD AUTO: 13.42 THOUSAND/UL (ref 4.31–10.16)

## 2022-11-27 RX ORDER — REPAGLINIDE 1 MG/1
0.5 TABLET ORAL
Status: DISCONTINUED | OUTPATIENT
Start: 2022-11-27 | End: 2022-12-10 | Stop reason: HOSPADM

## 2022-11-27 RX ORDER — HYDRALAZINE HYDROCHLORIDE 25 MG/1
100 TABLET, FILM COATED ORAL 3 TIMES DAILY
Status: DISCONTINUED | OUTPATIENT
Start: 2022-11-27 | End: 2022-12-10 | Stop reason: HOSPADM

## 2022-11-27 RX ORDER — BISACODYL 10 MG
10 SUPPOSITORY, RECTAL RECTAL DAILY PRN
Status: DISCONTINUED | OUTPATIENT
Start: 2022-11-27 | End: 2022-12-10 | Stop reason: HOSPADM

## 2022-11-27 RX ADMIN — IPRATROPIUM BROMIDE 0.5 MG: 0.5 SOLUTION RESPIRATORY (INHALATION) at 19:35

## 2022-11-27 RX ADMIN — BENZONATATE 200 MG: 100 CAPSULE ORAL at 21:23

## 2022-11-27 RX ADMIN — REPAGLINIDE 0.5 MG: 1 TABLET ORAL at 13:24

## 2022-11-27 RX ADMIN — BENZONATATE 200 MG: 100 CAPSULE ORAL at 09:21

## 2022-11-27 RX ADMIN — GABAPENTIN 100 MG: 100 CAPSULE ORAL at 09:21

## 2022-11-27 RX ADMIN — LEVALBUTEROL HYDROCHLORIDE 1.25 MG: 1.25 SOLUTION, CONCENTRATE RESPIRATORY (INHALATION) at 14:18

## 2022-11-27 RX ADMIN — IPRATROPIUM BROMIDE 0.5 MG: 0.5 SOLUTION RESPIRATORY (INHALATION) at 14:18

## 2022-11-27 RX ADMIN — CYANOCOBALAMIN TAB 500 MCG 500 MCG: 500 TAB at 09:22

## 2022-11-27 RX ADMIN — HYDRALAZINE HYDROCHLORIDE 100 MG: 25 TABLET ORAL at 09:46

## 2022-11-27 RX ADMIN — INSULIN LISPRO 2 UNITS: 100 INJECTION, SOLUTION INTRAVENOUS; SUBCUTANEOUS at 11:32

## 2022-11-27 RX ADMIN — INSULIN LISPRO 2 UNITS: 100 INJECTION, SOLUTION INTRAVENOUS; SUBCUTANEOUS at 16:11

## 2022-11-27 RX ADMIN — HYDRALAZINE HYDROCHLORIDE 100 MG: 25 TABLET ORAL at 21:23

## 2022-11-27 RX ADMIN — INSULIN GLARGINE 10 UNITS: 100 INJECTION, SOLUTION SUBCUTANEOUS at 21:24

## 2022-11-27 RX ADMIN — CEFEPIME HYDROCHLORIDE 1000 MG: 1 INJECTION, SOLUTION INTRAVENOUS at 17:25

## 2022-11-27 RX ADMIN — HEPARIN SODIUM 5000 UNITS: 5000 INJECTION INTRAVENOUS; SUBCUTANEOUS at 13:24

## 2022-11-27 RX ADMIN — TORSEMIDE 40 MG: 20 TABLET ORAL at 09:21

## 2022-11-27 RX ADMIN — ASPIRIN 81 MG CHEWABLE TABLET 81 MG: 81 TABLET CHEWABLE at 09:22

## 2022-11-27 RX ADMIN — ISODIUM CHLORIDE 3 ML: 0.03 SOLUTION RESPIRATORY (INHALATION) at 14:18

## 2022-11-27 RX ADMIN — BENZONATATE 200 MG: 100 CAPSULE ORAL at 16:11

## 2022-11-27 RX ADMIN — HEPARIN SODIUM 5000 UNITS: 5000 INJECTION INTRAVENOUS; SUBCUTANEOUS at 21:24

## 2022-11-27 RX ADMIN — CARVEDILOL 12.5 MG: 12.5 TABLET, FILM COATED ORAL at 17:25

## 2022-11-27 RX ADMIN — AMLODIPINE BESYLATE 10 MG: 10 TABLET ORAL at 09:22

## 2022-11-27 RX ADMIN — NYSTATIN: 100000 POWDER TOPICAL at 09:31

## 2022-11-27 RX ADMIN — PANTOPRAZOLE SODIUM 40 MG: 40 TABLET, DELAYED RELEASE ORAL at 05:18

## 2022-11-27 RX ADMIN — Medication 2000 UNITS: at 09:21

## 2022-11-27 RX ADMIN — LEVALBUTEROL HYDROCHLORIDE 1.25 MG: 1.25 SOLUTION, CONCENTRATE RESPIRATORY (INHALATION) at 19:35

## 2022-11-27 RX ADMIN — CARVEDILOL 12.5 MG: 12.5 TABLET, FILM COATED ORAL at 09:21

## 2022-11-27 RX ADMIN — HYDRALAZINE HYDROCHLORIDE 100 MG: 25 TABLET ORAL at 16:11

## 2022-11-27 RX ADMIN — NYSTATIN: 100000 POWDER TOPICAL at 17:26

## 2022-11-27 RX ADMIN — FLUCONAZOLE 100 MG: 100 TABLET ORAL at 09:21

## 2022-11-27 RX ADMIN — LEVALBUTEROL HYDROCHLORIDE 1.25 MG: 1.25 SOLUTION, CONCENTRATE RESPIRATORY (INHALATION) at 08:39

## 2022-11-27 RX ADMIN — DOXAZOSIN 2 MG: 2 TABLET ORAL at 21:23

## 2022-11-27 RX ADMIN — DOCUSATE SODIUM 100 MG: 100 CAPSULE, LIQUID FILLED ORAL at 17:26

## 2022-11-27 RX ADMIN — INSULIN LISPRO 1 UNITS: 100 INJECTION, SOLUTION INTRAVENOUS; SUBCUTANEOUS at 21:24

## 2022-11-27 RX ADMIN — POLYETHYLENE GLYCOL 3350 17 G: 17 POWDER, FOR SOLUTION ORAL at 09:22

## 2022-11-27 RX ADMIN — ISOSORBIDE MONONITRATE 60 MG: 60 TABLET, EXTENDED RELEASE ORAL at 09:21

## 2022-11-27 RX ADMIN — REPAGLINIDE 0.5 MG: 1 TABLET ORAL at 16:11

## 2022-11-27 RX ADMIN — ATORVASTATIN CALCIUM 40 MG: 40 TABLET, FILM COATED ORAL at 09:22

## 2022-11-27 RX ADMIN — SENNOSIDES 8.6 MG: 8.6 TABLET, FILM COATED ORAL at 21:23

## 2022-11-27 RX ADMIN — TAMSULOSIN HYDROCHLORIDE 0.4 MG: 0.4 CAPSULE ORAL at 17:25

## 2022-11-27 RX ADMIN — DOCUSATE SODIUM 100 MG: 100 CAPSULE, LIQUID FILLED ORAL at 09:22

## 2022-11-27 RX ADMIN — IPRATROPIUM BROMIDE 0.5 MG: 0.5 SOLUTION RESPIRATORY (INHALATION) at 08:39

## 2022-11-27 RX ADMIN — Medication 5 ML: at 21:24

## 2022-11-27 RX ADMIN — HEPARIN SODIUM 5000 UNITS: 5000 INJECTION INTRAVENOUS; SUBCUTANEOUS at 05:18

## 2022-11-27 RX ADMIN — INSULIN LISPRO 1 UNITS: 100 INJECTION, SOLUTION INTRAVENOUS; SUBCUTANEOUS at 02:18

## 2022-11-27 RX ADMIN — ISODIUM CHLORIDE 3 ML: 0.03 SOLUTION RESPIRATORY (INHALATION) at 19:35

## 2022-11-27 RX ADMIN — BISACODYL 10 MG: 10 SUPPOSITORY RECTAL at 13:24

## 2022-11-27 RX ADMIN — GABAPENTIN 100 MG: 100 CAPSULE ORAL at 17:26

## 2022-11-27 RX ADMIN — INSULIN LISPRO 1 UNITS: 100 INJECTION, SOLUTION INTRAVENOUS; SUBCUTANEOUS at 09:23

## 2022-11-27 NOTE — ASSESSMENT & PLAN NOTE
Troponin [id number]  Elevated troponin in recent admission as well  Reports chest pressure when in respiratory distress  Denies history of CAD  · EKG no ischemic changes, read as AFib, rate 103,RBBB  · No history of AFib  Prior EKG shows sinus rhythm with PACs /PVCs  Will repeat EKG  · 2D echo last admission showed  EF low normal, normal wall motion, grade 2 diastolic dysfunction, moderately dilated atriums     · Most likely non MI troponin elevation due to # 1 and CHF

## 2022-11-27 NOTE — ASSESSMENT & PLAN NOTE
Patient presents with acute respiratory distress shortly after eating a few spoonful of puree diet in STR, patient reported chest pressure and SOB  Patient was satting 85-95% on oxygen 3 L per STR transfer paper  Patient was belching after eating/drinking limited amount of diet/water per staff  · Patient was discharged on the day of admission after being hospitalized for aspiration pneumonia, acute on chronic diastolic CHF, dysphagia  Patient received 7 days of IV Rocephin and Flagyl  Received IV Lasix  Seen by GI, underwent EGD, found to have oropharyngeal dysphagia with esophageal dysmotility, no obvious esophageal stricture  GI recommend to consider PEG tube placement if recurrent aspirations  Recommend pantoprazole daily  Underwent Barium swallow study which showed moderate to severe esophageal dysmotility with significant residual contrast remaining in the esophagus at the end of the study  Patient was recommend regular diet by speech and discharged on regular diet per STR  · Likely secondary to multifocal pneumonia and also CHF  · Patient required BiPAP on ED arrival   ABG post BiPAP essentially unremarkable  Later patient was titrated down to 6 liters oxygen upon admission  Patient is currently down to 1 liter oxygen with O2 saturation in low to mid 90s  · Chest x-ray - Pulmonary edema pattern, worse when comparing to 11/19/2022  Underlying infection cannot be excluded  · ProBNP 15,094  · Patient received Lasix 40 IV in ED  Will continue Lasix 40 mg IV daily  · Received cefepime and Flagyl in ED  · Received IV Solu-Medrol in ED  No wheezing on auscultation  · Send sputum Gram stain and culture  · Urine for Legionella pneumococcal antigens were negative  · Continue Xopenex+ Atrovent t i d , Xopenex p r n    · CT of the chest showed pulmonary edema with moderate size bilateral pleural effusions with significant bibasilar atelectasis and concomitant multifocal pneumonia  · Patient restarted back on cefepime 1 gram IV daily as procalcitonin level is increasing compared to prior admission    Procalcitonin level now improving  · Continue cefepime to finish a 1 week course

## 2022-11-27 NOTE — ASSESSMENT & PLAN NOTE
Lab Results   Component Value Date    HGBA1C 5 7 (H) 11/16/2022       Recent Labs     11/26/22  2111 11/27/22  0206 11/27/22  0717 11/27/22  1114   POCGLU 252* 219* 203* 244*       Blood Sugar Average: Last 72 hrs:  (P) 660 [id number]   Patient was discharged on Prandin t i d  With meals  Will hold while inpatient  · Continue Humalog sliding scale with Accu-Cheks q a c  And HS  · Patient was started on diabetic pureed Diet with thin liquids  Blood sugars are labile  · Blood sugars continued to remain high    Resume Prandin 0 5 milligram p o  B i d

## 2022-11-27 NOTE — ASSESSMENT & PLAN NOTE
Patient reports no appetite in past 2 days,did not eat all day yesterday  Loss of appetite could be secondary to antibiotic  · Keep patient NPO for now  · Discussed with speech therapy-patient at high risk for aspiration despite the level of diet  For now patient started on pureed diet with thin liquids with medications crushed with puree  · GI recommend to consider PEG tube if recurrent aspirations  Per recent GI note,family did not want PEG tube during prior hospitalization  · Continue pantoprazole daily  · Obstructive series showed barium throughout the colon with modest amount of stool along the rectosigmoid region  · Prolonged discussion held with patient's daughter and patient's PA on November 23, 2022 regarding risk of recurrent aspiration and the patient possibly needing PEG    Discussed with daughter who wants to hold off on PEG tube placement

## 2022-11-27 NOTE — ASSESSMENT & PLAN NOTE
Wt Readings from Last 3 Encounters:   11/27/22 82 7 kg (182 lb 5 8 oz)   11/22/22 84 5 kg (186 lb 4 8 oz)     Patient received IV Lasix in recent admission  · Chest x-ray upon admission shows worsening pulmonary edema  · Recent 2D echo as below  · Mild edema to lower extremity, left forearm edema on exam   · Patient received 40 milligrams of Lasix IV in the ED and was continued on  Lasix 40 milligram IV daily which was later increased to 40 milligram IV q 12 hours  · Left upper extremity venous Doppler showed no evidence of DVT  · CT chest showed pulmonary edema with moderate size bilateral pleural effusions left more than right  · Patient received Lasix 40 milligram q 12 hours and was transitioned to torsemide 40 milligram p o  Daily  · Creatinine continues to remain stable  Patient continues to have good diuresis

## 2022-11-27 NOTE — ASSESSMENT & PLAN NOTE
On hydralazine, Norvasc, Coreg, Imdur, Cardura  · Will continue above medications with holding parameter  · Blood pressure continues to remain on the high side    Hydralazine dose was increased to 100 milligram p o  T i d

## 2022-11-27 NOTE — ASSESSMENT & PLAN NOTE
Lab Results   Component Value Date    EGFR 19 11/27/2022    EGFR 19 11/26/2022    EGFR 18 11/25/2022    CREATININE 2 73 (H) 11/27/2022    CREATININE 2 64 (H) 11/26/2022    CREATININE 2 81 (H) 11/25/2022   History of CKD 4, baseline creatinine appears to be around 1 5-1 9 in past 2 years in care everywhere  Creatinine during previous hospitalization ranged in 2 4-2 8  · Possible acute kidney injury versus progression of chronic kidney disease  · Urinalysis was bland  · Received IV Lasix 40 mg in ED  · Avoid nephrotoxin and hypotension  · Renal ultrasound showed no hydronephrosis moderate left renal atrophy  · Nephrology input appreciated  · Patient was continued on Lasix 40 milligram IV q 12 hours with good diuresis  · Patient transitioned to torsemide 40 milligram p o  Daily and is having good diuresis with stable creatinine

## 2022-11-27 NOTE — PLAN OF CARE
Problem: RESPIRATORY - ADULT  Goal: Achieves optimal ventilation and oxygenation  Description: INTERVENTIONS:  - Assess for changes in respiratory status  - Assess for changes in mentation and behavior  - Position to facilitate oxygenation and minimize respiratory effort  - Oxygen administered by appropriate delivery if ordered  - Initiate smoking cessation education as indicated  - Encourage broncho-pulmonary hygiene including cough, deep breathe, Incentive Spirometry  - Assess the need for suctioning and aspirate as needed  - Assess and instruct to report SOB or any respiratory difficulty  - Respiratory Therapy support as indicated  Outcome: Progressing     Problem: Prexisting or High Potential for Compromised Skin Integrity  Goal: Skin integrity is maintained or improved  Description: INTERVENTIONS:  - Identify patients at risk for skin breakdown  - Assess and monitor skin integrity  - Assess and monitor nutrition and hydration status  - Monitor labs   - Assess for incontinence   - Turn and reposition patient  - Assist with mobility/ambulation  - Relieve pressure over bony prominences  - Avoid friction and shearing  - Provide appropriate hygiene as needed including keeping skin clean and dry  - Evaluate need for skin moisturizer/barrier cream  - Collaborate with interdisciplinary team   - Patient/family teaching  - Consider wound care consult   Outcome: Progressing     Problem: MOBILITY - ADULT  Goal: Maintain or return to baseline ADL function  Description: INTERVENTIONS:  -  Assess patient's ability to carry out ADLs; assess patient's baseline for ADL function and identify physical deficits which impact ability to perform ADLs (bathing, care of mouth/teeth, toileting, grooming, dressing, etc )  - Assess/evaluate cause of self-care deficits   - Assess range of motion  - Assess patient's mobility; develop plan if impaired  - Assess patient's need for assistive devices and provide as appropriate  - Encourage maximum independence but intervene and supervise when necessary  - Involve family in performance of ADLs  - Assess for home care needs following discharge   - Consider OT consult to assist with ADL evaluation and planning for discharge  - Provide patient education as appropriate  Outcome: Progressing  Goal: Maintains/Returns to pre admission functional level  Description: INTERVENTIONS:  - Perform BMAT or MOVE assessment daily    - Set and communicate daily mobility goal to care team and patient/family/caregiver  - Collaborate with rehabilitation services on mobility goals if consulted  - Perform Range of Motion 3 times a day  - Reposition patient every 2 hours    - Dangle patient 3 times a day  - Stand patient 3 times a day  - Ambulate patient 3 times a day  - Out of bed to chair 3 times a day   - Out of bed for meals 3 times a day  - Out of bed for toileting  - Record patient progress and toleration of activity level   Outcome: Progressing     Problem: Potential for Falls  Goal: Patient will remain free of falls  Description: INTERVENTIONS:  - Educate patient/family on patient safety including physical limitations  - Instruct patient to call for assistance with activity   - Consult OT/PT to assist with strengthening/mobility   - Keep Call bell within reach  - Keep bed low and locked with side rails adjusted as appropriate  - Keep care items and personal belongings within reach  - Initiate and maintain comfort rounds  - Make Fall Risk Sign visible to staff  - Offer Toileting every 2 Hours, in advance of need  - Initiate/Maintain bed alarm  - Obtain necessary fall risk management equipment: yellow socks  - Apply yellow socks and bracelet for high fall risk patients  - Consider moving patient to room near nurses station  Outcome: Progressing     Problem: PAIN - ADULT  Goal: Verbalizes/displays adequate comfort level or baseline comfort level  Description: Interventions:  - Encourage patient to monitor pain and request assistance  - Assess pain using appropriate pain scale  - Administer analgesics based on type and severity of pain and evaluate response  - Implement non-pharmacological measures as appropriate and evaluate response  - Consider cultural and social influences on pain and pain management  - Notify physician/advanced practitioner if interventions unsuccessful or patient reports new pain  Outcome: Progressing     Problem: INFECTION - ADULT  Goal: Absence or prevention of progression during hospitalization  Description: INTERVENTIONS:  - Assess and monitor for signs and symptoms of infection  - Monitor lab/diagnostic results  - Monitor all insertion sites, i e  indwelling lines, tubes, and drains  - Monitor endotracheal if appropriate and nasal secretions for changes in amount and color  - [place] appropriate cooling/warming therapies per order  - Administer medications as ordered  - Instruct and encourage patient and family to use good hand hygiene technique  - Identify and instruct in appropriate isolation precautions for identified infection/condition  Outcome: Progressing  Goal: Absence of fever/infection during neutropenic period  Description: INTERVENTIONS:  - Monitor WBC    Outcome: Progressing     Problem: SAFETY ADULT  Goal: Maintain or return to baseline ADL function  Description: INTERVENTIONS:  -  Assess patient's ability to carry out ADLs; assess patient's baseline for ADL function and identify physical deficits which impact ability to perform ADLs (bathing, care of mouth/teeth, toileting, grooming, dressing, etc )  - Assess/evaluate cause of self-care deficits   - Assess range of motion  - Assess patient's mobility; develop plan if impaired  - Assess patient's need for assistive devices and provide as appropriate  - Encourage maximum independence but intervene and supervise when necessary  - Involve family in performance of ADLs  - Assess for home care needs following discharge   - Consider OT consult to assist with ADL evaluation and planning for discharge  - Provide patient education as appropriate  Outcome: Progressing  Goal: Maintains/Returns to pre admission functional level  Description: INTERVENTIONS:  - Perform BMAT or MOVE assessment daily    - Set and communicate daily mobility goal to care team and patient/family/caregiver  - Collaborate with rehabilitation services on mobility goals if consulted  - Perform Range of Motion 3 times a day  - Reposition patient every 2 hours    - Dangle patient 3 times a day  - Stand patient 3 times a day  - Ambulate patient 3 times a day  - Out of bed to chair 3 times a day   - Out of bed for meals 3 times a day  - Out of bed for toileting  - Record patient progress and toleration of activity level   Outcome: Progressing  Goal: Patient will remain free of falls  Description: INTERVENTIONS:  - Educate patient/family on patient safety including physical limitations  - Instruct patient to call for assistance with activity   - Consult OT/PT to assist with strengthening/mobility   - Keep Call bell within reach  - Keep bed low and locked with side rails adjusted as appropriate  - Keep care items and personal belongings within reach  - Initiate and maintain comfort rounds  - Make Fall Risk Sign visible to staff  - Offer Toileting every 2 Hours, in advance of need  - Initiate/Maintain bed alarm  - Obtain necessary fall risk management equipment: yellow socks  - Apply yellow socks and bracelet for high fall risk patients  - Consider moving patient to room near nurses station  Outcome: Progressing

## 2022-11-27 NOTE — ASSESSMENT & PLAN NOTE
Patient completed 7 days of nystatin suspension  Continues to have white plaques  Patient was given Diflucan 200 milligrams 1 dose and will be continued on Diflucan 100 milligram p o   Daily

## 2022-11-27 NOTE — PROGRESS NOTES
[facility] [address]  Progress Note Marda Merlin 10/19/1929, 80 y o  male MRN: [id number]  Unit/Bed#: [address] Encounter: [id number]  Primary Care Provider: Demi Phillips PA-C   Date and time admitted to hospital: 11/22/2022  8:41 PM    * Acute respiratory failure with hypoxia [facility])  Assessment & Plan  Patient presents with acute respiratory distress shortly after eating a few spoonful of puree diet in STR, patient reported chest pressure and SOB  Patient was satting 85-95% on oxygen 3 L per STR transfer paper  Patient was belching after eating/drinking limited amount of diet/water per staff  · Patient was discharged on the day of admission after being hospitalized for aspiration pneumonia, acute on chronic diastolic CHF, dysphagia  Patient received 7 days of IV Rocephin and Flagyl  Received IV Lasix  Seen by GI, underwent EGD, found to have oropharyngeal dysphagia with esophageal dysmotility, no obvious esophageal stricture  GI recommend to consider PEG tube placement if recurrent aspirations  Recommend pantoprazole daily  Underwent Barium swallow study which showed moderate to severe esophageal dysmotility with significant residual contrast remaining in the esophagus at the end of the study  Patient was recommend regular diet by speech and discharged on regular diet per STR  · Likely secondary to multifocal pneumonia and also CHF  · Patient required BiPAP on ED arrival   ABG post BiPAP essentially unremarkable  Later patient was titrated down to 6 liters oxygen upon admission  Patient is currently down to 1 liter oxygen with O2 saturation in low to mid 90s  · Chest x-ray - Pulmonary edema pattern, worse when comparing to 11/19/2022  Underlying infection cannot be excluded  · ProBNP 15,094  · Patient received Lasix 40 IV in ED  Will continue Lasix 40 mg IV daily  · Received cefepime and Flagyl in ED  · Received IV Solu-Medrol in ED  No wheezing on auscultation    · Send sputum Gram stain and culture  · Urine for Legionella pneumococcal antigens were negative  · Continue Xopenex+ Atrovent t i d , Xopenex p r n  · CT of the chest showed pulmonary edema with moderate size bilateral pleural effusions with significant bibasilar atelectasis and concomitant multifocal pneumonia  · Patient restarted back on cefepime 1 gram IV daily as procalcitonin level is increasing compared to prior admission  Procalcitonin level now improving  · Continue cefepime to finish a 1 week course    Esophageal dysphagia  Assessment & Plan  Patient reports no appetite in past 2 days,did not eat all day yesterday  Loss of appetite could be secondary to antibiotic  · Keep patient NPO for now  · Discussed with speech therapy-patient at high risk for aspiration despite the level of diet  For now patient started on pureed diet with thin liquids with medications crushed with puree  · GI recommend to consider PEG tube if recurrent aspirations  Per recent GI note,family did not want PEG tube during prior hospitalization  · Continue pantoprazole daily  · Obstructive series showed barium throughout the colon with modest amount of stool along the rectosigmoid region  · Prolonged discussion held with patient's daughter and patient's PA on November 23, 2022 regarding risk of recurrent aspiration and the patient possibly needing PEG  Discussed with daughter who wants to hold off on PEG tube placement      Acute on chronic diastolic congestive heart failure [facility])  Assessment & Plan  Wt Readings from Last 3 Encounters:   11/27/22 82 7 kg (182 lb 5 8 oz)   11/22/22 84 5 kg (186 lb 4 8 oz)     Patient received IV Lasix in recent admission     · Chest x-ray upon admission shows worsening pulmonary edema  · Recent 2D echo as below  · Mild edema to lower extremity, left forearm edema on exam   · Patient received 40 milligrams of Lasix IV in the ED and was continued on  Lasix 40 milligram IV daily which was later increased to 40 milligram IV q 12 hours  · Left upper extremity venous Doppler showed no evidence of DVT  · CT chest showed pulmonary edema with moderate size bilateral pleural effusions left more than right  · Patient received Lasix 40 milligram q 12 hours and was transitioned to torsemide 40 milligram p o  Daily  · Creatinine continues to remain stable  Patient continues to have good diuresis  Acute kidney injury superimposed on CKD [facility])  Assessment & Plan  Lab Results   Component Value Date    EGFR 19 11/27/2022    EGFR 19 11/26/2022    EGFR 18 11/25/2022    CREATININE 2 73 (H) 11/27/2022    CREATININE 2 64 (H) 11/26/2022    CREATININE 2 81 (H) 11/25/2022   History of CKD 4, baseline creatinine appears to be around 1 5-1 9 in past 2 years in care everywhere  Creatinine during previous hospitalization ranged in 2 4-2 8  · Possible acute kidney injury versus progression of chronic kidney disease  · Urinalysis was bland  · Received IV Lasix 40 mg in ED  · Avoid nephrotoxin and hypotension  · Renal ultrasound showed no hydronephrosis moderate left renal atrophy  · Nephrology input appreciated  · Patient was continued on Lasix 40 milligram IV q 12 hours with good diuresis  · Patient transitioned to torsemide 40 milligram p o  Daily and is having good diuresis with stable creatinine  Aspiration pneumonia [facility])  Assessment & Plan  Just completed 7 days antibiotic  · Repeat procalcitonin level was 0 54  · Patient received cefepime and Flagyl in the ED     Patient will be continued on cefepime  1 gram IV daily to finish 1 week course  · Sputum cultures could not be obtained  · Urine for Legionella and pneumococcal antigens were negative  · Pulmonary input appreciated  · Blood cultures have been negative    Left upper quadrant abdominal mass  Assessment & Plan  CT scan of the chest and also renal ultrasound showed left upper quadrant pass medial to the spleen  CT abdomen pelvis was ordered but could not be performed due to retained barium  Will need to reorder CT abdomen pelvis at a later date after clearing up barium  Patient was started on Colace and MiraLax and was later added on senna  Patient still did not have a bowel movement  Patient ordered Dulcolax suppository p r n  BPH (benign prostatic hyperplasia)  Assessment & Plan  Continue Cardura and Flomax  · Check PVR    Hyperlipidemia  Assessment & Plan  Continue statin    Elevated troponin  Assessment & Plan  Troponin [phone]  Elevated troponin in recent admission as well  Reports chest pressure when in respiratory distress  Denies history of CAD  · EKG no ischemic changes, read as AFib, rate 103,RBBB  · No history of AFib  Prior EKG shows sinus rhythm with PACs /PVCs  Will repeat EKG  · 2D echo last admission showed  EF low normal, normal wall motion, grade 2 diastolic dysfunction, moderately dilated atriums  · Most likely non MI troponin elevation due to # 1 and CHF      Thrush  Assessment & Plan  Patient completed 7 days of nystatin suspension  Continues to have white plaques  Patient was given Diflucan 200 milligrams 1 dose and will be continued on Diflucan 100 milligram p o  Daily    Type 2 diabetes mellitus [facility])  Assessment & Plan  Lab Results   Component Value Date    HGBA1C 5 7 (H) 11/16/2022       Recent Labs     11/26/22  2111 11/27/22  0206 11/27/22  0717 11/27/22  1114   POCGLU 252* 219* 203* 244*       Blood Sugar Average: Last 72 hrs:  (P) 229 [id number]   Patient was discharged on Prandin t i d  With meals  Will hold while inpatient  · Continue Humalog sliding scale with Accu-Cheks q a c  And HS  · Patient was started on diabetic pureed Diet with thin liquids  Blood sugars are labile  · Blood sugars continued to remain high  Resume Prandin 0 5 milligram p o  B i d  Primary hypertension  Assessment & Plan  On hydralazine, Norvasc, Coreg, Imdur, Cardura    · Will continue above medications with holding parameter  · Blood pressure continues to remain on the high side  Hydralazine dose was increased to 100 milligram p o  T i d           VTE Pharmacologic Prophylaxis:   High Risk (Score >/= 5) - Pharmacological DVT Prophylaxis Ordered: heparin  Sequential Compression Devices Ordered  Patient Centered Rounds: I performed bedside rounds with nursing staff today  Discussions with Specialists or Other Care Team Provider: Yes    Education and Discussions with Family / Patient: Updated  (daughter) via phone  Time Spent for Care: 45 minutes  More than 50% of total time spent on counseling and coordination of care as described above  Current Length of Stay: 5 day(s)  Current Patient Status: Inpatient   Certification Statement: The patient will continue to require additional inpatient hospital stay due to Acute CHF, acute respiratory failure, aspiration pneumonia  Discharge Plan: Anticipate discharge in 24-48 hrs to rehab facility  Code Status: Level 1 - Full Code    Subjective:   Patient is feeling okay  Denies any shortness of breath  Very occasional cough  Did not have a bowel movement yet    Objective:     Vitals:   Temp (24hrs), Av 6 °F (37 °C), Min:98 1 °F (36 7 °C), Max:99 7 °F (37 6 °C)    Temp:  [98 1 °F (36 7 °C)-99 7 °F (37 6 °C)] 99 7 °F (37 6 °C)  HR:  [61-68] 67  Resp:  [16-20] 16  BP: (153-187)/(54-66) 161/63  SpO2:  [93 %-97 %] 95 %  Body mass index is 30 35 kg/m²  Input and Output Summary (last 24 hours): Intake/Output Summary (Last 24 hours) at 2022 1153  Last data filed at 2022 1125  Gross per 24 hour   Intake --   Output 1730 ml   Net -1730 ml       Physical Exam:   Physical Exam  Constitutional:       Appearance: Normal appearance  HENT:      Head: Normocephalic and atraumatic  Eyes:      Extraocular Movements: Extraocular movements intact  Pupils: Pupils are equal, round, and reactive to light  Cardiovascular:      Rate and Rhythm: Normal rate and regular rhythm  Heart sounds: No murmur heard  No gallop  Pulmonary:      Effort: Pulmonary effort is normal       Breath sounds: Normal breath sounds  Abdominal:      General: Bowel sounds are normal       Palpations: Abdomen is soft  Tenderness: There is no abdominal tenderness  Musculoskeletal:         General: No swelling or deformity  Normal range of motion  Cervical back: Normal range of motion and neck supple  Skin:     General: Skin is warm and dry  Neurological:      General: No focal deficit present  Mental Status: He is alert  Additional Data:     Labs:  Results from last 7 days   Lab Units 11/27/22  0523   WBC Thousand/uL 13 42*   HEMOGLOBIN g/dL 8 2*   HEMATOCRIT % 26 0*   PLATELETS Thousands/uL 221   NEUTROS PCT % 79*   LYMPHS PCT % 9*   MONOS PCT % 7   EOS PCT % 2     Results from last 7 days   Lab Units 11/27/22  0523 11/23/22  0825 11/22/22 2058   SODIUM mmol/L 140   < > 133*   POTASSIUM mmol/L 3 6   < > 4 9   CHLORIDE mmol/L 101   < > 96   CO2 mmol/L 32   < > 28   BUN mg/dL 109*   < > 91*   CREATININE mg/dL 2 73*   < > 2 83*   ANION GAP mmol/L 7   < > 9   CALCIUM mg/dL 8 5   < > 8 8   ALBUMIN g/dL  --   --  2 6*   TOTAL BILIRUBIN mg/dL  --   --  0 57   ALK PHOS U/L  --   --  92   ALT U/L  --   --  27   GLUCOSE RANDOM mg/dL 168*   < > 287*    < > = values in this interval not displayed       Results from last 7 days   Lab Units 11/22/22  2058   INR  1 07     Results from last 7 days   Lab Units 11/27/22  1114 11/27/22  0717 11/27/22  0206 11/26/22  2111 11/26/22  1553 11/26/22  1114 11/26/22  0717 11/26/22  0218 11/25/22  2042 11/25/22  1620 11/25/22  1117 11/25/22  0735   POC GLUCOSE mg/dl 244* 203* 219* 252* 319* 278* 212* 247* 390* 286* 366* 104         Results from last 7 days   Lab Units 11/27/22  0523 11/25/22  0450 11/23/22  0825 11/22/22 2058 11/22/22 0453   LACTIC ACID mmol/L  --   --   --  1 0  --    PROCALCITONIN ng/ml 0 36* 0 44* 0 54* 0 37* 0 39* Lines/Drains:  Invasive Devices     Peripheral Intravenous Line  Duration           Peripheral IV 11/26/22 Distal;Left;Upper;Ventral (anterior) Arm 1 day          Drain  Duration           External Urinary Catheter <1 day                      Imaging: Reviewed radiology reports from this admission including: chest xray    Recent Cultures (last 7 days):   Results from last 7 days   Lab Units 11/22/22 2058 11/22/22 2057   BLOOD CULTURE  No Growth After 4 Days  No Growth After 4 Days         Last 24 Hours Medication List:   Current Facility-Administered Medications   Medication Dose Route Frequency Provider Last Rate   • acetaminophen  650 mg Oral Q6H PRN KEISHA Newberry     • amLODIPine  10 mg Oral Daily KEISHA Newberry     • aspirin  81 mg Oral Daily KEISHA Newberry     • atorvastatin  40 mg Oral Daily KEISHA Newberry     • benzonatate  200 mg Oral TID KEISHA Escoto     • bisacodyl  10 mg Rectal Daily PRN Stella Aguero MD     • carvedilol  12 5 mg Oral BID With Meals KEISHA Newberry     • cefepime  1,000 mg Intravenous Q24H Stella Aguero MD 1,000 mg (11/26/22 1717)   • cholecalciferol  2,000 Units Oral Daily KEISHA Newberry     • vitamin B-12  500 mcg Oral Daily KEISHA Newberry     • docusate sodium  100 mg Oral BID Stella Aguero MD     • doxazosin  2 mg Oral HS KEISHA Newberry     • fluconazole  100 mg Oral Daily Stella Aguero MD     • gabapentin  100 mg Oral BID KEISHA Newberry     • heparin (porcine)  5,000 Units Subcutaneous Q8H [address] KEISHA Newberry     • hydrALAZINE  100 mg Oral TID Denisa Moraes MD     • hydrocodone-chlorpheniramine polistirex  5 mL Oral HS Stella Aguero MD     • insulin glargine  10 Units Subcutaneous HS Stella Aguero MD     • insulin lispro  1-5 Units Subcutaneous TID AC KEISHA Newberry     • insulin lispro  1-5 Units Subcutaneous 0200 KEISHA Newberry     • insulin lispro  1-5 Units Subcutaneous HS Emilee KEISHA Link     • ipratropium  0 5 mg Nebulization TID KEISHA Newberry     • isosorbide mononitrate  60 mg Oral Daily KEISHA Newberry     • labetalol  10 mg Intravenous Q6H PRN Héctor Seals PA-C     • levalbuterol  0 63 mg Nebulization Q4H PRN KEISHA Newberry     • levalbuterol  1 25 mg Nebulization TID KEISHA Newberry      And   • sodium chloride  3 mL Nebulization TID KEISHA Newberry     • nystatin   Topical BID KEISHA Newberry     • ondansetron  4 mg Intravenous Q6H PRN KEISHA Newberry     • pantoprazole  40 mg Oral Early Morning KEISHA Newberry     • polyethylene glycol  17 g Oral Daily Servando Arambula MD     • repaglinide  0 5 mg Oral TID AC Servando Arambula MD     • senna  1 tablet Oral HS Servando Arambula MD     • simethicone  80 mg Oral 4x Daily PRN KEISHA Newberry     • tamsulosin  0 4 mg Oral Daily With KEISHA Aguilar     • torsemide  40 mg Oral Daily Dom Gallego MD          Today, Patient Was Seen By: Servando Arambula MD    **Please Note: This note may have been constructed using a voice recognition system  **

## 2022-11-27 NOTE — PROGRESS NOTES
[address] [facility] NOTE   Eder William 80 y o  male MRN: [id number]  Unit/Bed#: [address] Encounter: [id number]  Reason for Consult: Elevated creatinine    ASSESSMENT and PLAN:  1  Elevated creatinine:  • Creatinine during previous hospital stay (11/15/22 to 11/22/22) ranged between 2 4 and 2 8  • Creatinine on admission was 2 83 on 11/22/22  • Unclear if this is truly SHAHID or progression of disease - possibly due to CRS  • Urinalysis is bland  Renal US without hydronephrosis but with L renal atrophy  • Creatinine has been 2 6 to 2 9 x >1 week now  • SCr stable today at 2 73    • Continue Torsemide 40 mg daily  2  Chronic kidney disease, stage III  • Baseline creatinine was 1 6-2 0 in 2021  • No prior renal follow-up      3  Hypertension:  • BP on the high side  • Current medications:  Amlodipine 10 mg OD, Carvedilol 12 5 mg BID, Doxazosin 2 mg qHS, Hydralazine 100 mg BID, Imdur 60 mg OD, Torsemide 40 mg daily  • Increase Hydralazine to 100 mg TID  4  Respiratory failure:  • Due to multifocal pneumonia, pleural effusions, congestive heart failure  • Improving  5  Congestive heart failure  • Continue Torsemide 40 mg OD    6  Esophageal dysmotility     · Family deciding on PEG     DISPO:  • Stable renal function  • Continue Torsemide 40 mg daily  • Increase Hydralazine to 100 mg TID  SUBJECTIVE / 24H INTERVAL HISTORY:  No new events per RN  Denies any CP or SOB  On dysphagia diet       OBJECTIVE:  Current Weight: Weight - Scale: 82 7 kg (182 lb 5 8 oz)  Vitals:    11/26/22 2329 11/27/22 0600 11/27/22 0748 11/27/22 0839   BP: (!) 187/65  161/63    BP Location:       Pulse: 61  67    Resp: 16      Temp: 98 3 °F (36 8 °C)  99 7 °F (37 6 °C)    TempSrc:       SpO2: 94%  93% 95%   Weight:  82 7 kg (182 lb 5 8 oz)     Height:           Intake/Output Summary (Last 24 hours) at 11/27/2022 0910  Last data filed at 11/27/2022 0524  Gross per 24 hour   Intake --   Output 1410 ml Net -1410 ml     General: conscious, cooperative, no distress  Skin: dry  Eyes: pink conjunctivae  ENT: moist mucous membranes  Respiratory: equal chest expansion, fine crackles in base  Cardiovascular: distinct heart sounds, normal rate, regular rhythm, no rub  Abdomen: soft, non tender, non distended, normal bowel sounds  Extremities: no edema  Genitourinary: no lock catheter  Neuro: awake, alert     Psych: appropriate affect    Medications:    Current Facility-Administered Medications:   •  acetaminophen (TYLENOL) tablet 650 mg, 650 mg, Oral, Q6H PRN, KEISHA Newberry, 650 mg at 11/26/22 0901  •  amLODIPine (NORVASC) tablet 10 mg, 10 mg, Oral, Daily, KEISHA Newberry, 10 mg at 11/26/22 0901  •  aspirin chewable tablet 81 mg, 81 mg, Oral, Daily, KEISHA Newberry, 81 mg at 11/26/22 0901  •  atorvastatin (LIPITOR) tablet 40 mg, 40 mg, Oral, Daily, KEISHA Newberry, 40 mg at 11/26/22 0901  •  benzonatate (TESSALON PERLES) capsule 200 mg, 200 mg, Oral, TID, KEISHA Wong, 200 mg at 11/26/22 2120  •  carvedilol (COREG) tablet 12 5 mg, 12 5 mg, Oral, BID With Meals, KEISHA Newberry, 12 5 mg at 11/26/22 1554  •  cefepime (MAXIPIME) IVPB (premix in dextrose) 1,000 mg 50 mL, 1,000 mg, Intravenous, Q24H, Zoltan Vazquez MD, Last Rate: 100 mL/hr at 11/26/22 1717, 1,000 mg at 11/26/22 1717  •  cholecalciferol (VITAMIN D3) tablet 2,000 Units, 2,000 Units, Oral, Daily, KEISHA Newberry, 2,000 Units at 11/26/22 0901  •  cyanocobalamin (VITAMIN B-12) tablet 500 mcg, 500 mcg, Oral, Daily, KEISHA Newberry, 500 mcg at 11/26/22 0901  •  docusate sodium (COLACE) capsule 100 mg, 100 mg, Oral, BID, Steve Yan MD, 100 mg at 11/26/22 1717  •  doxazosin (CARDURA) tablet 2 mg, 2 mg, Oral, HS, KEISHA Newberry, 2 mg at 11/26/22 2120  •  fluconazole (DIFLUCAN) tablet 100 mg, 100 mg, Oral, Daily, Steve Yan MD  •  gabapentin (NEURONTIN) capsule 100 mg, 100 mg, Oral, BID, Gloria Valdez, KEISHA, 100 mg at 11/26/22 1717  •  heparin (porcine) subcutaneous injection 5,000 Units, 5,000 Units, Subcutaneous, Q8H [address], 5,000 Units at 11/27/22 0518 **AND** [CANCELED] Platelet count, , , Once, KEISHA Newberry  •  hydrALAZINE (APRESOLINE) tablet 100 mg, 100 mg, Oral, BID, KEISHA Newberry, 100 mg at 11/26/22 1717  •  hydrocodone-chlorpheniramine polistirex (TUSSIONEX) ER suspension 5 mL, 5 mL, Oral, HS, Steve Yan MD, 5 mL at 11/26/22 2105  •  insulin glargine (LANTUS) subcutaneous injection 10 Units 0 1 mL, 10 Units, Subcutaneous, HS, Steve Yan MD, 10 Units at 11/26/22 2119  •  insulin lispro (HumaLOG) 100 units/mL subcutaneous injection 1-5 Units, 1-5 Units, Subcutaneous, TID AC, 3 Units at 11/26/22 1601 **AND** Fingerstick Glucose (POCT), , , TID AC, KEISHA Newberry  •  insulin lispro (HumaLOG) 100 units/mL subcutaneous injection 1-5 Units, 1-5 Units, Subcutaneous, 0200, KEISHA Newberry, 1 Units at 11/27/22 0218  •  insulin lispro (HumaLOG) 100 units/mL subcutaneous injection 1-5 Units, 1-5 Units, Subcutaneous, HS, KEISHA Newberry, 2 Units at 11/26/22 2119  •  ipratropium (ATROVENT) 0 02 % inhalation solution 0 5 mg, 0 5 mg, Nebulization, TID, KEISHA Newberry, 0 5 mg at 11/27/22 2132  •  isosorbide mononitrate (IMDUR) 24 hr tablet 60 mg, 60 mg, Oral, Daily, KEISHA Newberry, 60 mg at 11/26/22 0901  •  labetalol (NORMODYNE) injection 10 mg, 10 mg, Intravenous, Q6H PRN, Agustina Vecellio, PA-C, 10 mg at 11/24/22 0241  •  levalbuterol (XOPENEX) inhalation solution 0 63 mg, 0 63 mg, Nebulization, Q4H PRN, KEISHA Newberry  •  levalbuterol (XOPENEX) inhalation solution 1 25 mg, 1 25 mg, Nebulization, TID, 1 25 mg at 11/27/22 0839 **AND** sodium chloride 0 9 % inhalation solution 3 mL, 3 mL, Nebulization, TID, KEISHA Newberry, 3 mL at 11/26/22 1933  •  nystatin (MYCOSTATIN) powder, , Topical, BID, KEISHA Newberry, Given at 11/26/22 1724  •  ondansetron (ZOFRAN) injection 4 mg, 4 mg, Intravenous, Q6H PRN, KEISHA Newberry  •  pantoprazole (PROTONIX) EC tablet 40 mg, 40 mg, Oral, Early Morning, KEISHA Newberry, 40 mg at 11/27/22 0518  •  polyethylene glycol (MIRALAX) packet 17 g, 17 g, Oral, Daily, Steve Yan MD, 17 g at 11/26/22 0902  •  senna (SENOKOT) tablet 8 6 mg, 1 tablet, Oral, HS, Steve Yan MD, 8 6 mg at 11/26/22 2120  •  simethicone (MYLICON) chewable tablet 80 mg, 80 mg, Oral, 4x Daily PRN, KEISHA Newberry  •  tamsulosin (FLOMAX) capsule 0 4 mg, 0 4 mg, Oral, Daily With Dinner, KEISHA Newberry, 0 4 mg at 11/26/22 1554  •  torsemide (DEMADEX) tablet 40 mg, 40 mg, Oral, Daily, Ovidio Shaffer MD, 40 mg at 11/26/22 1554    Laboratory Results:  Results from last 7 days   Lab Units 11/27/22  0523 11/26/22  0524 11/25/22  0710 11/25/22  0450 11/24/22  0534 11/23/22  0825 11/22/22  2058 11/22/22  0453 11/21/22  0506   WBC Thousand/uL 13 42*  --  15 84*  --  14 84*  --  16 19* 13 11* 14 42*   HEMOGLOBIN g/dL 8 2*  --  8 8*  --  8 8*  --  9 6* 9 2* 9 3*   HEMATOCRIT % 26 0*  --  26 9*  --  26 8*  --  29 9* 27 9* 28 6*   PLATELETS Thousands/uL 221  --  209  --  230  --  253 197 188   POTASSIUM mmol/L 3 6 5 2  --  3 7 4 3 4 8 4 9 4 6 4 2   CHLORIDE mmol/L 101 100  --  101 105 102 96 103 101   CO2 mmol/L 32 30  --  29 30 28 28 27 28   BUN mg/dL 109* 111*  --  112* 101* 93* 91* 85* 75*   CREATININE mg/dL 2 73* 2 64*  --  2 81* 2 76* 2 77* 2 83* 2 68* 2 85*   CALCIUM mg/dL 8 5 8 8  --  8 9 8 8 8 9 8 8 8 2* 8 1*   MAGNESIUM mg/dL  --   --   --   --   --  2 9* 3 2*  --   --

## 2022-11-27 NOTE — ASSESSMENT & PLAN NOTE
CT scan of the chest and also renal ultrasound showed left upper quadrant pass medial to the spleen  CT abdomen pelvis was ordered but could not be performed due to retained barium  Will need to reorder CT abdomen pelvis at a later date after clearing up barium  Patient was started on Colace and MiraLax and was later added on senna  Patient still did not have a bowel movement  Patient ordered Dulcolax suppository p r n

## 2022-11-28 ENCOUNTER — APPOINTMENT (INPATIENT)
Dept: RADIOLOGY | Facility: HOSPITAL | Age: 87
End: 2022-11-28

## 2022-11-28 PROBLEM — K59.00 CONSTIPATION: Status: ACTIVE | Noted: 2022-11-28

## 2022-11-28 LAB
ANION GAP SERPL CALCULATED.3IONS-SCNC: 8 MMOL/L (ref 4–13)
BACTERIA BLD CULT: NORMAL
BACTERIA BLD CULT: NORMAL
BASOPHILS # BLD AUTO: 0.02 THOUSANDS/ÂΜL (ref 0–0.1)
BASOPHILS NFR BLD AUTO: 0 % (ref 0–1)
BUN SERPL-MCNC: 113 MG/DL (ref 5–25)
CALCIUM SERPL-MCNC: 8.5 MG/DL (ref 8.3–10.1)
CHLORIDE SERPL-SCNC: 101 MMOL/L (ref 96–108)
CO2 SERPL-SCNC: 33 MMOL/L (ref 21–32)
CREAT SERPL-MCNC: 2.79 MG/DL (ref 0.6–1.3)
EOSINOPHIL # BLD AUTO: 0.28 THOUSAND/ÂΜL (ref 0–0.61)
EOSINOPHIL NFR BLD AUTO: 2 % (ref 0–6)
ERYTHROCYTE [DISTWIDTH] IN BLOOD BY AUTOMATED COUNT: 15.3 % (ref 11.6–15.1)
GFR SERPL CREATININE-BSD FRML MDRD: 18 ML/MIN/1.73SQ M
GLUCOSE SERPL-MCNC: 114 MG/DL (ref 65–140)
GLUCOSE SERPL-MCNC: 114 MG/DL (ref 65–140)
GLUCOSE SERPL-MCNC: 115 MG/DL (ref 65–140)
GLUCOSE SERPL-MCNC: 152 MG/DL (ref 65–140)
GLUCOSE SERPL-MCNC: 229 MG/DL (ref 65–140)
GLUCOSE SERPL-MCNC: 230 MG/DL (ref 65–140)
HCT VFR BLD AUTO: 26.1 % (ref 36.5–49.3)
HGB BLD-MCNC: 8.5 G/DL (ref 12–17)
IMM GRANULOCYTES # BLD AUTO: 0.39 THOUSAND/UL (ref 0–0.2)
IMM GRANULOCYTES NFR BLD AUTO: 2 % (ref 0–2)
LYMPHOCYTES # BLD AUTO: 1.47 THOUSANDS/ÂΜL (ref 0.6–4.47)
LYMPHOCYTES NFR BLD AUTO: 9 % (ref 14–44)
MCH RBC QN AUTO: 29.7 PG (ref 26.8–34.3)
MCHC RBC AUTO-ENTMCNC: 32.6 G/DL (ref 31.4–37.4)
MCV RBC AUTO: 91 FL (ref 82–98)
MONOCYTES # BLD AUTO: 1.23 THOUSAND/ÂΜL (ref 0.17–1.22)
MONOCYTES NFR BLD AUTO: 8 % (ref 4–12)
NEUTROPHILS # BLD AUTO: 13.06 THOUSANDS/ÂΜL (ref 1.85–7.62)
NEUTS SEG NFR BLD AUTO: 79 % (ref 43–75)
NRBC BLD AUTO-RTO: 0 /100 WBCS
PLATELET # BLD AUTO: 237 THOUSANDS/UL (ref 149–390)
PMV BLD AUTO: 11.1 FL (ref 8.9–12.7)
POTASSIUM SERPL-SCNC: 3.3 MMOL/L (ref 3.5–5.3)
RBC # BLD AUTO: 2.86 MILLION/UL (ref 3.88–5.62)
SODIUM SERPL-SCNC: 142 MMOL/L (ref 135–147)
WBC # BLD AUTO: 16.45 THOUSAND/UL (ref 4.31–10.16)

## 2022-11-28 RX ORDER — POTASSIUM CHLORIDE 20 MEQ/1
40 TABLET, EXTENDED RELEASE ORAL ONCE
Status: COMPLETED | OUTPATIENT
Start: 2022-11-28 | End: 2022-11-28

## 2022-11-28 RX ADMIN — IPRATROPIUM BROMIDE 0.5 MG: 0.5 SOLUTION RESPIRATORY (INHALATION) at 19:41

## 2022-11-28 RX ADMIN — IPRATROPIUM BROMIDE 0.5 MG: 0.5 SOLUTION RESPIRATORY (INHALATION) at 07:22

## 2022-11-28 RX ADMIN — IPRATROPIUM BROMIDE 0.5 MG: 0.5 SOLUTION RESPIRATORY (INHALATION) at 14:20

## 2022-11-28 RX ADMIN — AMLODIPINE BESYLATE 10 MG: 10 TABLET ORAL at 09:13

## 2022-11-28 RX ADMIN — CEFEPIME HYDROCHLORIDE 1000 MG: 1 INJECTION, SOLUTION INTRAVENOUS at 18:41

## 2022-11-28 RX ADMIN — BISACODYL 10 MG: 10 SUPPOSITORY RECTAL at 10:38

## 2022-11-28 RX ADMIN — GABAPENTIN 100 MG: 100 CAPSULE ORAL at 17:37

## 2022-11-28 RX ADMIN — HYDRALAZINE HYDROCHLORIDE 100 MG: 25 TABLET ORAL at 17:37

## 2022-11-28 RX ADMIN — BENZONATATE 200 MG: 100 CAPSULE ORAL at 21:48

## 2022-11-28 RX ADMIN — HEPARIN SODIUM 5000 UNITS: 5000 INJECTION INTRAVENOUS; SUBCUTANEOUS at 05:37

## 2022-11-28 RX ADMIN — GABAPENTIN 100 MG: 100 CAPSULE ORAL at 09:11

## 2022-11-28 RX ADMIN — POLYETHYLENE GLYCOL 3350 17 G: 17 POWDER, FOR SOLUTION ORAL at 09:12

## 2022-11-28 RX ADMIN — ACETAMINOPHEN 650 MG: 325 TABLET, FILM COATED ORAL at 17:36

## 2022-11-28 RX ADMIN — REPAGLINIDE 0.5 MG: 1 TABLET ORAL at 12:30

## 2022-11-28 RX ADMIN — TORSEMIDE 40 MG: 20 TABLET ORAL at 09:12

## 2022-11-28 RX ADMIN — ONDANSETRON 4 MG: 2 INJECTION INTRAMUSCULAR; INTRAVENOUS at 10:38

## 2022-11-28 RX ADMIN — FLUCONAZOLE 100 MG: 100 TABLET ORAL at 09:11

## 2022-11-28 RX ADMIN — REPAGLINIDE 0.5 MG: 1 TABLET ORAL at 07:49

## 2022-11-28 RX ADMIN — ACETAMINOPHEN 650 MG: 325 TABLET, FILM COATED ORAL at 10:38

## 2022-11-28 RX ADMIN — HEPARIN SODIUM 5000 UNITS: 5000 INJECTION INTRAVENOUS; SUBCUTANEOUS at 21:50

## 2022-11-28 RX ADMIN — CARVEDILOL 12.5 MG: 12.5 TABLET, FILM COATED ORAL at 07:49

## 2022-11-28 RX ADMIN — HYDRALAZINE HYDROCHLORIDE 100 MG: 25 TABLET ORAL at 09:13

## 2022-11-28 RX ADMIN — ISODIUM CHLORIDE 3 ML: 0.03 SOLUTION RESPIRATORY (INHALATION) at 14:20

## 2022-11-28 RX ADMIN — PANTOPRAZOLE SODIUM 40 MG: 40 TABLET, DELAYED RELEASE ORAL at 05:37

## 2022-11-28 RX ADMIN — LEVALBUTEROL HYDROCHLORIDE 1.25 MG: 1.25 SOLUTION, CONCENTRATE RESPIRATORY (INHALATION) at 19:41

## 2022-11-28 RX ADMIN — ISODIUM CHLORIDE 3 ML: 0.03 SOLUTION RESPIRATORY (INHALATION) at 19:41

## 2022-11-28 RX ADMIN — POTASSIUM CHLORIDE 40 MEQ: 1500 TABLET, EXTENDED RELEASE ORAL at 09:15

## 2022-11-28 RX ADMIN — ISOSORBIDE MONONITRATE 60 MG: 60 TABLET, EXTENDED RELEASE ORAL at 09:13

## 2022-11-28 RX ADMIN — CYANOCOBALAMIN TAB 500 MCG 500 MCG: 500 TAB at 09:12

## 2022-11-28 RX ADMIN — CARVEDILOL 12.5 MG: 12.5 TABLET, FILM COATED ORAL at 17:37

## 2022-11-28 RX ADMIN — HEPARIN SODIUM 5000 UNITS: 5000 INJECTION INTRAVENOUS; SUBCUTANEOUS at 14:51

## 2022-11-28 RX ADMIN — BENZONATATE 200 MG: 100 CAPSULE ORAL at 09:11

## 2022-11-28 RX ADMIN — Medication 5 ML: at 21:50

## 2022-11-28 RX ADMIN — ASPIRIN 81 MG CHEWABLE TABLET 81 MG: 81 TABLET CHEWABLE at 09:11

## 2022-11-28 RX ADMIN — ISODIUM CHLORIDE 3 ML: 0.03 SOLUTION RESPIRATORY (INHALATION) at 07:22

## 2022-11-28 RX ADMIN — BENZONATATE 200 MG: 100 CAPSULE ORAL at 17:36

## 2022-11-28 RX ADMIN — TAMSULOSIN HYDROCHLORIDE 0.4 MG: 0.4 CAPSULE ORAL at 17:37

## 2022-11-28 RX ADMIN — LEVALBUTEROL HYDROCHLORIDE 1.25 MG: 1.25 SOLUTION, CONCENTRATE RESPIRATORY (INHALATION) at 14:20

## 2022-11-28 RX ADMIN — SENNOSIDES 8.6 MG: 8.6 TABLET, FILM COATED ORAL at 21:49

## 2022-11-28 RX ADMIN — NYSTATIN: 100000 POWDER TOPICAL at 09:13

## 2022-11-28 RX ADMIN — ATORVASTATIN CALCIUM 40 MG: 40 TABLET, FILM COATED ORAL at 09:11

## 2022-11-28 RX ADMIN — DOXAZOSIN 2 MG: 2 TABLET ORAL at 21:49

## 2022-11-28 RX ADMIN — Medication 2000 UNITS: at 09:11

## 2022-11-28 RX ADMIN — INSULIN GLARGINE 10 UNITS: 100 INJECTION, SOLUTION SUBCUTANEOUS at 21:48

## 2022-11-28 RX ADMIN — INSULIN LISPRO 2 UNITS: 100 INJECTION, SOLUTION INTRAVENOUS; SUBCUTANEOUS at 12:24

## 2022-11-28 RX ADMIN — INSULIN LISPRO 1 UNITS: 100 INJECTION, SOLUTION INTRAVENOUS; SUBCUTANEOUS at 17:36

## 2022-11-28 RX ADMIN — NYSTATIN 1 APPLICATION: 100000 POWDER TOPICAL at 17:37

## 2022-11-28 RX ADMIN — INSULIN LISPRO 2 UNITS: 100 INJECTION, SOLUTION INTRAVENOUS; SUBCUTANEOUS at 21:49

## 2022-11-28 RX ADMIN — LEVALBUTEROL HYDROCHLORIDE 1.25 MG: 1.25 SOLUTION, CONCENTRATE RESPIRATORY (INHALATION) at 07:22

## 2022-11-28 RX ADMIN — REPAGLINIDE 0.5 MG: 1 TABLET ORAL at 17:38

## 2022-11-28 NOTE — PROGRESS NOTES
[place] 45  Progress Note Joan Vu 10/19/1929, 80 y o  male MRN: [id number]  Unit/Bed#: [facility] Encounter: [id number]  Primary Care Provider: Moo Hassan PA-C   Date and time admitted to hospital: 11/22/2022  8:41 PM    * Acute respiratory failure with hypoxia [facility])  Assessment & Plan  Patient presents with acute respiratory distress shortly after eating a few spoonful of puree diet in STR, patient reported chest pressure and SOB  Patient was satting 85-95% on oxygen 3 L per STR transfer paper  Patient was belching after eating/drinking limited amount of diet/water per staff  · Patient was discharged on the day of admission after being hospitalized for aspiration pneumonia, acute on chronic diastolic CHF, dysphagia  Patient received 7 days of IV Rocephin and Flagyl  Received IV Lasix  Seen by GI, underwent EGD, found to have oropharyngeal dysphagia with esophageal dysmotility, no obvious esophageal stricture  GI recommend to consider PEG tube placement if recurrent aspirations  Recommend pantoprazole daily  Underwent Barium swallow study which showed moderate to severe esophageal dysmotility with significant residual contrast remaining in the esophagus at the end of the study  Patient was recommend regular diet by speech and discharged on regular diet per STR  · Likely secondary to multifocal pneumonia and also CHF  · Patient required BiPAP on ED arrival   ABG post BiPAP essentially unremarkable  Later patient was titrated down to 6 liters oxygen upon admission  Patient is currently down to 1 liter oxygen with O2 saturation in low to mid 90s  · Chest x-ray - Pulmonary edema pattern, worse when comparing to 11/19/2022  Underlying infection cannot be excluded  · ProBNP 15,094  · Patient received Lasix 40 IV in ED  Will continue Lasix 40 mg IV daily  · Received cefepime and Flagyl in ED  · Received IV Solu-Medrol in ED  No wheezing on auscultation    · Sputum grams stain and culture was sent today as patient was coughing up mucus mixed with blood  · Urine for Legionella pneumococcal antigens were negative  · Continue Xopenex+ Atrovent t i d , Xopenex p r n  · CT of the chest showed pulmonary edema with moderate size bilateral pleural effusions with significant bibasilar atelectasis and concomitant multifocal pneumonia  · Patient restarted back on cefepime 1 gram IV daily as procalcitonin level is increasing compared to prior admission  Procalcitonin level now improving  · Patient has been on IV cefepime  · Patient again having low-grade fevers with cough productive of mucoid phlegm which is blood tinged and sometimes black  Discussed with Pulmonary to re-evaluate the patient  · Obstructive series showed persistent pulmonary edema with multifocal pneumonia    Esophageal dysphagia  Assessment & Plan  Patient reports no appetite in past 2 days,did not eat all day yesterday  Loss of appetite could be secondary to antibiotic  · Keep patient NPO for now  · Discussed with speech therapy-patient at high risk for aspiration despite the level of diet  For now patient started on pureed diet with thin liquids with medications crushed with puree  · GI recommend to consider PEG tube if recurrent aspirations  Per recent GI note,family did not want PEG tube during prior hospitalization  · Continue pantoprazole daily  · Obstructive series showed barium throughout the colon with modest amount of stool along the rectosigmoid region  · Prolonged discussion held with patient's daughter and patient's PA on November 23, 2022 regarding risk of recurrent aspiration and the patient possibly needing PEG    Discussed with daughter who wants to hold off on PEG tube placement      Acute on chronic diastolic congestive heart failure [facility])  Assessment & Plan  Wt Readings from Last 3 Encounters:   11/28/22 81 kg (178 lb 8 oz)   11/22/22 84 5 kg (186 lb 4 8 oz)     Patient received IV Lasix in recent admission  · Chest x-ray upon admission shows worsening pulmonary edema  · Recent 2D echo as below  · Mild edema to lower extremity, left forearm edema on exam   · Patient received 40 milligrams of Lasix IV in the ED and was continued on  Lasix 40 milligram IV daily which was later increased to 40 milligram IV q 12 hours  · Left upper extremity venous Doppler showed no evidence of DVT  · CT chest showed pulmonary edema with moderate size bilateral pleural effusions left more than right  · Patient received Lasix 40 milligram q 12 hours and was transitioned to torsemide 40 milligram p o  Daily  · Creatinine continues to remain stable  Patient continues to have good diuresis  · Might need accept higher creatinine to maintain euvolemia        Acute kidney injury superimposed on CKD [facility])  Assessment & Plan  Lab Results   Component Value Date    EGFR 18 11/28/2022    EGFR 19 11/27/2022    EGFR 19 11/26/2022    CREATININE 2 79 (H) 11/28/2022    CREATININE 2 73 (H) 11/27/2022    CREATININE 2 64 (H) 11/26/2022   History of CKD 4, baseline creatinine appears to be around 1 5-1 9 in past 2 years in care everywhere  Creatinine during previous hospitalization ranged in 2 4-2 8  · Possible acute kidney injury versus progression of chronic kidney disease  · Urinalysis was bland  · Received IV Lasix 40 mg in ED  · Avoid nephrotoxin and hypotension  · Renal ultrasound showed no hydronephrosis moderate left renal atrophy  · Nephrology input appreciated  · Patient was continued on Lasix 40 milligram IV q 12 hours with good diuresis  · Patient transitioned to torsemide 40 milligram p o  Daily and is having good diuresis with stable creatinine  · Right hip accept higher creatinine to maintain euvolemia    Aspiration pneumonia [facility])  Assessment & Plan  Just completed 7 days antibiotic  · Repeat procalcitonin level was 0 54  · Patient received cefepime and Flagyl in the ED   Patient is on cefepime 1 gram IV daily day 6  · Sputum cultures could be sent now as patient is bringing up phlegm now  · Urine for Legionella and pneumococcal antigens were negative  · Pulmonary input appreciated  · Blood cultures have been negative  · Patient now having low-grade fevers with worsening white count  · Chest x-ray with multifocal pneumonia which is persistent  · Pulmonary to re-evaluate the patient    Constipation  Assessment & Plan  Patient did not have a bowel movement for the past 5 days  Patient was started on coreg which could not swallow  Continue MiraLax and gram p o  Daily  Patient added on senna q h s  Patient was also given Dulcolax suppository without bowel movement  Patient will be given a soapsuds enema      Left upper quadrant abdominal mass  Assessment & Plan  CT scan of the chest and also renal ultrasound showed left upper quadrant pass medial to the spleen  CT abdomen pelvis was ordered but could not be performed due to retained barium  Will need to reorder CT abdomen pelvis at a later date after clearing up barium  Patient was ordered for Colace 100 milligram p o  B i d  Which could not swallow  MiraLax 17 milligram p o  Daily, senna q h s     Patient ordered for Dulcolax suppository today and will also be given a soapsuds enema      BPH (benign prostatic hyperplasia)  Assessment & Plan  Continue Cardura and Flomax  · Check PVR    Hyperlipidemia  Assessment & Plan  Continue statin    Elevated troponin  Assessment & Plan  Troponin [phone]  Elevated troponin in recent admission as well  Reports chest pressure when in respiratory distress  Denies history of CAD  · EKG no ischemic changes, read as AFib, rate 103,RBBB  · No history of AFib  Prior EKG shows sinus rhythm with PACs /PVCs  Will repeat EKG  · 2D echo last admission showed  EF low normal, normal wall motion, grade 2 diastolic dysfunction, moderately dilated atriums     · Most likely non MI troponin elevation due to # 1 and CHF      Thrush  Assessment & Plan  Patient completed 7 days of nystatin suspension  Continues to have white plaques  Patient was given Diflucan 200 milligrams 1 dose and will be continued on Diflucan 100 milligram p o  Daily    Type 2 diabetes mellitus [facility])  Assessment & Plan  Lab Results   Component Value Date    HGBA1C 5 7 (H) 11/16/2022       Recent Labs     11/27/22  2048 11/28/22  0241 11/28/22  0722 11/28/22  1046   POCGLU 156* 114 114 229*       Blood Sugar Average: Last 72 hrs:  (P) 356 [id number]   Patient was discharged on Prandin t i d  With meals  Which was on hold initially while inpatient  · Continue Humalog sliding scale with Accu-Cheks q a c  And HS  · Patient was started on diabetic pureed Diet with thin liquids  Patient was started on Lantus 10 units q h s  As blood sugars were high  · Blood sugars continued to remain high  Resume Prandin 0 5 milligram p o  B i d  Primary hypertension  Assessment & Plan  On hydralazine, Norvasc, Coreg, Imdur, Cardura  · Will continue above medications with holding parameter  · Blood pressure continues to remain on the high side  Hydralazine dose was increased to 100 milligram p o  T i d  with improved blood pressure          VTE Pharmacologic Prophylaxis:   High Risk (Score >/= 5) - Pharmacological DVT Prophylaxis Ordered: heparin  Sequential Compression Devices Ordered  Patient Centered Rounds: I performed bedside rounds with nursing staff today  Discussions with Specialists or Other Care Team Provider:  Pulmonology, Nephrology    Education and Discussions with Family / Patient: Updated  (daughter) via phone  Time Spent for Care: 45 minutes  More than 50% of total time spent on counseling and coordination of care as described above      Current Length of Stay: 6 day(s)  Current Patient Status: Inpatient   Certification Statement: The patient will continue to require additional inpatient hospital stay due to Acute respiratory failure, multifocal pneumonia, constipation  Discharge Plan: Anticipate discharge in 48-72 hrs to rehab facility  Code Status: Level 1 - Full Code    Subjective:   Patient has been coughing thick mucoid phlegm which is reddish and later this afternoon patient had black phlegm  Denies any chest pain  Patient still did not have a bowel movement with Dulcolax suppository and senna  Patient denies any abdominal pain    Objective:     Vitals:   Temp (24hrs), Av 8 °F (37 7 °C), Min:98 9 °F (37 2 °C), Max:100 9 °F (38 3 °C)    Temp:  [98 9 °F (37 2 °C)-100 9 °F (38 3 °C)] 100 °F (37 8 °C)  HR:  [60-88] 61  Resp:  [18-20] 18  BP: (132-165)/(52-73) 144/52  SpO2:  [88 %-95 %] 95 %  Body mass index is 29 7 kg/m²  Input and Output Summary (last 24 hours): Intake/Output Summary (Last 24 hours) at 2022 1523  Last data filed at 2022 0749  Gross per 24 hour   Intake 360 ml   Output 1500 ml   Net -1140 ml       Physical Exam:   Physical Exam  Constitutional:       Appearance: Normal appearance  HENT:      Head: Normocephalic and atraumatic  Eyes:      Extraocular Movements: Extraocular movements intact  Pupils: Pupils are equal, round, and reactive to light  Cardiovascular:      Rate and Rhythm: Normal rate and regular rhythm  Heart sounds: No murmur heard  No gallop  Pulmonary:      Effort: Pulmonary effort is normal       Breath sounds: Normal breath sounds  Abdominal:      General: Bowel sounds are normal       Palpations: Abdomen is soft  Tenderness: There is no abdominal tenderness  Musculoskeletal:         General: No swelling or deformity  Normal range of motion  Cervical back: Normal range of motion and neck supple  Skin:     General: Skin is warm and dry  Neurological:      General: No focal deficit present  Mental Status: He is alert          Additional Data:     Labs:  Results from last 7 days   Lab Units 22  0503   WBC Thousand/uL 16 45*   HEMOGLOBIN g/dL 8 5*   HEMATOCRIT % 26 1*   PLATELETS Thousands/uL 237   NEUTROS PCT % 79*   LYMPHS PCT % 9*   MONOS PCT % 8   EOS PCT % 2     Results from last 7 days   Lab Units 11/28/22  0503 11/23/22  0825 11/22/22 2058   SODIUM mmol/L 142   < > 133*   POTASSIUM mmol/L 3 3*   < > 4 9   CHLORIDE mmol/L 101   < > 96   CO2 mmol/L 33*   < > 28   BUN mg/dL 113*   < > 91*   CREATININE mg/dL 2 79*   < > 2 83*   ANION GAP mmol/L 8   < > 9   CALCIUM mg/dL 8 5   < > 8 8   ALBUMIN g/dL  --   --  2 6*   TOTAL BILIRUBIN mg/dL  --   --  0 57   ALK PHOS U/L  --   --  92   ALT U/L  --   --  27   GLUCOSE RANDOM mg/dL 115   < > 287*    < > = values in this interval not displayed  Results from last 7 days   Lab Units 11/22/22 2058   INR  1 07     Results from last 7 days   Lab Units 11/28/22  1046 11/28/22  0722 11/28/22  0241 11/27/22  2048 11/27/22  1555 11/27/22  1114 11/27/22  0717 11/27/22  0206 11/26/22  2111 11/26/22  1553 11/26/22  1114 11/26/22  0717   POC GLUCOSE mg/dl 229* 114 114 156* 230* 244* 203* 219* 252* 319* 278* 212*         Results from last 7 days   Lab Units 11/27/22  0523 11/25/22  0450 11/23/22  0825 11/22/22 2058 11/22/22 0453   LACTIC ACID mmol/L  --   --   --  1 0  --    PROCALCITONIN ng/ml 0 36* 0 44* 0 54* 0 37* 0 39*       Lines/Drains:  Invasive Devices     Peripheral Intravenous Line  Duration           Peripheral IV 11/26/22 Distal;Left;Upper;Ventral (anterior) Arm 2 days                      Imaging: Reviewed radiology reports from this admission including: chest xray and chest CT scan    Recent Cultures (last 7 days):   Results from last 7 days   Lab Units 11/22/22 2058 11/22/22 2057   BLOOD CULTURE  No Growth After 5 Days  No Growth After 5 Days         Last 24 Hours Medication List:   Current Facility-Administered Medications   Medication Dose Route Frequency Provider Last Rate   • acetaminophen  650 mg Oral Q6H PRN KEISHA Newberry     • amLODIPine  10 mg Oral Daily KEISHA Newberry     • aspirin  81 mg Oral Daily KEISHA Butler     • atorvastatin  40 mg Oral Daily KEISHA Newberry     • benzonatate  200 mg Oral TID KEISHA Naranjo     • bisacodyl  10 mg Rectal Daily PRN Marya Bell MD     • carvedilol  12 5 mg Oral BID With Meals KEISHA Newberry     • cefepime  1,000 mg Intravenous Q24H Marya Bell MD 1,000 mg (11/27/22 1725)   • cholecalciferol  2,000 Units Oral Daily KEISHA Newberry     • vitamin B-12  500 mcg Oral Daily KEISHA Newberry     • doxazosin  2 mg Oral HS KEISHA Newberry     • fluconazole  100 mg Oral Daily Marya Bell MD     • gabapentin  100 mg Oral BID KEISHA Newberry     • heparin (porcine)  5,000 Units Subcutaneous Q8H [address] KEISHA Newberry     • hydrALAZINE  100 mg Oral TID Madelin Fischer MD     • hydrocodone-chlorpheniramine polistirex  5 mL Oral HS Marya Bell MD     • insulin glargine  10 Units Subcutaneous HS Marya Bell MD     • insulin lispro  1-5 Units Subcutaneous TID AC KEISHA Newberry     • insulin lispro  1-5 Units Subcutaneous 0200 KEISHA Newberry     • insulin lispro  1-5 Units Subcutaneous HS KEISHA Newberry     • ipratropium  0 5 mg Nebulization TID KEISHA Newberry     • isosorbide mononitrate  60 mg Oral Daily KEISHA Newberry     • labetalol  10 mg Intravenous Q6H PRN Crystal Sigala PA-C     • levalbuterol  0 63 mg Nebulization Q4H PRN KEISHA Newberry     • levalbuterol  1 25 mg Nebulization TID KEISHA Newberry      And   • sodium chloride  3 mL Nebulization TID KEISHA Newberry     • nystatin   Topical BID KEISHA Newberry     • ondansetron  4 mg Intravenous Q6H PRN KEISHA Newberry     • pantoprazole  40 mg Oral Early Morning KEISHA Newberry     • polyethylene glycol  17 g Oral Daily Marya Bell MD     • repaglinide  0 5 mg Oral TID AC Marya Bell MD     • senna  1 tablet Oral HS Marya Bell MD     • simethicone  80 mg Oral 4x Daily PRN KEISHA Butler     • tamsulosin  0 4 mg Oral Daily With Dinner KEISHA Freitas     • torsemide  40 mg Oral Daily Dequan Montilla MD          Today, Patient Was Seen By: Sherrell Yu MD    **Please Note: This note may have been constructed using a voice recognition system  **

## 2022-11-28 NOTE — PHYSICAL THERAPY NOTE
Attempted PT treatment 2 x today  Pt was actively vomiting the first time, pt declined the second time due to feeling weak and sleepy  Will follow    Alejandra Jaimes PT  [id number]     11/28/22 1343   Note Type   Note Type Cancelled Session   Cancel Reasons Refusal

## 2022-11-28 NOTE — ASSESSMENT & PLAN NOTE
Patient did not have a bowel movement for the past 5 days  Patient was started on coreg which could not swallow  Continue MiraLax and gram p o  Daily  Patient added on senna q h s  Patient was also given Dulcolax suppository without bowel movement    Patient will be given a soapsuds enema Patient is calling and she feels like a little lapse in her head. She says it is hard to describe. She wants to know if this would have to do with her INR being low.  Please advise

## 2022-11-28 NOTE — ASSESSMENT & PLAN NOTE
CT scan of the chest and also renal ultrasound showed left upper quadrant pass medial to the spleen  CT abdomen pelvis was ordered but could not be performed due to retained barium  Will need to reorder CT abdomen pelvis at a later date after clearing up barium  Patient was ordered for Colace 100 milligram p o  B i d  Which could not swallow  MiraLax 17 milligram p o  Daily, senna q h s     Patient ordered for Dulcolax suppository today and will also be given a soapsuds enema

## 2022-11-28 NOTE — ASSESSMENT & PLAN NOTE
Patient presents with acute respiratory distress shortly after eating a few spoonful of puree diet in STR, patient reported chest pressure and SOB  Patient was satting 85-95% on oxygen 3 L per STR transfer paper  Patient was belching after eating/drinking limited amount of diet/water per staff  · Patient was discharged on the day of admission after being hospitalized for aspiration pneumonia, acute on chronic diastolic CHF, dysphagia  Patient received 7 days of IV Rocephin and Flagyl  Received IV Lasix  Seen by GI, underwent EGD, found to have oropharyngeal dysphagia with esophageal dysmotility, no obvious esophageal stricture  GI recommend to consider PEG tube placement if recurrent aspirations  Recommend pantoprazole daily  Underwent Barium swallow study which showed moderate to severe esophageal dysmotility with significant residual contrast remaining in the esophagus at the end of the study  Patient was recommend regular diet by speech and discharged on regular diet per STR  · Likely secondary to multifocal pneumonia and also CHF  · Patient required BiPAP on ED arrival   ABG post BiPAP essentially unremarkable  Later patient was titrated down to 6 liters oxygen upon admission  Patient is currently down to 1 liter oxygen with O2 saturation in low to mid 90s  · Chest x-ray - Pulmonary edema pattern, worse when comparing to 11/19/2022  Underlying infection cannot be excluded  · ProBNP 15,094  · Patient received Lasix 40 IV in ED  Will continue Lasix 40 mg IV daily  · Received cefepime and Flagyl in ED  · Received IV Solu-Medrol in ED  No wheezing on auscultation  · Sputum grams stain and culture was sent today as patient was coughing up mucus mixed with blood  · Urine for Legionella pneumococcal antigens were negative  · Continue Xopenex+ Atrovent t i d , Xopenex p r n    · CT of the chest showed pulmonary edema with moderate size bilateral pleural effusions with significant bibasilar atelectasis and concomitant multifocal pneumonia  · Patient restarted back on cefepime 1 gram IV daily as procalcitonin level is increasing compared to prior admission  Procalcitonin level now improving  · Patient has been on IV cefepime  · Patient again having low-grade fevers with cough productive of mucoid phlegm which is blood tinged and sometimes black    Discussed with Pulmonary to re-evaluate the patient  · Obstructive series showed persistent pulmonary edema with multifocal pneumonia

## 2022-11-28 NOTE — ASSESSMENT & PLAN NOTE
Just completed 7 days antibiotic  · Repeat procalcitonin level was 0 54  · Patient received cefepime and Flagyl in the ED     Patient is on cefepime 1 gram IV daily day 6    · Sputum cultures could be sent now as patient is bringing up phlegm now  · Urine for Legionella and pneumococcal antigens were negative  · Pulmonary input appreciated  · Blood cultures have been negative  · Patient now having low-grade fevers with worsening white count  · Chest x-ray with multifocal pneumonia which is persistent  · Pulmonary to re-evaluate the patient

## 2022-11-28 NOTE — ASSESSMENT & PLAN NOTE
Troponin [id number]  Elevated troponin in recent admission as well  Reports chest pressure when in respiratory distress  Denies history of CAD  · EKG no ischemic changes, read as AFib, rate 103,RBBB  · No history of AFib  Prior EKG shows sinus rhythm with PACs /PVCs  Will repeat EKG  · 2D echo last admission showed  EF low normal, normal wall motion, grade 2 diastolic dysfunction, moderately dilated atriums     · Most likely non MI troponin elevation due to # 1 and CHF

## 2022-11-28 NOTE — PLAN OF CARE
Problem: RESPIRATORY - ADULT  Goal: Achieves optimal ventilation and oxygenation  Description: INTERVENTIONS:  - Assess for changes in respiratory status  - Assess for changes in mentation and behavior  - Position to facilitate oxygenation and minimize respiratory effort  - Oxygen administered by appropriate delivery if ordered  - Encourage broncho-pulmonary hygiene including cough, deep breathe, Incentive Spirometry  - Assess the need for suctioning and aspirate as needed  - Assess and instruct to report SOB or any respiratory difficulty  - Respiratory Therapy support as indicated  Outcome: Progressing     Problem: Prexisting or High Potential for Compromised Skin Integrity  Goal: Skin integrity is maintained or improved  Description: INTERVENTIONS:  - Identify patients at risk for skin breakdown  - Assess and monitor skin integrity  - Assess and monitor nutrition and hydration status  - Monitor labs   - Assess for incontinence   - Turn and reposition patient  - Assist with mobility/ambulation  - Relieve pressure over bony prominences  - Avoid friction and shearing  - Provide appropriate hygiene as needed including keeping skin clean and dry  - Evaluate need for skin moisturizer/barrier cream  - Collaborate with interdisciplinary team   - Patient/family teaching  - Consider wound care consult   Outcome: Progressing     Problem: MOBILITY - ADULT  Goal: Maintain or return to baseline ADL function  Description: INTERVENTIONS:  -  Assess patient's ability to carry out ADLs; assess patient's baseline for ADL function and identify physical deficits which impact ability to perform ADLs (bathing, care of mouth/teeth, toileting, grooming, dressing, etc )  - Assess/evaluate cause of self-care deficits   - Assess range of motion  - Assess patient's mobility; develop plan if impaired  - Assess patient's need for assistive devices and provide as appropriate  - Encourage maximum independence but intervene and supervise when necessary  - Involve family in performance of ADLs  - Assess for home care needs following discharge   - Consider OT consult to assist with ADL evaluation and planning for discharge  - Provide patient education as appropriate  Outcome: Progressing

## 2022-11-28 NOTE — ASSESSMENT & PLAN NOTE
On hydralazine, Norvasc, Coreg, Imdur, Cardura  · Will continue above medications with holding parameter  · Blood pressure continues to remain on the high side    Hydralazine dose was increased to 100 milligram p o  T i d  with improved blood pressure

## 2022-11-28 NOTE — PROGRESS NOTES
Progress Note - Pulmonary   Ulysses Carroll 80 y o  male MRN: [id number]  Unit/Bed#: [address] Encounter: [id number]    Assessment and Plan:    1  Acute respiratory failure with hypoxia:  Currently saturating 95% on 1 L of nasal cannula oxygen  Titrate down FiO2 as tolerated    2  Acute on chronic diastolic heart failure:  His previous echocardiogram showed normal systolic function but had moderate diastolic dysfunction  He is currently on diuretic therapy with Demadex  He has CKD and her creatinine is elevated 2 79     3  Bilateral pleural effusion:  His imaging showed bilateral small pleural effusions  This is secondary to congestive heart failure with CKD  4  Pneumonia:  He has bilateral pneumonia with consolidation  She has been on treatment with cefepime  he has dysphagia and this pneumonia could be from recurrent aspiration  The blood cultures have been negative so far  4  Dysphagia with recurrent aspiration:  he has history of recurrent aspiration and was advised PEG tube placement which the family refused  I had a long conversation with the patient as well as her daughter at the bedside  Updated  on patient's condition and progress  Discussed with Dr Alden Koyanagi the patient's hospitalist     Isa grey for allowing me to participate in the care of the patient  Chief Complaint:   Pneumonia    Subjective: The patient states that his shortness of breath is better  He continues to have cough  Had blood-tinged sputum earlier  No chest pain or palpitation  Chest x-ray showed bilateral consolidative changes  No swelling of feet  Temperature to 100 9 this evening  Objective:     Vitals: Blood pressure 144/52, pulse 61, temperature 100 °F (37 8 °C), temperature source Oral, resp  rate 18, height 5' 5" (1 651 m), weight 81 kg (178 lb 8 oz), SpO2 95 %  ,Body mass index is 29 7 kg/m²        Intake/Output Summary (Last 24 hours) at 11/28/2022 6410  Last data filed at 11/28/2022 0749  Gross per 24 hour   Intake 360 ml   Output 1500 ml   Net -1140 ml       Invasive Devices     Peripheral Intravenous Line  Duration           Peripheral IV 11/26/22 Distal;Left;Upper;Ventral (anterior) Arm 2 days                Physical Exam:  Hemodynamically stable  Temperature maximum 100 9  Saturating 95% on 1 L of nasal cannula oxygen  HEENT:  No conjunctival pallor no cyanosis  Neck:  No jugular venous distention no significant neck or supraclavicular adenopathy  Heart S1-S2 heard  Chest air entry present bilaterally occasional crackles bilaterally  No rhonchi  Abdomen soft bowel sounds audible  Neuro awake alert  Extremities no clubbing no edema    Labs: I have personally reviewed pertinent lab results  white cell count 16 45 hemoglobin 8 5 potassium 3 3    Creatinine 2 79  Imaging and other studies: I have personally reviewed pertinent films in PACS  The imaging showed pulmonary vascular congestion and bilateral consolidation

## 2022-11-28 NOTE — ASSESSMENT & PLAN NOTE
Wt Readings from Last 3 Encounters:   11/28/22 81 kg (178 lb 8 oz)   11/22/22 84 5 kg (186 lb 4 8 oz)     Patient received IV Lasix in recent admission  · Chest x-ray upon admission shows worsening pulmonary edema  · Recent 2D echo as below  · Mild edema to lower extremity, left forearm edema on exam   · Patient received 40 milligrams of Lasix IV in the ED and was continued on  Lasix 40 milligram IV daily which was later increased to 40 milligram IV q 12 hours  · Left upper extremity venous Doppler showed no evidence of DVT  · CT chest showed pulmonary edema with moderate size bilateral pleural effusions left more than right  · Patient received Lasix 40 milligram q 12 hours and was transitioned to torsemide 40 milligram p o  Daily  · Creatinine continues to remain stable  Patient continues to have good diuresis    · Might need accept higher creatinine to maintain euvolemia

## 2022-11-28 NOTE — OCCUPATIONAL THERAPY NOTE
OCCUPATIONAL THERAPY       11/28/22 1315   Note Type   Note Type Cancelled Session   Cancel Reasons Refusal   Licensure   [place] License Number  Eduardo Muniz MS, OTR/L, #[id number]

## 2022-11-28 NOTE — PROGRESS NOTES
NEPHROLOGY PROGRESS NOTE   Eder William 80 y o  male MRN: [id number]  Unit/Bed#: [address] [place] [address] Encounter: [id number]    ASSESSMENT & PLAN:  12-year-old male with history of diabetes mellitus, hypertension, chronic kidney disease, CHF, esophageal dysmotility presented to [facility] after presenting with shortness of breath  Nephrology consulted for CKD  Recent hospital admission between November 15 to November 22 for respiratory failure due to pneumonia and fluid overload  Was discharged on oral Lasix 20 mg daily after treatment with IV Lasix  Discharge creatinine was around 2 68  Now again presenting with shortness of breath and nephrology consulted for worsening creatinine which was on admission 2 83  Elevated creatinine on chronic kidney disease stage 3  -baseline creatinine from 2021 was around 1 6-2 0  -admission creatinine 2 83 with use of oral Lasix since last hospital admission   -possibly CKD progression from cardiorenal syndrome and age-related nephron loss  -UA was bland, renal ultrasound without hydronephrosis but finding of left renal atrophy   -renal function currently stable  -creatinine today was around 2 79 mg/dL, potassium was replaced   -continue oral torsemide 40 mg daily  Continue to monitor renal function, may have to accept higher creatinine to maintain volume status    Primary hypertension  -blood pressure slightly on higher side  -dose of hydralazine was increased to 100 mg t i d  On 11/27, continue at current dose  Continue amlodipine 10 mg daily, carvedilol 12 5 mg twice daily, doxazosin 2 mg q h s , Imdur 60 mg once daily and torsemide 40 mg daily   -if blood pressure remains elevated may consider increasing the dose of carvedilol  Fluid overload/acute on chronic diastolic CHF  -was treated with IV Lasix    -CT was suggestive of pulmonary edema with moderate size pleural effusion left more than right  -volume status has improved status post IV Lasix  -currently on oral torsemide 40 mg daily, continue at current dose  Good urine output in last 24 hours  Weight trending down    Aspiration pneumonia, status post antibiotic treatment    Hypokalemia  -replaced    Shortness of breath likely combination of fluid overload as well as aspiration pneumonia    BPH-on doxazosin on Flomax    Esophageal dysphagia:  Noted discussion for PEG tube placement  Anemia, low at 8 5 g dL, continue to monitor    Left upper quadrant mass:  Renal ultrasound suggestive of solid hypoechoic mass in the left upper quadrant medial to the spleen as seen on previous CT scan   -recommend workup and management per primary team   CT scan on hold due to be edema in the GI tract, also discussed with primary team about risk of contrast nephropathy if plan for CT with IV contrast as may need to discuss with patient and recommend pre and post contrast hydration  Discussed with primary team      SUBJECTIVE:  No new complaints  No chest pain or shortness of breath  Still on oxygen by nasal cannula    OBJECTIVE:  Current Weight: Weight - Scale: 81 kg (178 lb 8 oz)  Vitals:    11/28/22 0912   BP: 155/55   Pulse: 88   Resp:    Temp:    SpO2: 95%       Intake/Output Summary (Last 24 hours) at 11/28/2022 0953  Last data filed at 11/28/2022 0749  Gross per 24 hour   Intake 360 ml   Output 2030 ml   Net -1670 ml       Physical Exam  General:  Ill looking, awake  On oxygen by nasal cannula  Eyes: Conjunctivae pink,  Sclera anicteric  ENT: lips and mucous membranes moist  Neck: supple   Chest:  Bilateral basal crackles, more on the left side  CVS: S1 & S2 present, normal rate, regular rhythm, no murmur    Abdomen: soft, non-tender, non-distended, Bowel sounds normoactive  Extremities: no edema of  legs  Skin: no rash  Neuro: awake, alert, oriented  Psych: Mood and affect appropriate     Medications:    Current Facility-Administered Medications:   •  acetaminophen (TYLENOL) tablet 650 mg, 650 mg, Oral, Q6H PRN, Emilee Link, CRNP, 650 mg at 11/26/22 0901  •  amLODIPine (NORVASC) tablet 10 mg, 10 mg, Oral, Daily, Migdaliaidoc Zunigarik, CRNP, 10 mg at 11/28/22 0913  •  aspirin chewable tablet 81 mg, 81 mg, Oral, Daily, Migdaliaidoc Zunigarik, CRNP, 81 mg at 11/28/22 0911  •  atorvastatin (LIPITOR) tablet 40 mg, 40 mg, Oral, Daily, Cuiyicarlos Zunigarik, CRNP, 40 mg at 11/28/22 0911  •  benzonatate (TESSALON PERLES) capsule 200 mg, 200 mg, Oral, TID, Dow Oppenheim, APOLONIANP, 200 mg at 11/28/22 6212  •  bisacodyl (DULCOLAX) rectal suppository 10 mg, 10 mg, Rectal, Daily PRN, Evelina Agarwal MD, 10 mg at 11/27/22 1324  •  carvedilol (COREG) tablet 12 5 mg, 12 5 mg, Oral, BID With Meals, APOLONIA NewberryNP, 12 5 mg at 11/28/22 0749  •  cefepime (MAXIPIME) IVPB (premix in dextrose) 1,000 mg 50 mL, 1,000 mg, Intravenous, Q24H, Steve Yan MD, Last Rate: 100 mL/hr at 11/27/22 1725, 1,000 mg at 11/27/22 1725  •  cholecalciferol (VITAMIN D3) tablet 2,000 Units, 2,000 Units, Oral, Daily, APOLONIA NewberryNP, 2,000 Units at 11/28/22 0911  •  cyanocobalamin (VITAMIN B-12) tablet 500 mcg, 500 mcg, Oral, Daily, KEISHA Newberry, 500 mcg at 11/28/22 0912  •  docusate sodium (COLACE) capsule 100 mg, 100 mg, Oral, BID, Steve Yan MD, 100 mg at 11/28/22 0911  •  doxazosin (CARDURA) tablet 2 mg, 2 mg, Oral, HS, APOLONIA NewberryNP, 2 mg at 11/27/22 2123  •  fluconazole (DIFLUCAN) tablet 100 mg, 100 mg, Oral, Daily, Steve Yan MD, 100 mg at 11/28/22 0911  •  gabapentin (NEURONTIN) capsule 100 mg, 100 mg, Oral, BID, KEISHA Newberry, 100 mg at 11/28/22 0530  •  heparin (porcine) subcutaneous injection 5,000 Units, 5,000 Units, Subcutaneous, Q8H [address], 5,000 Units at 11/28/22 0537 **AND** [CANCELED] Platelet count, , , Once, KEISHA Newberry  •  hydrALAZINE (APRESOLINE) tablet 100 mg, 100 mg, Oral, TID, Prem Bond MD, 100 mg at 11/28/22 0913  •  hydrocodone-chlorpheniramine polistirex (TUSSIONEX) ER suspension 5 mL, 5 mL, Oral, HS, Jeanne Ivory MD, 5 mL at 11/27/22 2124  •  insulin glargine (LANTUS) subcutaneous injection 10 Units 0 1 mL, 10 Units, Subcutaneous, HS, Steve Yan MD, 10 Units at 11/27/22 2124  •  insulin lispro (HumaLOG) 100 units/mL subcutaneous injection 1-5 Units, 1-5 Units, Subcutaneous, TID AC, 2 Units at 11/27/22 1611 **AND** Fingerstick Glucose (POCT), , , TID AC, KEISHA Newberry  •  insulin lispro (HumaLOG) 100 units/mL subcutaneous injection 1-5 Units, 1-5 Units, Subcutaneous, 0200, KEISHA Newberry, 1 Units at 11/27/22 0218  •  insulin lispro (HumaLOG) 100 units/mL subcutaneous injection 1-5 Units, 1-5 Units, Subcutaneous, HS, KEISHA Newberry, 1 Units at 11/27/22 2124  •  ipratropium (ATROVENT) 0 02 % inhalation solution 0 5 mg, 0 5 mg, Nebulization, TID, Emilee Link, CRNP, 0 5 mg at 11/28/22 0063  •  isosorbide mononitrate (IMDUR) 24 hr tablet 60 mg, 60 mg, Oral, Daily, Emilee Link, APOLONIANP, 60 mg at 11/28/22 0913  •  labetalol (NORMODYNE) injection 10 mg, 10 mg, Intravenous, Q6H PRN, Agustina Schneider PA-C, 10 mg at 11/24/22 0241  •  levalbuterol (XOPENEX) inhalation solution 0 63 mg, 0 63 mg, Nebulization, Q4H PRN, KEISHA Newberry  •  levalbuterol (XOPENEX) inhalation solution 1 25 mg, 1 25 mg, Nebulization, TID, 1 25 mg at 11/28/22 0722 **AND** sodium chloride 0 9 % inhalation solution 3 mL, 3 mL, Nebulization, TID, APOLONIA NewberryNP, 3 mL at 11/28/22 2293  •  nystatin (MYCOSTATIN) powder, , Topical, BID, KEISHA Newberry, Given at 11/28/22 0913  •  ondansetron (ZOFRAN) injection 4 mg, 4 mg, Intravenous, Q6H PRN, KEISHA Newberry  •  pantoprazole (PROTONIX) EC tablet 40 mg, 40 mg, Oral, Early Morning, KEISHA Newberry, 40 mg at 11/28/22 0537  •  polyethylene glycol (MIRALAX) packet 17 g, 17 g, Oral, Daily, Steve Yan MD, 17 g at 11/28/22 0912  •  repaglinide (PRANDIN) tablet 0 5 mg, 0 5 mg, Oral, TID AC, Steve Yan MD, 0 5 mg at 11/28/22 0749  •  senna (SENOKOT) tablet 8 6 mg, 1 tablet, Oral, HS, Marya Bell MD, 8 6 mg at 11/27/22 2123  •  simethicone (MYLICON) chewable tablet 80 mg, 80 mg, Oral, 4x Daily PRN, KEISHA Newberry  •  tamsulosin (FLOMAX) capsule 0 4 mg, 0 4 mg, Oral, Daily With Dinner, KEISHA Newberry, 0 4 mg at 11/27/22 1725  •  torsemide (DEMADEX) tablet 40 mg, 40 mg, Oral, Daily, Madelin Fischer MD, 40 mg at 11/28/22 0912    Invasive Devices:        Lab Results:   Results from last 7 days   Lab Units 11/28/22  0503 11/27/22  0523 11/26/22  0524 11/25/22  0710 11/24/22  0534 11/23/22  0825 11/22/22  2058   WBC Thousand/uL 16 45* 13 42*  --  15 84*   < >  --  16 19*   HEMOGLOBIN g/dL 8 5* 8 2*  --  8 8*   < >  --  9 6*   HEMATOCRIT % 26 1* 26 0*  --  26 9*   < >  --  29 9*   PLATELETS Thousands/uL 237 221  --  209   < >  --  253   POTASSIUM mmol/L 3 3* 3 6 5 2  --    < > 4 8 4 9   CHLORIDE mmol/L 101 101 100  --    < > 102 96   CO2 mmol/L 33* 32 30  --    < > 28 28   BUN mg/dL 113* 109* 111*  --    < > 93* 91*   CREATININE mg/dL 2 79* 2 73* 2 64*  --    < > 2 77* 2 83*   CALCIUM mg/dL 8 5 8 5 8 8  --    < > 8 9 8 8   MAGNESIUM mg/dL  --   --   --   --   --  2 9* 3 2*   ALK PHOS U/L  --   --   --   --   --   --  92   ALT U/L  --   --   --   --   --   --  27    < > = values in this interval not displayed  Previous work up:         Portions of the record may have been created with voice recognition software  Occasional wrong word or "sound a like" substitutions may have occurred due to the inherent limitations of voice recognition software  Read the chart carefully and recognize, using context, where substitutions have occurred  If you have any questions, please contact the dictating provider

## 2022-11-28 NOTE — ASSESSMENT & PLAN NOTE
Lab Results   Component Value Date    EGFR 18 11/28/2022    EGFR 19 11/27/2022    EGFR 19 11/26/2022    CREATININE 2 79 (H) 11/28/2022    CREATININE 2 73 (H) 11/27/2022    CREATININE 2 64 (H) 11/26/2022   History of CKD 4, baseline creatinine appears to be around 1 5-1 9 in past 2 years in care everywhere  Creatinine during previous hospitalization ranged in 2 4-2 8  · Possible acute kidney injury versus progression of chronic kidney disease  · Urinalysis was bland  · Received IV Lasix 40 mg in ED  · Avoid nephrotoxin and hypotension  · Renal ultrasound showed no hydronephrosis moderate left renal atrophy  · Nephrology input appreciated  · Patient was continued on Lasix 40 milligram IV q 12 hours with good diuresis  · Patient transitioned to torsemide 40 milligram p o  Daily and is having good diuresis with stable creatinine    · Right hip accept higher creatinine to maintain euvolemia

## 2022-11-28 NOTE — ASSESSMENT & PLAN NOTE
Lab Results   Component Value Date    HGBA1C 5 7 (H) 11/16/2022       Recent Labs     11/27/22 2048 11/28/22  0241 11/28/22  0722 11/28/22  1046   POCGLU 156* 114 114 229*       Blood Sugar Average: Last 72 hrs:  (P) 210 [id number]   Patient was discharged on Prandin t i d  With meals  Which was on hold initially while inpatient  · Continue Humalog sliding scale with Accu-Cheks q a c  And HS  · Patient was started on diabetic pureed Diet with thin liquids  Patient was started on Lantus 10 units q h s  As blood sugars were high  · Blood sugars continued to remain high    Resume Prandin 0 5 milligram p o  B i d

## 2022-11-29 PROBLEM — N18.30 STAGE 3 CHRONIC KIDNEY DISEASE (HCC): Status: ACTIVE | Noted: 2022-11-15

## 2022-11-29 LAB
ANION GAP SERPL CALCULATED.3IONS-SCNC: 6 MMOL/L (ref 4–13)
BASOPHILS # BLD AUTO: 0.02 THOUSANDS/ÂΜL (ref 0–0.1)
BASOPHILS NFR BLD AUTO: 0 % (ref 0–1)
BUN SERPL-MCNC: 115 MG/DL (ref 5–25)
CALCIUM SERPL-MCNC: 8.4 MG/DL (ref 8.3–10.1)
CHLORIDE SERPL-SCNC: 103 MMOL/L (ref 96–108)
CO2 SERPL-SCNC: 35 MMOL/L (ref 21–32)
CREAT SERPL-MCNC: 3.43 MG/DL (ref 0.6–1.3)
EOSINOPHIL # BLD AUTO: 0.42 THOUSAND/ÂΜL (ref 0–0.61)
EOSINOPHIL NFR BLD AUTO: 3 % (ref 0–6)
ERYTHROCYTE [DISTWIDTH] IN BLOOD BY AUTOMATED COUNT: 15.8 % (ref 11.6–15.1)
GFR SERPL CREATININE-BSD FRML MDRD: 14 ML/MIN/1.73SQ M
GLUCOSE SERPL-MCNC: 107 MG/DL (ref 65–140)
GLUCOSE SERPL-MCNC: 199 MG/DL (ref 65–140)
GLUCOSE SERPL-MCNC: 206 MG/DL (ref 65–140)
GLUCOSE SERPL-MCNC: 228 MG/DL (ref 65–140)
GLUCOSE SERPL-MCNC: 78 MG/DL (ref 65–140)
GLUCOSE SERPL-MCNC: 80 MG/DL (ref 65–140)
HCT VFR BLD AUTO: 26.3 % (ref 36.5–49.3)
HGB BLD-MCNC: 8.4 G/DL (ref 12–17)
IMM GRANULOCYTES # BLD AUTO: 0.21 THOUSAND/UL (ref 0–0.2)
IMM GRANULOCYTES NFR BLD AUTO: 1 % (ref 0–2)
LYMPHOCYTES # BLD AUTO: 1.55 THOUSANDS/ÂΜL (ref 0.6–4.47)
LYMPHOCYTES NFR BLD AUTO: 11 % (ref 14–44)
MCH RBC QN AUTO: 29.7 PG (ref 26.8–34.3)
MCHC RBC AUTO-ENTMCNC: 31.9 G/DL (ref 31.4–37.4)
MCV RBC AUTO: 93 FL (ref 82–98)
MONOCYTES # BLD AUTO: 1.27 THOUSAND/ÂΜL (ref 0.17–1.22)
MONOCYTES NFR BLD AUTO: 9 % (ref 4–12)
NEUTROPHILS # BLD AUTO: 11.07 THOUSANDS/ÂΜL (ref 1.85–7.62)
NEUTS SEG NFR BLD AUTO: 76 % (ref 43–75)
NRBC BLD AUTO-RTO: 0 /100 WBCS
PLATELET # BLD AUTO: 211 THOUSANDS/UL (ref 149–390)
PMV BLD AUTO: 11.7 FL (ref 8.9–12.7)
POTASSIUM SERPL-SCNC: 4.6 MMOL/L (ref 3.5–5.3)
PROCALCITONIN SERPL-MCNC: 0.63 NG/ML
RBC # BLD AUTO: 2.83 MILLION/UL (ref 3.88–5.62)
SODIUM SERPL-SCNC: 144 MMOL/L (ref 135–147)
WBC # BLD AUTO: 14.54 THOUSAND/UL (ref 4.31–10.16)

## 2022-11-29 RX ADMIN — ASPIRIN 81 MG CHEWABLE TABLET 81 MG: 81 TABLET CHEWABLE at 08:57

## 2022-11-29 RX ADMIN — REPAGLINIDE 0.5 MG: 1 TABLET ORAL at 18:00

## 2022-11-29 RX ADMIN — HEPARIN SODIUM 5000 UNITS: 5000 INJECTION INTRAVENOUS; SUBCUTANEOUS at 05:45

## 2022-11-29 RX ADMIN — AMLODIPINE BESYLATE 10 MG: 10 TABLET ORAL at 08:58

## 2022-11-29 RX ADMIN — CYANOCOBALAMIN TAB 500 MCG 500 MCG: 500 TAB at 08:57

## 2022-11-29 RX ADMIN — ATORVASTATIN CALCIUM 40 MG: 40 TABLET, FILM COATED ORAL at 08:57

## 2022-11-29 RX ADMIN — NYSTATIN 1 APPLICATION: 100000 POWDER TOPICAL at 18:01

## 2022-11-29 RX ADMIN — CARVEDILOL 12.5 MG: 12.5 TABLET, FILM COATED ORAL at 18:02

## 2022-11-29 RX ADMIN — INSULIN GLARGINE 10 UNITS: 100 INJECTION, SOLUTION SUBCUTANEOUS at 21:55

## 2022-11-29 RX ADMIN — PANTOPRAZOLE SODIUM 40 MG: 40 TABLET, DELAYED RELEASE ORAL at 05:45

## 2022-11-29 RX ADMIN — NYSTATIN 1 APPLICATION: 100000 POWDER TOPICAL at 08:59

## 2022-11-29 RX ADMIN — HEPARIN SODIUM 5000 UNITS: 5000 INJECTION INTRAVENOUS; SUBCUTANEOUS at 21:56

## 2022-11-29 RX ADMIN — Medication 2000 UNITS: at 08:57

## 2022-11-29 RX ADMIN — LEVALBUTEROL HYDROCHLORIDE 1.25 MG: 1.25 SOLUTION, CONCENTRATE RESPIRATORY (INHALATION) at 07:14

## 2022-11-29 RX ADMIN — BENZONATATE 200 MG: 100 CAPSULE ORAL at 18:00

## 2022-11-29 RX ADMIN — ISODIUM CHLORIDE 3 ML: 0.03 SOLUTION RESPIRATORY (INHALATION) at 19:32

## 2022-11-29 RX ADMIN — CEFEPIME HYDROCHLORIDE 1000 MG: 1 INJECTION, SOLUTION INTRAVENOUS at 18:12

## 2022-11-29 RX ADMIN — POLYETHYLENE GLYCOL 3350 17 G: 17 POWDER, FOR SOLUTION ORAL at 08:55

## 2022-11-29 RX ADMIN — IPRATROPIUM BROMIDE 0.5 MG: 0.5 SOLUTION RESPIRATORY (INHALATION) at 07:14

## 2022-11-29 RX ADMIN — IPRATROPIUM BROMIDE 0.5 MG: 0.5 SOLUTION RESPIRATORY (INHALATION) at 19:32

## 2022-11-29 RX ADMIN — FLUCONAZOLE 100 MG: 100 TABLET ORAL at 08:57

## 2022-11-29 RX ADMIN — ISOSORBIDE MONONITRATE 60 MG: 60 TABLET, EXTENDED RELEASE ORAL at 08:58

## 2022-11-29 RX ADMIN — CARVEDILOL 12.5 MG: 12.5 TABLET, FILM COATED ORAL at 08:58

## 2022-11-29 RX ADMIN — Medication 5 ML: at 21:56

## 2022-11-29 RX ADMIN — BENZONATATE 200 MG: 100 CAPSULE ORAL at 21:55

## 2022-11-29 RX ADMIN — SENNOSIDES 8.6 MG: 8.6 TABLET, FILM COATED ORAL at 21:55

## 2022-11-29 RX ADMIN — GABAPENTIN 100 MG: 100 CAPSULE ORAL at 08:57

## 2022-11-29 RX ADMIN — BENZONATATE 200 MG: 100 CAPSULE ORAL at 08:57

## 2022-11-29 RX ADMIN — INSULIN LISPRO 1 UNITS: 100 INJECTION, SOLUTION INTRAVENOUS; SUBCUTANEOUS at 21:56

## 2022-11-29 RX ADMIN — ISODIUM CHLORIDE 3 ML: 0.03 SOLUTION RESPIRATORY (INHALATION) at 13:00

## 2022-11-29 RX ADMIN — INSULIN LISPRO 1 UNITS: 100 INJECTION, SOLUTION INTRAVENOUS; SUBCUTANEOUS at 13:06

## 2022-11-29 RX ADMIN — GABAPENTIN 100 MG: 100 CAPSULE ORAL at 18:00

## 2022-11-29 RX ADMIN — HYDRALAZINE HYDROCHLORIDE 100 MG: 25 TABLET ORAL at 21:55

## 2022-11-29 RX ADMIN — IPRATROPIUM BROMIDE 0.5 MG: 0.5 SOLUTION RESPIRATORY (INHALATION) at 13:00

## 2022-11-29 RX ADMIN — INSULIN LISPRO 2 UNITS: 100 INJECTION, SOLUTION INTRAVENOUS; SUBCUTANEOUS at 18:00

## 2022-11-29 RX ADMIN — LEVALBUTEROL HYDROCHLORIDE 1.25 MG: 1.25 SOLUTION, CONCENTRATE RESPIRATORY (INHALATION) at 19:32

## 2022-11-29 RX ADMIN — HYDRALAZINE HYDROCHLORIDE 100 MG: 25 TABLET ORAL at 08:58

## 2022-11-29 RX ADMIN — REPAGLINIDE 0.5 MG: 1 TABLET ORAL at 13:07

## 2022-11-29 RX ADMIN — HEPARIN SODIUM 5000 UNITS: 5000 INJECTION INTRAVENOUS; SUBCUTANEOUS at 13:08

## 2022-11-29 RX ADMIN — HYDRALAZINE HYDROCHLORIDE 100 MG: 25 TABLET ORAL at 18:02

## 2022-11-29 RX ADMIN — REPAGLINIDE 0.5 MG: 1 TABLET ORAL at 06:03

## 2022-11-29 RX ADMIN — DOXAZOSIN 2 MG: 2 TABLET ORAL at 21:55

## 2022-11-29 RX ADMIN — LEVALBUTEROL HYDROCHLORIDE 1.25 MG: 1.25 SOLUTION, CONCENTRATE RESPIRATORY (INHALATION) at 13:00

## 2022-11-29 RX ADMIN — TAMSULOSIN HYDROCHLORIDE 0.4 MG: 0.4 CAPSULE ORAL at 18:00

## 2022-11-29 RX ADMIN — ISODIUM CHLORIDE 3 ML: 0.03 SOLUTION RESPIRATORY (INHALATION) at 07:14

## 2022-11-29 NOTE — ASSESSMENT & PLAN NOTE
On Norvasc, hydralazine, Coreg, Imdur, Cardura which is being continued    · Blood pressures acceptable

## 2022-11-29 NOTE — ASSESSMENT & PLAN NOTE
Wt Readings from Last 3 Encounters:   11/29/22 81 4 kg (179 lb 7 3 oz)   11/22/22 84 5 kg (186 lb 4 8 oz)     Patient received IV Lasix in recent admission  · Chest x-ray upon admission shows worsening pulmonary edema  · Patient received 40 milligrams of Lasix IV in the ED and was continued on  IV Lasix    Then transitioned to [facility] which is currently on hold  · Left upper extremity venous Doppler showed no evidence of DVT  · CT chest showed pulmonary edema with moderate size bilateral pleural effusions left more than right  · Might need accept higher creatinine to maintain euvolemia

## 2022-11-29 NOTE — ASSESSMENT & PLAN NOTE
Lab Results   Component Value Date    HGBA1C 5 7 (H) 11/16/2022       Recent Labs     11/29/22  0202 11/29/22  0732 11/29/22  1107 11/29/22  1538   POCGLU 107 78 206* 228*     Patient was discharged on Prandin t i d  With meals  · Continue Humalog sliding scale with Accu-Cheks q a c  And HS  · Currently on diabetic pureed Diet with thin liquids  Continue Lantus 10 units q h s   As blood sugars were high  · Continue Prandin 0 5 milligram p o  B i d

## 2022-11-29 NOTE — ASSESSMENT & PLAN NOTE
Lab Results   Component Value Date    EGFR 14 11/29/2022    EGFR 18 11/28/2022    EGFR 19 11/27/2022    CREATININE 3 43 (H) 11/29/2022    CREATININE 2 79 (H) 11/28/2022    CREATININE 2 73 (H) 11/27/2022     History of CKD 4, baseline creatinine appears to be around 1 5-1 9 in past 2 years in care everywhere  Creatinine during previous hospitalization ranged in 2 4-2 8  · Possible CKD progression  · Urinalysis was bland  · Received IV Lasix 40 mg in ED  · Renal ultrasound showed no hydronephrosis moderate left renal atrophy  · Nephrology input appreciated  · Patient was continued on Lasix 40 milligram IV q 12 hours with good diuresis  · Was later transitioned to torsemide 40 milligram p o  Daily    Demadex on hold as creatinine is trending up

## 2022-11-29 NOTE — ASSESSMENT & PLAN NOTE
Just completed 7 days of antibiotic  · Repeat procalcitonin level was 0 54 initially, continues to remain mildly elevated  · Patient received cefepime and Flagyl in the ED   Continue cefepime 1 gram IV daily  · Sputum culture pending  · Urine for Legionella and pneumococcal antigens were negative  · Pulmonary input appreciated  · Blood cultures have been negative  · Patient was having low-grade fevers with worsening white count    Fevers improving  · Chest x-ray with multifocal pneumonia which is persistent

## 2022-11-29 NOTE — PLAN OF CARE
Problem: RESPIRATORY - ADULT  Goal: Achieves optimal ventilation and oxygenation  Description: INTERVENTIONS:  - Assess for changes in respiratory status  - Assess for changes in mentation and behavior  - Position to facilitate oxygenation and minimize respiratory effort  - Oxygen administered by appropriate delivery if ordered  - Initiate smoking cessation education as indicated  - Encourage broncho-pulmonary hygiene including cough, deep breathe, Incentive Spirometry  - Assess the need for suctioning and aspirate as needed  - Assess and instruct to report SOB or any respiratory difficulty  - Respiratory Therapy support as indicated  Outcome: Progressing     Problem: Prexisting or High Potential for Compromised Skin Integrity  Goal: Skin integrity is maintained or improved  Description: INTERVENTIONS:  - Identify patients at risk for skin breakdown  - Assess and monitor skin integrity  - Assess and monitor nutrition and hydration status  - Monitor labs   - Assess for incontinence   - Turn and reposition patient  - Assist with mobility/ambulation  - Relieve pressure over bony prominences  - Avoid friction and shearing  - Provide appropriate hygiene as needed including keeping skin clean and dry  - Evaluate need for skin moisturizer/barrier cream  - Collaborate with interdisciplinary team   - Patient/family teaching  - Consider wound care consult   Outcome: Progressing     Problem: MOBILITY - ADULT  Goal: Maintain or return to baseline ADL function  Description: INTERVENTIONS:  -  Assess patient's ability to carry out ADLs; assess patient's baseline for ADL function and identify physical deficits which impact ability to perform ADLs (bathing, care of mouth/teeth, toileting, grooming, dressing, etc )  - Assess/evaluate cause of self-care deficits   - Assess range of motion  - Assess patient's mobility; develop plan if impaired  - Assess patient's need for assistive devices and provide as appropriate  - Encourage maximum independence but intervene and supervise when necessary  - Involve family in performance of ADLs  - Assess for home care needs following discharge   - Consider OT consult to assist with ADL evaluation and planning for discharge  - Provide patient education as appropriate  Outcome: Progressing     Problem: Potential for Falls  Goal: Patient will remain free of falls  Description: INTERVENTIONS:  - Educate patient/family on patient safety including physical limitations  - Instruct patient to call for assistance with activity   - Consult OT/PT to assist with strengthening/mobility   - Keep Call bell within reach  - Keep bed low and locked with side rails adjusted as appropriate  - Keep care items and personal belongings within reach  - Initiate and maintain comfort rounds  - Make Fall Risk Sign visible to staff  - Offer Toileting every 2 Hours, in advance of need  - Initiate/Maintain bed/chair alarm  - Obtain necessary fall risk management equipment: fall risk sign  - Apply yellow socks and bracelet for high fall risk patients  - Consider moving patient to room near nurses station  Outcome: Progressing

## 2022-11-29 NOTE — PROGRESS NOTES
NEPHROLOGY PROGRESS NOTE   Sukumar Freitas 80 y o  male MRN: [id number]  Unit/Bed#: [address] [place] [address] Encounter: [id number]    ASSESSMENT & PLAN:  24-year-old male with history of diabetes mellitus, hypertension, chronic kidney disease, CHF, esophageal dysmotility presented to [facility] after presenting with shortness of breath  Nephrology consulted for CKD  Recent hospital admission between November 15 to November 22 for respiratory failure due to pneumonia and fluid overload  Was discharged on oral Lasix 20 mg daily after treatment with IV Lasix  Discharge creatinine was around 2 68  Now again presenting with shortness of breath and nephrology consulted for worsening creatinine which was on admission 2 83  Elevated creatinine on chronic kidney disease stage 3  -baseline creatinine from 2021 was around 1 6-2 0  -admission creatinine 2 83 with use of oral Lasix since last hospital admission   -possibly CKD progression from cardiorenal syndrome and age-related nephron loss  -UA was bland, renal ultrasound without hydronephrosis but finding of left renal atrophy   -renal function has worsened to creatinine 3 4 today in the setting of ongoing diuresis with oral torsemide 40 mg daily  Due to worsening renal function holding off on further torsemide today  Order for bladder scan for possibility of urine retention  Repeat UA urine microscopy and also checking urine eosinophil for concern for AIN in the setting of patient being on antibiotics  -if renal function worsens further tomorrow would consider IV fluid    Primary hypertension  -blood pressure acceptable  -dose of hydralazine was increased to 100 mg t i d  On 11/27, continue at current dose  Continue amlodipine 10 mg daily, carvedilol 12 5 mg twice daily, doxazosin 2 mg q h s , Imdur 60 mg once daily and torsemide 40 mg daily   -if blood pressure remains elevated may consider increasing the dose of carvedilol      Fluid overload/acute on chronic diastolic CHF  -was treated with IV Lasix  -CT was suggestive of pulmonary edema with moderate size pleural effusion left more than right  -volume status has improved status post IV Lasix and then was transitioned to oral torsemide 40 mg daily but with worsening of renal function today with, holding further torsemide  May need to consider lower dose 20 mg daily once renal function improves back to creatinine 2 7-2 8  Aspiration pneumonia, on antibiotic treatment with cefepime    Hypokalemia  -resolved status post replacement    Shortness of breath likely combination of fluid overload as well as aspiration pneumonia-improving status post diuresis    BPH-on doxazosin on Flomax    Esophageal dysphagia:  Noted discussion for PEG tube placement  Anemia, low at 8 4 g dL, continue to monitor    Left upper quadrant mass:  Renal ultrasound suggestive of solid hypoechoic mass in the left upper quadrant medial to the spleen as seen on previous CT scan   -recommend workup and management per primary team   CT scan on hold due to barium in the GI tract, also discussed with primary team about risk of contrast nephropathy  if plan for CT with IV contrast as may need to discuss with patient and recommend pre and post contrast hydration  Discussed with primary team      SUBJECTIVE:  No new complaints  Denies any lower extremity edema or shortness of breath    OBJECTIVE:  Current Weight: Weight - Scale: 81 4 kg (179 lb 7 3 oz)  Vitals:    11/29/22 0858   BP: 140/57   Pulse: 65   Resp:    Temp: 98 4 °F (36 9 °C)   SpO2: 92%       Intake/Output Summary (Last 24 hours) at 11/29/2022 1017  Last data filed at 11/29/2022 7078  Gross per 24 hour   Intake 200 ml   Output 500 ml   Net -300 ml       Physical Exam  General:  Ill looking, awake  Eyes: Conjunctivae pink,  Sclera anicteric  ENT: lips and mucous membranes moist  Neck: supple   Chest: Clear to Auscultation both lungs,  no crackles, ronchus or wheezing    CVS: S1 & S2 present, normal rate, regular rhythm, no murmur    Abdomen: soft, non-tender, non-distended, Bowel sounds normoactive  Extremities: no edema of  legs  Skin: no rash  Neuro: awake, alert, oriented x 3   Psych: Mood and affect appropriate     Medications:    Current Facility-Administered Medications:   •  acetaminophen (TYLENOL) tablet 650 mg, 650 mg, Oral, Q6H PRN, Migdaliaidoc Zunigarik, CRNP, 650 mg at 11/28/22 1736  •  amLODIPine (NORVASC) tablet 10 mg, 10 mg, Oral, Daily, Cuiyin Nadiarik, CRNP, 10 mg at 11/29/22 5869  •  aspirin chewable tablet 81 mg, 81 mg, Oral, Daily, Cuidoc Zunigarik, CRNP, 81 mg at 11/29/22 0857  •  atorvastatin (LIPITOR) tablet 40 mg, 40 mg, Oral, Daily, Cuijohnn Nadiarik, CRNP, 40 mg at 11/29/22 6500  •  benzonatate (TESSALON PERLES) capsule 200 mg, 200 mg, Oral, TID, Arvie Fusi, CRNP, 200 mg at 11/29/22 8147  •  bisacodyl (DULCOLAX) rectal suppository 10 mg, 10 mg, Rectal, Daily PRN, Harleen Brown MD, 10 mg at 11/28/22 1038  •  carvedilol (COREG) tablet 12 5 mg, 12 5 mg, Oral, BID With Meals, Cuidoc Link, CRNP, 12 5 mg at 11/29/22 0858  •  cefepime (MAXIPIME) IVPB (premix in dextrose) 1,000 mg 50 mL, 1,000 mg, Intravenous, Q24H, Steve Yan MD, Last Rate: 100 mL/hr at 11/28/22 1841, 1,000 mg at 11/28/22 1841  •  cholecalciferol (VITAMIN D3) tablet 2,000 Units, 2,000 Units, Oral, Daily, Emilee Link CRNP, 2,000 Units at 11/29/22 0857  •  cyanocobalamin (VITAMIN B-12) tablet 500 mcg, 500 mcg, Oral, Daily, Cuidoc Link, CRNP, 500 mcg at 11/29/22 0857  •  doxazosin (CARDURA) tablet 2 mg, 2 mg, Oral, HS, KEISHA Newberry, 2 mg at 11/28/22 2149  •  fluconazole (DIFLUCAN) tablet 100 mg, 100 mg, Oral, Daily, Steve Yan MD, 100 mg at 11/29/22 0857  •  gabapentin (NEURONTIN) capsule 100 mg, 100 mg, Oral, BID, KEISHA Newberry, 100 mg at 11/29/22 0857  •  heparin (porcine) subcutaneous injection 5,000 Units, 5,000 Units, Subcutaneous, Q8H [facility], 5,000 Units at 11/29/22 0424 **AND** [CANCELED] Platelet count, , , Once, KEISHA Newberry  •  hydrALAZINE (APRESOLINE) tablet 100 mg, 100 mg, Oral, TID, Stoney Philip MD, 100 mg at 11/29/22 0858  •  hydrocodone-chlorpheniramine polistirex (TUSSIONEX) ER suspension 5 mL, 5 mL, Oral, HS, Andressa Yan MD, 5 mL at 11/28/22 2150  •  insulin glargine (LANTUS) subcutaneous injection 10 Units 0 1 mL, 10 Units, Subcutaneous, HS, Alana Zaidi MD, 10 Units at 11/28/22 2148  •  insulin lispro (HumaLOG) 100 units/mL subcutaneous injection 1-5 Units, 1-5 Units, Subcutaneous, TID AC, 1 Units at 11/28/22 1736 **AND** Fingerstick Glucose (POCT), , , TID AC, KEISHA Newberry  •  insulin lispro (HumaLOG) 100 units/mL subcutaneous injection 1-5 Units, 1-5 Units, Subcutaneous, 0200, KEISHA Newberry, 1 Units at 11/27/22 0218  •  insulin lispro (HumaLOG) 100 units/mL subcutaneous injection 1-5 Units, 1-5 Units, Subcutaneous, HS, KEISHA Newberry, 2 Units at 11/28/22 2149  •  ipratropium (ATROVENT) 0 02 % inhalation solution 0 5 mg, 0 5 mg, Nebulization, TID, KEISHA Newberry, 0 5 mg at 11/29/22 6513  •  isosorbide mononitrate (IMDUR) 24 hr tablet 60 mg, 60 mg, Oral, Daily, KEISHA Newberry, 60 mg at 11/29/22 0858  •  labetalol (NORMODYNE) injection 10 mg, 10 mg, Intravenous, Q6H PRN, Agustina Schneider PA-C, 10 mg at 11/24/22 0241  •  levalbuterol (XOPENEX) inhalation solution 0 63 mg, 0 63 mg, Nebulization, Q4H PRN, KEISHA Newberry  •  levalbuterol (XOPENEX) inhalation solution 1 25 mg, 1 25 mg, Nebulization, TID, 1 25 mg at 11/29/22 0714 **AND** sodium chloride 0 9 % inhalation solution 3 mL, 3 mL, Nebulization, TID, KEISHA Newberry, 3 mL at 11/29/22 0714  •  nystatin (MYCOSTATIN) powder, , Topical, BID, KEISHA Newberry, 1 application at 22/15/78 0859  •  ondansetron (ZOFRAN) injection 4 mg, 4 mg, Intravenous, Q6H PRN, KEISHA Newberry, 4 mg at 11/28/22 1038  •  pantoprazole (PROTONIX) EC tablet 40 mg, 40 mg, Oral, Early Morning, Rik Gaucher, CRNP, 40 mg at 11/29/22 0545  •  polyethylene glycol (MIRALAX) packet 17 g, 17 g, Oral, Daily, Abbi Yan MD, 17 g at 11/29/22 0855  •  repaglinide (PRANDIN) tablet 0 5 mg, 0 5 mg, Oral, TID AC, Abbi Yan MD, 0 5 mg at 11/29/22 0603  •  senna (SENOKOT) tablet 8 6 mg, 1 tablet, Oral, HS, Gabino Echeverria MD, 8 6 mg at 11/28/22 2149  •  simethicone (MYLICON) chewable tablet 80 mg, 80 mg, Oral, 4x Daily PRN, KEISHA Newebrry  •  tamsulosin (FLOMAX) capsule 0 4 mg, 0 4 mg, Oral, Daily With Dinner, KEISHA Newberry, 0 4 mg at 11/28/22 1737    Invasive Devices:        Lab Results:   Results from last 7 days   Lab Units 11/29/22  0544 11/28/22  0503 11/27/22  0523 11/24/22  0534 11/23/22  0825 11/22/22  2058   WBC Thousand/uL 14 54* 16 45* 13 42*   < >  --  16 19*   HEMOGLOBIN g/dL 8 4* 8 5* 8 2*   < >  --  9 6*   HEMATOCRIT % 26 3* 26 1* 26 0*   < >  --  29 9*   PLATELETS Thousands/uL 211 237 221   < >  --  253   POTASSIUM mmol/L 4 6 3 3* 3 6   < > 4 8 4 9   CHLORIDE mmol/L 103 101 101   < > 102 96   CO2 mmol/L 35* 33* 32   < > 28 28   BUN mg/dL 115* 113* 109*   < > 93* 91*   CREATININE mg/dL 3 43* 2 79* 2 73*   < > 2 77* 2 83*   CALCIUM mg/dL 8 4 8 5 8 5   < > 8 9 8 8   MAGNESIUM mg/dL  --   --   --   --  2 9* 3 2*   ALK PHOS U/L  --   --   --   --   --  92   ALT U/L  --   --   --   --   --  27    < > = values in this interval not displayed  Previous work up:         Portions of the record may have been created with voice recognition software  Occasional wrong word or "sound a like" substitutions may have occurred due to the inherent limitations of voice recognition software  Read the chart carefully and recognize, using context, where substitutions have occurred  If you have any questions, please contact the dictating provider

## 2022-11-29 NOTE — PLAN OF CARE
Problem: PHYSICAL THERAPY ADULT  Goal: Performs mobility at highest level of function for planned discharge setting  See evaluation for individualized goals  Description: Treatment/Interventions: ADL retraining, Functional transfer training, LE strengthening/ROM, Therapeutic exercise, Endurance training, Bed mobility, Gait training, Spoke to nursing        See flowsheet documentation for full assessment, interventions and recommendations  Outcome: Progressing  Note: Prognosis: Good  Problem List: Decreased strength, Decreased range of motion, Decreased endurance, Impaired balance, Decreased mobility, Impaired judgement, Decreased safety awareness, Pain  Assessment: Pt agreeable to PT session this AM  Pt continues to require Mod/Max A to perform bed mobility  Attempted sit <> stand x 2 attempts from edge of bed with Max A x 2 with increased difficulty, feet sliding forward with poor balance despite tactile cues + assist  Pt returned to edge of bed x second attempt and repositioned for comfort  Able to perform exercise as mentioned above with intermittent assist + encouragement to complete sets  PT Discharge Recommendation: Post acute rehabilitation services    See flowsheet documentation for full assessment

## 2022-11-29 NOTE — ASSESSMENT & PLAN NOTE
Patient completed 7 days of nystatin suspension  · Continues to have white plaques  · Patient was given Diflucan 200 milligrams 1 dose and being continued on Diflucan 100 milligram p o   Daily

## 2022-11-29 NOTE — ASSESSMENT & PLAN NOTE
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  · Holding off on CT abdomen/pelvis with contrast due to elevated creatinine

## 2022-11-29 NOTE — PROGRESS NOTES
[address]  Progress Note Daquan Miller 10/19/1929, 80 y o  male MRN: [id number]  Unit/Bed#: [facility] Encounter: [id number]  Primary Care Provider: Lynda Carty PA-C   Date and time admitted to hospital: 11/22/2022  8:41 PM    * Acute respiratory failure with hypoxia [facility])  Assessment & Plan  Patient presented with acute respiratory distress shortly after eating a few spoonful of puree diet in STR, patient reported chest pressure and SOB  Patient was satting 85-95% on oxygen 3 L per STR transfer paper  Patient was belching after eating/drinking limited amount of diet/water per staff  · Patient was discharged on the day of admission after being hospitalized for aspiration pneumonia, acute on chronic diastolic CHF, dysphagia  Patient received 7 days of IV Rocephin and Flagyl  Received IV Lasix  Seen by GI, underwent EGD, found to have oropharyngeal dysphagia with esophageal dysmotility, no obvious esophageal stricture  Underwent Barium swallow study which showed moderate to severe esophageal dysmotility with significant residual contrast remaining in the esophagus at the end of the study  Patient was recommended regular diet by speech and discharged on regular diet per STR  · Likely secondary to multifocal pneumonia and also CHF  · Patient required BiPAP on ED arrival   ABG post BiPAP essentially unremarkable  Later patient was titrated down to 6 liters oxygen upon admission  Patient is currently down to 1 liter oxygen  · Chest x-ray - Pulmonary edema, worse when comparing to 11/19/2022  Underlying infection could not be excluded  · ProBNP 15,094  · Patient received Lasix 40 IV in ED  · Received cefepime and Flagyl in ED  Currently on IV cefepime  · Received IV Solu-Medrol in ED  No wheezing on auscultation    · Continue nebs  · CT of the chest showed pulmonary edema with moderate size bilateral pleural effusions with significant bibasilar atelectasis and concomitant multifocal pneumonia   · Obstructive series showed persistent pulmonary edema with multifocal pneumonia    Aspiration pneumonia ([address] )  Assessment & Plan  Just completed 7 days of antibiotic  · Repeat procalcitonin level was 0 54 initially, continues to remain mildly elevated  · Patient received cefepime and Flagyl in the ED   Continue cefepime 1 gram IV daily  · Sputum culture pending  · Urine for Legionella and pneumococcal antigens were negative  · Pulmonary input appreciated  · Blood cultures have been negative  · Patient was having low-grade fevers with worsening white count  Fevers improving  · Chest x-ray with multifocal pneumonia which is persistent    Acute on chronic diastolic congestive heart failure (HCC)  Assessment & Plan  Wt Readings from Last 3 Encounters:   11/29/22 81 4 kg (179 lb 7 3 oz)   11/22/22 84 5 kg (186 lb 4 8 oz)     Patient received IV Lasix in recent admission  · Chest x-ray upon admission shows worsening pulmonary edema  · Patient received 40 milligrams of Lasix IV in the ED and was continued on  IV Lasix  Then transitioned to [facility] which is currently on hold  · Left upper extremity venous Doppler showed no evidence of DVT  · CT chest showed pulmonary edema with moderate size bilateral pleural effusions left more than right  · Might need accept higher creatinine to maintain euvolemia        Esophageal dysphagia  Assessment & Plan  Patient reports no appetite for 2 days  Loss of appetite could be secondary to antibiotic  · Discussed with speech therapy-patient at high risk for aspiration despite the level of diet  For now patient on pureed diet with thin liquids with medications crushed with puree  · GI recommend to consider PEG tube if recurrent aspirations  Per recent GI note, family did not want PEG tube during prior hospitalization    · Continue pantoprazole daily  · Obstructive series showed barium throughout the colon with modest amount of stool along the rectosigmoid region  · Prior provider spoke with patient's family regarding possible PEG tube placement  Daughter wants to hold off on PEG placement      Left upper quadrant abdominal mass  Assessment & Plan  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  · Holding off on CT abdomen/pelvis with contrast due to elevated creatinine      Constipation  Assessment & Plan  Patient with constipation  Continue MiraLax, senna q h s  · Patient was also given Dulcolax suppository, soapsuds enema      BPH (benign prostatic hyperplasia)  Assessment & Plan  Continue Cardura and Flomax  · PVR 280s    Hyperlipidemia  Assessment & Plan  Continue statin    Elevated troponin  Assessment & Plan  Troponin [id number]  Elevated troponin in recent admission as well  Reports chest pressure when in respiratory distress  Denies history of CAD  · EKG no ischemic changes, read as AFib, rate 103,RBBB per prior provider  · No history of AFib  Prior EKG shows sinus rhythm with PACs /PVCs  Will repeat EKG  · 2D echo last admission showed  EF low normal, normal wall motion, grade 2 diastolic dysfunction, moderately dilated atriums  · Most likely non MI troponin elevation due to # 1 and CHF      Thrush  Assessment & Plan  Patient completed 7 days of nystatin suspension  · Continues to have white plaques  · Patient was given Diflucan 200 milligrams 1 dose and being continued on Diflucan 100 milligram p o  Daily    Type 2 diabetes mellitus [facility])  Assessment & Plan  Lab Results   Component Value Date    HGBA1C 5 7 (H) 11/16/2022       Recent Labs     11/29/22  0202 11/29/22  0732 11/29/22  1107 11/29/22  1538   POCGLU 107 78 206* 228*     Patient was discharged on Prandin t i d  With meals  · Continue Humalog sliding scale with Accu-Cheks q a c  And HS  · Currently on diabetic pureed Diet with thin liquids  Continue Lantus 10 units q h s   As blood sugars were high  · Continue Prandin 0 5 milligram p o  B i d     Stage 3 chronic kidney disease [facility])  Assessment & Plan  Lab Results   Component Value Date    EGFR 14 2022    EGFR 18 2022    EGFR 19 2022    CREATININE 3 43 (H) 2022    CREATININE 2 79 (H) 2022    CREATININE 2 73 (H) 2022     History of CKD 4, baseline creatinine appears to be around 1 5-1 9 in past 2 years in care everywhere  Creatinine during previous hospitalization ranged in 2 4-2 8  · Possible CKD progression  · Urinalysis was bland  · Received IV Lasix 40 mg in ED  · Renal ultrasound showed no hydronephrosis moderate left renal atrophy  · Nephrology input appreciated  · Patient was continued on Lasix 40 milligram IV q 12 hours with good diuresis  · Was later transitioned to torsemide 40 milligram p o  Daily  Demadex on hold as creatinine is trending up    Primary hypertension  Assessment & Plan  On Norvasc, hydralazine, Coreg, Imdur, Cardura which is being continued  · Blood pressures acceptable        VTE Pharmacologic Prophylaxis:   Heparin    Patient Centered Rounds: I performed bedside rounds with nursing staff today  Discussions with Specialists or Other Care Team Provider: yes - renal    Education and Discussions with Family / Patient: Updated  (daughter) via phone  Time Spent for Care: 35 min  More than 50% of total time spent on counseling and coordination of care as described above  Current Length of Stay: 7 day(s)  Current Patient Status: Inpatient   Certification Statement: The patient will continue to require additional inpatient hospital stay due to Acute respiratory failure with hypoxia, pneumonia, CKD with worsening creatinine  Discharge Plan: Anticipate discharge in 48-72 hrs to rehab facility  Code Status: Level 1 - Full Code    Subjective:     Patient denies any shortness of breath  States he coughed once       Objective:     Vitals:   Temp (24hrs), Av °F (37 2 °C), Min:98 4 °F (36 9 °C), Max:100 1 °F (37 8 °C)    Temp:  [98 4 °F (36 9 °C)-100 1 °F (37 8 °C)] 98 5 °F (36 9 °C)  HR:  [53-65] 64  Resp:  [18] 18  BP: (125-155)/(51-68) 138/55  SpO2:  [91 %-96 %] 92 %  Body mass index is 29 86 kg/m²  Input and Output Summary (last 24 hours): Intake/Output Summary (Last 24 hours) at 11/29/2022 1808  Last data filed at 11/29/2022 1129  Gross per 24 hour   Intake 200 ml   Output 710 ml   Net -510 ml       Physical Exam:   Physical Exam  Vitals reviewed  Constitutional:       General: He is not in acute distress  Appearance: He is ill-appearing (Chronically)  HENT:      Head: Normocephalic and atraumatic  Eyes:      General:         Right eye: No discharge  Left eye: No discharge  Cardiovascular:      Rate and Rhythm: Normal rate and regular rhythm  Pulmonary:      Effort: Pulmonary effort is normal  No respiratory distress  Breath sounds: No wheezing or rales  Comments: Decreased breath sounds bilaterally  Abdominal:      General: Bowel sounds are normal  There is no distension  Palpations: Abdomen is soft  Tenderness: There is no abdominal tenderness  Musculoskeletal:      Right lower leg: No edema  Left lower leg: No edema  Neurological:      Mental Status: He is alert           Additional Data:     Labs:  Results from last 7 days   Lab Units 11/29/22  0544   WBC Thousand/uL 14 54*   HEMOGLOBIN g/dL 8 4*   HEMATOCRIT % 26 3*   PLATELETS Thousands/uL 211   NEUTROS PCT % 76*   LYMPHS PCT % 11*   MONOS PCT % 9   EOS PCT % 3     Results from last 7 days   Lab Units 11/29/22  0544 11/23/22  0825 11/22/22 2058   SODIUM mmol/L 144   < > 133*   POTASSIUM mmol/L 4 6   < > 4 9   CHLORIDE mmol/L 103   < > 96   CO2 mmol/L 35*   < > 28   BUN mg/dL 115*   < > 91*   CREATININE mg/dL 3 43*   < > 2 83*   ANION GAP mmol/L 6   < > 9   CALCIUM mg/dL 8 4   < > 8 8   ALBUMIN g/dL  --   --  2 6*   TOTAL BILIRUBIN mg/dL  --   --  0 57   ALK PHOS U/L  --   --  92   ALT U/L  --   -- 27   GLUCOSE RANDOM mg/dL 80   < > 287*    < > = values in this interval not displayed  Results from last 7 days   Lab Units 11/22/22 2058   INR  1 07     Results from last 7 days   Lab Units 11/29/22  1538 11/29/22  1107 11/29/22  0732 11/29/22  0202 11/28/22  2051 11/28/22  1618 11/28/22  1046 11/28/22  0722 11/28/22  0241 11/27/22  2048 11/27/22  1555 11/27/22  1114   POC GLUCOSE mg/dl 228* 206* 78 107 230* 152* 229* 114 114 156* 230* 244*         Results from last 7 days   Lab Units 11/29/22  0544 11/27/22  0523 11/25/22  0450 11/23/22  0825 11/22/22 2058   LACTIC ACID mmol/L  --   --   --   --  1 0   PROCALCITONIN ng/ml 0 63* 0 36* 0 44* 0 54* 0 37*       Lines/Drains:  Invasive Devices     Peripheral Intravenous Line  Duration           Peripheral IV 11/26/22 Distal;Left;Upper;Ventral (anterior) Arm 3 days                Imaging: Reviewed radiology reports from this admission including: chest CT scan    Recent Cultures (last 7 days):   Results from last 7 days   Lab Units 11/28/22  1502 11/22/22 2058 11/22/22 2057   BLOOD CULTURE   --  No Growth After 5 Days  No Growth After 5 Days     SPUTUM CULTURE  Culture too young- will reincubate  --   --    GRAM STAIN RESULT  Rare Epithelial cells per low power field*  1+ Polys*  1+ Gram negative rods*  Rare Gram positive rods*  Rare Gram positive cocci in pairs*  --   --        Last 24 Hours Medication List:   Current Facility-Administered Medications   Medication Dose Route Frequency Provider Last Rate   • acetaminophen  650 mg Oral Q6H PRN KEISHA Newberry     • amLODIPine  10 mg Oral Daily KEISHA Newberry     • aspirin  81 mg Oral Daily KEIHSA Newberry     • atorvastatin  40 mg Oral Daily KEISHA Newberry     • benzonatate  200 mg Oral TID KEISHA Andrews     • bisacodyl  10 mg Rectal Daily PRN Steve Yan MD     • carvedilol  12 5 mg Oral BID With Meals KEISHA Newberry     • cefepime  1,000 mg Intravenous Q24H Steve MD Farrah 1,000 mg (11/28/22 1841)   • cholecalciferol  2,000 Units Oral Daily KEISHA Newberry     • vitamin B-12  500 mcg Oral Daily KEISHA Newberry     • doxazosin  2 mg Oral HS KEISHA Newberry     • fluconazole  100 mg Oral Daily Leslye Funez MD     • gabapentin  100 mg Oral BID KEISHA Newberry     • heparin (porcine)  5,000 Units Subcutaneous Q8H [address] KEISHA Newberry     • hydrALAZINE  100 mg Oral TID Leslie Haynes MD     • hydrocodone-chlorpheniramine polistirex  5 mL Oral HS Leslye Funez MD     • insulin glargine  10 Units Subcutaneous HS Leslye Funez MD     • insulin lispro  1-5 Units Subcutaneous TID AC KEISHA Newberry     • insulin lispro  1-5 Units Subcutaneous 0200 KEISHA Newberry     • insulin lispro  1-5 Units Subcutaneous HS KEISHA Newberry     • ipratropium  0 5 mg Nebulization TID KEISHA Newberry     • isosorbide mononitrate  60 mg Oral Daily KEISHA Newberry     • labetalol  10 mg Intravenous Q6H PRN Silva Berman PA-C     • levalbuterol  0 63 mg Nebulization Q4H PRN KEISHA Newberry     • levalbuterol  1 25 mg Nebulization TID KEISHA Newberry      And   • sodium chloride  3 mL Nebulization TID KEISHA Newberry     • nystatin   Topical BID KEISHA Newberry     • ondansetron  4 mg Intravenous Q6H PRN KEISHA Newberry     • pantoprazole  40 mg Oral Early Morning KEISHA Newberry     • polyethylene glycol  17 g Oral Daily Leslye Funez MD     • repaglinide  0 5 mg Oral TID AC Leslye Funez MD     • senna  1 tablet Oral HS Leslye Funez MD     • simethicone  80 mg Oral 4x Daily PRN KEISHA Newberry     • tamsulosin  0 4 mg Oral Daily With Dinner Rajendra and KEISHA Peñaloza          Today, Patient Was Seen By: Francia Sarmiento DO    **Please Note: This note may have been constructed using a voice recognition system  **

## 2022-11-29 NOTE — ASSESSMENT & PLAN NOTE
Patient presented with acute respiratory distress shortly after eating a few spoonful of puree diet in STR, patient reported chest pressure and SOB  Patient was satting 85-95% on oxygen 3 L per STR transfer paper  Patient was belching after eating/drinking limited amount of diet/water per staff  · Patient was discharged on the day of admission after being hospitalized for aspiration pneumonia, acute on chronic diastolic CHF, dysphagia  Patient received 7 days of IV Rocephin and Flagyl  Received IV Lasix  Seen by GI, underwent EGD, found to have oropharyngeal dysphagia with esophageal dysmotility, no obvious esophageal stricture  Underwent Barium swallow study which showed moderate to severe esophageal dysmotility with significant residual contrast remaining in the esophagus at the end of the study  Patient was recommended regular diet by speech and discharged on regular diet per STR  · Likely secondary to multifocal pneumonia and also CHF  · Patient required BiPAP on ED arrival   ABG post BiPAP essentially unremarkable  Later patient was titrated down to 6 liters oxygen upon admission  Patient is currently down to 1 liter oxygen  · Chest x-ray - Pulmonary edema, worse when comparing to 11/19/2022  Underlying infection could not be excluded  · ProBNP 15,094  · Patient received Lasix 40 IV in ED  · Received cefepime and Flagyl in ED  Currently on IV cefepime  · Received IV Solu-Medrol in ED  No wheezing on auscultation    · Continue nebs  · CT of the chest showed pulmonary edema with moderate size bilateral pleural effusions with significant bibasilar atelectasis and concomitant multifocal pneumonia   · Obstructive series showed persistent pulmonary edema with multifocal pneumonia

## 2022-11-29 NOTE — ASSESSMENT & PLAN NOTE
Patient with constipation  Continue MiraLax, senna q h s    · Patient was also given Dulcolax suppository, soapsuds enema

## 2022-11-29 NOTE — ASSESSMENT & PLAN NOTE
Troponin [id number]  Elevated troponin in recent admission as well  Reports chest pressure when in respiratory distress  Denies history of CAD  · EKG no ischemic changes, read as AFib, rate 103,RBBB per prior provider  · No history of AFib  Prior EKG shows sinus rhythm with PACs /PVCs  Will repeat EKG  · 2D echo last admission showed  EF low normal, normal wall motion, grade 2 diastolic dysfunction, moderately dilated atriums     · Most likely non MI troponin elevation due to # 1 and CHF

## 2022-11-29 NOTE — PHYSICAL THERAPY NOTE
PT TREATMENT       11/29/22 1143   PT Last Visit   PT Visit Date 11/29/22   Note Type   Note Type Treatment   Pain Assessment   Pain Assessment Tool 0-10   Pain Score No Pain   Multiple Pain Sites No   Restrictions/Precautions   Weight Bearing Precautions Per Order No   Other Precautions Bed Alarm; Chair Alarm; Fall Risk;Pain   General   Chart Reviewed Yes   Family/Caregiver Present No   Cognition   Overall Cognitive Status WFL   Arousal/Participation Cooperative   Attention Within functional limits   Orientation Level Oriented to person;Oriented to place;Oriented to situation   Following Commands Follows multistep commands with increased time or repetition   Subjective   Subjective Pt with no complaints at beginning of session, agreeable to try and get OOB   Bed Mobility   Supine to Sit 3  Moderate assistance   Additional items Assist x 1;Verbal cues   Sit to Supine 2  Maximal assistance   Additional items Assist x 1;Verbal cues   Transfers   Sit to Stand 2  Maximal assistance   Additional items Assist x 2;Verbal cues   Stand to Sit 2  Maximal assistance   Additional items Assist x 2;Verbal cues   Additional Comments Attempted stand pivot transfer x 2 attempts today with increased difficulty  Ambulation/Elevation   Ambulation/Elevation Additional Comments Unable to ambulate due to poor standing balance   Balance   Static Sitting Fair   Dynamic Sitting Fair -   Static Standing Poor   Dynamic Standing Poor   Ambulatory Poor -   Activity Tolerance   Activity Tolerance Patient tolerated treatment well;Patient limited by fatigue   Nurse Made Aware yes   Exercises   Neuro re-ed Pt able to perform seated/supine exercise including ankle pumps, LAQ, quad sets, assisted heel slides, wrist exte, bicep curls x 10 reps bilat   Assessment   Prognosis Good   Problem List Decreased strength;Decreased range of motion;Decreased endurance; Impaired balance;Decreased mobility; Impaired judgement;Decreased safety awareness;Pain Assessment Pt agreeable to PT session this AM  Pt continues to require Mod/Max A to perform bed mobility  Attempted sit <> stand x 2 attempts from edge of bed with Max A x 2 with increased difficulty, feet sliding forward with poor balance despite tactile cues + assist  Pt returned to edge of bed x second attempt and repositioned for comfort  Able to perform exercise as mentioned above with intermittent assist + encouragement to complete sets  The patient's AM-PAC Basic Mobility Inpatient Short Form Raw Score is 10  A Raw score of less than or equal to 16 suggests the patient may benefit from discharge to post-acute rehabilitation services  Please also refer to the recommendation of the Physical Therapist for safe discharge planning  Goals   Patient Goals to get better   Plan   Treatment/Interventions ADL retraining;Functional transfer training;LE strengthening/ROM; Therapeutic exercise; Endurance training;Bed mobility;Gait training;Spoke to nursing   Progress Slow progress, decreased activity tolerance   PT Frequency Other (Comment)  (5x/week)   Recommendation   PT Discharge Recommendation Post acute rehabilitation services   AM-PAC Basic Mobility Inpatient   Turning in Bed Without Bedrails 2   Lying on Back to Sitting on Edge of Flat Bed 2   Moving Bed to Chair 2   Standing Up From Chair 2   Walk in Room 1   Climb 3-5 Stairs 1   Basic Mobility Inpatient Raw Score 10   Turning Head Towards Sound 4   Follow Simple Instructions 3   Low Function Basic Mobility Raw Score 17   Low Function Basic Mobility Standardized Score 27 46   Highest Level Of Mobility   JH-HLM Goal 4: Move to chair/commode   JH-HLM Achieved 3: Sit at edge of bed   Education   Education Provided Mobility training;Home exercise program   Patient Reinforcement needed   End of Consult   Patient Position at End of Consult Supine;Bed/Chair alarm activated; All needs within reach   [facility] Insurance Number  Ally Chester [id number]

## 2022-11-29 NOTE — ASSESSMENT & PLAN NOTE
Patient reports no appetite for 2 days  Loss of appetite could be secondary to antibiotic  · Discussed with speech therapy-patient at high risk for aspiration despite the level of diet  For now patient on pureed diet with thin liquids with medications crushed with puree  · GI recommend to consider PEG tube if recurrent aspirations  Per recent GI note, family did not want PEG tube during prior hospitalization  · Continue pantoprazole daily  · Obstructive series showed barium throughout the colon with modest amount of stool along the rectosigmoid region  · Prior provider spoke with patient's family regarding possible PEG tube placement    Daughter wants to hold off on PEG placement

## 2022-11-29 NOTE — PLAN OF CARE
Problem: RESPIRATORY - ADULT  Goal: Achieves optimal ventilation and oxygenation  Description: INTERVENTIONS:  - Assess for changes in respiratory status  - Assess for changes in mentation and behavior  - Position to facilitate oxygenation and minimize respiratory effort  - Oxygen administered by appropriate delivery if ordered  - Initiate smoking cessation education as indicated  - Encourage broncho-pulmonary hygiene including cough, deep breathe, Incentive Spirometry  - Assess the need for suctioning and aspirate as needed  - Assess and instruct to report SOB or any respiratory difficulty  - Respiratory Therapy support as indicated  Outcome: Progressing     Problem: MOBILITY - ADULT  Goal: Maintains/Returns to pre admission functional level  Description: INTERVENTIONS:  - Perform BMAT or MOVE assessment daily    - Set and communicate daily mobility goal to care team and patient/family/caregiver     - Collaborate with rehabilitation services on mobility goals if consulted  - Out of bed for toileting  - Record patient progress and toleration of activity level   Outcome: Progressing     Problem: Potential for Falls  Goal: Patient will remain free of falls  Description: INTERVENTIONS:  - Educate patient/family on patient safety including physical limitations  - Instruct patient to call for assistance with activity   - Consult OT/PT to assist with strengthening/mobility   - Keep Call bell within reach  - Keep bed low and locked with side rails adjusted as appropriate  - Keep care items and personal belongings within reach  - Initiate and maintain comfort rounds  - Make Fall Risk Sign visible to staff  - Apply yellow socks and bracelet for high fall risk patients  - Consider moving patient to room near nurses station  Outcome: Progressing     Problem: INFECTION - ADULT  Goal: Absence or prevention of progression during hospitalization  Description: INTERVENTIONS:  - Assess and monitor for signs and symptoms of infection  - Monitor lab/diagnostic results  - Monitor all insertion sites, i e  indwelling lines, tubes, and drains  - Monitor endotracheal if appropriate and nasal secretions for changes in amount and color  - [place] appropriate cooling/warming therapies per order  - Administer medications as ordered  - Instruct and encourage patient and family to use good hand hygiene technique  - Identify and instruct in appropriate isolation precautions for identified infection/condition  Outcome: Progressing  Goal: Absence of fever/infection during neutropenic period  Description: INTERVENTIONS:  - Monitor WBC    Outcome: Progressing     Problem: SAFETY ADULT  Goal: Patient will remain free of falls  Description: INTERVENTIONS:  - Educate patient/family on patient safety including physical limitations  - Instruct patient to call for assistance with activity   - Consult OT/PT to assist with strengthening/mobility   - Keep Call bell within reach  - Keep bed low and locked with side rails adjusted as appropriate  - Keep care items and personal belongings within reach  - Initiate and maintain comfort rounds  - Make Fall Risk Sign visible to staff  - Apply yellow socks and bracelet for high fall risk patients  - Consider moving patient to room near nurses station  Outcome: Progressing

## 2022-11-30 LAB
ANION GAP SERPL CALCULATED.3IONS-SCNC: 9 MMOL/L (ref 4–13)
BACTERIA SPT RESP CULT: ABNORMAL
BACTERIA UR QL AUTO: ABNORMAL /HPF
BILIRUB UR QL STRIP: NEGATIVE
BUN SERPL-MCNC: 119 MG/DL (ref 5–25)
CALCIUM SERPL-MCNC: 8.4 MG/DL (ref 8.3–10.1)
CHLORIDE SERPL-SCNC: 101 MMOL/L (ref 96–108)
CLARITY UR: ABNORMAL
CO2 SERPL-SCNC: 32 MMOL/L (ref 21–32)
COLOR UR: ABNORMAL
CREAT SERPL-MCNC: 3.77 MG/DL (ref 0.6–1.3)
ERYTHROCYTE [DISTWIDTH] IN BLOOD BY AUTOMATED COUNT: 15.9 % (ref 11.6–15.1)
GFR SERPL CREATININE-BSD FRML MDRD: 12 ML/MIN/1.73SQ M
GLUCOSE SERPL-MCNC: 114 MG/DL (ref 65–140)
GLUCOSE SERPL-MCNC: 115 MG/DL (ref 65–140)
GLUCOSE SERPL-MCNC: 127 MG/DL (ref 65–140)
GLUCOSE SERPL-MCNC: 157 MG/DL (ref 65–140)
GLUCOSE SERPL-MCNC: 185 MG/DL (ref 65–140)
GLUCOSE SERPL-MCNC: 244 MG/DL (ref 65–140)
GLUCOSE UR STRIP-MCNC: NEGATIVE MG/DL
GRAM STN SPEC: ABNORMAL
HCT VFR BLD AUTO: 26.2 % (ref 36.5–49.3)
HGB BLD-MCNC: 8.2 G/DL (ref 12–17)
HGB UR QL STRIP.AUTO: NEGATIVE
KETONES UR STRIP-MCNC: NEGATIVE MG/DL
LEUKOCYTE ESTERASE UR QL STRIP: ABNORMAL
MCH RBC QN AUTO: 29.4 PG (ref 26.8–34.3)
MCHC RBC AUTO-ENTMCNC: 31.3 G/DL (ref 31.4–37.4)
MCV RBC AUTO: 94 FL (ref 82–98)
NITRITE UR QL STRIP: NEGATIVE
NON-SQ EPI CELLS URNS QL MICRO: ABNORMAL /HPF
PH UR STRIP.AUTO: 5.5 [PH]
PLATELET # BLD AUTO: 219 THOUSANDS/UL (ref 149–390)
PMV BLD AUTO: 12.1 FL (ref 8.9–12.7)
POTASSIUM SERPL-SCNC: 4.8 MMOL/L (ref 3.5–5.3)
PROT UR STRIP-MCNC: ABNORMAL MG/DL
RBC # BLD AUTO: 2.79 MILLION/UL (ref 3.88–5.62)
RBC #/AREA URNS AUTO: ABNORMAL /HPF
SODIUM SERPL-SCNC: 142 MMOL/L (ref 135–147)
SP GR UR STRIP.AUTO: 1.02 (ref 1–1.03)
UROBILINOGEN UR QL STRIP.AUTO: 0.2 E.U./DL
WBC # BLD AUTO: 14.73 THOUSAND/UL (ref 4.31–10.16)
WBC #/AREA URNS AUTO: ABNORMAL /HPF

## 2022-11-30 RX ORDER — SODIUM CHLORIDE, SODIUM GLUCONATE, SODIUM ACETATE, POTASSIUM CHLORIDE, MAGNESIUM CHLORIDE, SODIUM PHOSPHATE, DIBASIC, AND POTASSIUM PHOSPHATE .53; .5; .37; .037; .03; .012; .00082 G/100ML; G/100ML; G/100ML; G/100ML; G/100ML; G/100ML; G/100ML
50 INJECTION, SOLUTION INTRAVENOUS CONTINUOUS
Status: DISPENSED | OUTPATIENT
Start: 2022-11-30 | End: 2022-11-30

## 2022-11-30 RX ADMIN — HEPARIN SODIUM 5000 UNITS: 5000 INJECTION INTRAVENOUS; SUBCUTANEOUS at 06:01

## 2022-11-30 RX ADMIN — HYDRALAZINE HYDROCHLORIDE 100 MG: 25 TABLET ORAL at 17:29

## 2022-11-30 RX ADMIN — REPAGLINIDE 0.5 MG: 1 TABLET ORAL at 17:31

## 2022-11-30 RX ADMIN — Medication 5 ML: at 21:31

## 2022-11-30 RX ADMIN — Medication 2000 UNITS: at 09:00

## 2022-11-30 RX ADMIN — REPAGLINIDE 0.5 MG: 1 TABLET ORAL at 06:01

## 2022-11-30 RX ADMIN — INSULIN LISPRO 1 UNITS: 100 INJECTION, SOLUTION INTRAVENOUS; SUBCUTANEOUS at 17:26

## 2022-11-30 RX ADMIN — IPRATROPIUM BROMIDE 0.5 MG: 0.5 SOLUTION RESPIRATORY (INHALATION) at 19:52

## 2022-11-30 RX ADMIN — ASPIRIN 81 MG CHEWABLE TABLET 81 MG: 81 TABLET CHEWABLE at 09:02

## 2022-11-30 RX ADMIN — ATORVASTATIN CALCIUM 40 MG: 40 TABLET, FILM COATED ORAL at 09:02

## 2022-11-30 RX ADMIN — HEPARIN SODIUM 5000 UNITS: 5000 INJECTION INTRAVENOUS; SUBCUTANEOUS at 21:31

## 2022-11-30 RX ADMIN — SODIUM CHLORIDE, SODIUM GLUCONATE, SODIUM ACETATE, POTASSIUM CHLORIDE, MAGNESIUM CHLORIDE, SODIUM PHOSPHATE, DIBASIC, AND POTASSIUM PHOSPHATE 50 ML/HR: .53; .5; .37; .037; .03; .012; .00082 INJECTION, SOLUTION INTRAVENOUS at 10:28

## 2022-11-30 RX ADMIN — LEVALBUTEROL HYDROCHLORIDE 1.25 MG: 1.25 SOLUTION, CONCENTRATE RESPIRATORY (INHALATION) at 19:52

## 2022-11-30 RX ADMIN — BENZONATATE 200 MG: 100 CAPSULE ORAL at 21:31

## 2022-11-30 RX ADMIN — ISODIUM CHLORIDE 3 ML: 0.03 SOLUTION RESPIRATORY (INHALATION) at 13:26

## 2022-11-30 RX ADMIN — INSULIN LISPRO 1 UNITS: 100 INJECTION, SOLUTION INTRAVENOUS; SUBCUTANEOUS at 11:51

## 2022-11-30 RX ADMIN — INSULIN LISPRO 2 UNITS: 100 INJECTION, SOLUTION INTRAVENOUS; SUBCUTANEOUS at 21:32

## 2022-11-30 RX ADMIN — BENZONATATE 200 MG: 100 CAPSULE ORAL at 17:25

## 2022-11-30 RX ADMIN — IPRATROPIUM BROMIDE 0.5 MG: 0.5 SOLUTION RESPIRATORY (INHALATION) at 13:26

## 2022-11-30 RX ADMIN — SENNOSIDES 8.6 MG: 8.6 TABLET, FILM COATED ORAL at 21:31

## 2022-11-30 RX ADMIN — POLYETHYLENE GLYCOL 3350 17 G: 17 POWDER, FOR SOLUTION ORAL at 08:58

## 2022-11-30 RX ADMIN — GABAPENTIN 100 MG: 100 CAPSULE ORAL at 17:25

## 2022-11-30 RX ADMIN — CYANOCOBALAMIN TAB 500 MCG 500 MCG: 500 TAB at 09:00

## 2022-11-30 RX ADMIN — INSULIN GLARGINE 10 UNITS: 100 INJECTION, SOLUTION SUBCUTANEOUS at 21:31

## 2022-11-30 RX ADMIN — PANTOPRAZOLE SODIUM 40 MG: 40 TABLET, DELAYED RELEASE ORAL at 06:01

## 2022-11-30 RX ADMIN — GABAPENTIN 100 MG: 100 CAPSULE ORAL at 09:00

## 2022-11-30 RX ADMIN — LEVALBUTEROL HYDROCHLORIDE 1.25 MG: 1.25 SOLUTION, CONCENTRATE RESPIRATORY (INHALATION) at 07:13

## 2022-11-30 RX ADMIN — HYDRALAZINE HYDROCHLORIDE 100 MG: 25 TABLET ORAL at 08:59

## 2022-11-30 RX ADMIN — FLUCONAZOLE 100 MG: 100 TABLET ORAL at 09:02

## 2022-11-30 RX ADMIN — LEVALBUTEROL HYDROCHLORIDE 1.25 MG: 1.25 SOLUTION, CONCENTRATE RESPIRATORY (INHALATION) at 13:26

## 2022-11-30 RX ADMIN — CARVEDILOL 12.5 MG: 12.5 TABLET, FILM COATED ORAL at 17:29

## 2022-11-30 RX ADMIN — TAMSULOSIN HYDROCHLORIDE 0.4 MG: 0.4 CAPSULE ORAL at 17:25

## 2022-11-30 RX ADMIN — NYSTATIN 1 APPLICATION: 100000 POWDER TOPICAL at 09:03

## 2022-11-30 RX ADMIN — ISODIUM CHLORIDE 3 ML: 0.03 SOLUTION RESPIRATORY (INHALATION) at 07:14

## 2022-11-30 RX ADMIN — NYSTATIN 1 APPLICATION: 100000 POWDER TOPICAL at 17:26

## 2022-11-30 RX ADMIN — HYDRALAZINE HYDROCHLORIDE 100 MG: 25 TABLET ORAL at 21:31

## 2022-11-30 RX ADMIN — HEPARIN SODIUM 5000 UNITS: 5000 INJECTION INTRAVENOUS; SUBCUTANEOUS at 13:27

## 2022-11-30 RX ADMIN — ISODIUM CHLORIDE 3 ML: 0.03 SOLUTION RESPIRATORY (INHALATION) at 19:52

## 2022-11-30 RX ADMIN — CARVEDILOL 12.5 MG: 12.5 TABLET, FILM COATED ORAL at 09:02

## 2022-11-30 RX ADMIN — AMLODIPINE BESYLATE 10 MG: 10 TABLET ORAL at 09:02

## 2022-11-30 RX ADMIN — DOXAZOSIN 2 MG: 2 TABLET ORAL at 21:31

## 2022-11-30 RX ADMIN — REPAGLINIDE 0.5 MG: 1 TABLET ORAL at 11:51

## 2022-11-30 RX ADMIN — IPRATROPIUM BROMIDE 0.5 MG: 0.5 SOLUTION RESPIRATORY (INHALATION) at 07:14

## 2022-11-30 RX ADMIN — ISOSORBIDE MONONITRATE 60 MG: 60 TABLET, EXTENDED RELEASE ORAL at 09:02

## 2022-11-30 RX ADMIN — BENZONATATE 200 MG: 100 CAPSULE ORAL at 09:02

## 2022-11-30 NOTE — PROGRESS NOTES
Progress Note - Pulmonary   Tamia Baker 80 y o  male MRN: [id number]  Unit/Bed#: 207 [address] Encounter: [id number]    Assessment and Plan:    1  Acute respiratory failure with hypoxia:  Currently saturating 93% on 1 L of nasal cannula oxygen  Titrate off FiO2 as tolerated     2  Acute on chronic diastolic heart failure:  His previous echocardiogram showed normal systolic function but had moderate diastolic dysfunction  He has CKD and her creatinine is elevated 3 77  His diuretic therapy is on hold      3  Bilateral pleural effusions:  His imaging showed bilateral small pleural effusions  This is secondary to congestive heart failure with CKD      4  Pneumonia:  He has bilateral pneumonia with consolidation  She has been on treatment with cefepime  he has dysphagia and this pneumonia could be from recurrent aspiration  The blood cultures have been negative so far  We can stop the antibiotic and watch him for now     4  Dysphagia with recurrent aspiration:  he has history of recurrent aspiration and was advised PEG tube placement which the family refused      I spoke to the patient  Updated      Discussed with Golda Fothergill the patient's hospitalist      Thank you for allowing me to participate in the care of the patient  Chief Complaint:   Shortness of breath; pneumonia    Subjective:   He states that his shortness of breath is better  He states that he can not swallow better  Denied any cough  No fever or chills    Objective:     Vitals: Blood pressure 155/52, pulse 55, temperature 98 6 °F (37 °C), resp  rate 18, height 5' 5" (1 651 m), weight 85 kg (187 lb 6 3 oz), SpO2 95 %  ,Body mass index is 31 18 kg/m²        Intake/Output Summary (Last 24 hours) at 11/30/2022 1605  Last data filed at 11/30/2022 0437  Gross per 24 hour   Intake --   Output 350 ml   Net -350 ml       Invasive Devices     Peripheral Intravenous Line  Duration           Peripheral IV 11/26/22 Distal;Left;Upper;Ventral (anterior) Arm 4 days                Physical Exam:  On clinical examination, he is comfortable lying in bed  Saturating 93% on 1 L of nasal cannula oxygen  HEENT:  No conjunctival pallor no cyanosis  Neck:  No jugular venous distention  Heart S1-S2 heard  Chest air entry present bilaterally no significant crackles or rhonchi  Abdomen soft and nontender bowel sounds audible  Neuro awake alert  Extremities no edema      Labs: I have personally reviewed pertinent lab results  White cell count 14 73 hemoglobin 8 2  Creatinine 3 77 potassium 4 8  Imaging and other studies: I have personally reviewed pertinent reports

## 2022-11-30 NOTE — SPEECH THERAPY NOTE
Speech/Language Pathology Progress Note    Patient Name: Christel Dominguez  [id number]'[id number] Date: 11/30/2022     Problem List  Principal Problem:    Acute respiratory failure with hypoxia [facility])  Active Problems:    Primary hypertension    Aspiration pneumonia (HCC)    Stage 3 chronic kidney disease (HCC)    Type 2 diabetes mellitus (HCC)    Esophageal dysphagia    Thrush    Elevated troponin    Hyperlipidemia    Acute on chronic diastolic congestive heart failure (HCC)    BPH (benign prostatic hyperplasia)    Left upper quadrant abdominal mass    Constipation      Subjective:  -  Objective:  F/U re: dysphagia continued  Records reviewed and spoke c pt/staff  Pt fed himself v slowly and evidenced no immediate s/s pharyngeal dysphagia and no overt s/s regurgitation  Per note, dtr not interested in EPG tube at this time  Plan/Recommendations:  Continue current conservative diet and precautions to promote esophageal clearance  No new recommendations at this time       Skinny Biggs [address] [id number]

## 2022-11-30 NOTE — PROGRESS NOTES
L[address] 45  Progress Note Joan Vu 10/19/1929, 80 y o  male MRN: [id number]  Unit/Bed#: [facility] Encounter: [id number]  Primary Care Provider: Moo Hassan PA-C   Date and time admitted to hospital: 11/22/2022  8:41 PM    * Acute respiratory failure with hypoxia [facility])  Assessment & Plan  Patient presented with acute respiratory distress shortly after eating a few spoonful of puree diet in STR, patient reported chest pressure and SOB  Patient was satting 85-95% on oxygen 3 L per STR transfer paper  Patient was belching after eating/drinking limited amount of diet/water per staff  · Patient was discharged on the day of admission after being hospitalized for aspiration pneumonia, acute on chronic diastolic CHF, dysphagia  Patient received 7 days of IV Rocephin and Flagyl  Received IV Lasix  Seen by GI, underwent EGD, found to have oropharyngeal dysphagia with esophageal dysmotility, no obvious esophageal stricture  Underwent Barium swallow study which showed moderate to severe esophageal dysmotility with significant residual contrast remaining in the esophagus at the end of the study  Patient was recommended regular diet by speech and discharged on regular diet per STR  · Likely secondary to multifocal pneumonia and also CHF  · Patient required BiPAP on ED arrival   ABG post BiPAP essentially unremarkable  Later patient was titrated down to 6 liters oxygen upon admission  Currently on 1 liter oxygen  · Chest x-ray - Pulmonary edema, worse when comparing to 11/19/2022  Underlying infection could not be excluded  · ProBNP 15,094  · Patient received Lasix 40 IV in ED  · Received cefepime and Flagyl in ED  completed 7 days of IV cefepime  · Received IV Solu-Medrol in ED  No wheezing on auscultation    · Continue nebs  · CT of the chest showed pulmonary edema with moderate size bilateral pleural effusions with significant bibasilar atelectasis and concomitant multifocal pneumonia   · Obstructive series showed persistent pulmonary edema with multifocal pneumonia    Aspiration pneumonia (HCC)  Assessment & Plan  Repeat procalcitonin level was 0 54 initially, continues to remain mildly elevated  · Patient received cefepime and Flagyl in the ED   Completed 7 day course of cefepime 1 gram IV daily  · Sputum culture with mixed respiratory hardik  · Urine for Legionella and pneumococcal antigens were negative  · Pulmonary input appreciated  · Blood cultures negative  · Patient was having low-grade fevers with worsening white count  Fevers improved  · Chest x-ray with multifocal pneumonia which is persistent    Acute on chronic diastolic congestive heart failure (HCC)  Assessment & Plan  Wt Readings from Last 3 Encounters:   11/30/22 85 kg (187 lb 6 3 oz)   11/22/22 84 5 kg (186 lb 4 8 oz)     Patient received IV Lasix in recent admission  · Chest x-ray upon admission shows worsening pulmonary edema  · Patient received 40 milligrams of Lasix IV in the ED and was continued on  IV Lasix  Then transitioned to [facility] which is on hold  · Left upper extremity venous Doppler showed no evidence of DVT  · CT chest showed pulmonary edema with moderate size bilateral pleural effusions left more than right  · Might need accept higher creatinine to maintain euvolemia  Esophageal dysphagia  Assessment & Plan  Patient reported no appetite for 2 days  Loss of appetite could be secondary to antibiotic  · Per speech therapy, patient at high risk for aspiration despite the level of diet  Currently on pureed diet with thin liquids with medications crushed with puree  · GI recommended to consider PEG tube if recurrent aspirations  · Continue pantoprazole daily  · Obstructive series showed barium throughout the colon with modest amount of stool along the rectosigmoid region  · Discussed with patient's daughter, wants to hold off on PEG placement    Discussed with her that patient is at high risk for recurrent aspiration pneumonia      Left upper quadrant abdominal mass  Assessment & Plan  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  · Holding off on CT abdomen/pelvis with contrast due to elevated creatinine at this time      Constipation  Assessment & Plan  Patient with constipation  Continue MiraLax, senna q h s  · Patient was also given Dulcolax suppository, soapsuds enema previously      BPH (benign prostatic hyperplasia)  Assessment & Plan  Continue Cardura and Flomax  · PVR acceptable    Hyperlipidemia  Assessment & Plan  Continue statin    Elevated troponin  Assessment & Plan  Troponin [phone]  Elevated troponin in recent admission as well  Reports chest pressure when in respiratory distress  Denies history of CAD  · EKG no ischemic changes, read as AFib, rate 103, RBBB per prior provider  · No history of AFib  Prior EKG shows sinus rhythm with PACs /PVCs  Will repeat EKG  · 2D echo prior admission showed EF low normal, normal wall motion, grade 2 diastolic dysfunction, moderately dilated atriums  · Most likely non MI troponin elevation due to # 1 and CHF      Thrush  Assessment & Plan  Patient completed 7 days of nystatin suspension  · Continues to have white plaques  · Patient was given Diflucan 200 milligrams 1 dose and being continued on Diflucan 100 milligram p o  Daily    Type 2 diabetes mellitus [facility])  Assessment & Plan  Lab Results   Component Value Date    HGBA1C 5 7 (H) 11/16/2022       Recent Labs     11/30/22  0712 11/30/22  1044 11/30/22  1614 11/30/22 2053   POCGLU 114 185* 157* 244*     Patient was discharged on Prandin t i d  With meals  · Continue Humalog sliding scale with Accu-Cheks q a c  And HS  · Currently on diabetic pureed Diet with thin liquids  Continue Lantus 10 units q h s   As blood sugars were high  · Continue Prandin 0 5 milligram p o  B i d     Stage 3 chronic kidney disease ([facility] [address] )  Assessment & Plan  Lab Results   Component Value Date    EGFR 12 2022    EGFR 14 2022    EGFR 18 2022    CREATININE 3 77 (H) 2022    CREATININE 3 43 (H) 2022    CREATININE 2 79 (H) 2022     History of CKD 4, baseline creatinine appears to be around 1 5-1 9 in past 2 years in care everywhere  Creatinine during previous hospitalization ranged in 2 4-2 8  · Possible CKD progression  · Urinalysis was bland  · Received IV Lasix 40 mg in ED  · Creatinine continues to trend up  IV fluids for 10 hours per Nephrology  · Renal ultrasound showed no hydronephrosis moderate left renal atrophy  · Nephrology input appreciated    Primary hypertension  Assessment & Plan  On Norvasc, hydralazine, Coreg, Imdur, Cardura which is being continued  · Blood pressures overall acceptable      VTE Pharmacologic Prophylaxis:   heparin    Patient Centered Rounds: I performed bedside rounds with nursing staff today  Discussions with Specialists or Other Care Team Provider: yes - renal    Education and Discussions with Family / Patient: Updated  (daughter) via phone  Time Spent for Care: 35 min  More than 50% of total time spent on counseling and coordination of care as described above  Current Length of Stay: 8 day(s)  Current Patient Status: Inpatient   Certification Statement: The patient will continue to require additional inpatient hospital stay due to Chronic kidney disease with worsening creatinine requiring IV fluids and monitoring of lab work  Discharge Plan: Anticipate discharge in 48-72 hrs to rehab facility  Code Status: Level 1 - Full Code    Subjective:     Patient denies any shortness of breath, abdominal pain, chest pain    Reports feeling tired    Objective:     Vitals:   Temp (24hrs), Av 5 °F (36 9 °C), Min:98 °F (36 7 °C), Max:99 7 °F (37 6 °C)    Temp:  [98 °F (36 7 °C)-99 7 °F (37 6 °C)] 99 7 °F (37 6 °C)  HR:  [43-64] 55  Resp:  [16-18] 18  BP: (138-162)/(52-61) 142/52  SpO2:  [90 %-100 %] 90 %  Body mass index is 31 18 kg/m²  Input and Output Summary (last 24 hours): Intake/Output Summary (Last 24 hours) at 11/30/2022 0956  Last data filed at 11/30/2022 0437  Gross per 24 hour   Intake --   Output 560 ml   Net -560 ml       Physical Exam:   Physical Exam  Vitals reviewed  Constitutional:       General: He is not in acute distress  Appearance: He is ill-appearing (Chronically)  HENT:      Head: Normocephalic and atraumatic  Eyes:      General:         Right eye: No discharge  Left eye: No discharge  Cardiovascular:      Rate and Rhythm: Normal rate and regular rhythm  Pulmonary:      Effort: Pulmonary effort is normal  No respiratory distress  Breath sounds: No wheezing or rales  Comments: Decreased breath sounds bilaterally  Abdominal:      General: Bowel sounds are normal  There is no distension  Palpations: Abdomen is soft  Tenderness: There is no abdominal tenderness  Neurological:      Mental Status: He is alert           Additional Data:     Labs:  Results from last 7 days   Lab Units 11/30/22  0634 11/29/22  0544   WBC Thousand/uL 14 73* 14 54*   HEMOGLOBIN g/dL 8 2* 8 4*   HEMATOCRIT % 26 2* 26 3*   PLATELETS Thousands/uL 219 211   NEUTROS PCT %  --  76*   LYMPHS PCT %  --  11*   MONOS PCT %  --  9   EOS PCT %  --  3     Results from last 7 days   Lab Units 11/30/22  0634   SODIUM mmol/L 142   POTASSIUM mmol/L 4 8   CHLORIDE mmol/L 101   CO2 mmol/L 32   BUN mg/dL 119*   CREATININE mg/dL 3 77*   ANION GAP mmol/L 9   CALCIUM mg/dL 8 4   GLUCOSE RANDOM mg/dL 115         Results from last 7 days   Lab Units 11/30/22  0712 11/30/22  0155 11/29/22  2104 11/29/22  1538 11/29/22  1107 11/29/22  0732 11/29/22  0202 11/28/22  2051 11/28/22  1618 11/28/22  1046 11/28/22  0722 11/28/22  0241   POC GLUCOSE mg/dl 114 127 199* 228* 206* 78 107 230* 152* 229* 114 114         Results from last 7 days   Lab Units 11/29/22  0544 11/27/22  0523 11/25/22  0450   PROCALCITONIN ng/ml 0 63* 0 36* 0 44*       Lines/Drains:  Invasive Devices     Peripheral Intravenous Line  Duration           Peripheral IV 11/26/22 Distal;Left;Upper;Ventral (anterior) Arm 4 days                      Imaging: No pertinent imaging reviewed      Recent Cultures (last 7 days):   Results from last 7 days   Lab Units 11/28/22  1502   SPUTUM CULTURE  2+ Growth of   GRAM STAIN RESULT  Rare Epithelial cells per low power field*  1+ Polys*  1+ Gram negative rods*  Rare Gram positive rods*  Rare Gram positive cocci in pairs*       Last 24 Hours Medication List:   Current Facility-Administered Medications   Medication Dose Route Frequency Provider Last Rate   • acetaminophen  650 mg Oral Q6H PRN KEISHA Newberry     • amLODIPine  10 mg Oral Daily KEISHA Newberry     • aspirin  81 mg Oral Daily KEISHA Newberry     • atorvastatin  40 mg Oral Daily KEISHA Newberry     • benzonatate  200 mg Oral TID KEISHA Escoto     • bisacodyl  10 mg Rectal Daily PRN Stella Aguero MD     • carvedilol  12 5 mg Oral BID With Meals KEISHA Newberry     • cefepime  1,000 mg Intravenous Q24H Steve Yan MD 1,000 mg (11/29/22 1812)   • cholecalciferol  2,000 Units Oral Daily KEISHA Newberry     • vitamin B-12  500 mcg Oral Daily KEISHA Newberry     • doxazosin  2 mg Oral HS KEISHA Newberry     • fluconazole  100 mg Oral Daily Stella Aguero MD     • gabapentin  100 mg Oral BID KEISHA Newberry     • heparin (porcine)  5,000 Units Subcutaneous Q8H [address] KEISHA Newberry     • hydrALAZINE  100 mg Oral TID Denisa Moraes MD     • hydrocodone-chlorpheniramine polistirex  5 mL Oral HS Stella Aguero MD     • insulin glargine  10 Units Subcutaneous HS Stella Aguero MD     • insulin lispro  1-5 Units Subcutaneous TID AC KEISHA Newberry     • insulin lispro  1-5 Units Subcutaneous 0200 KEISHA Newberry     • insulin lispro  1-5 Units Subcutaneous HS KEISHA Newberry     • ipratropium  0 5 mg Nebulization TID KEISHA Newberry     • isosorbide mononitrate  60 mg Oral Daily KEISHA Newberry     • labetalol  10 mg Intravenous Q6H PRN Tierra Cook PA-C     • levalbuterol  0 63 mg Nebulization Q4H PRN KEISHA Newberry     • levalbuterol  1 25 mg Nebulization TID KEISHA Newberry      And   • sodium chloride  3 mL Nebulization TID KEISHA Newberry     • multi-electrolyte  50 mL/hr Intravenous Continuous Heidy Steiner MD     • nystatin   Topical BID KEISHA Newberry     • ondansetron  4 mg Intravenous Q6H PRN KEISHA Newberry     • pantoprazole  40 mg Oral Early Morning KEISHA Newberry     • polyethylene glycol  17 g Oral Daily Mary Lou Alves MD     • repaglinide  0 5 mg Oral TID AC Mary Lou Alves MD     • senna  1 tablet Oral HS Mary Lou Alves MD     • simethicone  80 mg Oral 4x Daily PRN KEISHA Newberry     • tamsulosin  0 4 mg Oral Daily With Dinner Rajendra and KEISHA Peñaloza          Today, Patient Was Seen By: Dayday Davila DO    **Please Note: This note may have been constructed using a voice recognition system  **

## 2022-11-30 NOTE — PLAN OF CARE
Problem: RESPIRATORY - ADULT  Goal: Achieves optimal ventilation and oxygenation  Description: INTERVENTIONS:  - Assess for changes in respiratory status  - Assess for changes in mentation and behavior  - Position to facilitate oxygenation and minimize respiratory effort  - Oxygen administered by appropriate delivery if ordered  - Initiate smoking cessation education as indicated  - Encourage broncho-pulmonary hygiene including cough, deep breathe, Incentive Spirometry  - Assess the need for suctioning and aspirate as needed  - Assess and instruct to report SOB or any respiratory difficulty  - Respiratory Therapy support as indicated  Outcome: Progressing     Problem: MOBILITY - ADULT  Goal: Maintains/Returns to pre admission functional level  Description: INTERVENTIONS:  - Perform BMAT or MOVE assessment daily    - Set and communicate daily mobility goal to care team and patient/family/caregiver     - Collaborate with rehabilitation services on mobility goals if consulted  - Out of bed for toileting  - Record patient progress and toleration of activity level   Outcome: Progressing     Problem: Potential for Falls  Goal: Patient will remain free of falls  Description: INTERVENTIONS:  -  Assess patient's ability to carry out ADLs; assess patient's baseline for ADL function and identify physical deficits which impact ability to perform ADLs (bathing, care of mouth/teeth, toileting, grooming, dressing, etc )  - Assess/evaluate cause of self-care deficits   - Assess range of motion  - Assess patient's mobility; develop plan if impaired  - Assess patient's need for assistive devices and provide as appropriate  - Encourage maximum independence but intervene and supervise when necessary  - Involve family in performance of ADLs  - Assess for home care needs following discharge   - Consider OT consult to assist with ADL evaluation and planning for discharge  - Provide patient education as appropriate  Outcome: Progressing

## 2022-11-30 NOTE — PLAN OF CARE
Problem: RESPIRATORY - ADULT  Goal: Achieves optimal ventilation and oxygenation  Description: INTERVENTIONS:  - Assess for changes in respiratory status  - Assess for changes in mentation and behavior  - Position to facilitate oxygenation and minimize respiratory effort  - Oxygen administered by appropriate delivery if ordered  - Initiate smoking cessation education as indicated  - Encourage broncho-pulmonary hygiene including cough, deep breathe, Incentive Spirometry  - Assess the need for suctioning and aspirate as needed  - Assess and instruct to report SOB or any respiratory difficulty  - Respiratory Therapy support as indicated  Outcome: Progressing     Problem: Prexisting or High Potential for Compromised Skin Integrity  Goal: Skin integrity is maintained or improved  Description: INTERVENTIONS:  - Identify patients at risk for skin breakdown  - Assess and monitor skin integrity  - Assess and monitor nutrition and hydration status  - Monitor labs   - Assess for incontinence   - Turn and reposition patient  - Assist with mobility/ambulation  - Relieve pressure over bony prominences  - Avoid friction and shearing  - Provide appropriate hygiene as needed including keeping skin clean and dry  - Evaluate need for skin moisturizer/barrier cream  - Collaborate with interdisciplinary team   - Patient/family teaching  - Consider wound care consult   Outcome: Progressing     Problem: MOBILITY - ADULT  Goal: Maintain or return to baseline ADL function  Description: INTERVENTIONS:  -  Assess patient's ability to carry out ADLs; assess patient's baseline for ADL function and identify physical deficits which impact ability to perform ADLs (bathing, care of mouth/teeth, toileting, grooming, dressing, etc )  - Assess/evaluate cause of self-care deficits   - Assess range of motion  - Assess patient's mobility; develop plan if impaired  - Assess patient's need for assistive devices and provide as appropriate  - Encourage maximum independence but intervene and supervise when necessary  - Involve family in performance of ADLs  - Assess for home care needs following discharge   - Consider OT consult to assist with ADL evaluation and planning for discharge  - Provide patient education as appropriate  Outcome: Progressing

## 2022-11-30 NOTE — DISCHARGE INSTR - DIET
Pureed food, thin liquid  To promote clearance of esophagus:  SLOW RATE of intake  Small frequent feedings  Remain upright after meals (30-45 mins)

## 2022-11-30 NOTE — PROGRESS NOTES
NEPHROLOGY PROGRESS NOTE   Brisa Spangler 80 y o  male MRN: [id number]  Unit/Bed#: [address] [place] [address] Encounter: [id number]    ASSESSMENT & PLAN:  44-year-old male with history of diabetes mellitus, hypertension, chronic kidney disease, CHF, esophageal dysmotility presented to [facility] after presenting with shortness of breath  Nephrology consulted for CKD  Recent hospital admission between November 15 to November 22 for respiratory failure due to pneumonia and fluid overload  Was discharged on oral Lasix 20 mg daily after treatment with IV Lasix  Discharge creatinine was around 2 68  Now again presenting with shortness of breath and nephrology consulted for worsening creatinine which was on admission 2 83  Elevated creatinine on chronic kidney disease stage 3  -baseline creatinine from 2021 was around 1 6-2 0  -admission creatinine 2 83 with use of oral Lasix since last hospital admission   -possibly CKD progression from cardiorenal syndrome and age-related nephron loss  -UA was bland, renal ultrasound without hydronephrosis but finding of left renal atrophy   -in the setting of diuresis, renal function has worsened during the hospital stay and creatinine now 3 7 mg/dL  Patient was getting treatment with oral torsemide 40 mg daily which was stopped on 11/29 in the setting of creatinine worsening  Bladder scan was positive for 282 mL, placed on urine retention protocol  Also ordered for UA urine microscopy and urine eosinophil for possibility of AIN in the setting of patient being on antibiotics  Patient does not have any peripheral eosinophilia  -also with renal function worsening likely due to intravascular volume depletion, ordered for IV Isolyte at 50 mL/hour for next 10 hours    Weight has trended up today to 187 lb from 179 lb yesterday, would question accuracy of bed scale as patient does not appear clinically volume overloaded  -once renal function improves would consider lower dose of torsemide 20 mg  At home was on Lasix 20 mg daily after recent hospital admission    Primary hypertension  -blood pressure stable and is at goal  -dose of hydralazine was increased to 100 mg t i d  On 11/27, continue at current dose  Continue amlodipine 10 mg daily, carvedilol 12 5 mg twice daily, doxazosin 2 mg q h s , Imdur 60 mg once daily and torsemide 40 mg daily   -if blood pressure please elevated may consider increasing the dose of carvedilol  Fluid overload/acute on chronic diastolic CHF  -CT was suggestive of pulmonary edema with moderate size pleural effusion left more than right  -volume status has improved status post IV Lasix and then was transitioned to oral torsemide 40 mg daily but with worsening of renal function on 11/29, torsemide was held  Continue to hold torsemide for now till renal function improves  May need to consider lower dose 20 mg daily once renal function improves back to creatinine 2 7-2 8  Aspiration pneumonia, on antibiotic treatment with cefepime    Hypokalemia  -resolved status post replacement    Shortness of breath likely combination of fluid overload as well as aspiration pneumonia-improving status post diuresis    BPH-on doxazosin on Flomax    Esophageal dysphagia:  Noted discussion for PEG tube placement  Anemia, low at 8 2 g dL, continue to monitor    Left upper quadrant mass:  Renal ultrasound suggestive of solid hypoechoic mass in the left upper quadrant medial to the spleen as seen on previous CT scan   -recommend workup and management per primary team   CT scan on hold due to barium in the GI tract, also discussed with primary team about risk of contrast nephropathy  if plan for CT with IV contrast ,  may need to discuss with patient and recommend pre and post contrast hydration      Discussed with primary team      SUBJECTIVE:  Patient denies any nausea vomiting or shortness of breath    OBJECTIVE:  Current Weight: Weight - Scale: 85 kg (187 lb 6 3 oz)  Vitals:    11/30/22 0859   BP: 142/52   Pulse: 55   Resp:    Temp:    SpO2: 90%       Intake/Output Summary (Last 24 hours) at 11/30/2022 0940  Last data filed at 11/30/2022 0437  Gross per 24 hour   Intake --   Output 560 ml   Net -560 ml       Physical Exam  General:  Ill looking, awake  Eyes: Conjunctivae pink,  Sclera anicteric  ENT: lips and mucous membranes moist  Neck: supple   Chest: Clear to Auscultation both lungs,  no crackles, ronchus or wheezing  CVS: S1 & S2 present, normal rate, regular rhythm, no murmur    Abdomen: soft, non-tender, non-distended, Bowel sounds normoactive  Extremities: no edema of  legs  Skin: no rash  Neuro: awake, alert, oriented  Psych: Mood and affect appropriate     Medications:    Current Facility-Administered Medications:   •  acetaminophen (TYLENOL) tablet 650 mg, 650 mg, Oral, Q6H PRN, APOLONIA NewberryNP, 650 mg at 11/28/22 1736  •  amLODIPine (NORVASC) tablet 10 mg, 10 mg, Oral, Daily, Cuidoc Link, CRNP, 10 mg at 11/30/22 0902  •  aspirin chewable tablet 81 mg, 81 mg, Oral, Daily, Migdaliaidoc Link, CRNP, 81 mg at 11/30/22 5509  •  atorvastatin (LIPITOR) tablet 40 mg, 40 mg, Oral, Daily, Cuiyin Nadiarik, CRNP, 40 mg at 11/30/22 0902  •  benzonatate (TESSALON PERLES) capsule 200 mg, 200 mg, Oral, TID, APOLONIA SanfordNP, 200 mg at 11/30/22 3438  •  bisacodyl (DULCOLAX) rectal suppository 10 mg, 10 mg, Rectal, Daily PRN, Alana Zaidi MD, 10 mg at 11/28/22 1038  •  carvedilol (COREG) tablet 12 5 mg, 12 5 mg, Oral, BID With Meals, CuiAPOLONIA PateNP, 12 5 mg at 11/30/22 0902  •  cefepime (MAXIPIME) IVPB (premix in dextrose) 1,000 mg 50 mL, 1,000 mg, Intravenous, Q24H, Alana Zaidi MD, Last Rate: 100 mL/hr at 11/29/22 1812, 1,000 mg at 11/29/22 1812  •  cholecalciferol (VITAMIN D3) tablet 2,000 Units, 2,000 Units, Oral, Daily, KEISHA Newberry, 2,000 Units at 11/30/22 0900  •  cyanocobalamin (VITAMIN B-12) tablet 500 mcg, 500 mcg, Oral, Daily, Servando Combs, KEISHA, 500 mcg at 11/30/22 0900  •  doxazosin (CARDURA) tablet 2 mg, 2 mg, Oral, HS, KEISHA Newberry, 2 mg at 11/29/22 2155  •  fluconazole (DIFLUCAN) tablet 100 mg, 100 mg, Oral, Daily, Steve Yan MD, 100 mg at 11/30/22 0902  •  gabapentin (NEURONTIN) capsule 100 mg, 100 mg, Oral, BID, KEISHA Newberry, 100 mg at 11/30/22 0900  •  heparin (porcine) subcutaneous injection 5,000 Units, 5,000 Units, Subcutaneous, Q8H [address], 5,000 Units at 11/30/22 0601 **AND** [CANCELED] Platelet count, , , Once, KEISHA Newberry  •  hydrALAZINE (APRESOLINE) tablet 100 mg, 100 mg, Oral, TID, Tiny Teixeira MD, 100 mg at 11/30/22 0859  •  hydrocodone-chlorpheniramine polistirex (TUSSIONEX) ER suspension 5 mL, 5 mL, Oral, HS, Janine Fry MD, 5 mL at 11/29/22 2156  •  insulin glargine (LANTUS) subcutaneous injection 10 Units 0 1 mL, 10 Units, Subcutaneous, HS, Julio Yan MD, 10 Units at 11/29/22 2155  •  insulin lispro (HumaLOG) 100 units/mL subcutaneous injection 1-5 Units, 1-5 Units, Subcutaneous, TID AC, 2 Units at 11/29/22 1800 **AND** Fingerstick Glucose (POCT), , , TID AC, KEISHA Newberry  •  insulin lispro (HumaLOG) 100 units/mL subcutaneous injection 1-5 Units, 1-5 Units, Subcutaneous, 0200, KEISHA Newberry, 1 Units at 11/27/22 0218  •  insulin lispro (HumaLOG) 100 units/mL subcutaneous injection 1-5 Units, 1-5 Units, Subcutaneous, HS, KEISHA Newberry, 1 Units at 11/29/22 2156  •  ipratropium (ATROVENT) 0 02 % inhalation solution 0 5 mg, 0 5 mg, Nebulization, TID, KEISHA Newberry, 0 5 mg at 11/30/22 6680  •  isosorbide mononitrate (IMDUR) 24 hr tablet 60 mg, 60 mg, Oral, Daily, KEISHA Newberry, 60 mg at 11/30/22 0902  •  labetalol (NORMODYNE) injection 10 mg, 10 mg, Intravenous, Q6H PRN, Agustina Schneider PA-C, 10 mg at 11/24/22 0241  •  levalbuterol (XOPENEX) inhalation solution 0 63 mg, 0 63 mg, Nebulization, Q4H PRN, KEISHA Newberry  •  levalbuterol (XOPENEX) inhalation solution 1 25 mg, 1 25 mg, Nebulization, TID, 1 25 mg at 11/30/22 0713 **AND** sodium chloride 0 9 % inhalation solution 3 mL, 3 mL, Nebulization, TID, KEISHA Newberry, 3 mL at 11/30/22 1627  •  nystatin (MYCOSTATIN) powder, , Topical, BID, KEISHA Newberry, 1 application at 29/87/27 0903  •  ondansetron (ZOFRAN) injection 4 mg, 4 mg, Intravenous, Q6H PRN, KEISHA Newberry, 4 mg at 11/28/22 1038  •  pantoprazole (PROTONIX) EC tablet 40 mg, 40 mg, Oral, Early Morning, KEISHA Newberry, 40 mg at 11/30/22 0601  •  polyethylene glycol (MIRALAX) packet 17 g, 17 g, Oral, Daily, Alana Zaidi MD, 17 g at 11/30/22 0858  •  repaglinide (PRANDIN) tablet 0 5 mg, 0 5 mg, Oral, TID AC, Steve Yan MD, 0 5 mg at 11/30/22 0601  •  senna (SENOKOT) tablet 8 6 mg, 1 tablet, Oral, HS, Alana Zaidi MD, 8 6 mg at 11/29/22 2155  •  simethicone (MYLICON) chewable tablet 80 mg, 80 mg, Oral, 4x Daily PRN, KEISHA Newberry  •  tamsulosin (FLOMAX) capsule 0 4 mg, 0 4 mg, Oral, Daily With Dinner, KEISHA Newberry, 0 4 mg at 11/29/22 1800    Invasive Devices:        Lab Results:   Results from last 7 days   Lab Units 11/30/22  0634 11/29/22  0544 11/28/22  0503   WBC Thousand/uL 14 73* 14 54* 16 45*   HEMOGLOBIN g/dL 8 2* 8 4* 8 5*   HEMATOCRIT % 26 2* 26 3* 26 1*   PLATELETS Thousands/uL 219 211 237   POTASSIUM mmol/L 4 8 4 6 3 3*   CHLORIDE mmol/L 101 103 101   CO2 mmol/L 32 35* 33*   BUN mg/dL 119* 115* 113*   CREATININE mg/dL 3 77* 3 43* 2 79*   CALCIUM mg/dL 8 4 8 4 8 5       Previous work up:         Portions of the record may have been created with voice recognition software  Occasional wrong word or "sound a like" substitutions may have occurred due to the inherent limitations of voice recognition software  Read the chart carefully and recognize, using context, where substitutions have occurred  If you have any questions, please contact the dictating provider

## 2022-12-01 ENCOUNTER — APPOINTMENT (INPATIENT)
Dept: RADIOLOGY | Facility: HOSPITAL | Age: 87
End: 2022-12-01

## 2022-12-01 LAB
ANION GAP SERPL CALCULATED.3IONS-SCNC: 7 MMOL/L (ref 4–13)
BUN SERPL-MCNC: 123 MG/DL (ref 5–25)
CALCIUM SERPL-MCNC: 8.3 MG/DL (ref 8.3–10.1)
CHLORIDE SERPL-SCNC: 101 MMOL/L (ref 96–108)
CO2 SERPL-SCNC: 31 MMOL/L (ref 21–32)
CREAT SERPL-MCNC: 3.9 MG/DL (ref 0.6–1.3)
EOSINOPHIL NFR URNS MANUAL: 0 %
FLUAV RNA RESP QL NAA+PROBE: NEGATIVE
FLUBV RNA RESP QL NAA+PROBE: NEGATIVE
GFR SERPL CREATININE-BSD FRML MDRD: 12 ML/MIN/1.73SQ M
GLUCOSE SERPL-MCNC: 101 MG/DL (ref 65–140)
GLUCOSE SERPL-MCNC: 104 MG/DL (ref 65–140)
GLUCOSE SERPL-MCNC: 105 MG/DL (ref 65–140)
GLUCOSE SERPL-MCNC: 105 MG/DL (ref 65–140)
GLUCOSE SERPL-MCNC: 112 MG/DL (ref 65–140)
GLUCOSE SERPL-MCNC: 141 MG/DL (ref 65–140)
POTASSIUM SERPL-SCNC: 4.8 MMOL/L (ref 3.5–5.3)
RSV RNA RESP QL NAA+PROBE: NEGATIVE
SARS-COV-2 RNA RESP QL NAA+PROBE: NEGATIVE
SODIUM SERPL-SCNC: 139 MMOL/L (ref 135–147)

## 2022-12-01 RX ORDER — SODIUM CHLORIDE, SODIUM GLUCONATE, SODIUM ACETATE, POTASSIUM CHLORIDE, MAGNESIUM CHLORIDE, SODIUM PHOSPHATE, DIBASIC, AND POTASSIUM PHOSPHATE .53; .5; .37; .037; .03; .012; .00082 G/100ML; G/100ML; G/100ML; G/100ML; G/100ML; G/100ML; G/100ML
50 INJECTION, SOLUTION INTRAVENOUS CONTINUOUS
Status: DISCONTINUED | OUTPATIENT
Start: 2022-12-01 | End: 2022-12-03

## 2022-12-01 RX ADMIN — ATORVASTATIN CALCIUM 40 MG: 40 TABLET, FILM COATED ORAL at 08:08

## 2022-12-01 RX ADMIN — FLUCONAZOLE 100 MG: 100 TABLET ORAL at 08:08

## 2022-12-01 RX ADMIN — REPAGLINIDE 0.5 MG: 1 TABLET ORAL at 11:41

## 2022-12-01 RX ADMIN — LEVALBUTEROL HYDROCHLORIDE 1.25 MG: 1.25 SOLUTION, CONCENTRATE RESPIRATORY (INHALATION) at 13:42

## 2022-12-01 RX ADMIN — CARVEDILOL 12.5 MG: 12.5 TABLET, FILM COATED ORAL at 08:08

## 2022-12-01 RX ADMIN — PANTOPRAZOLE SODIUM 40 MG: 40 TABLET, DELAYED RELEASE ORAL at 05:21

## 2022-12-01 RX ADMIN — NYSTATIN: 100000 POWDER TOPICAL at 08:09

## 2022-12-01 RX ADMIN — REPAGLINIDE 0.5 MG: 1 TABLET ORAL at 17:13

## 2022-12-01 RX ADMIN — HYDRALAZINE HYDROCHLORIDE 100 MG: 25 TABLET ORAL at 08:08

## 2022-12-01 RX ADMIN — ISODIUM CHLORIDE 3 ML: 0.03 SOLUTION RESPIRATORY (INHALATION) at 20:17

## 2022-12-01 RX ADMIN — ISODIUM CHLORIDE 3 ML: 0.03 SOLUTION RESPIRATORY (INHALATION) at 08:09

## 2022-12-01 RX ADMIN — Medication 2000 UNITS: at 08:08

## 2022-12-01 RX ADMIN — SODIUM CHLORIDE, SODIUM GLUCONATE, SODIUM ACETATE, POTASSIUM CHLORIDE AND MAGNESIUM CHLORIDE 75 ML/HR: 526; 502; 368; 37; 30 INJECTION, SOLUTION INTRAVENOUS at 22:09

## 2022-12-01 RX ADMIN — DOXAZOSIN 2 MG: 2 TABLET ORAL at 22:11

## 2022-12-01 RX ADMIN — IPRATROPIUM BROMIDE 0.5 MG: 0.5 SOLUTION RESPIRATORY (INHALATION) at 08:08

## 2022-12-01 RX ADMIN — POLYETHYLENE GLYCOL 3350 17 G: 17 POWDER, FOR SOLUTION ORAL at 08:06

## 2022-12-01 RX ADMIN — SODIUM CHLORIDE, SODIUM GLUCONATE, SODIUM ACETATE, POTASSIUM CHLORIDE AND MAGNESIUM CHLORIDE 75 ML/HR: 526; 502; 368; 37; 30 INJECTION, SOLUTION INTRAVENOUS at 11:41

## 2022-12-01 RX ADMIN — GABAPENTIN 100 MG: 100 CAPSULE ORAL at 08:07

## 2022-12-01 RX ADMIN — GABAPENTIN 100 MG: 100 CAPSULE ORAL at 17:13

## 2022-12-01 RX ADMIN — NYSTATIN: 100000 POWDER TOPICAL at 17:19

## 2022-12-01 RX ADMIN — HEPARIN SODIUM 5000 UNITS: 5000 INJECTION INTRAVENOUS; SUBCUTANEOUS at 22:10

## 2022-12-01 RX ADMIN — HYDRALAZINE HYDROCHLORIDE 100 MG: 25 TABLET ORAL at 22:11

## 2022-12-01 RX ADMIN — HEPARIN SODIUM 5000 UNITS: 5000 INJECTION INTRAVENOUS; SUBCUTANEOUS at 13:40

## 2022-12-01 RX ADMIN — BENZONATATE 200 MG: 100 CAPSULE ORAL at 22:11

## 2022-12-01 RX ADMIN — SENNOSIDES 8.6 MG: 8.6 TABLET, FILM COATED ORAL at 22:11

## 2022-12-01 RX ADMIN — CYANOCOBALAMIN TAB 500 MCG 500 MCG: 500 TAB at 08:08

## 2022-12-01 RX ADMIN — AMLODIPINE BESYLATE 10 MG: 10 TABLET ORAL at 08:08

## 2022-12-01 RX ADMIN — IPRATROPIUM BROMIDE 0.5 MG: 0.5 SOLUTION RESPIRATORY (INHALATION) at 13:42

## 2022-12-01 RX ADMIN — IPRATROPIUM BROMIDE 0.5 MG: 0.5 SOLUTION RESPIRATORY (INHALATION) at 20:17

## 2022-12-01 RX ADMIN — ACETAMINOPHEN 650 MG: 325 TABLET, FILM COATED ORAL at 08:08

## 2022-12-01 RX ADMIN — LEVALBUTEROL HYDROCHLORIDE 0.63 MG: 0.63 SOLUTION RESPIRATORY (INHALATION) at 08:07

## 2022-12-01 RX ADMIN — BENZONATATE 200 MG: 100 CAPSULE ORAL at 08:08

## 2022-12-01 RX ADMIN — ISODIUM CHLORIDE 3 ML: 0.03 SOLUTION RESPIRATORY (INHALATION) at 13:42

## 2022-12-01 RX ADMIN — TAMSULOSIN HYDROCHLORIDE 0.4 MG: 0.4 CAPSULE ORAL at 17:13

## 2022-12-01 RX ADMIN — HYDRALAZINE HYDROCHLORIDE 100 MG: 25 TABLET ORAL at 17:13

## 2022-12-01 RX ADMIN — ASPIRIN 81 MG CHEWABLE TABLET 81 MG: 81 TABLET CHEWABLE at 08:08

## 2022-12-01 RX ADMIN — LEVALBUTEROL HYDROCHLORIDE 1.25 MG: 1.25 SOLUTION, CONCENTRATE RESPIRATORY (INHALATION) at 20:17

## 2022-12-01 RX ADMIN — HEPARIN SODIUM 5000 UNITS: 5000 INJECTION INTRAVENOUS; SUBCUTANEOUS at 05:21

## 2022-12-01 RX ADMIN — CARVEDILOL 12.5 MG: 12.5 TABLET, FILM COATED ORAL at 17:13

## 2022-12-01 RX ADMIN — Medication 5 ML: at 22:11

## 2022-12-01 RX ADMIN — BENZONATATE 200 MG: 100 CAPSULE ORAL at 17:13

## 2022-12-01 RX ADMIN — INSULIN GLARGINE 10 UNITS: 100 INJECTION, SOLUTION SUBCUTANEOUS at 22:11

## 2022-12-01 RX ADMIN — REPAGLINIDE 0.5 MG: 1 TABLET ORAL at 06:01

## 2022-12-01 RX ADMIN — ISOSORBIDE MONONITRATE 60 MG: 60 TABLET, EXTENDED RELEASE ORAL at 08:08

## 2022-12-01 NOTE — ASSESSMENT & PLAN NOTE
Lab Results   Component Value Date    EGFR 12 12/01/2022    EGFR 12 11/30/2022    EGFR 14 11/29/2022    CREATININE 3 90 (H) 12/01/2022    CREATININE 3 77 (H) 11/30/2022    CREATININE 3 43 (H) 11/29/2022     History of CKD 4, baseline creatinine appears to be around 1 5-1 9 in past 2 years in care everywhere  Creatinine during previous hospitalization ranged in 2 4-2 8  · Possible CKD progression  · Urinalysis was bland  · Received IV Lasix 40 mg in ED  · Creatinine continues to worsen    Received IV fluids for 10 hours per Nephrology on 11/30  · Restart IV fluids per Nephrology again today  · Renal ultrasound showed no hydronephrosis moderate left renal atrophy  · Nephrology input appreciated

## 2022-12-01 NOTE — CASE MANAGEMENT
Case Management Discharge Planning Note    Patient name Ezio Carrillo  Location [address] [address] [address] MRN [id number]  : 10/19/1929 Date 2022       Current Admission Date: 2022  Current Admission Diagnosis:Acute respiratory failure with hypoxia [facility])   Patient Active Problem List    Diagnosis Date Noted   • Constipation 2022   • Left upper quadrant abdominal mass 2022   • Chronic kidney disease    • Acute respiratory failure with hypoxia ([address] ) 2022   • Acute on chronic diastolic congestive heart failure ([address] ) 2022   • BPH (benign prostatic hyperplasia) 2022   • Acute diastolic (congestive) heart failure ([address] ) 2022   • PVCs (premature ventricular contractions) 2022   • Hyperlipidemia 2022   • Primary hypertension 11/15/2022   • Aspiration pneumonia ([address] ) 11/15/2022   • CHF (congestive heart failure) ([address] ) 11/15/2022   • Stage 3 chronic kidney disease ([address] ) 11/15/2022   • Type 2 diabetes mellitus ([address] ) 11/15/2022   • Esophageal dysphagia 11/15/2022   • Thrush 11/15/2022   • Elevated troponin 11/15/2022      LOS (days): 9  Geometric Mean LOS (GMLOS) (days): 5 20  Days to GMLOS:-3 5     OBJECTIVE:  Risk of Unplanned Readmission Score: 23 77         Current admission status: Inpatient   Preferred Pharmacy:   [address] [facility]  [place] [id number]  Phone: [phone] Fax: [phone]    Primary Care Provider: Sultana Escalera PA-C    Primary Insurance: MEDICARE  Secondary Insurance: [place] [employer] OF [place]    DISCHARGE DETAILS:    Discharge planning discussed with[de-identified] Daughter, Aimee Tata of Choice: Yes  Comments - Freedom of Choice: SW following to monitor needs and assist with planning  STR placement is planned  SW spoke with pt's daughter about rehab facility options  Daughter has toured several facilities to assist with decision    SW provided daughter with list of accepting agencies from [employer] to consider as well  SW offered ongoing support and assistance with planning as needed  CM contacted family/caregiver?: Yes  Were Treatment Team discharge recommendations reviewed with patient/caregiver?: Yes  Did patient/caregiver verbalize understanding of patient care needs?: N/A- going to facility  Were patient/caregiver advised of the risks associated with not following Treatment Team discharge recommendations?: Yes    Contacts  Patient Contacts: Niel Lanes (dtr)  Relationship to Patient[de-identified] Family  Contact Method:  In Person  Reason/Outcome: Discharge Planning    Other Referral/Resources/Interventions Provided:  Interventions: Short Term Rehab  Referral Comments: STR facility list given to daughter to review    Treatment Team Recommendation: Short Term Rehab  Discharge Destination Plan[de-identified] Short Term Rehab  Transport at Discharge : [facility] Ambulance

## 2022-12-01 NOTE — ASSESSMENT & PLAN NOTE
Patient presented with acute respiratory distress shortly after eating a few spoonful of puree diet in STR, patient reported chest pressure and SOB  Patient was satting 85-95% on oxygen 3 L per STR transfer paper  Patient was belching after eating/drinking limited amount of diet/water per staff  · Patient was discharged on the day of admission after being hospitalized for aspiration pneumonia, acute on chronic diastolic CHF, dysphagia  Patient received 7 days of IV Rocephin and Flagyl  Received IV Lasix  Seen by GI, underwent EGD, found to have oropharyngeal dysphagia with esophageal dysmotility, no obvious esophageal stricture  Underwent Barium swallow study which showed moderate to severe esophageal dysmotility with significant residual contrast remaining in the esophagus at the end of the study  Patient was recommended regular diet by speech and discharged on regular diet per STR  · Likely secondary to multifocal pneumonia and also CHF  · Patient required BiPAP on ED arrival   ABG post BiPAP essentially unremarkable  Later patient was titrated down to 6 liters oxygen upon admission  Currently on 1 liter oxygen  · Chest x-ray - Pulmonary edema, worse when comparing to 11/19/2022  Underlying infection could not be excluded  · ProBNP 15,094  · Patient received Lasix 40 IV in ED  · Received cefepime and Flagyl in ED  Patient has completed 7 days of IV cefepime  · Received IV Solu-Medrol in ED  No wheezing on auscultation    · CT of the chest showed pulmonary edema with moderate size bilateral pleural effusions with significant bibasilar atelectasis and concomitant multifocal pneumonia   · Obstructive series showed persistent pulmonary edema with multifocal pneumonia

## 2022-12-01 NOTE — PLAN OF CARE
Problem: RESPIRATORY - ADULT  Goal: Achieves optimal ventilation and oxygenation  Description: INTERVENTIONS:  - Assess for changes in respiratory status  - Assess for changes in mentation and behavior  - Position to facilitate oxygenation and minimize respiratory effort  - Oxygen administered by appropriate delivery if ordered  - Initiate smoking cessation education as indicated  - Encourage broncho-pulmonary hygiene including cough, deep breathe, Incentive Spirometry  - Assess the need for suctioning and aspirate as needed  - Assess and instruct to report SOB or any respiratory difficulty  - Respiratory Therapy support as indicated  Outcome: Progressing     Problem: MOBILITY - ADULT  Goal: Maintain or return to baseline ADL function  Description: INTERVENTIONS:  -  Assess patient's ability to carry out ADLs; assess patient's baseline for ADL function and identify physical deficits which impact ability to perform ADLs (bathing, care of mouth/teeth, toileting, grooming, dressing, etc )  - Assess/evaluate cause of self-care deficits   - Assess range of motion  - Assess patient's mobility; develop plan if impaired  - Assess patient's need for assistive devices and provide as appropriate  - Encourage maximum independence but intervene and supervise when necessary  - Involve family in performance of ADLs  - Assess for home care needs following discharge   - Consider OT consult to assist with ADL evaluation and planning for discharge  - Provide patient education as appropriate  Outcome: Progressing  Goal: Maintains/Returns to pre admission functional level  Description: INTERVENTIONS:  - Perform BMAT or MOVE assessment daily    - Set and communicate daily mobility goal to care team and patient/family/caregiver     - Collaborate with rehabilitation services on mobility goals if consulted  - Out of bed for toileting  - Record patient progress and toleration of activity level   Outcome: Progressing     Problem: PAIN - ADULT  Goal: Verbalizes/displays adequate comfort level or baseline comfort level  Description: Interventions:  - Encourage patient to monitor pain and request assistance  - Assess pain using appropriate pain scale  - Administer analgesics based on type and severity of pain and evaluate response  - Implement non-pharmacological measures as appropriate and evaluate response  - Consider cultural and social influences on pain and pain management  - Notify physician/advanced practitioner if interventions unsuccessful or patient reports new pain  Outcome: Progressing

## 2022-12-01 NOTE — ASSESSMENT & PLAN NOTE
Patient reported no appetite for 2 days  Loss of appetite could be secondary to antibiotic  · Per speech therapy, patient at high risk for aspiration despite the level of diet  Currently on pureed diet with thin liquids with medications crushed with puree  · GI recommended to consider PEG tube if recurrent aspirations  · Continue pantoprazole daily  · Obstructive series showed barium throughout the colon with modest amount of stool along the rectosigmoid region  · Discussed with patient's daughter, wants to hold off on PEG placement    Discussed with her that patient is at high risk for recurrent aspiration pneumonia

## 2022-12-01 NOTE — ASSESSMENT & PLAN NOTE
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  · Holding off on CT abdomen/pelvis with contrast due to worsening creatinine at this time

## 2022-12-01 NOTE — ASSESSMENT & PLAN NOTE
On Norvasc, hydralazine, Coreg, Imdur, Cardura which is being continued    · Blood pressures overall acceptable

## 2022-12-01 NOTE — PROGRESS NOTES
NEPHROLOGY PROGRESS NOTE   Moissé Gamboa 80 y o  male MRN: [id number]  Unit/Bed#: 2 [place] [address] Encounter: [id number]    ASSESSMENT & PLAN:  59-year-old male with history of diabetes mellitus, hypertension, chronic kidney disease, CHF, esophageal dysmotility presented to [facility] after presenting with shortness of breath  Nephrology consulted for CKD  Recent hospital admission between November 15 to November 22 for respiratory failure due to pneumonia and fluid overload  Was discharged on oral Lasix 20 mg daily after treatment with IV Lasix  Discharge creatinine was around 2 68  Now again presenting with shortness of breath and nephrology consulted for worsening creatinine which was on admission 2 83  Elevated creatinine on chronic kidney disease stage 3  -baseline creatinine from 2021 was around 1 6-2 0  -admission creatinine 2 83 with use of oral Lasix since last hospital admission   -possibly CKD progression from cardiorenal syndrome and age-related nephron loss  -UA was bland, renal ultrasound without hydronephrosis but finding of left renal atrophy   -in the setting of diuresis, renal function has worsened during the hospital stay and creatinine now 3 9 mg/dL  -Patient was getting treatment with oral torsemide 40 mg daily which was stopped on 11/29 in the setting of creatinine worsening  Was also given IV fluid 500 mL on 11/30 but still renal function continue to worsen to creatinine 3 9 today   Would also consider possibility of AIN but does not have peripheral eosinophilia, waiting for urine eosinophil result which is under process  Would continue to hold diuretics  Ordered for IV Isolyte at 75 mL/hour for intravascular volume expansion   It appears cefepime was stopped on 11/30 with last dose on 11/29, this may help renal function improve if it was from AIN    -continue to monitor renal function    Primary hypertension  -blood pressure stable and is at goal  -dose of hydralazine was increased to 100 mg t i d  On 11/27, continue at current dose  Continue amlodipine 10 mg daily, carvedilol 12 5 mg twice daily, doxazosin 2 mg q h s , Imdur 60 mg once daily and torsemide 40 mg daily   -if blood pressure please elevated may consider increasing the dose of carvedilol  Fluid overload/acute on chronic diastolic CHF  -CT was suggestive of pulmonary edema with moderate size pleural effusion left more than right  -volume status has improved status post IV Lasix and then was transitioned to oral torsemide 40 mg daily but with worsening of renal function on 11/29, torsemide was held  Continue to hold torsemide for now till renal function improves  May need to consider lower dose 20 mg daily once renal function improves back to creatinine 2 7-2 8 but currently in the setting of worsening renal function, ordering IV fluid and will monitor volume status closely    Aspiration pneumonia, 6 5th room last dose was on 11/29    Hypokalemia  -resolved status post replacement    Shortness of breath likely combination of fluid overload as well as aspiration pneumonia-improving status post diuresis    BPH-on doxazosin on Flomax    Esophageal dysphagia:  Noted discussion for PEG tube placement  Anemia, low at 8 2 g dL, continue to monitor    Left upper quadrant mass:  Renal ultrasound suggestive of solid hypoechoic mass in the left upper quadrant medial to the spleen as seen on previous CT scan   -recommend workup and management per primary team   CT scan on hold due to barium in the GI tract, also discussed with primary team about risk of contrast nephropathy  if plan for CT with IV contrast ,  may need to discuss with patient and recommend pre and post contrast hydration      Discussed with primary team      SUBJECTIVE:  Patient denies any shortness of breath, chest pain nausea vomiting    OBJECTIVE:  Current Weight: Weight - Scale: 82 6 kg (182 lb)  Vitals:    12/01/22 1012   BP:    Pulse: 65 Resp:    Temp: 99 4 °F (37 4 °C)   SpO2: 91%       Intake/Output Summary (Last 24 hours) at 12/1/2022 1024  Last data filed at 12/1/2022 0901  Gross per 24 hour   Intake --   Output 675 ml   Net -675 ml       Physical Exam  General:  Ill looking, awake  Eyes: Conjunctivae pink,  Sclera anicteric  ENT: lips and mucous membranes moist  Neck: supple   Chest: Clear to Auscultation both lungs,  no crackles, ronchus or wheezing  CVS: S1 & S2 present, normal rate, regular rhythm, no murmur    Abdomen: soft, non-tender, non-distended, Bowel sounds normoactive  Extremities: no edema of  legs  Skin: no rash  Neuro: awake, alert, oriented x3  Psych: Mood and affect appropriate     Medications:    Current Facility-Administered Medications:   •  acetaminophen (TYLENOL) tablet 650 mg, 650 mg, Oral, Q6H PRN, APOLONIA NewberryNP, 650 mg at 12/01/22 0808  •  amLODIPine (NORVASC) tablet 10 mg, 10 mg, Oral, Daily, APOLONIA NewberryNP, 10 mg at 12/01/22 0808  •  aspirin chewable tablet 81 mg, 81 mg, Oral, Daily, KEISHA Newberry, 81 mg at 12/01/22 0808  •  atorvastatin (LIPITOR) tablet 40 mg, 40 mg, Oral, Daily, Emilee Link, APOLONIANP, 40 mg at 12/01/22 0808  •  benzonatate (TESSALON PERLES) capsule 200 mg, 200 mg, Oral, TID, Dania Mclaughlin, CRNP, 200 mg at 12/01/22 0808  •  bisacodyl (DULCOLAX) rectal suppository 10 mg, 10 mg, Rectal, Daily PRN, Chester Bob MD, 10 mg at 11/28/22 1038  •  carvedilol (COREG) tablet 12 5 mg, 12 5 mg, Oral, BID With Meals, KEISHA Newberry, 12 5 mg at 12/01/22 0808  •  cholecalciferol (VITAMIN D3) tablet 2,000 Units, 2,000 Units, Oral, Daily, KEISHA Newberry, 2,000 Units at 12/01/22 0808  •  cyanocobalamin (VITAMIN B-12) tablet 500 mcg, 500 mcg, Oral, Daily, KEISHA Newberry, 500 mcg at 12/01/22 0808  •  doxazosin (CARDURA) tablet 2 mg, 2 mg, Oral, HS, KEISHA Newberry, 2 mg at 11/30/22 2131  •  fluconazole (DIFLUCAN) tablet 100 mg, 100 mg, Oral, Daily, Chester Bob MD, 100 mg at 12/01/22 0808  •  gabapentin (NEURONTIN) capsule 100 mg, 100 mg, Oral, BID, KEISHA Newberry, 100 mg at 12/01/22 0807  •  heparin (porcine) subcutaneous injection 5,000 Units, 5,000 Units, Subcutaneous, Q8H [address], 5,000 Units at 12/01/22 0521 **AND** [CANCELED] Platelet count, , , Once, KEISHA Newberry  •  hydrALAZINE (APRESOLINE) tablet 100 mg, 100 mg, Oral, TID, Prem Bond MD, 100 mg at 12/01/22 0808  •  hydrocodone-chlorpheniramine polistirex (TUSSIONEX) ER suspension 5 mL, 5 mL, Oral, HS, Steve Yan MD, 5 mL at 11/30/22 2131  •  insulin glargine (LANTUS) subcutaneous injection 10 Units 0 1 mL, 10 Units, Subcutaneous, HS, Steve Yan MD, 10 Units at 11/30/22 2131  •  insulin lispro (HumaLOG) 100 units/mL subcutaneous injection 1-5 Units, 1-5 Units, Subcutaneous, TID AC, 1 Units at 11/30/22 1726 **AND** Fingerstick Glucose (POCT), , , TID AC, KEISHA Newberry  •  insulin lispro (HumaLOG) 100 units/mL subcutaneous injection 1-5 Units, 1-5 Units, Subcutaneous, 0200, KEISHA Newberry, 1 Units at 11/27/22 0218  •  insulin lispro (HumaLOG) 100 units/mL subcutaneous injection 1-5 Units, 1-5 Units, Subcutaneous, HS, KEISHA Newberry, 2 Units at 11/30/22 2132  •  ipratropium (ATROVENT) 0 02 % inhalation solution 0 5 mg, 0 5 mg, Nebulization, TID, KEISHA Newberry, 0 5 mg at 12/01/22 0808  •  isosorbide mononitrate (IMDUR) 24 hr tablet 60 mg, 60 mg, Oral, Daily, KEISHA Newberry, 60 mg at 12/01/22 0808  •  labetalol (NORMODYNE) injection 10 mg, 10 mg, Intravenous, Q6H PRN, Agustina Schneider PA-C, 10 mg at 11/24/22 0241  •  levalbuterol (XOPENEX) inhalation solution 0 63 mg, 0 63 mg, Nebulization, Q4H PRN, KEISHA Newberry, 0 63 mg at 12/01/22 0807  •  levalbuterol (XOPENEX) inhalation solution 1 25 mg, 1 25 mg, Nebulization, TID, 1 25 mg at 11/30/22 1952 **AND** sodium chloride 0 9 % inhalation solution 3 mL, 3 mL, Nebulization, TID, KEISHA Newberry, 3 mL at 12/01/22 0809  • nystatin (MYCOSTATIN) powder, , Topical, BID, KEISHA Newberry, Given at 12/01/22 0809  •  ondansetron (ZOFRAN) injection 4 mg, 4 mg, Intravenous, Q6H PRN, KEISHA Newberry, 4 mg at 11/28/22 1038  •  pantoprazole (PROTONIX) EC tablet 40 mg, 40 mg, Oral, Early Morning, KEISHA Newberry, 40 mg at 12/01/22 9805  •  polyethylene glycol (MIRALAX) packet 17 g, 17 g, Oral, Daily, Steve Yan MD, 17 g at 12/01/22 0806  •  repaglinide (PRANDIN) tablet 0 5 mg, 0 5 mg, Oral, TID AC, Steve Yan MD, 0 5 mg at 12/01/22 0601  •  senna (SENOKOT) tablet 8 6 mg, 1 tablet, Oral, HS, Shirley Kunz MD, 8 6 mg at 11/30/22 2131  •  simethicone (MYLICON) chewable tablet 80 mg, 80 mg, Oral, 4x Daily PRN, KEISHA Newberry  •  tamsulosin (FLOMAX) capsule 0 4 mg, 0 4 mg, Oral, Daily With Dinner, KEISHA Newberry, 0 4 mg at 11/30/22 1725    Invasive Devices:        Lab Results:   Results from last 7 days   Lab Units 12/01/22  0521 11/30/22  0634 11/29/22  0544 11/28/22  0503   WBC Thousand/uL  --  14 73* 14 54* 16 45*   HEMOGLOBIN g/dL  --  8 2* 8 4* 8 5*   HEMATOCRIT %  --  26 2* 26 3* 26 1*   PLATELETS Thousands/uL  --  219 211 237   POTASSIUM mmol/L 4 8 4 8 4 6 3 3*   CHLORIDE mmol/L 101 101 103 101   CO2 mmol/L 31 32 35* 33*   BUN mg/dL 123* 119* 115* 113*   CREATININE mg/dL 3 90* 3 77* 3 43* 2 79*   CALCIUM mg/dL 8 3 8 4 8 4 8 5       Previous work up:         Portions of the record may have been created with voice recognition software  Occasional wrong word or "sound a like" substitutions may have occurred due to the inherent limitations of voice recognition software  Read the chart carefully and recognize, using context, where substitutions have occurred  If you have any questions, please contact the dictating provider

## 2022-12-01 NOTE — ASSESSMENT & PLAN NOTE
Repeat procalcitonin level was 0 54 initially, continues to remain mildly elevated  · Patient received cefepime and Flagyl in the ED   Completed 7 day course of cefepime 1 gram IV daily  · Sputum culture with mixed respiratory hardik  · Urine for Legionella and pneumococcal antigens were negative  · Pulmonary input appreciated  · Blood cultures negative  · Patient was having low-grade fevers with worsening white count    Fevers improved  · Chest x-ray with multifocal pneumonia which is persistent

## 2022-12-01 NOTE — ASSESSMENT & PLAN NOTE
Patient with constipation  Continue MiraLax, senna q h s  · Patient was also given Dulcolax suppository, soapsuds enema previously

## 2022-12-01 NOTE — ASSESSMENT & PLAN NOTE
Lab Results   Component Value Date    EGFR 12 11/30/2022    EGFR 14 11/29/2022    EGFR 18 11/28/2022    CREATININE 3 77 (H) 11/30/2022    CREATININE 3 43 (H) 11/29/2022    CREATININE 2 79 (H) 11/28/2022     History of CKD 4, baseline creatinine appears to be around 1 5-1 9 in past 2 years in care everywhere  Creatinine during previous hospitalization ranged in 2 4-2 8  · Possible CKD progression  · Urinalysis was bland  · Received IV Lasix 40 mg in ED  · Creatinine continues to trend up    IV fluids for 10 hours per Nephrology  · Renal ultrasound showed no hydronephrosis moderate left renal atrophy  · Nephrology input appreciated

## 2022-12-01 NOTE — ASSESSMENT & PLAN NOTE
Repeat procalcitonin level was 0 54 initially, continues to remain mildly elevated  · Patient received cefepime and Flagyl in the ED   Completed 7 day course of cefepime 1 gram IV daily  · Patient febrile again today to 101 1    Check procalcitonin, blood cultures, chest x-ray, COVID/flu, urine culture added  · Sputum culture with mixed respiratory hardik  · Urine for Legionella and pneumococcal antigens were negative  · Pulmonary input appreciated  · Blood cultures negative  · Chest x-ray with multifocal pneumonia which is persistent

## 2022-12-01 NOTE — ASSESSMENT & PLAN NOTE
Troponin [id number]  Elevated troponin in recent admission as well  Reports chest pressure when in respiratory distress  Denies history of CAD  · EKG no ischemic changes, read as AFib, rate 103, RBBB per prior provider  · No history of AFib  Prior EKG shows sinus rhythm with PACs /PVCs  repeat EKG showed sinus rhythm  · Recent 2D echo showed EF low normal, normal wall motion, grade 2 diastolic dysfunction, moderately dilated atriums     · Most likely non MI troponin elevation due to # 1 and CHF

## 2022-12-01 NOTE — CONSULTS
Consultation - Infectious Disease   Alena Vasquez 80 y o  male MRN: [id number]  Unit/Bed#: 2 [place] [address] Encounter: [id number]      IMPRESSION & RECOMMENDATIONS:   1  SIRS with fever, leukocytosis  Cultures negative  CT chest with bilateral effusions and atelectasis  In setting of recent high risk aspiration and esophageal dysmotility identified  Despite 11 days of IV antibiotics, intermittent fevers and leukocytosis persist    -continue monitoring off antibiotics  -discontinue 1 week of fluconazole for thrush on 12 2 22  -follow-up 12/01/2022 repeat blood cultures   -monitor temperature and hemodynamics  -serial exam  -monitor serial labs  -aspiration precautions      2  Acute hypoxic respiratory failure, multi factorial including acute on chronic CHF, aspiration risk, dysphagia  -monitoring off antibiotics as above  -monitor temperature and hemodynamics  -serial exam  -monitor respiratory status  -aspiration precaution  -ongoing pulmonary follow-up/management    3  Acute on chronic kidney disease  Creatinine elevated at 3 77  -renal dose adjust medications as needed  -volume management per Nephrology  -recheck BMP     4  Esophageal dysmotility  11/17/2022 barium swallow showed moderate to severe esophageal dysmotility  -monitor symptoms  -aspiration precautions  -speech therapy ongoing follow-up  -GI recommending consideration of PEG tube if recurrent aspirations  -patient's daughter wants to hold off on Peg tube present      Antibiotics:  D2 off Cefepime  Fluconazole D6    I have discussed the above management plan in detail with patient, RN, and the primary service    Extensive review of the medical records in epic including review of the notes, radiographs, and laboratory results     HISTORY OF PRESENT ILLNESS:  Reason for Consult: Fever    HPI: Alena Vasquez is a 80y o  year old male with recent diagnose of esophageal dysmotility, type 2 diabetes, CHF, hyperlipidemia, CKD 4, BPH who presented to the ER 22 with acute respiratory distress shortly after eating a few spoonful of puree diet in STR with patient reported chest pressure and SOB after same-day discharge for hospitalization for aspiration pneumonia having completed a 7 day course of Rocephin and Flagyl  Patient had been evaluated by speech therapy and discharged on regular diet  Patient was readmitted with acute respiratory failure with chest imaging suspicious for pulmonary edema and bilateral atelectasis  Patient was initiated on a 2nd antibiotic course this time with cefepime for aspiration pneumonia  Despite 11 days of IV antibiotic, patient continues to have intermittent fever and leukocytosis  Subsequently infectious Disease now being consulted regarding evaluation and management of fever  Patient currently remains on dysphagia 1 diet with pureed food and thickened liquids  Patient denies shivering, shaking chills or sweats, no nausea, vomiting, diarrhea, + abdominal discomfort  REVIEW OF SYSTEMS:  A complete review of systems is negative other than that noted in the HPI  PAST MEDICAL HISTORY:  Past Medical History:   Diagnosis Date   • [facility]    • Diabetes mellitus ([facility] )    • Elevated lipids    • Hypertension    • Hyponatremia 11/15/2022   • Neuropathy    • Renal disorder      Past Surgical History:   Procedure Laterality Date   • HERNIA REPAIR         FAMILY HISTORY:  Non-contributory    SOCIAL HISTORY:  Social History   Social History     Substance and Sexual Activity   Alcohol Use Not Currently     Social History     Substance and Sexual Activity   Drug Use Never     Social History     Tobacco Use   Smoking Status Former   • Packs/day: 3 00   • Types: Cigarettes   • Quit date:    • Years since quittin 9   Smokeless Tobacco Never       ALLERGIES:  No Known Allergies    MEDICATIONS:  All current active medications have been reviewed      PHYSICAL EXAM:  Temp:  [98 8 °F (37 1 °C)-101 1 °F (38 4 °C)] 98 8 °F (37 1 °C)  HR:  [51-68] 60  Resp:  [18] 18  BP: (147-176)/(52-97) 153/54  SpO2:  [89 %-99 %] 92 %  Temp (24hrs), Av 5 °F (37 5 °C), Min:98 8 °F (37 1 °C), Max:101 1 °F (38 4 °C)  Current: Temperature: 98 8 °F (37 1 °C)    Intake/Output Summary (Last 24 hours) at 2022 1603  Last data filed at 2022 1330  Gross per 24 hour   Intake --   Output 1150 ml   Net -1150 ml       General Appearance:  51-year-old elderly male, chronically debilitated, appearing propped fairly comfortable in bed, lethargic, in no acute resp distress   Head:  Normocephalic, without obvious abnormality, atraumatic   Eyes:  Conjunctiva pink and sclera anicteric, both eyes   Nose: Nares normal, mucosa normal, no drainage   Throat: Oropharynx moist without lesions   Neck: Supple, symmetrical, no adenopathy, no tenderness/mass/nodules   Back:   Symmetric, no curvature, ROM normal, no CVA tenderness   Lungs:   Decreased breath sounds fairly clear to auscultation bilaterally, respirations unlabored short conversation, statting 92% on 1L NCO2, active wet cough   Chest Wall:  No tenderness or deformity   Heart:  RRR; no murmur, rub or gallop   Abdomen:   Soft, non-tender, non-distended, positive bowel sounds    Extremities: No cyanosis, clubbing, + generalized edema   Skin: No rashes or lesions  No draining wounds noted  Lymph nodes: Cervical, supraclavicular nodes normal   Neurologic: Lethargic, arousable to name, weak, propped in bed       LABS, IMAGING, & OTHER STUDIES:  Lab Results:  I have personally reviewed pertinent labs    Results from last 7 days   Lab Units 22  0634 22  0544 22  0503   WBC Thousand/uL 14 73* 14 54* 16 45*   HEMOGLOBIN g/dL 8 2* 8 4* 8 5*   PLATELETS Thousands/uL 219 211 237     Results from last 7 days   Lab Units 22  0521 22  0634 22  0544   SODIUM mmol/L 139 142 144   POTASSIUM mmol/L 4 8 4 8 4 6   CHLORIDE mmol/L 101 101 103   CO2 mmol/L 31 32 35*   BUN mg/dL 123* 119* 115*   CREATININE mg/dL 3 90* 3 77* 3 43*   EGFR ml/min/1 73sq m 12 12 14   CALCIUM mg/dL 8 3 8 4 8 4     Results from last 7 days   Lab Units 12/01/22  1043 11/28/22  1502   BLOOD CULTURE  Received in Microbiology Lab  Culture in Progress  Received in Microbiology Lab  Culture in Progress  --    SPUTUM CULTURE   --  2+ Growth of   GRAM STAIN RESULT   --  Rare Epithelial cells per low power field*  1+ Polys*  1+ Gram negative rods*  Rare Gram positive rods*  Rare Gram positive cocci in pairs*     Results from last 7 days   Lab Units 11/29/22  0544 11/27/22  0523 11/25/22  0450   PROCALCITONIN ng/ml 0 63* 0 36* 0 44*                   Imaging Studies:   11/23/2022 CT chest:  Pulmonary edema with bilateral effusions atelectasis  Suspicious mass in the left upper quadrant  11/23/2022 abdominal obstruction series:  Barium seen throughout the colon with modest amount of stool in the rectosigmoid colon    Other Studies:   I have personally reviewed pertinent reports    11/17/2022 barium swallow showed moderate to severe esophageal dysmotility

## 2022-12-01 NOTE — ASSESSMENT & PLAN NOTE
Troponin [id number]  Elevated troponin in recent admission as well  Reports chest pressure when in respiratory distress  Denies history of CAD  · EKG no ischemic changes, read as AFib, rate 103, RBBB per prior provider  · No history of AFib  Prior EKG shows sinus rhythm with PACs /PVCs  Will repeat EKG  · 2D echo prior admission showed EF low normal, normal wall motion, grade 2 diastolic dysfunction, moderately dilated atriums     · Most likely non MI troponin elevation due to # 1 and CHF

## 2022-12-01 NOTE — ASSESSMENT & PLAN NOTE
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  · Holding off on CT abdomen/pelvis with contrast due to elevated creatinine at this time

## 2022-12-01 NOTE — PROGRESS NOTES
[facility] [address]  Progress Note Luis Fernando Titus 10/19/1929, 80 y o  male MRN: [id number]  Unit/Bed#: [facility] Encounter: [id number]  Primary Care Provider: Rupesh Colon PA-C   Date and time admitted to hospital: 11/22/2022  8:41 PM    * Acute respiratory failure with hypoxia [facility])  Assessment & Plan  Patient presented with acute respiratory distress shortly after eating a few spoonful of puree diet in STR, patient reported chest pressure and SOB  Patient was satting 85-95% on oxygen 3 L per STR transfer paper  Patient was belching after eating/drinking limited amount of diet/water per staff  · Patient was discharged on the day of admission after being hospitalized for aspiration pneumonia, acute on chronic diastolic CHF, dysphagia  Patient received 7 days of IV Rocephin and Flagyl  Received IV Lasix  Seen by GI, underwent EGD, found to have oropharyngeal dysphagia with esophageal dysmotility, no obvious esophageal stricture  Underwent Barium swallow study which showed moderate to severe esophageal dysmotility with significant residual contrast remaining in the esophagus at the end of the study  Patient was recommended regular diet by speech and discharged on regular diet per STR  · Likely secondary to multifocal pneumonia and also CHF  · Patient required BiPAP on ED arrival   ABG post BiPAP essentially unremarkable  Later patient was titrated down to 6 liters oxygen upon admission  Currently on 1 liter oxygen  · Chest x-ray - Pulmonary edema, worse when comparing to 11/19/2022  Underlying infection could not be excluded  · ProBNP 15,094  · Patient received Lasix 40 IV in ED  · Received cefepime and Flagyl in ED  Patient has completed 7 days of IV cefepime  · Received IV Solu-Medrol in ED  No wheezing on auscultation    · CT of the chest showed pulmonary edema with moderate size bilateral pleural effusions with significant bibasilar atelectasis and concomitant multifocal pneumonia   · Obstructive series showed persistent pulmonary edema with multifocal pneumonia    Aspiration pneumonia (HCC)  Assessment & Plan  Repeat procalcitonin level was 0 54 initially, continues to remain mildly elevated  · Patient received cefepime and Flagyl in the ED   Completed 7 day course of cefepime 1 gram IV daily  · Patient febrile again today to 101 1  Check procalcitonin, blood cultures, chest x-ray, COVID/flu, urine culture added  · Sputum culture with mixed respiratory hardik  · Urine for Legionella and pneumococcal antigens were negative  · Pulmonary input appreciated  · Blood cultures negative  · Chest x-ray with multifocal pneumonia which is persistent    Acute on chronic diastolic congestive heart failure (HCC)  Assessment & Plan  Wt Readings from Last 3 Encounters:   12/01/22 82 6 kg (182 lb)   11/22/22 84 5 kg (186 lb 4 8 oz)     Patient received IV Lasix in recent admission  · Chest x-ray upon admission shows worsening pulmonary edema  · Received 40 milligrams of Lasix IV in the ED and was continued on  IV Lasix  Then transitioned to [facility] which is on hold  · Left upper extremity venous Doppler showed no evidence of DVT  · CT chest showed pulmonary edema with moderate size bilateral pleural effusions left more than right  · Might need accept higher creatinine to maintain euvolemia  Esophageal dysphagia  Assessment & Plan  Patient reported no appetite for 2 days  Loss of appetite could be secondary to antibiotic  · Per speech therapy, patient at high risk for aspiration despite the level of diet  Currently on pureed diet with thin liquids with medications crushed with puree  · GI recommended to consider PEG tube if recurrent aspirations  · Continue pantoprazole daily  · Obstructive series showed barium throughout the colon with modest amount of stool along the rectosigmoid region  · Discussed with patient's daughter, wants to hold off on PEG placement    Discussed with her that patient is at high risk for recurrent aspiration pneumonia      Left upper quadrant abdominal mass  Assessment & Plan  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  · Holding off on CT abdomen/pelvis with contrast due to worsening creatinine at this time      Constipation  Assessment & Plan  Patient with constipation  Continue MiraLax, senna q h s  · Patient was also given Dulcolax suppository, soapsuds enema previously  BPH (benign prostatic hyperplasia)  Assessment & Plan  Continue Flomax, Cardura  · PVR acceptable    Hyperlipidemia  Assessment & Plan  Continue statin    Elevated troponin  Assessment & Plan  Troponin [phone]  Elevated troponin in recent admission as well  Reports chest pressure when in respiratory distress  Denies history of CAD  · EKG no ischemic changes, read as AFib, rate 103, RBBB per prior provider  · No history of AFib  Prior EKG shows sinus rhythm with PACs /PVCs  repeat EKG showed sinus rhythm  · Recent 2D echo showed EF low normal, normal wall motion, grade 2 diastolic dysfunction, moderately dilated atriums  · Most likely non MI troponin elevation due to # 1 and CHF      Thrush  Assessment & Plan  Patient completed 7 days of nystatin suspension  · Continues to have white plaques  · Patient was given Diflucan 200 milligrams 1 dose and being continued on Diflucan 100 milligram p o  Daily for total of 10 days    Type 2 diabetes mellitus [facility])  Assessment & Plan  Lab Results   Component Value Date    HGBA1C 5 7 (H) 11/16/2022       Recent Labs     11/30/22  1614 11/30/22  2053 12/01/22  0153 12/01/22  0709   POCGLU 157* 244* 141* 101     Patient was discharged on Prandin t i d  With meals  · Continue Prandin, Humalog sliding scale with Accu-Cheks q a c  And HS  · Currently on diabetic pureed Diet with thin liquids  Continue Lantus 10 units q h s      Stage 3 chronic kidney disease [facility])  Assessment & Plan  Lab Results   Component Value Date    EGFR 12 2022    EGFR 12 2022    EGFR 14 2022    CREATININE 3 90 (H) 2022    CREATININE 3 77 (H) 2022    CREATININE 3 43 (H) 2022     History of CKD 4, baseline creatinine appears to be around 1 5-1 9 in past 2 years in care everywhere  Creatinine during previous hospitalization ranged in 2 4-2 8  · Possible CKD progression  · Urinalysis was bland  · Received IV Lasix 40 mg in ED  · Creatinine continues to worsen  Received IV fluids for 10 hours per Nephrology on   · Restart IV fluids per Nephrology again today  · Renal ultrasound showed no hydronephrosis moderate left renal atrophy  · Nephrology input appreciated    Primary hypertension  Assessment & Plan  Continue Coreg, hydralazine, Norvasc, Imdur, Cardura   · Blood pressures overall acceptable        VTE Pharmacologic Prophylaxis:   Heparin    Patient Centered Rounds: I performed bedside rounds with nursing staff today  Discussions with Specialists or Other Care Team Provider: yes - renal, pulm    Education and Discussions with Family / Patient: Updated  (daughter) via phone  Time Spent for Care: 30 minutes  More than 50% of total time spent on counseling and coordination of care as described above  Current Length of Stay: 9 day(s)  Current Patient Status: Inpatient   Certification Statement: The patient will continue to require additional inpatient hospital stay due to CKD stage 4 with worsening creatinine, fever  Discharge Plan: Anticipate discharge in 48-72 hrs to rehab facility  Code Status: Level 1 - Full Code    Subjective:     Patient sleepy today    Reports feeling extremely tired    Objective:     Vitals:   Temp (24hrs), Av 5 °F (37 5 °C), Min:98 6 °F (37 °C), Max:101 1 °F (38 4 °C)    Temp:  [98 6 °F (37 °C)-101 1 °F (38 4 °C)] 99 4 °F (37 4 °C)  HR:  [55-68] 65  Resp:  [18] 18  BP: (147-176)/(52-97) 176/97  SpO2:  [91 %-99 %] 91 %  Body mass index is 30 29 kg/m²  Input and Output Summary (last 24 hours): Intake/Output Summary (Last 24 hours) at 12/1/2022 1042  Last data filed at 12/1/2022 0901  Gross per 24 hour   Intake --   Output 675 ml   Net -675 ml       Physical Exam:   Physical Exam  Vitals reviewed  Constitutional:       General: He is not in acute distress  Appearance: He is ill-appearing (Chronically)  He is not toxic-appearing  Comments: Sleepy but easily arousable and answering questions   HENT:      Head: Normocephalic and atraumatic  Eyes:      General:         Right eye: No discharge  Left eye: No discharge  Cardiovascular:      Rate and Rhythm: Normal rate and regular rhythm  Pulmonary:      Effort: Pulmonary effort is normal  No respiratory distress  Breath sounds: No wheezing or rales  Comments: decreased breath sounds bilaterally  Abdominal:      General: Bowel sounds are normal  There is no distension  Palpations: Abdomen is soft  Tenderness: There is no abdominal tenderness           Additional Data:     Labs:  Results from last 7 days   Lab Units 11/30/22  0634 11/29/22  0544   WBC Thousand/uL 14 73* 14 54*   HEMOGLOBIN g/dL 8 2* 8 4*   HEMATOCRIT % 26 2* 26 3*   PLATELETS Thousands/uL 219 211   NEUTROS PCT %  --  76*   LYMPHS PCT %  --  11*   MONOS PCT %  --  9   EOS PCT %  --  3     Results from last 7 days   Lab Units 12/01/22  0521   SODIUM mmol/L 139   POTASSIUM mmol/L 4 8   CHLORIDE mmol/L 101   CO2 mmol/L 31   BUN mg/dL 123*   CREATININE mg/dL 3 90*   ANION GAP mmol/L 7   CALCIUM mg/dL 8 3   GLUCOSE RANDOM mg/dL 105         Results from last 7 days   Lab Units 12/01/22  0709 12/01/22  0153 11/30/22  2053 11/30/22  1614 11/30/22  1044 11/30/22  0712 11/30/22  0155 11/29/22  2104 11/29/22  1538 11/29/22  1107 11/29/22  0732 11/29/22  0202   POC GLUCOSE mg/dl 101 141* 244* 157* 185* 114 127 199* 228* 206* 78 107         Results from last 7 days   Lab Units 11/29/22  0544 11/27/22  1245 11/25/22  0450   PROCALCITONIN ng/ml 0 63* 0 36* 0 44*       Lines/Drains:  Invasive Devices     Peripheral Intravenous Line  Duration           Peripheral IV 11/30/22 Dorsal (posterior); Left Hand <1 day                Imaging: No pertinent imaging reviewed      Recent Cultures (last 7 days):   Results from last 7 days   Lab Units 11/28/22  1502   SPUTUM CULTURE  2+ Growth of   GRAM STAIN RESULT  Rare Epithelial cells per low power field*  1+ Polys*  1+ Gram negative rods*  Rare Gram positive rods*  Rare Gram positive cocci in pairs*       Last 24 Hours Medication List:   Current Facility-Administered Medications   Medication Dose Route Frequency Provider Last Rate   • acetaminophen  650 mg Oral Q6H PRN KEISHA Newberry     • amLODIPine  10 mg Oral Daily KEISHA Newberry     • aspirin  81 mg Oral Daily KEISHA Newberry     • atorvastatin  40 mg Oral Daily KEISHA Newberry     • benzonatate  200 mg Oral TID Emogene KEISHA Perez     • bisacodyl  10 mg Rectal Daily PRN Katie Gallo MD     • carvedilol  12 5 mg Oral BID With Meals KEISHA Newberry     • cholecalciferol  2,000 Units Oral Daily KEISHA Newberry     • vitamin B-12  500 mcg Oral Daily KEISHA Newberry     • doxazosin  2 mg Oral HS KEISHA Newberry     • fluconazole  100 mg Oral Daily Katie Gallo MD     • gabapentin  100 mg Oral BID KEISHA Newberry     • heparin (porcine)  5,000 Units Subcutaneous Q8H [facility] KEISHA Newberry     • hydrALAZINE  100 mg Oral TID Timmy Bruce MD     • hydrocodone-chlorpheniramine polistirex  5 mL Oral HS Katie Gallo MD     • insulin glargine  10 Units Subcutaneous HS Katie Gallo MD     • insulin lispro  1-5 Units Subcutaneous TID AC KEISHA Newberry     • insulin lispro  1-5 Units Subcutaneous 0200 KEISHA Newberry     • insulin lispro  1-5 Units Subcutaneous HS KEISHA Newberry     • ipratropium  0 5 mg Nebulization TID KEISHA Newberry     • isosorbide mononitrate  60 mg Oral Daily KEISHA Newberry     • labetalol  10 mg Intravenous Q6H PRN Tiera Gardner PA-C     • levalbuterol  0 63 mg Nebulization Q4H PRN KEISHA Newberry     • levalbuterol  1 25 mg Nebulization TID KEISHA Newberry      And   • sodium chloride  3 mL Nebulization TID KEISHA Newberry     • multi-electrolyte  75 mL/hr Intravenous Continuous Jamaal Singh MD     • nystatin   Topical BID KEISHA Newberry     • ondansetron  4 mg Intravenous Q6H PRN KEISHA Newberry     • pantoprazole  40 mg Oral Early Morning KEISHA Newberry     • polyethylene glycol  17 g Oral Daily Jeanne Ivory MD     • repaglinide  0 5 mg Oral TID AC Steve Yan MD     • senna  1 tablet Oral HS Jeanne Ivory MD     • simethicone  80 mg Oral 4x Daily PRN KEISHA Newberry     • tamsulosin  0 4 mg Oral Daily With Dinner Rajendra and KEISHA Peñaloza          Today, Patient Was Seen By: Jie Carmona DO    **Please Note: This note may have been constructed using a voice recognition system  **

## 2022-12-01 NOTE — PLAN OF CARE
Problem: RESPIRATORY - ADULT  Goal: Achieves optimal ventilation and oxygenation  Description: INTERVENTIONS:  - Assess for changes in respiratory status  - Assess for changes in mentation and behavior  - Position to facilitate oxygenation and minimize respiratory effort  - Oxygen administered by appropriate delivery if ordered  - Initiate smoking cessation education as indicated  - Encourage broncho-pulmonary hygiene including cough, deep breathe, Incentive Spirometry  - Assess the need for suctioning and aspirate as needed  - Assess and instruct to report SOB or any respiratory difficulty  - Respiratory Therapy support as indicated  Outcome: Progressing     Problem: Prexisting or High Potential for Compromised Skin Integrity  Goal: Skin integrity is maintained or improved  Description: INTERVENTIONS:  - Identify patients at risk for skin breakdown  - Assess and monitor skin integrity  - Assess and monitor nutrition and hydration status  - Monitor labs   - Assess for incontinence   - Turn and reposition patient  - Assist with mobility/ambulation  - Relieve pressure over bony prominences  - Avoid friction and shearing  - Provide appropriate hygiene as needed including keeping skin clean and dry  - Evaluate need for skin moisturizer/barrier cream  - Collaborate with interdisciplinary team   - Patient/family teaching  - Consider wound care consult   Outcome: Progressing     Problem: MOBILITY - ADULT  Goal: Maintain or return to baseline ADL function  Description: INTERVENTIONS:  -  Assess patient's ability to carry out ADLs; assess patient's baseline for ADL function and identify physical deficits which impact ability to perform ADLs (bathing, care of mouth/teeth, toileting, grooming, dressing, etc )  - Assess/evaluate cause of self-care deficits   - Assess range of motion  - Assess patient's mobility; develop plan if impaired  - Assess patient's need for assistive devices and provide as appropriate  - Encourage maximum independence but intervene and supervise when necessary  - Involve family in performance of ADLs  - Assess for home care needs following discharge   - Consider OT consult to assist with ADL evaluation and planning for discharge  - Provide patient education as appropriate  Outcome: Progressing  Goal: Maintains/Returns to pre admission functional level  Description: INTERVENTIONS:  - Perform BMAT or MOVE assessment daily    - Set and communicate daily mobility goal to care team and patient/family/caregiver  - Collaborate with rehabilitation services on mobility goals if consulted  - Perform Range of Motion 3 times a day  - Reposition patient every 3 hours    - Dangle patient 3 times a day  - Stand patient 3 times a day  - Ambulate patient 3 times a day  - Out of bed to chair 3 times a day   - Out of bed for meals 3 times a day  - Out of bed for toileting  - Record patient progress and toleration of activity level   Outcome: Progressing     Problem: Potential for Falls  Goal: Patient will remain free of falls  Description: INTERVENTIONS:  - Educate patient/family on patient safety including physical limitations  - Instruct patient to call for assistance with activity   - Consult OT/PT to assist with strengthening/mobility   - Keep Call bell within reach  - Keep bed low and locked with side rails adjusted as appropriate  - Keep care items and personal belongings within reach  - Initiate and maintain comfort rounds  - Make Fall Risk Sign visible to staff  - Offer Toileting every 2 Hours, in advance of need  - Initiate/Maintain 1alarm  - Obtain necessary fall risk management equipment: call bell use  - Apply yellow socks and bracelet for high fall risk patients  - Consider moving patient to room near nurses station  Outcome: Progressing     Problem: PAIN - ADULT  Goal: Verbalizes/displays adequate comfort level or baseline comfort level  Description: Interventions:  - Encourage patient to monitor pain and request assistance  - Assess pain using appropriate pain scale  - Administer analgesics based on type and severity of pain and evaluate response  - Implement non-pharmacological measures as appropriate and evaluate response  - Consider cultural and social influences on pain and pain management  - Notify physician/advanced practitioner if interventions unsuccessful or patient reports new pain  Outcome: Progressing     Problem: INFECTION - ADULT  Goal: Absence or prevention of progression during hospitalization  Description: INTERVENTIONS:  - Assess and monitor for signs and symptoms of infection  - Monitor lab/diagnostic results  - Monitor all insertion sites, i e  indwelling lines, tubes, and drains  - Monitor endotracheal if appropriate and nasal secretions for changes in amount and color  - [place] appropriate cooling/warming therapies per order  - Administer medications as ordered  - Instruct and encourage patient and family to use good hand hygiene technique  - Identify and instruct in appropriate isolation precautions for identified infection/condition  Outcome: Progressing  Goal: Absence of fever/infection during neutropenic period  Description: INTERVENTIONS:  - Monitor WBC    Outcome: Progressing     Problem: SAFETY ADULT  Goal: Maintain or return to baseline ADL function  Description: INTERVENTIONS:  -  Assess patient's ability to carry out ADLs; assess patient's baseline for ADL function and identify physical deficits which impact ability to perform ADLs (bathing, care of mouth/teeth, toileting, grooming, dressing, etc )  - Assess/evaluate cause of self-care deficits   - Assess range of motion  - Assess patient's mobility; develop plan if impaired  - Assess patient's need for assistive devices and provide as appropriate  - Encourage maximum independence but intervene and supervise when necessary  - Involve family in performance of ADLs  - Assess for home care needs following discharge   - Consider OT consult to assist with ADL evaluation and planning for discharge  - Provide patient education as appropriate  Outcome: Progressing  Goal: Maintains/Returns to pre admission functional level  Description: INTERVENTIONS:  - Perform BMAT or MOVE assessment daily    - Set and communicate daily mobility goal to care team and patient/family/caregiver  - Collaborate with rehabilitation services on mobility goals if consulted  - Perform Range of Motion 3 times a day  - Reposition patient every 3 hours    - Dangle patient 3 times a day  - Stand patient 3 times a day  - Ambulate patient 3 times a day  - Out of bed to chair 3 times a day   - Out of bed for meals 3 times a day  - Out of bed for toileting  - Record patient progress and toleration of activity level   Outcome: Progressing  Goal: Patient will remain free of falls  Description: INTERVENTIONS:  - Educate patient/family on patient safety including physical limitations  - Instruct patient to call for assistance with activity   - Consult OT/PT to assist with strengthening/mobility   - Keep Call bell within reach  - Keep bed low and locked with side rails adjusted as appropriate  - Keep care items and personal belongings within reach  - Initiate and maintain comfort rounds  - Make Fall Risk Sign visible to staff  - Offer Toileting every 2 Hours, in advance of need  - Initiate/Maintain 1alarm  - Obtain necessary fall risk management equipment: call bell   - Apply yellow socks and bracelet for high fall risk patients  - Consider moving patient to room near nurses station  Outcome: Progressing     Problem: DISCHARGE PLANNING  Goal: Discharge to home or other facility with appropriate resources  Description: INTERVENTIONS:  - Identify barriers to discharge w/patient and caregiver  - Arrange for needed discharge resources and transportation as appropriate  - Identify discharge learning needs (meds, wound care, etc )  - Arrange for interpretive services to assist at discharge as needed  - Refer to Case Management Department for coordinating discharge planning if the patient needs post-hospital services based on physician/advanced practitioner order or complex needs related to functional status, cognitive ability, or social support system  Outcome: Progressing     Problem: Knowledge Deficit  Goal: Patient/family/caregiver demonstrates understanding of disease process, treatment plan, medications, and discharge instructions  Description: Complete learning assessment and assess knowledge base  Interventions:  - Provide teaching at level of understanding  - Provide teaching via preferred learning methods  Outcome: Progressing     Problem: Nutrition/Hydration-ADULT  Goal: Nutrient/Hydration intake appropriate for improving, restoring or maintaining nutritional needs  Description: Monitor and assess patient's nutrition/hydration status for malnutrition  Collaborate with interdisciplinary team and initiate plan and interventions as ordered  Monitor patient's weight and dietary intake as ordered or per policy  Utilize nutrition screening tool and intervene as necessary  Determine patient's food preferences and provide high-protein, high-caloric foods as appropriate       INTERVENTIONS:  - Monitor oral intake, urinary output, labs, and treatment plans  - Assess nutrition and hydration status and recommend course of action  - Evaluate amount of meals eaten  - Assist patient with eating if necessary   - Allow adequate time for meals  - Recommend/ encourage appropriate diets, oral nutritional supplements, and vitamin/mineral supplements  - Order, calculate, and assess calorie counts as needed  - Recommend, monitor, and adjust tube feedings and TPN/PPN based on assessed needs  - Assess need for intravenous fluids  - Provide specific nutrition/hydration education as appropriate  - Include patient/family/caregiver in decisions related to nutrition  Outcome: Progressing

## 2022-12-01 NOTE — PROGRESS NOTES
Progress Note - Pulmonary   Chrystine Bob 80 y o  male MRN: [id number]  Unit/Bed#: [address] [address] Encounter: [id number]    Assessment/Plan:    Acute hypoxic respiratory failure, multifactorial due to below              Titrate oxygen to maintain saturations greater than or equal to 89%              Pulmonary hygiene with nebulizers and chest PT as needed      Acute on chronic diastolic CHF with underlying CKD IV with SHAHID              Diuresis per primary team and Nephrology              Monitor I/O and daily weights      Abnormal CT chest with pulmonary edema and bilateral pleural effusions with suspected multifocal pneumonia with probable aspiration at nursing facility              Completed course of cefepime, but now with recurrent fever  ID consult is pending  Diuresis as above  Repeat chest x-ray pending      Oropharyngeal dysphagia with esophageal dysmotility              Speech therapy and diet per primary team               Aspiration precautions  Peg tube has been deferred at this time  Chief Complaint/Subjective:    Cristin Singh is resting in bed getting a breathing treatment  He shakes his head no when asked if he is short of breath or has pain  He is in no acute distress and appears comfortable  Objective:    Vitals: Blood pressure 153/54, pulse (!) 51, temperature 99 °F (37 2 °C), resp  rate 18, height 5' 5" (1 651 m), weight 82 6 kg (182 lb), SpO2 (!) 89 %  1L NC,Body mass index is 30 29 kg/m²  Intake/Output Summary (Last 24 hours) at 12/1/2022 1341  Last data filed at 12/1/2022 0901  Gross per 24 hour   Intake --   Output 675 ml   Net -675 ml       Invasive Devices     Peripheral Intravenous Line  Duration           Peripheral IV 11/30/22 Dorsal (posterior); Left Hand <1 day                Physical Exam:     Physical Exam  Vitals reviewed  Constitutional:       General: He is not in acute distress  Appearance: He is well-developed and overweight   He is not toxic-appearing or diaphoretic  Interventions: Nasal cannula in place  HENT:      Head: Normocephalic and atraumatic  Eyes:      General: No scleral icterus  Extraocular Movements: EOM normal    Neck:      Trachea: No tracheal deviation  Cardiovascular:      Rate and Rhythm: Normal rate and regular rhythm  Heart sounds: S1 normal and S2 normal  No murmur heard  No friction rub  No gallop  Pulmonary:      Effort: Pulmonary effort is normal  No tachypnea, accessory muscle usage or respiratory distress  Breath sounds: No stridor  Decreased breath sounds present  No wheezing, rhonchi or rales  Chest:      Chest wall: No tenderness  Abdominal:      General: Bowel sounds are normal  There is no distension  Palpations: Abdomen is soft  Tenderness: There is no abdominal tenderness  Musculoskeletal:         General: No tenderness or edema  Cervical back: Neck supple  Skin:     General: Skin is warm and dry  Findings: No rash  Nails: There is no cyanosis  Neurological:      Mental Status: He is alert  GCS: GCS eye subscore is 4  GCS verbal subscore is 5  GCS motor subscore is 6  Motor: Motor strength is normal    Psychiatric:         Mood and Affect: Mood and affect normal          Speech: Speech normal          Behavior: Behavior is cooperative         Labs:   CMP:   Lab Results   Component Value Date    SODIUM 139 12/01/2022    K 4 8 12/01/2022     12/01/2022    CO2 31 12/01/2022     (H) 12/01/2022    CREATININE 3 90 (H) 12/01/2022    CALCIUM 8 3 12/01/2022    EGFR 12 12/01/2022     Viral culture negative    Blood cultures x 2 pending    Imaging and other studies: CXR pending

## 2022-12-01 NOTE — ASSESSMENT & PLAN NOTE
Wt Readings from Last 3 Encounters:   11/30/22 85 kg (187 lb 6 3 oz)   11/22/22 84 5 kg (186 lb 4 8 oz)     Patient received IV Lasix in recent admission  · Chest x-ray upon admission shows worsening pulmonary edema  · Patient received 40 milligrams of Lasix IV in the ED and was continued on  IV Lasix  Then transitioned to [facility] which is on hold  · Left upper extremity venous Doppler showed no evidence of DVT  · CT chest showed pulmonary edema with moderate size bilateral pleural effusions left more than right  · Might need accept higher creatinine to maintain euvolemia

## 2022-12-01 NOTE — ASSESSMENT & PLAN NOTE
Lab Results   Component Value Date    HGBA1C 5 7 (H) 11/16/2022       Recent Labs     11/30/22  0712 11/30/22  1044 11/30/22  1614 11/30/22 2053   POCGLU 114 185* 157* 244*     Patient was discharged on Prandin t i d  With meals  · Continue Humalog sliding scale with Accu-Cheks q a c  And HS  · Currently on diabetic pureed Diet with thin liquids  Continue Lantus 10 units q h s   As blood sugars were high  · Continue Prandin 0 5 milligram p o  B i d

## 2022-12-01 NOTE — ASSESSMENT & PLAN NOTE
Lab Results   Component Value Date    HGBA1C 5 7 (H) 11/16/2022       Recent Labs     11/30/22  1614 11/30/22 2053 12/01/22  0153 12/01/22  0709   POCGLU 157* 244* 141* 101     Patient was discharged on Prandin t i d  With meals  · Continue Prandin, Humalog sliding scale with Accu-Cheks q a c  And HS  · Currently on diabetic pureed Diet with thin liquids  Continue Lantus 10 units q h s

## 2022-12-01 NOTE — ASSESSMENT & PLAN NOTE
Patient completed 7 days of nystatin suspension  · Continues to have white plaques  · Patient was given Diflucan 200 milligrams 1 dose and being continued on Diflucan 100 milligram p o   Daily for total of 10 days

## 2022-12-01 NOTE — ASSESSMENT & PLAN NOTE
Patient presented with acute respiratory distress shortly after eating a few spoonful of puree diet in STR, patient reported chest pressure and SOB  Patient was satting 85-95% on oxygen 3 L per STR transfer paper  Patient was belching after eating/drinking limited amount of diet/water per staff  · Patient was discharged on the day of admission after being hospitalized for aspiration pneumonia, acute on chronic diastolic CHF, dysphagia  Patient received 7 days of IV Rocephin and Flagyl  Received IV Lasix  Seen by GI, underwent EGD, found to have oropharyngeal dysphagia with esophageal dysmotility, no obvious esophageal stricture  Underwent Barium swallow study which showed moderate to severe esophageal dysmotility with significant residual contrast remaining in the esophagus at the end of the study  Patient was recommended regular diet by speech and discharged on regular diet per STR  · Likely secondary to multifocal pneumonia and also CHF  · Patient required BiPAP on ED arrival   ABG post BiPAP essentially unremarkable  Later patient was titrated down to 6 liters oxygen upon admission  Currently on 1 liter oxygen  · Chest x-ray - Pulmonary edema, worse when comparing to 11/19/2022  Underlying infection could not be excluded  · ProBNP 15,094  · Patient received Lasix 40 IV in ED  · Received cefepime and Flagyl in ED  completed 7 days of IV cefepime  · Received IV Solu-Medrol in ED  No wheezing on auscultation    · Continue nebs  · CT of the chest showed pulmonary edema with moderate size bilateral pleural effusions with significant bibasilar atelectasis and concomitant multifocal pneumonia   · Obstructive series showed persistent pulmonary edema with multifocal pneumonia

## 2022-12-02 LAB
ANION GAP SERPL CALCULATED.3IONS-SCNC: 7 MMOL/L (ref 4–13)
BUN SERPL-MCNC: 117 MG/DL (ref 5–25)
CALCIUM SERPL-MCNC: 8.3 MG/DL (ref 8.3–10.1)
CHLORIDE SERPL-SCNC: 103 MMOL/L (ref 96–108)
CO2 SERPL-SCNC: 31 MMOL/L (ref 21–32)
CREAT SERPL-MCNC: 3.87 MG/DL (ref 0.6–1.3)
ERYTHROCYTE [DISTWIDTH] IN BLOOD BY AUTOMATED COUNT: 16.2 % (ref 11.6–15.1)
GFR SERPL CREATININE-BSD FRML MDRD: 12 ML/MIN/1.73SQ M
GLUCOSE SERPL-MCNC: 107 MG/DL (ref 65–140)
GLUCOSE SERPL-MCNC: 109 MG/DL (ref 65–140)
GLUCOSE SERPL-MCNC: 118 MG/DL (ref 65–140)
GLUCOSE SERPL-MCNC: 206 MG/DL (ref 65–140)
GLUCOSE SERPL-MCNC: 73 MG/DL (ref 65–140)
GLUCOSE SERPL-MCNC: 98 MG/DL (ref 65–140)
HCT VFR BLD AUTO: 25.3 % (ref 36.5–49.3)
HGB BLD-MCNC: 8 G/DL (ref 12–17)
MCH RBC QN AUTO: 29.6 PG (ref 26.8–34.3)
MCHC RBC AUTO-ENTMCNC: 31.6 G/DL (ref 31.4–37.4)
MCV RBC AUTO: 94 FL (ref 82–98)
PLATELET # BLD AUTO: 233 THOUSANDS/UL (ref 149–390)
PMV BLD AUTO: 11.8 FL (ref 8.9–12.7)
POTASSIUM SERPL-SCNC: 4.7 MMOL/L (ref 3.5–5.3)
PROCALCITONIN SERPL-MCNC: 0.34 NG/ML
RBC # BLD AUTO: 2.7 MILLION/UL (ref 3.88–5.62)
SODIUM SERPL-SCNC: 141 MMOL/L (ref 135–147)
WBC # BLD AUTO: 13.15 THOUSAND/UL (ref 4.31–10.16)

## 2022-12-02 RX ORDER — DOXAZOSIN 2 MG/1
4 TABLET ORAL
Status: DISCONTINUED | OUTPATIENT
Start: 2022-12-02 | End: 2022-12-02

## 2022-12-02 RX ORDER — TAMSULOSIN HYDROCHLORIDE 0.4 MG/1
0.4 CAPSULE ORAL
Status: DISCONTINUED | OUTPATIENT
Start: 2022-12-03 | End: 2022-12-10 | Stop reason: HOSPADM

## 2022-12-02 RX ORDER — GUAIFENESIN/DEXTROMETHORPHAN 100-10MG/5
10 SYRUP ORAL EVERY 6 HOURS PRN
Status: DISCONTINUED | OUTPATIENT
Start: 2022-12-02 | End: 2022-12-10 | Stop reason: HOSPADM

## 2022-12-02 RX ORDER — POLYETHYLENE GLYCOL 3350 17 G/17G
17 POWDER, FOR SOLUTION ORAL 2 TIMES DAILY
Status: DISCONTINUED | OUTPATIENT
Start: 2022-12-02 | End: 2022-12-10 | Stop reason: HOSPADM

## 2022-12-02 RX ADMIN — INSULIN GLARGINE 10 UNITS: 100 INJECTION, SOLUTION SUBCUTANEOUS at 22:33

## 2022-12-02 RX ADMIN — AMLODIPINE BESYLATE 10 MG: 10 TABLET ORAL at 08:33

## 2022-12-02 RX ADMIN — IPRATROPIUM BROMIDE 0.5 MG: 0.5 SOLUTION RESPIRATORY (INHALATION) at 14:39

## 2022-12-02 RX ADMIN — LEVALBUTEROL HYDROCHLORIDE 1.25 MG: 1.25 SOLUTION, CONCENTRATE RESPIRATORY (INHALATION) at 14:39

## 2022-12-02 RX ADMIN — ISODIUM CHLORIDE 3 ML: 0.03 SOLUTION RESPIRATORY (INHALATION) at 14:39

## 2022-12-02 RX ADMIN — SENNOSIDES 8.6 MG: 8.6 TABLET, FILM COATED ORAL at 22:30

## 2022-12-02 RX ADMIN — GUAIFENESIN AND DEXTROMETHORPHAN 10 ML: 100; 10 SYRUP ORAL at 03:03

## 2022-12-02 RX ADMIN — LEVALBUTEROL HYDROCHLORIDE 1.25 MG: 1.25 SOLUTION, CONCENTRATE RESPIRATORY (INHALATION) at 08:13

## 2022-12-02 RX ADMIN — BENZONATATE 200 MG: 100 CAPSULE ORAL at 22:30

## 2022-12-02 RX ADMIN — ATORVASTATIN CALCIUM 40 MG: 40 TABLET, FILM COATED ORAL at 08:33

## 2022-12-02 RX ADMIN — NYSTATIN: 100000 POWDER TOPICAL at 08:34

## 2022-12-02 RX ADMIN — HEPARIN SODIUM 5000 UNITS: 5000 INJECTION INTRAVENOUS; SUBCUTANEOUS at 14:31

## 2022-12-02 RX ADMIN — Medication 5 ML: at 22:29

## 2022-12-02 RX ADMIN — GABAPENTIN 100 MG: 100 CAPSULE ORAL at 08:33

## 2022-12-02 RX ADMIN — HYDRALAZINE HYDROCHLORIDE 100 MG: 25 TABLET ORAL at 08:33

## 2022-12-02 RX ADMIN — Medication 2000 UNITS: at 08:33

## 2022-12-02 RX ADMIN — REPAGLINIDE 0.5 MG: 1 TABLET ORAL at 08:34

## 2022-12-02 RX ADMIN — ISODIUM CHLORIDE 3 ML: 0.03 SOLUTION RESPIRATORY (INHALATION) at 08:13

## 2022-12-02 RX ADMIN — POLYETHYLENE GLYCOL 3350 17 G: 17 POWDER, FOR SOLUTION ORAL at 08:33

## 2022-12-02 RX ADMIN — GABAPENTIN 100 MG: 100 CAPSULE ORAL at 17:16

## 2022-12-02 RX ADMIN — BENZONATATE 200 MG: 100 CAPSULE ORAL at 17:16

## 2022-12-02 RX ADMIN — HEPARIN SODIUM 5000 UNITS: 5000 INJECTION INTRAVENOUS; SUBCUTANEOUS at 05:23

## 2022-12-02 RX ADMIN — IPRATROPIUM BROMIDE 0.5 MG: 0.5 SOLUTION RESPIRATORY (INHALATION) at 08:13

## 2022-12-02 RX ADMIN — INSULIN LISPRO 1 UNITS: 100 INJECTION, SOLUTION INTRAVENOUS; SUBCUTANEOUS at 22:32

## 2022-12-02 RX ADMIN — ISODIUM CHLORIDE 3 ML: 0.03 SOLUTION RESPIRATORY (INHALATION) at 20:41

## 2022-12-02 RX ADMIN — PANTOPRAZOLE SODIUM 40 MG: 40 TABLET, DELAYED RELEASE ORAL at 05:23

## 2022-12-02 RX ADMIN — REPAGLINIDE 0.5 MG: 1 TABLET ORAL at 12:01

## 2022-12-02 RX ADMIN — HEPARIN SODIUM 5000 UNITS: 5000 INJECTION INTRAVENOUS; SUBCUTANEOUS at 22:30

## 2022-12-02 RX ADMIN — HYDRALAZINE HYDROCHLORIDE 100 MG: 25 TABLET ORAL at 17:16

## 2022-12-02 RX ADMIN — HYDRALAZINE HYDROCHLORIDE 100 MG: 25 TABLET ORAL at 22:31

## 2022-12-02 RX ADMIN — CYANOCOBALAMIN TAB 500 MCG 500 MCG: 500 TAB at 08:33

## 2022-12-02 RX ADMIN — CARVEDILOL 12.5 MG: 12.5 TABLET, FILM COATED ORAL at 17:16

## 2022-12-02 RX ADMIN — ISOSORBIDE MONONITRATE 60 MG: 60 TABLET, EXTENDED RELEASE ORAL at 08:33

## 2022-12-02 RX ADMIN — LEVALBUTEROL HYDROCHLORIDE 1.25 MG: 1.25 SOLUTION, CONCENTRATE RESPIRATORY (INHALATION) at 20:41

## 2022-12-02 RX ADMIN — POLYETHYLENE GLYCOL 3350 17 G: 17 POWDER, FOR SOLUTION ORAL at 22:31

## 2022-12-02 RX ADMIN — NYSTATIN: 100000 POWDER TOPICAL at 17:16

## 2022-12-02 RX ADMIN — BISACODYL 10 MG: 10 SUPPOSITORY RECTAL at 12:06

## 2022-12-02 RX ADMIN — IPRATROPIUM BROMIDE 0.5 MG: 0.5 SOLUTION RESPIRATORY (INHALATION) at 20:41

## 2022-12-02 RX ADMIN — SODIUM CHLORIDE, SODIUM GLUCONATE, SODIUM ACETATE, POTASSIUM CHLORIDE AND MAGNESIUM CHLORIDE 50 ML/HR: 526; 502; 368; 37; 30 INJECTION, SOLUTION INTRAVENOUS at 17:16

## 2022-12-02 RX ADMIN — ASPIRIN 81 MG CHEWABLE TABLET 81 MG: 81 TABLET CHEWABLE at 08:33

## 2022-12-02 RX ADMIN — BENZONATATE 200 MG: 100 CAPSULE ORAL at 08:32

## 2022-12-02 RX ADMIN — FLUCONAZOLE 100 MG: 100 TABLET ORAL at 08:33

## 2022-12-02 RX ADMIN — CARVEDILOL 12.5 MG: 12.5 TABLET, FILM COATED ORAL at 08:33

## 2022-12-02 NOTE — ASSESSMENT & PLAN NOTE
Lab Results   Component Value Date    HGBA1C 5 7 (H) 11/16/2022       Recent Labs     12/02/22  0155 12/02/22  0716 12/02/22  1142 12/02/22  1558   POCGLU 118 107 98 73     Patient was discharged on Prandin t i d  With meals  · Continue Prandin, Humalog sliding scale with Accu-Cheks q a c  And HS  · Currently on diabetic pureed Diet with thin liquids  Continue Lantus 10 units q h s

## 2022-12-02 NOTE — CASE MANAGEMENT
Case Management Discharge Planning Note    Patient name Diamante Christianson  [address] [address] [address] MRN [id number]  : 10/19/1929 Date 2022       Current Admission Date: 2022  Current Admission Diagnosis:Acute respiratory failure with hypoxia [facility])   Patient Active Problem List    Diagnosis Date Noted   • Constipation 2022   • Left upper quadrant abdominal mass 2022   • Chronic kidney disease    • Acute respiratory failure with hypoxia ([address] ) 2022   • Acute on chronic diastolic congestive heart failure ([address] ) 2022   • BPH (benign prostatic hyperplasia) 2022   • Acute diastolic (congestive) heart failure ([address] ) 2022   • PVCs (premature ventricular contractions) 2022   • Hyperlipidemia 2022   • Primary hypertension 11/15/2022   • Aspiration pneumonia ([facility] ) 11/15/2022   • CHF (congestive heart failure) ([facility] [address] [facility] ) 11/15/2022   • Stage 3 chronic kidney disease ([facility] ) 11/15/2022   • Type 2 diabetes mellitus ([facility] [address] [facility] ) 11/15/2022   • Esophageal dysphagia 11/15/2022   • Thrush 11/15/2022   • Elevated troponin 11/15/2022      LOS (days): 10  Geometric Mean LOS (GMLOS) (days): 5 20  Days to GMLOS:-4 5     OBJECTIVE:  Risk of Unplanned Readmission Score: 23 63         Current admission status: Inpatient   Preferred Pharmacy:   [facility] [address] [place]  [address] [id number]  Phone: [phone] Fax: [phone]    Primary Care Provider: Moo Hassan PA-C    Primary Insurance: MEDICARE  Secondary Insurance: [employer] OF [place]    DISCHARGE DETAILS:    Discharge planning discussed with[de-identified] Richard Campuzano of Choice: Yes  Comments - Freedom of Choice: SW following to assist with DCP  STR placement is planned  SW met with daughter to review plan and obtain facility choice  Daughter is requesting rehab placement at [facility] when discharged    Daughter's plan is for pt to return to [facility] [place] after rehab  CM contacted family/caregiver?: Yes  Were Treatment Team discharge recommendations reviewed with patient/caregiver?: Yes  Did patient/caregiver verbalize understanding of patient care needs?: N/A- going to facility  Were patient/caregiver advised of the risks associated with not following Treatment Team discharge recommendations?: Yes    Contacts  Patient Contacts: Maximiliano  (dtr)  Relationship to Patient[de-identified] Family  Contact Method: In Person  Reason/Outcome: Discharge Planning    Other Referral/Resources/Interventions Provided:  Interventions: Short Term Rehab  Referral Comments: [place] reserved in [address] spoke with Paul Jackson from [place] about daughter's choice and she will talk with admissions about availability over weekend  Treatment Team Recommendation: Short Term Rehab  Discharge Destination Plan[de-identified] Short Term Rehab  Transport at Discharge : BLS Ambulance    IMM Given (Date):: 12/02/22  IMM Given to[de-identified] Family (IMM reviewed with daughter  Daughter verbalized understanding  Daughter signed IMM and copy given   Copy also placed in scan bin for chart )

## 2022-12-02 NOTE — PROGRESS NOTES
Progress Note - Pulmonary   Tamia Baker 80 y o  male MRN: [id number]  Unit/Bed#: [address] [address] Encounter: [id number]    Assessment/Plan:    Acute hypoxic respiratory failure, multifactorial due to below              Titrate oxygen to maintain saturations greater than or equal to 89%              Pulmonary hygiene with nebulizers and chest PT as needed      Acute on chronic diastolic CHF with underlying CKD IV with SHAHID              Diuresis per primary team and Nephrology              Monitor I/O and daily weights      Abnormal CT chest with pulmonary edema and bilateral pleural effusions with multifocal pneumonia and risk for recurrent aspiration              Completed course of cefepime               Diuresis as above     Suspect aspiration events will be ongoing given dysphagia  Monitor off antibiotics for now per ID      Oropharyngeal dysphagia with esophageal dysmotility              Speech therapy and diet per primary team               Aspiration precautions               Peg tube has been deferred at this time  Will see again 12/05/2022 if still admitted  Please call with questions or concerns in the interim  Chief Complaint:    “I am better ”    Subjective:    Nuvia Combs is resting in bed between disturbances  Upon entering the room, he awakens easily  He reports he feels his breathing is better  Has a loose cough  He denies any specific complaints  Objective:    Vitals: Blood pressure 150/63, pulse 64, temperature 99 °F (37 2 °C), resp  rate 18, height 5' 5" (1 651 m), weight 83 2 kg (183 lb 6 8 oz), SpO2 92 %  1L NC,Body mass index is 30 52 kg/m²  Intake/Output Summary (Last 24 hours) at 12/2/2022 1207  Last data filed at 12/2/2022 1101  Gross per 24 hour   Intake --   Output 950 ml   Net -950 ml       Invasive Devices     Peripheral Intravenous Line  Duration           Peripheral IV 11/30/22 Dorsal (posterior); Left Hand 1 day                Physical Exam:     Physical Exam  Vitals reviewed  Constitutional:       General: He is not in acute distress  Appearance: He is well-developed and overweight  He is not toxic-appearing or diaphoretic  Interventions: Nasal cannula in place  HENT:      Head: Normocephalic and atraumatic  Eyes:      General: No scleral icterus  Extraocular Movements: EOM normal    Neck:      Trachea: No tracheal deviation  Cardiovascular:      Rate and Rhythm: Normal rate and regular rhythm  Heart sounds: S1 normal and S2 normal  No murmur heard  No friction rub  No gallop  Pulmonary:      Effort: Pulmonary effort is normal  No tachypnea, accessory muscle usage or respiratory distress  Breath sounds: No stridor  Rhonchi present  No decreased breath sounds, wheezing or rales  Chest:      Chest wall: No tenderness  Abdominal:      General: Bowel sounds are normal  There is no distension  Palpations: Abdomen is soft  Tenderness: There is no abdominal tenderness  Musculoskeletal:         General: No tenderness or edema  Cervical back: Neck supple  Skin:     General: Skin is warm and dry  Findings: No rash  Nails: There is no cyanosis  Neurological:      Mental Status: He is alert and oriented to person, place, and time  GCS: GCS eye subscore is 4  GCS verbal subscore is 5  GCS motor subscore is 6  Motor: Motor strength is normal    Psychiatric:         Mood and Affect: Mood and affect normal          Speech: Speech normal          Behavior: Behavior normal  Behavior is cooperative         Labs:   CBC:   Lab Results   Component Value Date    WBC 13 15 (H) 12/02/2022    HGB 8 0 (L) 12/02/2022    HCT 25 3 (L) 12/02/2022    MCV 94 12/02/2022     12/02/2022    MCH 29 6 12/02/2022    MCHC 31 6 12/02/2022    RDW 16 2 (H) 12/02/2022    MPV 11 8 12/02/2022   , CMP:   Lab Results   Component Value Date    SODIUM 141 12/02/2022    K 4 7 12/02/2022     12/02/2022    CO2 31 12/02/2022     (H) 12/02/2022    CREATININE 3 87 (H) 12/02/2022    CALCIUM 8 3 12/02/2022    EGFR 12 12/02/2022     Procalcitonin 0 34    Imaging and other studies: I have personally reviewed pertinent reports     and CXR done yesterday shows waxing and waning opacities

## 2022-12-02 NOTE — PLAN OF CARE
Problem: MOBILITY - ADULT  Goal: Maintain or return to baseline ADL function  Description: INTERVENTIONS:  -  Assess patient's ability to carry out ADLs; assess patient's baseline for ADL function and identify physical deficits which impact ability to perform ADLs (bathing, care of mouth/teeth, toileting, grooming, dressing, etc )  - Assess/evaluate cause of self-care deficits   - Assess range of motion  - Assess patient's mobility; develop plan if impaired  - Assess patient's need for assistive devices and provide as appropriate  - Encourage maximum independence but intervene and supervise when necessary  - Involve family in performance of ADLs  - Assess for home care needs following discharge   - Consider OT consult to assist with ADL evaluation and planning for discharge  - Provide patient education as appropriate  Outcome: Progressing     Problem: SAFETY ADULT  Goal: Maintain or return to baseline ADL function  Description: INTERVENTIONS:  -  Assess patient's ability to carry out ADLs; assess patient's baseline for ADL function and identify physical deficits which impact ability to perform ADLs (bathing, care of mouth/teeth, toileting, grooming, dressing, etc )  - Assess/evaluate cause of self-care deficits   - Assess range of motion  - Assess patient's mobility; develop plan if impaired  - Assess patient's need for assistive devices and provide as appropriate  - Encourage maximum independence but intervene and supervise when necessary  - Involve family in performance of ADLs  - Assess for home care needs following discharge   - Consider OT consult to assist with ADL evaluation and planning for discharge  - Provide patient education as appropriate  Outcome: Progressing     Problem: PAIN - ADULT  Goal: Verbalizes/displays adequate comfort level or baseline comfort level  Description: Interventions:  - Encourage patient to monitor pain and request assistance  - Assess pain using appropriate pain scale  - Administer analgesics based on type and severity of pain and evaluate response  - Implement non-pharmacological measures as appropriate and evaluate response  - Consider cultural and social influences on pain and pain management  - Notify physician/advanced practitioner if interventions unsuccessful or patient reports new pain  Outcome: Progressing

## 2022-12-02 NOTE — ASSESSMENT & PLAN NOTE
Patient presented with acute respiratory distress shortly after eating a few spoonful of puree diet in STR, patient reported chest pressure and SOB  Patient was satting 85-95% on oxygen 3 L per STR transfer paper  Patient was belching after eating/drinking limited amount of diet/water per staff  · Patient was discharged on the day of admission after being hospitalized for aspiration pneumonia, acute on chronic diastolic CHF, dysphagia  Patient received 7 days of IV Rocephin and Flagyl  Received IV Lasix  Seen by GI, underwent EGD, found to have oropharyngeal dysphagia with esophageal dysmotility, no obvious esophageal stricture  Underwent Barium swallow study which showed moderate to severe esophageal dysmotility with significant residual contrast remaining in the esophagus at the end of the study  Patient was recommended regular diet by speech and discharged on regular diet per STR  · Likely secondary to multifocal pneumonia and also CHF  · Patient required BiPAP on ED arrival   ABG post BiPAP essentially unremarkable  Then titrated down to 6 liters oxygen upon admission  Currently on 1 liter oxygen  · Chest x-ray - Pulmonary edema, worse when comparing to 11/19/2022  Underlying infection could not be excluded  · ProBNP 15,094  · Patient received Lasix 40 IV in ED  · Received cefepime and Flagyl in ED  Patient has completed 7 days of IV cefepime  · Received IV Solu-Medrol in ED  No wheezing on auscultation    · CT of the chest showed pulmonary edema with moderate size bilateral pleural effusions with significant bibasilar atelectasis and concomitant multifocal pneumonia   · Obstructive series showed persistent pulmonary edema with multifocal pneumonia

## 2022-12-02 NOTE — ASSESSMENT & PLAN NOTE
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

## 2022-12-02 NOTE — ASSESSMENT & PLAN NOTE
Continue Flomax, Cardura  · Patient with urinary retention requiring multiple straight cath    Per RN patient also with penile swelling and therefore Ayala catheter placed  · Urology consulted

## 2022-12-02 NOTE — PLAN OF CARE
Problem: RESPIRATORY - ADULT  Goal: Achieves optimal ventilation and oxygenation  Description: INTERVENTIONS:  - Assess for changes in respiratory status  - Assess for changes in mentation and behavior  - Position to facilitate oxygenation and minimize respiratory effort  - Oxygen administered by appropriate delivery if ordered  - Initiate smoking cessation education as indicated  - Encourage broncho-pulmonary hygiene including cough, deep breathe, Incentive Spirometry  - Assess the need for suctioning and aspirate as needed  - Assess and instruct to report SOB or any respiratory difficulty  - Respiratory Therapy support as indicated  Outcome: Progressing     Problem: Prexisting or High Potential for Compromised Skin Integrity  Goal: Skin integrity is maintained or improved  Description: INTERVENTIONS:  - Identify patients at risk for skin breakdown  - Assess and monitor skin integrity  - Assess and monitor nutrition and hydration status  - Monitor labs   - Assess for incontinence   - Turn and reposition patient  - Assist with mobility/ambulation  - Relieve pressure over bony prominences  - Avoid friction and shearing  - Provide appropriate hygiene as needed including keeping skin clean and dry  - Evaluate need for skin moisturizer/barrier cream  - Collaborate with interdisciplinary team   - Patient/family teaching  - Consider wound care consult   Outcome: Progressing     Problem: MOBILITY - ADULT  Goal: Maintain or return to baseline ADL function  Description: INTERVENTIONS:  -  Assess patient's ability to carry out ADLs; assess patient's baseline for ADL function and identify physical deficits which impact ability to perform ADLs (bathing, care of mouth/teeth, toileting, grooming, dressing, etc )  - Assess/evaluate cause of self-care deficits   - Assess range of motion  - Assess patient's mobility; develop plan if impaired  - Assess patient's need for assistive devices and provide as appropriate  - Encourage maximum independence but intervene and supervise when necessary  - Involve family in performance of ADLs  - Assess for home care needs following discharge   - Consider OT consult to assist with ADL evaluation and planning for discharge  - Provide patient education as appropriate  Outcome: Progressing  Goal: Maintains/Returns to pre admission functional level  Description: INTERVENTIONS:  - Perform BMAT or MOVE assessment daily    - Set and communicate daily mobility goal to care team and patient/family/caregiver  - Collaborate with rehabilitation services on mobility goals if consulted  - Perform Range of Motion 3 times a day  - Reposition patient every 3 hours    - Dangle patient 3 times a day  - Stand patient 3 times a day  - Ambulate patient 3 times a day  - Out of bed to chair 3 times a day   - Out of bed for meals 3 times a day  - Out of bed for toileting  - Record patient progress and toleration of activity level   Outcome: Progressing     Problem: Potential for Falls  Goal: Patient will remain free of falls  Description: INTERVENTIONS:  - Educate patient/family on patient safety including physical limitations  - Instruct patient to call for assistance with activity   - Consult OT/PT to assist with strengthening/mobility   - Keep Call bell within reach  - Keep bed low and locked with side rails adjusted as appropriate  - Keep care items and personal belongings within reach  - Initiate and maintain comfort rounds  - Make Fall Risk Sign visible to staff  - Offer Toileting every 3 Hours, in advance of need  - Initiate/Maintain 2alarm  - Obtain necessary fall risk management equipment: call bell use, bed alarm  - Apply yellow socks and bracelet for high fall risk patients  - Consider moving patient to room near nurses station  Outcome: Progressing     Problem: PAIN - ADULT  Goal: Verbalizes/displays adequate comfort level or baseline comfort level  Description: Interventions:  - Encourage patient to monitor pain and request assistance  - Assess pain using appropriate pain scale  - Administer analgesics based on type and severity of pain and evaluate response  - Implement non-pharmacological measures as appropriate and evaluate response  - Consider cultural and social influences on pain and pain management  - Notify physician/advanced practitioner if interventions unsuccessful or patient reports new pain  Outcome: Progressing     Problem: INFECTION - ADULT  Goal: Absence or prevention of progression during hospitalization  Description: INTERVENTIONS:  - Assess and monitor for signs and symptoms of infection  - Monitor lab/diagnostic results  - Monitor all insertion sites, i e  indwelling lines, tubes, and drains  - Monitor endotracheal if appropriate and nasal secretions for changes in amount and color  - [place] appropriate cooling/warming therapies per order  - Administer medications as ordered  - Instruct and encourage patient and family to use good hand hygiene technique  - Identify and instruct in appropriate isolation precautions for identified infection/condition  Outcome: Progressing  Goal: Absence of fever/infection during neutropenic period  Description: INTERVENTIONS:  - Monitor WBC    Outcome: Progressing     Problem: SAFETY ADULT  Goal: Maintain or return to baseline ADL function  Description: INTERVENTIONS:  -  Assess patient's ability to carry out ADLs; assess patient's baseline for ADL function and identify physical deficits which impact ability to perform ADLs (bathing, care of mouth/teeth, toileting, grooming, dressing, etc )  - Assess/evaluate cause of self-care deficits   - Assess range of motion  - Assess patient's mobility; develop plan if impaired  - Assess patient's need for assistive devices and provide as appropriate  - Encourage maximum independence but intervene and supervise when necessary  - Involve family in performance of ADLs  - Assess for home care needs following discharge   - Consider OT consult to assist with ADL evaluation and planning for discharge  - Provide patient education as appropriate  Outcome: Progressing  Goal: Maintains/Returns to pre admission functional level  Description: INTERVENTIONS:  - Perform BMAT or MOVE assessment daily    - Set and communicate daily mobility goal to care team and patient/family/caregiver  - Collaborate with rehabilitation services on mobility goals if consulted  - Perform Range of Motion 3 times a day  - Reposition patient every 3 hours    - Dangle patient 3 times a day  - Stand patient 3 times a day  - Ambulate patient 3 times a day  - Out of bed to chair 3 times a day   - Out of bed for meals 3 times a day  - Out of bed for toileting  - Record patient progress and toleration of activity level   Outcome: Progressing  Goal: Patient will remain free of falls  Description: INTERVENTIONS:  - Educate patient/family on patient safety including physical limitations  - Instruct patient to call for assistance with activity   - Consult OT/PT to assist with strengthening/mobility   - Keep Call bell within reach  - Keep bed low and locked with side rails adjusted as appropriate  - Keep care items and personal belongings within reach  - Initiate and maintain comfort rounds  - Make Fall Risk Sign visible to staff  - Offer Toileting every 2 Hours, in advance of need  - Initiate/Maintain 1alarm  - Obtain necessary fall risk management equipment: call bell use  - Apply yellow socks and bracelet for high fall risk patients  - Consider moving patient to room near nurses station  Outcome: Progressing     Problem: DISCHARGE PLANNING  Goal: Discharge to home or other facility with appropriate resources  Description: INTERVENTIONS:  - Identify barriers to discharge w/patient and caregiver  - Arrange for needed discharge resources and transportation as appropriate  - Identify discharge learning needs (meds, wound care, etc )  - Arrange for interpretive services to assist at discharge as needed  - Refer to Case Management Department for coordinating discharge planning if the patient needs post-hospital services based on physician/advanced practitioner order or complex needs related to functional status, cognitive ability, or social support system  Outcome: Progressing     Problem: Knowledge Deficit  Goal: Patient/family/caregiver demonstrates understanding of disease process, treatment plan, medications, and discharge instructions  Description: Complete learning assessment and assess knowledge base  Interventions:  - Provide teaching at level of understanding  - Provide teaching via preferred learning methods  Outcome: Progressing     Problem: Nutrition/Hydration-ADULT  Goal: Nutrient/Hydration intake appropriate for improving, restoring or maintaining nutritional needs  Description: Monitor and assess patient's nutrition/hydration status for malnutrition  Collaborate with interdisciplinary team and initiate plan and interventions as ordered  Monitor patient's weight and dietary intake as ordered or per policy  Utilize nutrition screening tool and intervene as necessary  Determine patient's food preferences and provide high-protein, high-caloric foods as appropriate       INTERVENTIONS:  - Monitor oral intake, urinary output, labs, and treatment plans  - Assess nutrition and hydration status and recommend course of action  - Evaluate amount of meals eaten  - Assist patient with eating if necessary   - Allow adequate time for meals  - Recommend/ encourage appropriate diets, oral nutritional supplements, and vitamin/mineral supplements  - Order, calculate, and assess calorie counts as needed  - Assess need for intravenous fluids  - Provide nutrition/hydration education as appropriate  - Include patient/family/caregiver in decisions related to nutrition  Outcome: Progressing

## 2022-12-02 NOTE — PROGRESS NOTES
NEPHROLOGY PROGRESS NOTE   Lorena Kasper 80 y o  male MRN: [id number]  Unit/Bed#: [address] [place] [address] Encounter: [id number]    ASSESSMENT & PLAN:  80-year-old male with history of diabetes mellitus, hypertension, chronic kidney disease, CHF, esophageal dysmotility presented to [facility] after presenting with shortness of breath  Nephrology consulted for CKD  Recent hospital admission between November 15 to November 22 for respiratory failure due to pneumonia and fluid overload  Was discharged on oral Lasix 20 mg daily after treatment with IV Lasix  Discharge creatinine was around 2 68  Now again presenting with shortness of breath and nephrology consulted for worsening creatinine which was on admission 2 83  Elevated creatinine on chronic kidney disease stage 3  -baseline creatinine from 2021 was around 1 6-2 0  -admission creatinine 2 83 with use of oral Lasix since last hospital admission   -possibly CKD progression from cardiorenal syndrome and age-related nephron loss  -UA was bland, renal ultrasound without hydronephrosis but finding of left renal atrophy   -in the setting of diuresis, renal function has worsened during the hospital stay to peak cr 3 9 mg/dL on 12/1     -Patient was getting treatment with oral torsemide 40 mg daily which was stopped on 11/29 in the setting of creatinine worsening  Was also given IV fluid 500 mL on 11/30 but still renal function continue to worsen to creatinine 3 9 on 12/1 after which was started on IV Isolyte at 75 mL/hour     -was considering possibility of intravascular volume depletion as well as AIN but urine eosinophil was only 0% so less likely, patient was also taken off all antibiotics and last dose of cefepime on 11/29   -renal function improving today to creatinine 3 87 with IV fluids  Clinically volume status acceptable continue IV fluid but decrease rate to 50 mL/hour  monitor for in respiratory distress      Primary hypertension  -blood pressure elevated to systolic 739Q  -dose of hydralazine was increased to 100 mg t i d  On 11/27, continue at current dose  Continue amlodipine 10 mg daily, carvedilol 12 5 mg twice daily,  Imdur 60 mg once daily  -due to elevated blood pressure increase doxazosin to 4 mg q h s  On 12/02    Fluid overload/acute on chronic diastolic CHF  -CT was suggestive of pulmonary edema with moderate size pleural effusion left more than right  -volume status has improved status post IV Lasix and then was transitioned to oral torsemide 40 mg daily but with worsening of renal function on 11/29, torsemide was held  Continue to hold torsemide for now till renal function improves  May need to consider lower dose 20 mg daily once renal function improves back to creatinine 2 7-2 8 but currently in the setting of creatinine elevated than baseline would continue IV fluid as mentioned above    Aspiration pneumonia, status post antibiotic last dose on 11/29    Shortness of breath likely combination of fluid overload as well as aspiration pneumonia-improving status post diuresis    BPH-on doxazosin stopped Flomax as already on doxazosin and increasing the dose of doxazosin    Esophageal dysphagia:  Noted discussion for PEG tube placement  Anemia, low at 8 0 g dL, continue to monitor    Left upper quadrant mass:  Renal ultrasound suggestive of solid hypoechoic mass in the left upper quadrant medial to the spleen as seen on previous CT scan   -recommend workup and management per primary team   CT scan on hold due to barium in the GI tract, also discussed with primary team about risk of contrast nephropathy  if plan for CT with IV contrast ,  may need to discuss with patient and recommend pre and post contrast hydration      Discussed with primary team    Nephrology Disposition Recommendations  Not medically stable for discharge from a Nephrology Standpoint Patient continues to require treatment for sirisha      SUBJECTIVE:  Patient denies any shortness of breath, chest pain nausea vomiting  Had fever on 12/01     OBJECTIVE:  Current Weight: Weight - Scale: 83 2 kg (183 lb 6 8 oz)  Vitals:    12/02/22 0814   BP:    Pulse:    Resp:    Temp:    SpO2: 92%       Intake/Output Summary (Last 24 hours) at 12/2/2022 0901  Last data filed at 12/1/2022 2256  Gross per 24 hour   Intake --   Output 850 ml   Net -850 ml       Physical Exam  General:  Ill looking, awake  On oxygen by NC   Eyes: Conjunctivae pink,  Sclera anicteric  ENT: lips and mucous membranes moist  Neck: supple   Chest: Clear to Auscultation both lungs,  no crackles, ronchus or wheezing  CVS: S1 & S2 present, normal rate, regular rhythm, no murmur    Abdomen: soft, non-tender, non-distended, Bowel sounds normoactive  Extremities: no edema of  legs  Skin: no rash  Neuro: awake, alert, oriented x 3   Psych: Mood and affect appropriate     Medications:    Current Facility-Administered Medications:   •  acetaminophen (TYLENOL) tablet 650 mg, 650 mg, Oral, Q6H PRN, APOLONIA NewberryNP, 650 mg at 12/01/22 0808  •  amLODIPine (NORVASC) tablet 10 mg, 10 mg, Oral, Daily, APOLONIA NewberryNP, 10 mg at 12/02/22 9701  •  aspirin chewable tablet 81 mg, 81 mg, Oral, Daily, Migdaliaidoc Zunigarik, CRNP, 81 mg at 12/02/22 0134  •  atorvastatin (LIPITOR) tablet 40 mg, 40 mg, Oral, Daily, Migdaliaidoc Zunigarik CRNP, 40 mg at 12/02/22 2707  •  benzonatate (TESSALON PERLES) capsule 200 mg, 200 mg, Oral, TID, Dow Oppenheim, CRNP, 200 mg at 12/02/22 6002  •  bisacodyl (DULCOLAX) rectal suppository 10 mg, 10 mg, Rectal, Daily PRN, Evelina Agarwal MD, 10 mg at 11/28/22 1038  •  carvedilol (COREG) tablet 12 5 mg, 12 5 mg, Oral, BID With Meals, KEISHA Newberry, 12 5 mg at 12/02/22 3684  •  cholecalciferol (VITAMIN D3) tablet 2,000 Units, 2,000 Units, Oral, Daily, KEISHA Newberry, 2,000 Units at 12/02/22 4207  •  cyanocobalamin (VITAMIN B-12) tablet 500 mcg, 500 mcg, Oral, Daily, KEISHA Newberry, 500 mcg at 12/02/22 8432  • dextromethorphan-guaiFENesin (ROBITUSSIN DM) oral syrup 10 mL, 10 mL, Oral, Q6H PRN, KEISHA Newberry, 10 mL at 12/02/22 0303  •  doxazosin (CARDURA) tablet 2 mg, 2 mg, Oral, HS, KEISHA Newberry, 2 mg at 12/01/22 2211  •  fluconazole (DIFLUCAN) tablet 100 mg, 100 mg, Oral, Daily, Steve Yan MD, 100 mg at 12/02/22 8274  •  gabapentin (NEURONTIN) capsule 100 mg, 100 mg, Oral, BID, KEISHA Newberry, 100 mg at 12/02/22 8647  •  heparin (porcine) subcutaneous injection 5,000 Units, 5,000 Units, Subcutaneous, Q8H [address], 5,000 Units at 12/02/22 0523 **AND** [CANCELED] Platelet count, , , Once, KEISHA Newberry  •  hydrALAZINE (APRESOLINE) tablet 100 mg, 100 mg, Oral, TID, Timmy Bruce MD, 100 mg at 12/02/22 8275  •  hydrocodone-chlorpheniramine polistirex (TUSSIONEX) ER suspension 5 mL, 5 mL, Oral, HS, Steve Yan MD, 5 mL at 12/01/22 2211  •  insulin glargine (LANTUS) subcutaneous injection 10 Units 0 1 mL, 10 Units, Subcutaneous, HS, Steve aYn MD, 10 Units at 12/01/22 2211  •  insulin lispro (HumaLOG) 100 units/mL subcutaneous injection 1-5 Units, 1-5 Units, Subcutaneous, TID AC, 1 Units at 11/30/22 1726 **AND** Fingerstick Glucose (POCT), , , TID AC, KEISHA Newberry  •  insulin lispro (HumaLOG) 100 units/mL subcutaneous injection 1-5 Units, 1-5 Units, Subcutaneous, 0200, KEISHA Newberry, 1 Units at 11/27/22 0218  •  insulin lispro (HumaLOG) 100 units/mL subcutaneous injection 1-5 Units, 1-5 Units, Subcutaneous, HS, KEISHA Newberry, 2 Units at 11/30/22 2132  •  ipratropium (ATROVENT) 0 02 % inhalation solution 0 5 mg, 0 5 mg, Nebulization, TID, KEISHA Newberry, 0 5 mg at 12/02/22 0813  •  isosorbide mononitrate (IMDUR) 24 hr tablet 60 mg, 60 mg, Oral, Daily, KEISHA Newberry, 60 mg at 12/02/22 6408  •  labetalol (NORMODYNE) injection 10 mg, 10 mg, Intravenous, Q6H PRN, Agustina Schneider PA-C, 10 mg at 11/24/22 0241  •  levalbuterol (XOPENEX) inhalation solution 0 63 mg, 0 63 mg, Nebulization, Q4H PRN, APOLONIA NewberryNP, 0 63 mg at 12/01/22 0807  •  levalbuterol (XOPENEX) inhalation solution 1 25 mg, 1 25 mg, Nebulization, TID, 1 25 mg at 12/02/22 0813 **AND** sodium chloride 0 9 % inhalation solution 3 mL, 3 mL, Nebulization, TID, APOLONIA NewberryNP, 3 mL at 12/02/22 0813  •  multi-electrolyte (PLASMALYTE-A/ISOLYTE-S PH 7 4) IV solution, 75 mL/hr, Intravenous, Continuous, Anand Jane MD, Last Rate: 75 mL/hr at 12/01/22 2209, 75 mL/hr at 12/01/22 2209  •  nystatin (MYCOSTATIN) powder, , Topical, BID, KEISHA Newberry, Given at 12/02/22 0834  •  ondansetron (ZOFRAN) injection 4 mg, 4 mg, Intravenous, Q6H PRN, KEISHA Newberry, 4 mg at 11/28/22 1038  •  pantoprazole (PROTONIX) EC tablet 40 mg, 40 mg, Oral, Early Morning, KEISHA Newberry, 40 mg at 12/02/22 5571  •  polyethylene glycol (MIRALAX) packet 17 g, 17 g, Oral, Daily, Da Ledbetter MD, 17 g at 12/02/22 4378  •  repaglinide (PRANDIN) tablet 0 5 mg, 0 5 mg, Oral, TID AC, Steve Yan MD, 0 5 mg at 12/02/22 6927  •  senna (SENOKOT) tablet 8 6 mg, 1 tablet, Oral, HS, Da Ledbetter MD, 8 6 mg at 12/01/22 2211  •  tamsulosin (FLOMAX) capsule 0 4 mg, 0 4 mg, Oral, Daily With Dinner, KEISHA Newberry, 0 4 mg at 12/01/22 1713    Invasive Devices:        Lab Results:   Results from last 7 days   Lab Units 12/02/22  0539 12/01/22  0521 11/30/22  0634 11/29/22  0544   WBC Thousand/uL 13 15*  --  14 73* 14 54*   HEMOGLOBIN g/dL 8 0*  --  8 2* 8 4*   HEMATOCRIT % 25 3*  --  26 2* 26 3*   PLATELETS Thousands/uL 233  --  219 211   POTASSIUM mmol/L 4 7 4 8 4 8 4 6   CHLORIDE mmol/L 103 101 101 103   CO2 mmol/L 31 31 32 35*   BUN mg/dL 117* 123* 119* 115*   CREATININE mg/dL 3 87* 3 90* 3 77* 3 43*   CALCIUM mg/dL 8 3 8 3 8 4 8 4       Previous work up:         Portions of the record may have been created with voice recognition software   Occasional wrong word or "sound a like" substitutions may have occurred due to the inherent limitations of voice recognition software  Read the chart carefully and recognize, using context, where substitutions have occurred  If you have any questions, please contact the dictating provider

## 2022-12-02 NOTE — ASSESSMENT & PLAN NOTE
Wt Readings from Last 3 Encounters:   12/02/22 83 2 kg (183 lb 6 8 oz)   11/22/22 84 5 kg (186 lb 4 8 oz)     Patient received IV Lasix in recent admission  · Chest x-ray upon admission shows worsening pulmonary edema  · Received 40 milligrams of Lasix IV in the ED and was continued on  IV Lasix  Then transitioned to [facility] which is currently on hold  · Left upper extremity venous Doppler showed no evidence of DVT  · CT chest showed pulmonary edema with moderate size bilateral pleural effusions left more than right  · Might need accept higher creatinine to maintain euvolemia

## 2022-12-02 NOTE — ASSESSMENT & PLAN NOTE
Lab Results   Component Value Date    EGFR 12 12/02/2022    EGFR 12 12/01/2022    EGFR 12 11/30/2022    CREATININE 3 87 (H) 12/02/2022    CREATININE 3 90 (H) 12/01/2022    CREATININE 3 77 (H) 11/30/2022     History of CKD 4, baseline creatinine appears to be around 1 5-1 9 in past 2 years in care everywhere  Creatinine during previous hospitalization ranged in 2 4-2 8  · Possible CKD progression  · Urinalysis was bland  · Received IV Lasix 40 mg in ED  · Creatinine slightly better today    Received IV fluids for 10 hours per Nephrology on 11/30  · Continue IV fluids  · Renal ultrasound showed no hydronephrosis moderate left renal atrophy  · Nephrology input appreciated

## 2022-12-02 NOTE — PROGRESS NOTES
-- Patient:  -- MRN: [id number]  -- Aidin Request ID: [id number]  -- Level of care reserved: Clark Comer  -- Partner Reserved: [facility], Crist[address]bal Brooke [phone]  -- Clinical needs requested:  -- Geography searched: [place] around [facility]  -- Start of Service:  -- Request sent: 1:48pm EST on 11/25/2022 by Satnam Langley  -- Partner reserved: 4:12pm EST on 12/2/2022 by Pamela Hercules  -- Choice list shared: 12:24pm EST on 12/1/2022 by Pamela Hercules

## 2022-12-02 NOTE — ASSESSMENT & PLAN NOTE
Patient completed 7 days of nystatin suspension  · Started on Diflucan as patient was noted to have white plaques  Diflucan 200 milligrams X 1 dose and completed total of 7 days of treatment with Diflucan 100 milligram p o   Daily

## 2022-12-02 NOTE — ASSESSMENT & PLAN NOTE
Patient with constipation  Continue MiraLax, senna q h s  · Continue current regimen  MiraLax increased to b i d   Dosing

## 2022-12-02 NOTE — ASSESSMENT & PLAN NOTE
Repeat procalcitonin level was 0 54 initially, continues to remain mildly elevated  · Patient received cefepime and Flagyl in the ED   Completed 7 day course of cefepime 1 gram IV daily  · Patient febrile again today to 101 1 on 12/1  procalcitonin mildly elevated, blood cultures negative, COVID/flu/RSV negative, urine culture pending  · Due to recurrent fevers, ID was consulted who recommended monitoring off antibiotics  · Daughter where that patient is at high risk for aspiration events due to his dysphagia  · Sputum culture with mixed respiratory hardik  · Urine for Legionella and pneumococcal antigens were negative  · Pulmonary input appreciated  · Blood cultures negative  · Chest x-ray with multifocal pneumonia which is persistent

## 2022-12-02 NOTE — PROGRESS NOTES
[address]  Progress Note Ingrid Bruce 10/19/1929, 80 y o  male MRN: [id number]  Unit/Bed#: [facility] Encounter: [id number]  Primary Care Provider: Pam Gage PA-C   Date and time admitted to hospital: 11/22/2022  8:41 PM    * Acute respiratory failure with hypoxia [facility])  Assessment & Plan  Patient presented with acute respiratory distress shortly after eating a few spoonful of puree diet in STR, patient reported chest pressure and SOB  Patient was satting 85-95% on oxygen 3 L per STR transfer paper  Patient was belching after eating/drinking limited amount of diet/water per staff  · Patient was discharged on the day of admission after being hospitalized for aspiration pneumonia, acute on chronic diastolic CHF, dysphagia  Patient received 7 days of IV Rocephin and Flagyl  Received IV Lasix  Seen by GI, underwent EGD, found to have oropharyngeal dysphagia with esophageal dysmotility, no obvious esophageal stricture  Underwent Barium swallow study which showed moderate to severe esophageal dysmotility with significant residual contrast remaining in the esophagus at the end of the study  Patient was recommended regular diet by speech and discharged on regular diet per STR  · Likely secondary to multifocal pneumonia and also CHF  · Patient required BiPAP on ED arrival   ABG post BiPAP essentially unremarkable  Then titrated down to 6 liters oxygen upon admission  Currently on 1 liter oxygen  · Chest x-ray - Pulmonary edema, worse when comparing to 11/19/2022  Underlying infection could not be excluded  · ProBNP 15,094  · Patient received Lasix 40 IV in ED  · Received cefepime and Flagyl in ED  Patient has completed 7 days of IV cefepime  · Received IV Solu-Medrol in ED  No wheezing on auscultation    · CT of the chest showed pulmonary edema with moderate size bilateral pleural effusions with significant bibasilar atelectasis and concomitant multifocal pneumonia · Obstructive series showed persistent pulmonary edema with multifocal pneumonia    Aspiration pneumonia (HCC)  Assessment & Plan  Repeat procalcitonin level was 0 54 initially, continues to remain mildly elevated  · Patient received cefepime and Flagyl in the ED   Completed 7 day course of cefepime 1 gram IV daily  · Patient febrile again today to 101 1 on 12/1  procalcitonin mildly elevated, blood cultures negative, COVID/flu/RSV negative, urine culture pending  · Due to recurrent fevers, ID was consulted who recommended monitoring off antibiotics  · Daughter where that patient is at high risk for aspiration events due to his dysphagia  · Sputum culture with mixed respiratory hardik  · Urine for Legionella and pneumococcal antigens were negative  · Pulmonary input appreciated  · Blood cultures negative  · Chest x-ray with multifocal pneumonia which is persistent  Acute on chronic diastolic congestive heart failure (HCC)  Assessment & Plan  Wt Readings from Last 3 Encounters:   12/02/22 83 2 kg (183 lb 6 8 oz)   11/22/22 84 5 kg (186 lb 4 8 oz)     Patient received IV Lasix in recent admission  · Chest x-ray upon admission shows worsening pulmonary edema  · Received 40 milligrams of Lasix IV in the ED and was continued on  IV Lasix  Then transitioned to [facility] which is currently on hold  · Left upper extremity venous Doppler showed no evidence of DVT  · CT chest showed pulmonary edema with moderate size bilateral pleural effusions left more than right  · Might need accept higher creatinine to maintain euvolemia  Esophageal dysphagia  Assessment & Plan  Patient reported no appetite for 2 days  Loss of appetite could be secondary to antibiotic  · Per speech therapy, patient at high risk for aspiration despite the level of diet  Currently on pureed diet with thin liquids with medications crushed with puree  · GI recommended to consider PEG tube if recurrent aspirations     · Continue pantoprazole daily  · Obstructive series showed barium throughout the colon with modest amount of stool along the rectosigmoid region  · Discussed with patient's daughter, wants to hold off on PEG placement  Discussed with her that patient is at high risk for recurrent aspiration pneumonia      Left upper quadrant abdominal mass  Assessment & Plan  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  · Discussed with radiology as patient cannot get IV contrast for CT scan and radiologist recommended getting MRI for further evaluation  · Per nephrology okay to obtain MRI with gadolinium using class 2 gadolinium agents    Constipation  Assessment & Plan  Patient with constipation  Continue MiraLax, senna q h s  · Continue current regimen  MiraLax increased to b i d  Dosing      BPH (benign prostatic hyperplasia)  Assessment & Plan  Continue Flomax, Cardura  · Patient with urinary retention requiring multiple straight cath  Per RN patient also with penile swelling and therefore Ayala catheter placed  · Urology consulted    Hyperlipidemia  Assessment & Plan  Continue statin  Elevated troponin  Assessment & Plan  Troponin [id number]  Elevated troponin in recent admission as well  Reports chest pressure when in respiratory distress  Denies history of CAD  · EKG no ischemic changes, read as AFib, rate 103, RBBB per prior provider  · No history of AFib  Prior EKG shows sinus rhythm with PACs /PVCs  repeat EKG showed sinus rhythm  · Recent 2D echo showed EF low normal, normal wall motion, grade 2 diastolic dysfunction, moderately dilated atriums  · Most likely non MI troponin elevation due to # 1 and CHF      Thrush  Assessment & Plan  Patient completed 7 days of nystatin suspension  · Started on Diflucan as patient was noted to have white plaques  Diflucan 200 milligrams X 1 dose and completed total of 7 days of treatment with Diflucan 100 milligram p o   Daily     Type 2 diabetes mellitus [facility])  Assessment & Plan  Lab Results   Component Value Date    HGBA1C 5 7 (H) 11/16/2022       Recent Labs     12/02/22  0155 12/02/22  0716 12/02/22  1142 12/02/22  1558   POCGLU 118 107 98 73     Patient was discharged on Prandin t i d  With meals  · Continue Prandin, Humalog sliding scale with Accu-Cheks q a c  And HS  · Currently on diabetic pureed Diet with thin liquids  Continue Lantus 10 units q h s  Stage 3 chronic kidney disease [facility])  Assessment & Plan  Lab Results   Component Value Date    EGFR 12 12/02/2022    EGFR 12 12/01/2022    EGFR 12 11/30/2022    CREATININE 3 87 (H) 12/02/2022    CREATININE 3 90 (H) 12/01/2022    CREATININE 3 77 (H) 11/30/2022     History of CKD 4, baseline creatinine appears to be around 1 5-1 9 in past 2 years in care everywhere  Creatinine during previous hospitalization ranged in 2 4-2 8  · Possible CKD progression  · Urinalysis was bland  · Received IV Lasix 40 mg in ED  · Creatinine slightly better today  Received IV fluids for 10 hours per Nephrology on 11/30  · Continue IV fluids  · Renal ultrasound showed no hydronephrosis moderate left renal atrophy  · Nephrology input appreciated    Primary hypertension  Assessment & Plan  Continue Coreg, hydralazine, Norvasc, Imdur, Cardura   · Blood pressures overall acceptable  VTE Pharmacologic Prophylaxis:   Heparin    Patient Centered Rounds: I performed bedside rounds with nursing staff today  Discussions with Specialists or Other Care Team Provider: yes - renal, radiology, urology    Education and Discussions with Family / Patient: Updated  (daughter) via phone  Time Spent for Care: 30 minutes  More than 50% of total time spent on counseling and coordination of care as described above      Current Length of Stay: 10 day(s)  Current Patient Status: Inpatient   Certification Statement: The patient will continue to require additional inpatient hospital stay due to States 3 chronic kidney disease with worsening creatinine compared to baseline requiring IV fluids  Discharge Plan: Anticipate discharge in 48-72 hrs to rehab facility  Code Status: Level 1 - Full Code    Subjective:     Patient states he is feeling better  Denies any abdominal pain    Objective:     Vitals:   Temp (24hrs), Av 8 °F (37 1 °C), Min:97 2 °F (36 2 °C), Max:99 4 °F (37 4 °C)    Temp:  [97 2 °F (36 2 °C)-99 4 °F (37 4 °C)] 99 °F (37 2 °C)  HR:  [51-66] 64  Resp:  [16-18] 18  BP: (150-156)/(54-63) 150/63  SpO2:  [89 %-98 %] 92 %  Body mass index is 30 52 kg/m²  Input and Output Summary (last 24 hours): Intake/Output Summary (Last 24 hours) at 2022 1000  Last data filed at 2022 2256  Gross per 24 hour   Intake --   Output 850 ml   Net -850 ml       Physical Exam:   Physical Exam  Vitals reviewed  Constitutional:       General: He is not in acute distress  Appearance: He is ill-appearing (Chronically)  HENT:      Head: Normocephalic and atraumatic  Eyes:      General:         Right eye: No discharge  Left eye: No discharge  Cardiovascular:      Rate and Rhythm: Normal rate and regular rhythm  Pulmonary:      Effort: Pulmonary effort is normal  No respiratory distress  Breath sounds: No wheezing or rales  Comments: Decreased breath sounds bilaterally  Abdominal:      General: Bowel sounds are normal  There is no distension  Palpations: Abdomen is soft  Tenderness: There is no abdominal tenderness  Neurological:      Mental Status: He is alert  Additional Data:     Labs:  Results from last 7 days   Lab Units 22  0539 22  0634 22  0544   WBC Thousand/uL 13 15*   < > 14 54*   HEMOGLOBIN g/dL 8 0*   < > 8 4*   HEMATOCRIT % 25 3*   < > 26 3*   PLATELETS Thousands/uL 233   < > 211   NEUTROS PCT %  --   --  76*   LYMPHS PCT %  --   --  11*   MONOS PCT %  --   --  9   EOS PCT %  --   --  3    < > = values in this interval not displayed  Results from last 7 days   Lab Units 12/02/22  0539   SODIUM mmol/L 141   POTASSIUM mmol/L 4 7   CHLORIDE mmol/L 103   CO2 mmol/L 31   BUN mg/dL 117*   CREATININE mg/dL 3 87*   ANION GAP mmol/L 7   CALCIUM mg/dL 8 3   GLUCOSE RANDOM mg/dL 109         Results from last 7 days   Lab Units 12/02/22  0716 12/02/22  0155 12/01/22  2055 12/01/22  1604 12/01/22  1116 12/01/22  0709 12/01/22  0153 11/30/22  2053 11/30/22  1614 11/30/22  1044 11/30/22  0712 11/30/22  0155   POC GLUCOSE mg/dl 107 118 104 105 112 101 141* 244* 157* 185* 114 127         Results from last 7 days   Lab Units 12/02/22  0539 11/29/22  0544 11/27/22  0523   PROCALCITONIN ng/ml 0 34* 0 63* 0 36*       Lines/Drains:  Invasive Devices     Peripheral Intravenous Line  Duration           Peripheral IV 11/30/22 Dorsal (posterior); Left Hand 1 day                      Imaging: Reviewed radiology reports from this admission including: chest xray    Recent Cultures (last 7 days):   Results from last 7 days   Lab Units 12/01/22  1043 11/28/22  1502   BLOOD CULTURE  Received in Microbiology Lab  Culture in Progress  Received in Microbiology Lab  Culture in Progress    --    SPUTUM CULTURE   --  2+ Growth of   GRAM STAIN RESULT   --  Rare Epithelial cells per low power field*  1+ Polys*  1+ Gram negative rods*  Rare Gram positive rods*  Rare Gram positive cocci in pairs*       Last 24 Hours Medication List:   Current Facility-Administered Medications   Medication Dose Route Frequency Provider Last Rate   • acetaminophen  650 mg Oral Q6H PRN KEISHA Newberry     • amLODIPine  10 mg Oral Daily KEISHA Newberry     • aspirin  81 mg Oral Daily KEISAH Newberry     • atorvastatin  40 mg Oral Daily KEISHA Newberry     • benzonatate  200 mg Oral TID KEISHA Lares     • bisacodyl  10 mg Rectal Daily PRN Steve Yan MD     • carvedilol  12 5 mg Oral BID With Meals KEISHA Newberry     • cholecalciferol  2,000 Units Oral Daily Cuiyin Presley Denver     • vitamin B-12  500 mcg Oral Daily KEISHA Newberry     • dextromethorphan-guaiFENesin  10 mL Oral Q6H PRN KEISHA Newberry     • doxazosin  4 mg Oral HS Oumou Reinoso MD     • gabapentin  100 mg Oral BID KEISHA Newberry     • heparin (porcine)  5,000 Units Subcutaneous Q8H [address] KEISHA Newberry     • hydrALAZINE  100 mg Oral TID Aleida Odonnell MD     • hydrocodone-chlorpheniramine polistirex  5 mL Oral HS Tata Yao MD     • insulin glargine  10 Units Subcutaneous HS Tata Yao MD     • insulin lispro  1-5 Units Subcutaneous TID AC KEISHA Newberry     • insulin lispro  1-5 Units Subcutaneous 0200 KEISHA Newberry     • insulin lispro  1-5 Units Subcutaneous HS KEISHA Newberry     • ipratropium  0 5 mg Nebulization TID KEISHA Newberry     • isosorbide mononitrate  60 mg Oral Daily KEISHA Newberry     • labetalol  10 mg Intravenous Q6H PRN Negro Wallace PA-C     • levalbuterol  0 63 mg Nebulization Q4H PRN KEISHA Newberry     • levalbuterol  1 25 mg Nebulization TID KEISHA Newberry      And   • sodium chloride  3 mL Nebulization TID KEISHA Newberry     • multi-electrolyte  50 mL/hr Intravenous Continuous Oumou Reinoso MD 50 mL/hr (12/02/22 4072)   • nystatin   Topical BID KEISHA Newberry     • ondansetron  4 mg Intravenous Q6H PRN KEISHA Newberry     • pantoprazole  40 mg Oral Early Morning KEISHA Newberry     • polyethylene glycol  17 g Oral Daily Tata Yao MD     • repaglinide  0 5 mg Oral TID AC Tata Yao MD     • senna  1 tablet Oral HS Tata Yao MD          Today, Patient Was Seen By: Jessy Garcia DO    **Please Note: This note may have been constructed using a voice recognition system  **

## 2022-12-02 NOTE — PROGRESS NOTES
Progress Note - Infectious Disease   Victor Hugo Porras 80 y o  male MRN: [id number]  Unit/Bed#: [address] [place] [address] Encounter: [id number]      Impression/Plan:  1   SIRS with fever, leukocytosis  Cultures negative  CT chest with bilateral effusions and atelectasis  In setting of recent high risk aspiration and esophageal dysmotility identified  Despite 11 days of IV antibiotics, intermittent fevers and leukocytosis persist  No fever overnight, white count trending down  -continue monitoring off antibiotics  -finish 1 week of fluconazole for thrush on 12 2 22  -follow-up 12/01/2022 repeat blood cultures   -monitor temperature and hemodynamics  -serial exam  -monitor serial labs  -aspiration precautions      2  Acute hypoxic respiratory failure, multi factorial including acute on chronic CHF, aspiration risk, dysphagia  12/02/2022 portable chest x-ray:  Airspace disease increased in the right upper lobe and decreased in left lower lobe  -monitoring off antibiotics as above  -monitor temperature and hemodynamics  -serial exam  -monitor respiratory status  -aspiration precaution  -ongoing pulmonary follow-up/management     3   Acute on chronic kidney disease  Creatinine elevated at 3 87  -renal dose adjust medications as needed  -volume management per Nephrology  -monitor creatinine     4  Esophageal dysmotility  11/17/2022 barium swallow showed moderate to severe esophageal dysmotility  -monitor symptoms  -aspiration precautions  -speech therapy ongoing follow-up  -GI recommending consideration of PEG tube if recurrent aspirations  -patient's daughter wants to hold off on Peg tube present      Antibiotics:  D3 off Cefepime  Fluconazole D7     I have discussed the above management plan in detail with patient, daughter at bedside, RN, and the primary service  We will see patient again 12/5/22 if here   Please call ID on call physician in meantime if questions      Subjective:  Patient has no fever, chills, sweats overnight; no nausea, vomiting, diarrhea; + cough not worse, no increased shortness of breath; no pain complaints  No new symptoms  Objective:  Vitals:  Temp:  [97 2 °F (36 2 °C)-99 4 °F (37 4 °C)] 99 °F (37 2 °C)  HR:  [51-66] 64  Resp:  [16-18] 18  BP: (150-156)/(54-63) 150/63  SpO2:  [89 %-98 %] 92 %  Temp (24hrs), Av 8 °F (37 1 °C), Min:97 2 °F (36 2 °C), Max:99 4 °F (37 4 °C)  Current: Temperature: 99 °F (37 2 °C)    Physical Exam:   General Appearance:  80year old elderly male, chronically debilitated, nontoxic appearance, propped comfortably in bed, no acute distress, talking with daughter who is visiting at bedside   HEENT: Atraumatic normocephalic   Throat: Oropharynx moist  Poor dentition   Pulmonary:   Normal respiratory excursion without accessory muscle use, scant active cough, statting 92% on 1L NCO2   Cardiac:  RRR   Abdomen:   Soft, non-tender, non-distended   Extremities: No edema, + venodynes   : No lock, no SPT   Psychiatric: Awake, cooperative   Skin: No new rashes  IV site nontender  Labs, Imaging, & Other studies:   All pertinent labs and imaging studies were personally reviewed  Results from last 7 days   Lab Units 22  0539 22  0634 22  0544   WBC Thousand/uL 13 15* 14 73* 14 54*   HEMOGLOBIN g/dL 8 0* 8 2* 8 4*   PLATELETS Thousands/uL 233 219 211     Results from last 7 days   Lab Units 22  0539 22  0521 22  0634   SODIUM mmol/L 141 139 142   POTASSIUM mmol/L 4 7 4 8 4 8   CHLORIDE mmol/L 103 101 101   CO2 mmol/L 31 31 32   BUN mg/dL 117* 123* 119*   CREATININE mg/dL 3 87* 3 90* 3 77*   EGFR ml/min/1 73sq m 12 12 12   CALCIUM mg/dL 8 3 8 3 8 4     Results from last 7 days   Lab Units 22  1043 22  1502   BLOOD CULTURE  Received in Microbiology Lab  Culture in Progress  Received in Microbiology Lab  Culture in Progress    --    SPUTUM CULTURE   --  2+ Growth of   GRAM STAIN RESULT   --  Rare Epithelial cells per low power field*  1+ Polys*  1+ Gram negative rods*  Rare Gram positive rods*  Rare Gram positive cocci in pairs*     Results from last 7 days   Lab Units 12/02/22  0539 11/29/22  0544 11/27/22  0523   PROCALCITONIN ng/ml 0 34* 0 63* 0 36*

## 2022-12-02 NOTE — ASSESSMENT & PLAN NOTE
Troponin [id number]  Elevated troponin in recent admission as well  Reports chest pressure when in respiratory distress  Denies history of CAD  · EKG no ischemic changes, read as AFib, rate 103, RBBB per prior provider  · No history of AFib  Prior EKG shows sinus rhythm with PACs /PVCs   repeat EKG showed sinus rhythm  · Recent 2D echo showed EF low normal, normal wall motion, grade 2 diastolic dysfunction, moderately dilated atriums  · Most likely non MI troponin elevation due to # 1 and CHF

## 2022-12-03 PROBLEM — B37.0 THRUSH: Status: RESOLVED | Noted: 2022-11-15 | Resolved: 2022-12-03

## 2022-12-03 LAB
ANION GAP SERPL CALCULATED.3IONS-SCNC: 8 MMOL/L (ref 4–13)
BUN SERPL-MCNC: 119 MG/DL (ref 5–25)
CALCIUM SERPL-MCNC: 8.6 MG/DL (ref 8.3–10.1)
CHLORIDE SERPL-SCNC: 103 MMOL/L (ref 96–108)
CO2 SERPL-SCNC: 32 MMOL/L (ref 21–32)
CREAT SERPL-MCNC: 3.78 MG/DL (ref 0.6–1.3)
GFR SERPL CREATININE-BSD FRML MDRD: 12 ML/MIN/1.73SQ M
GLUCOSE SERPL-MCNC: 118 MG/DL (ref 65–140)
GLUCOSE SERPL-MCNC: 123 MG/DL (ref 65–140)
GLUCOSE SERPL-MCNC: 126 MG/DL (ref 65–140)
GLUCOSE SERPL-MCNC: 128 MG/DL (ref 65–140)
GLUCOSE SERPL-MCNC: 163 MG/DL (ref 65–140)
GLUCOSE SERPL-MCNC: 171 MG/DL (ref 65–140)
GLUCOSE SERPL-MCNC: 181 MG/DL (ref 65–140)
POTASSIUM SERPL-SCNC: 4.9 MMOL/L (ref 3.5–5.3)
SODIUM SERPL-SCNC: 143 MMOL/L (ref 135–147)

## 2022-12-03 RX ADMIN — NYSTATIN: 100000 POWDER TOPICAL at 17:11

## 2022-12-03 RX ADMIN — INSULIN LISPRO 1 UNITS: 100 INJECTION, SOLUTION INTRAVENOUS; SUBCUTANEOUS at 02:57

## 2022-12-03 RX ADMIN — AMLODIPINE BESYLATE 10 MG: 10 TABLET ORAL at 08:15

## 2022-12-03 RX ADMIN — Medication 2000 UNITS: at 08:14

## 2022-12-03 RX ADMIN — INSULIN LISPRO 1 UNITS: 100 INJECTION, SOLUTION INTRAVENOUS; SUBCUTANEOUS at 16:28

## 2022-12-03 RX ADMIN — CARVEDILOL 12.5 MG: 12.5 TABLET, FILM COATED ORAL at 08:15

## 2022-12-03 RX ADMIN — GABAPENTIN 100 MG: 100 CAPSULE ORAL at 08:15

## 2022-12-03 RX ADMIN — ISOSORBIDE MONONITRATE 60 MG: 60 TABLET, EXTENDED RELEASE ORAL at 08:27

## 2022-12-03 RX ADMIN — HEPARIN SODIUM 5000 UNITS: 5000 INJECTION INTRAVENOUS; SUBCUTANEOUS at 22:19

## 2022-12-03 RX ADMIN — ISODIUM CHLORIDE 3 ML: 0.03 SOLUTION RESPIRATORY (INHALATION) at 07:04

## 2022-12-03 RX ADMIN — TAMSULOSIN HYDROCHLORIDE 0.4 MG: 0.4 CAPSULE ORAL at 16:04

## 2022-12-03 RX ADMIN — INSULIN GLARGINE 10 UNITS: 100 INJECTION, SOLUTION SUBCUTANEOUS at 22:19

## 2022-12-03 RX ADMIN — LEVALBUTEROL HYDROCHLORIDE 1.25 MG: 1.25 SOLUTION, CONCENTRATE RESPIRATORY (INHALATION) at 13:25

## 2022-12-03 RX ADMIN — HYDRALAZINE HYDROCHLORIDE 100 MG: 25 TABLET ORAL at 08:27

## 2022-12-03 RX ADMIN — CYANOCOBALAMIN TAB 500 MCG 500 MCG: 500 TAB at 08:14

## 2022-12-03 RX ADMIN — LEVALBUTEROL HYDROCHLORIDE 1.25 MG: 1.25 SOLUTION, CONCENTRATE RESPIRATORY (INHALATION) at 19:11

## 2022-12-03 RX ADMIN — NYSTATIN: 100000 POWDER TOPICAL at 08:32

## 2022-12-03 RX ADMIN — HEPARIN SODIUM 5000 UNITS: 5000 INJECTION INTRAVENOUS; SUBCUTANEOUS at 05:59

## 2022-12-03 RX ADMIN — BENZONATATE 200 MG: 100 CAPSULE ORAL at 08:14

## 2022-12-03 RX ADMIN — CARVEDILOL 12.5 MG: 12.5 TABLET, FILM COATED ORAL at 16:04

## 2022-12-03 RX ADMIN — INSULIN LISPRO 1 UNITS: 100 INJECTION, SOLUTION INTRAVENOUS; SUBCUTANEOUS at 22:21

## 2022-12-03 RX ADMIN — SENNOSIDES 8.6 MG: 8.6 TABLET, FILM COATED ORAL at 22:20

## 2022-12-03 RX ADMIN — ISODIUM CHLORIDE 3 ML: 0.03 SOLUTION RESPIRATORY (INHALATION) at 13:25

## 2022-12-03 RX ADMIN — GUAIFENESIN AND DEXTROMETHORPHAN 10 ML: 100; 10 SYRUP ORAL at 17:11

## 2022-12-03 RX ADMIN — BENZONATATE 200 MG: 100 CAPSULE ORAL at 22:27

## 2022-12-03 RX ADMIN — Medication 5 ML: at 22:19

## 2022-12-03 RX ADMIN — SODIUM CHLORIDE, SODIUM GLUCONATE, SODIUM ACETATE, POTASSIUM CHLORIDE AND MAGNESIUM CHLORIDE 50 ML/HR: 526; 502; 368; 37; 30 INJECTION, SOLUTION INTRAVENOUS at 13:43

## 2022-12-03 RX ADMIN — GABAPENTIN 100 MG: 100 CAPSULE ORAL at 17:11

## 2022-12-03 RX ADMIN — IPRATROPIUM BROMIDE 0.5 MG: 0.5 SOLUTION RESPIRATORY (INHALATION) at 07:04

## 2022-12-03 RX ADMIN — IPRATROPIUM BROMIDE 0.5 MG: 0.5 SOLUTION RESPIRATORY (INHALATION) at 19:11

## 2022-12-03 RX ADMIN — REPAGLINIDE 0.5 MG: 1 TABLET ORAL at 08:14

## 2022-12-03 RX ADMIN — REPAGLINIDE 0.5 MG: 1 TABLET ORAL at 11:46

## 2022-12-03 RX ADMIN — IPRATROPIUM BROMIDE 0.5 MG: 0.5 SOLUTION RESPIRATORY (INHALATION) at 13:25

## 2022-12-03 RX ADMIN — HYDRALAZINE HYDROCHLORIDE 100 MG: 25 TABLET ORAL at 16:04

## 2022-12-03 RX ADMIN — REPAGLINIDE 0.5 MG: 1 TABLET ORAL at 16:04

## 2022-12-03 RX ADMIN — POLYETHYLENE GLYCOL 3350 17 G: 17 POWDER, FOR SOLUTION ORAL at 08:14

## 2022-12-03 RX ADMIN — BENZONATATE 200 MG: 100 CAPSULE ORAL at 16:04

## 2022-12-03 RX ADMIN — HEPARIN SODIUM 5000 UNITS: 5000 INJECTION INTRAVENOUS; SUBCUTANEOUS at 13:43

## 2022-12-03 RX ADMIN — PANTOPRAZOLE SODIUM 40 MG: 40 TABLET, DELAYED RELEASE ORAL at 06:00

## 2022-12-03 RX ADMIN — HYDRALAZINE HYDROCHLORIDE 100 MG: 25 TABLET ORAL at 22:20

## 2022-12-03 RX ADMIN — LEVALBUTEROL HYDROCHLORIDE 1.25 MG: 1.25 SOLUTION, CONCENTRATE RESPIRATORY (INHALATION) at 07:04

## 2022-12-03 RX ADMIN — ATORVASTATIN CALCIUM 40 MG: 40 TABLET, FILM COATED ORAL at 08:14

## 2022-12-03 RX ADMIN — POLYETHYLENE GLYCOL 3350 17 G: 17 POWDER, FOR SOLUTION ORAL at 22:24

## 2022-12-03 NOTE — ASSESSMENT & PLAN NOTE
Lab Results   Component Value Date    EGFR 12 12/03/2022    EGFR 12 12/02/2022    EGFR 12 12/01/2022    CREATININE 3 78 (H) 12/03/2022    CREATININE 3 87 (H) 12/02/2022    CREATININE 3 90 (H) 12/01/2022     History of CKD 4, baseline creatinine appears to be around 1 5-1 9 in past 2 years in care everywhere  Creatinine during previous hospitalization ranged in 2 4-2 8  · Possible CKD progression  · Urinalysis was bland  · Received IV Lasix 40 mg in ED  · Creatinine continues to slowly improve    Received IV fluids for 10 hours per Nephrology on 11/30 and then restarted back on fluids  · Continue IV fluids  · Renal ultrasound showed no hydronephrosis moderate left renal atrophy  · Nephrology input appreciated

## 2022-12-03 NOTE — ASSESSMENT & PLAN NOTE
Patient reported no appetite for 2 days  Loss of appetite could be secondary to antibiotic  · Per speech therapy, patient at high risk for aspiration despite the level of diet  Currently on pureed diet with thin liquids with medications crushed with puree  · GI recommended to consider PEG tube if recurrent aspirations  · Continue pantoprazole daily  · Obstructive series showed barium throughout the colon with modest amount of stool along the rectosigmoid region  · Discussed with patient's daughter again and she will speak with the rest of the family regarding PEG placement    Discussed with her that patient is at high risk for recurrent aspiration pneumonia/hospitalizations

## 2022-12-03 NOTE — ASSESSMENT & PLAN NOTE
Wt Readings from Last 3 Encounters:   12/02/22 83 2 kg (183 lb 6 8 oz)   11/22/22 84 5 kg (186 lb 4 8 oz)     Patient received IV Lasix in recent admission  · Chest x-ray upon admission shows worsening pulmonary edema  · Received 40 milligrams of Lasix IV in the ED and was continued on IV Lasix  Then transitioned to [facility] which is currently on hold  · Left upper extremity venous Doppler showed no evidence of DVT  · CT chest showed pulmonary edema with moderate size bilateral pleural effusions left more than right  · Might need accept higher creatinine to maintain euvolemia

## 2022-12-03 NOTE — CONSULTS
H&P Exam - Urology       Patient: René Rojas   : 10/19/1929 Sex: male   MRN: [id number]     CSN: [id number]      History of Present Illness   HPI:  René Rojas is a 80 y o  male who presents with acute respiratory failure with hypoxia an aspiration pneumonia back on  in developing post admission penile scrotal swelling going into urinary tension we requiring multiple straight caths with Ayala catheter placed today no documentation as to what PVRs are patient seen at the bedside stating he has no normal voiding complaints patient is on doxazosin 4 mg tabs cannot remember what urologist he saw in the past that put him on that going 3 times a day with a fair stream without hesitancy intermittency or straining getting up no more than once a night        Review of Systems:   Constitutional:  Negative for activity change, fever, chills and diaphoresis  HENT: Negative for hearing loss and trouble swallowing  Eyes: Negative for itching and visual disturbance  Respiratory: Negative for chest tightness and shortness of breath  Cardiovascular: Negative for chest pain, edema  Gastrointestinal: Negative for abdominal distention, na abdominal pain, constipation, diarrhea, Nausea and vomiting  Genitourinary: Negative for decreased urine volume, difficulty urinating, dysuria, enuresis, frequency, hematuria and urgency  Musculoskeletal: Negative for gait problem and myalgias  Neurological: Negative for dizziness and headaches  Hematological: Does not bruise/bleed easily         Historical Information   Past Medical History:   Diagnosis Date   • [facility] (cong[facility]) ([facility] )    • Diabetes mellitus ([facility] )    • Elevated lipids    • Hypertension    • Hyponatremia 11/15/2022   • Neuropathy    • Renal disorder      Past Surgical History:   Procedure Laterality Date   • HERNIA REPAIR       Social History   Social History     Substance and Sexual Activity   Alcohol Use Not Currently     Social History     Substance and Sexual Activity   Drug Use Never     Social History     Tobacco Use   Smoking Status Former   • Packs/day: 3 00   • Types: Cigarettes   • Quit date: 80   • Years since quittin 9   Smokeless Tobacco Never     Family History: History reviewed  No pertinent family history  Meds/Allergies   Medications Prior to Admission   Medication   • amLODIPine (NORVASC) 10 mg tablet   • Ascorbic Acid (vitamin C) 1000 MG tablet   • aspirin 81 mg chewable tablet   • atorvastatin (LIPITOR) 40 mg tablet   • benzonatate (TESSALON PERLES) 100 mg capsule   • carvedilol (COREG) 12 5 mg tablet   • Cholecalciferol (Vitamin D3) 50 MCG (2000) TABS   • doxazosin (CARDURA) 2 mg tablet   • furosemide (LASIX) 20 mg tablet   • gabapentin (NEURONTIN) 100 mg capsule   • hydrALAZINE (APRESOLINE) 100 MG tablet   • isosorbide mononitrate (IMDUR) 60 mg 24 hr tablet   • [] metroNIDAZOLE (FLAGYL) 500 mg tablet   • repaglinide (PRANDIN) 0 5 mg tablet   • [] saccharomyces boulardii (FLORASTOR) 250 mg capsule   • simethicone (MYLICON) 80 mg chewable tablet   • tamsulosin (FLOMAX) 0 4 mg   • vitamin B-12 (VITAMIN B-12) 500 mcg tablet   • [] nystatin (MYCOSTATIN) 500,000 units/5 mL suspension     No Known Allergies    Objective   Vitals: /56   Pulse 56   Temp 98 8 °F (37 1 °C)   Resp 20   Ht 5' 5" (1 651 m)   Wt 83 2 kg (183 lb 6 8 oz)   SpO2 94%   BMI 30 52 kg/m²     Physical Exam:  General Alert awake   Normocephalic atraumatic PERRLA  Lungs clear bilaterally  Cardiac normal S1 normal S2  Abdomen soft, flank pain  Mild penile scrotal swelling  Limited testicular exam no abnormalities  Extremities no edema    I/O last 24 hours: In: -   Out: 2728 [Urine:1125]    Invasive Devices     Peripheral Intravenous Line  Duration           Peripheral IV 22 Dorsal (posterior); Left Hand 1 day          Drain  Duration           Urethral Catheter <1 day                    Lab Results: CBC:   Lab Results   Component Value Date    WBC 13 15 (H) 12/02/2022    HGB 8 0 (L) 12/02/2022    HCT 25 3 (L) 12/02/2022    MCV 94 12/02/2022     12/02/2022    MCH 29 6 12/02/2022    MCHC 31 6 12/02/2022    RDW 16 2 (H) 12/02/2022    MPV 11 8 12/02/2022    NRBC 0 11/29/2022     CMP:   Lab Results   Component Value Date     12/02/2022    CO2 31 12/02/2022     (H) 12/02/2022    CREATININE 3 87 (H) 12/02/2022    CALCIUM 8 3 12/02/2022    AST  11/22/2022      Comment:      No result  If an AST is required for patient care, it must be ordered separately  Specimen collection should occur prior to Sulfasalazine administration due to the potential for falsely depressed results       ALT 27 11/22/2022    ALKPHOS 92 11/22/2022    EGFR 12 12/02/2022     Urinalysis:   Lab Results   Component Value Date    COLORU Reena 11/30/2022    CLARITYU Slightly Cloudy 11/30/2022    SPECGRAV 1 020 11/30/2022    PHUR 5 5 11/30/2022    LEUKOCYTESUR Trace (A) 11/30/2022    NITRITE Negative 11/30/2022    GLUCOSEU Negative 11/30/2022    KETONESU Negative 11/30/2022    BILIRUBINUR Negative 11/30/2022    BLOODU Negative 11/30/2022     Urine Culture:   Lab Results   Component Value Date    URINECX Culture too young- will reincubate 11/30/2022     PSA: No results found for: PSA        Assessment/ Plan:  Urinary retention  Incomplete emptying high residual urines as per nurse's notes  Continue Ayala  DC doxazosin  Start tamsulosin 0 4 mg  Will DC Ayala after penile swelling decreases in a few days      Avinash Echevarria MD

## 2022-12-03 NOTE — PROGRESS NOTES
NEPHROLOGY PROGRESS NOTE   Maurice Perez 80 y o  male MRN: [id number]  Unit/Bed#: 2 [place] [address] Encounter: [id number]    ASSESSMENT & PLAN:  57-year-old male with history of diabetes mellitus, hypertension, chronic kidney disease, CHF, esophageal dysmotility presented to [facility] after presenting with shortness of breath  Nephrology consulted for CKD  Recent hospital admission between November 15 to November 22 for respiratory failure due to pneumonia and fluid overload  Was discharged on oral Lasix 20 mg daily after treatment with IV Lasix  Discharge creatinine was around 2 68  Now again presenting with shortness of breath and nephrology consulted for worsening creatinine which was on admission 2 83  Elevated creatinine on chronic kidney disease stage 3  -baseline creatinine from 2021 was around 1 6-2 0  -admission creatinine 2 83 with use of oral Lasix since last hospital admission   -possibly CKD progression from cardiorenal syndrome and age-related nephron loss  -UA was bland, renal ultrasound without hydronephrosis but finding of left renal atrophy   -in the setting of diuresis, renal function has worsened during the hospital stay to peak cr 3 9 mg/dL on 12/1     -Patient was getting treatment with oral torsemide 40 mg daily which was stopped on 11/29 in the setting of creatinine worsening  Was also given IV fluid 500 mL on 11/30 but still renal function continue to worsen to creatinine 3 9 on 12/1 after which was started on IV Isolyte at 75 mL/hour     -renal function improving today to creatinine 3 78 with IV hydration but with finding of lung crackles, will stop further IV fluid and monitor without diuretics and IV fluid  If any signs of respiratory distress okay to give IV Lasix 40 mg and monitor   -was considering possibility of intravascular volume depletion and urinary retention    Patient has been retaining urine as bladder scan positive and requiring straight catheterization after which Lock catheter was placed  Most likely worsening renal function with urine retention   -continue to monitor renal function  Lock catheter was placed on 12/02  Primary hypertension  -blood pressure elevated  -dose of hydralazine was increased to 100 mg t i d  On 11/27, continue at current dose  Continue amlodipine 10 mg daily, carvedilol 12 5 mg twice daily,  Imdur 60 mg once daily  -patient is now off doxazosin so possibility of increase in blood pressure is higher   -not increasing Coreg due to risk of bradycardia  If blood pressure remains elevated may consider clonidine    Fluid overload/acute on chronic diastolic CHF  -CT was suggestive of pulmonary edema with moderate size pleural effusion left more than right  -volume status has improved status post IV Lasix and then was transitioned to oral torsemide 40 mg daily but with worsening of renal function on 11/29, torsemide was held  Continue to hold torsemide for now till renal function improves  May need to consider lower dose 20 mg daily once renal function improves back to creatinine 2 7-2 8 but currently in the setting of creatinine elevated than baseline would continue to hold diuretics as mentioned above    Aspiration pneumonia, status post antibiotic last dose on 11/29    Shortness of breath likely combination of fluid overload as well as aspiration pneumonia-improving status post diuresis    BPH-was taken off doxazosin and started on Flomax on 12/02    Esophageal dysphagia:  Noted discussion for PEG tube placement      Anemia, low at 8 0 g dL, continue to monitor    Urinary retention, seen by Urology, recommended to continue lock  -was taken of doxazosin and started on Flomax    Left upper quadrant mass:  Renal ultrasound suggestive of solid hypoechoic mass in the left upper quadrant medial to the spleen as seen on previous CT scan   -recommend workup and management per primary team   CT scan on hold due to barium in the GI tract, also discussed with primary team about risk of contrast nephropathy  if plan for CT with IV contrast ,  may need to discuss with patient and recommend pre and post contrast hydration  -okay to do MRI with gadolinium using class 2 agents    Discussed with primary team    Nephrology Disposition Recommendations  Not medically stable for discharge from a Nephrology Standpoint Patient continues to require treatment for sirisha      SUBJECTIVE:  Patient denies any shortness of breath   no nausea vomiting    OBJECTIVE:  Current Weight: Weight - Scale: 83 2 kg (183 lb 6 8 oz)  Vitals:    12/03/22 0714   BP: 168/57   Pulse: 62   Resp: 19   Temp: 98 °F (36 7 °C)   SpO2: 98%       Intake/Output Summary (Last 24 hours) at 12/3/2022 1403  Last data filed at 12/3/2022 0600  Gross per 24 hour   Intake --   Output 450 ml   Net -450 ml       Physical Exam  General:  Ill looking, awake  Eyes: Conjunctivae pink,  Sclera anicteric  ENT: lips and mucous membranes moist  Neck: supple   Chest:  Finding of crackles left lung base  CVS: S1 & S2 present, normal rate, regular rhythm, no murmur    Abdomen: soft, non-tender, non-distended, Bowel sounds normoactive  Extremities: no edema of  legs  Skin: no rash  Neuro: awake, alert, oriented x 3   Psych: Mood and affect appropriate     Medications:    Current Facility-Administered Medications:   •  acetaminophen (TYLENOL) tablet 650 mg, 650 mg, Oral, Q6H PRN, APOLONIA NewberryNP, 650 mg at 12/01/22 0808  •  amLODIPine (NORVASC) tablet 10 mg, 10 mg, Oral, Daily, Emilee Link, CRNP, 10 mg at 12/03/22 0815  •  atorvastatin (LIPITOR) tablet 40 mg, 40 mg, Oral, Daily, Cuidoc Hammondsk, CRNP, 40 mg at 12/03/22 0195  •  benzonatate (TESSALON PERLES) capsule 200 mg, 200 mg, Oral, TID, KEISHA Lombardo, 200 mg at 12/03/22 7195  •  bisacodyl (DULCOLAX) rectal suppository 10 mg, 10 mg, Rectal, Daily PRN, Sherrell Yu MD, 10 mg at 12/02/22 1206  •  carvedilol (COREG) tablet 12 5 mg, 12 5 mg, Oral, BID With Meals, KEISHA Newberry, 12 5 mg at 12/03/22 3652  •  cholecalciferol (VITAMIN D3) tablet 2,000 Units, 2,000 Units, Oral, Daily, KEISHA Newberry, 2,000 Units at 12/03/22 7356  •  cyanocobalamin (VITAMIN B-12) tablet 500 mcg, 500 mcg, Oral, Daily, KEISHA Newberry, 500 mcg at 12/03/22 1878  •  dextromethorphan-guaiFENesin (ROBITUSSIN DM) oral syrup 10 mL, 10 mL, Oral, Q6H PRN, KEISHA Newberry, 10 mL at 12/02/22 0303  •  gabapentin (NEURONTIN) capsule 100 mg, 100 mg, Oral, BID, KEISHA Newberry, 100 mg at 12/03/22 0815  •  heparin (porcine) subcutaneous injection 5,000 Units, 5,000 Units, Subcutaneous, Q8H [facility], 5,000 Units at 12/03/22 1343 **AND** [CANCELED] Platelet count, , , Once, KEISHA Newberry  •  hydrALAZINE (APRESOLINE) tablet 100 mg, 100 mg, Oral, TID, Denisa Moraes MD, 100 mg at 12/03/22 0827  •  hydrocodone-chlorpheniramine polistirex (TUSSIONEX) ER suspension 5 mL, 5 mL, Oral, HS, Stella Aguero MD, 5 mL at 12/02/22 2229  •  insulin glargine (LANTUS) subcutaneous injection 10 Units 0 1 mL, 10 Units, Subcutaneous, HS, Stella Aguero MD, 10 Units at 12/02/22 2233  •  insulin lispro (HumaLOG) 100 units/mL subcutaneous injection 1-5 Units, 1-5 Units, Subcutaneous, TID AC, 1 Units at 11/30/22 1726 **AND** Fingerstick Glucose (POCT), , , TID AC, KEISHA Newberry  •  insulin lispro (HumaLOG) 100 units/mL subcutaneous injection 1-5 Units, 1-5 Units, Subcutaneous, 0200, KEISHA Newberry, 1 Units at 12/03/22 0257  •  insulin lispro (HumaLOG) 100 units/mL subcutaneous injection 1-5 Units, 1-5 Units, Subcutaneous, HS, KEISHA Newberry, 1 Units at 12/02/22 2232  •  ipratropium (ATROVENT) 0 02 % inhalation solution 0 5 mg, 0 5 mg, Nebulization, TID, KEISHA Newberry, 0 5 mg at 12/03/22 1325  •  isosorbide mononitrate (IMDUR) 24 hr tablet 60 mg, 60 mg, Oral, Daily, KEISHA Newberry, 60 mg at 12/03/22 0827  •  labetalol (NORMODYNE) injection 10 mg, 10 mg, Intravenous, Q6H PRN, Agustinaerika Schneider PA-C, 10 mg at 11/24/22 0241  •  levalbuterol (XOPENEX) inhalation solution 0 63 mg, 0 63 mg, Nebulization, Q4H PRN, Migdaliaidoc Zunigarik, CRNP, 0 63 mg at 12/01/22 0807  •  levalbuterol (XOPENEX) inhalation solution 1 25 mg, 1 25 mg, Nebulization, TID, 1 25 mg at 12/03/22 1325 **AND** sodium chloride 0 9 % inhalation solution 3 mL, 3 mL, Nebulization, TID, Cuiyin Nadiarik, CRNP, 3 mL at 12/03/22 1325  •  multi-electrolyte (PLASMALYTE-A/ISOLYTE-S PH 7 4) IV solution, 50 mL/hr, Intravenous, Continuous, Gail Ashyb MD, Last Rate: 50 mL/hr at 12/03/22 1343, 50 mL/hr at 12/03/22 1343  •  nystatin (MYCOSTATIN) powder, , Topical, BID, Emilee Hammondsk, CRNP, Given at 12/03/22 0394  •  ondansetron (ZOFRAN) injection 4 mg, 4 mg, Intravenous, Q6H PRN, Emilee Link, CRNP, 4 mg at 11/28/22 1038  •  pantoprazole (PROTONIX) EC tablet 40 mg, 40 mg, Oral, Early Morning, Migdaliaidoc Link, CRNP, 40 mg at 12/03/22 0600  •  polyethylene glycol (MIRALAX) packet 17 g, 17 g, Oral, BID, Tasha Brown DO, 17 g at 12/03/22 8255  •  repaglinide (PRANDIN) tablet 0 5 mg, 0 5 mg, Oral, TID AC, Steve Yan MD, 0 5 mg at 12/03/22 1146  •  senna (SENOKOT) tablet 8 6 mg, 1 tablet, Oral, HS, Valerie Lee MD, 8 6 mg at 12/02/22 2230  •  tamsulosin (FLOMAX) capsule 0 4 mg, 0 4 mg, Oral, Daily With Barbara Weeks MD    Invasive Devices:        Lab Results:   Results from last 7 days   Lab Units 12/03/22  0533 12/02/22  0539 12/01/22  0521 11/30/22  0634 11/29/22  0544   WBC Thousand/uL  --  13 15*  --  14 73* 14 54*   HEMOGLOBIN g/dL  --  8 0*  --  8 2* 8 4*   HEMATOCRIT %  --  25 3*  --  26 2* 26 3*   PLATELETS Thousands/uL  --  233  --  219 211   POTASSIUM mmol/L 4 9 4 7 4 8 4 8 4 6   CHLORIDE mmol/L 103 103 101 101 103   CO2 mmol/L 32 31 31 32 35*   BUN mg/dL 119* 117* 123* 119* 115*   CREATININE mg/dL 3 78* 3 87* 3 90* 3 77* 3 43*   CALCIUM mg/dL 8 6 8 3 8 3 8 4 8 4       Previous work up: Portions of the record may have been created with voice recognition software  Occasional wrong word or "sound a like" substitutions may have occurred due to the inherent limitations of voice recognition software  Read the chart carefully and recognize, using context, where substitutions have occurred  If you have any questions, please contact the dictating provider

## 2022-12-03 NOTE — ASSESSMENT & PLAN NOTE
Lab Results   Component Value Date    HGBA1C 5 7 (H) 11/16/2022       Recent Labs     12/02/22  2043 12/03/22  0254 12/03/22  0717 12/03/22  1040   POCGLU 206* 163* 118 123     Patient was discharged on Prandin t i d  With meals  · Continue Prandin, Humalog sliding scale with Accu-Cheks q a c  And HS  · Currently on diabetic pureed Diet with thin liquids  Continue Lantus 10 units q h s

## 2022-12-03 NOTE — PROGRESS NOTES
[facility] [address]  Progress Note Kacie Flanagan 10/19/1929, 80 y o  male MRN: [id number]  Unit/Bed#: [facility] Encounter: [id number]  Primary Care Provider: Damon Quezada PA-C   Date and time admitted to hospital: 11/22/2022  8:41 PM    * Acute respiratory failure with hypoxia [facility])  Assessment & Plan  Patient presented with acute respiratory distress shortly after eating a few spoonful of puree diet in STR, patient reported chest pressure and SOB  Patient was satting 85-95% on oxygen 3 L per STR transfer paper  Patient was belching after eating/drinking limited amount of diet/water per staff  · Patient was discharged on the day of admission after being hospitalized for aspiration pneumonia, acute on chronic diastolic CHF, dysphagia  Received IV Lasix and 7 days of IV Rocephin and Flagyl  Seen by GI, underwent EGD, found to have oropharyngeal dysphagia with esophageal dysmotility, no obvious esophageal stricture  Underwent Barium swallow study which showed moderate to severe esophageal dysmotility with significant residual contrast remaining in the esophagus at the end of the study  Patient was recommended regular diet by speech and discharged on regular diet per STR  · Likely secondary to multifocal pneumonia and also CHF  · Patient required BiPAP on ED arrival   ABG post BiPAP essentially unremarkable  Then titrated down to 6 liters oxygen upon admission  Currently on 1 liter oxygen  · Chest x-ray - Pulmonary edema, worse when comparing to 11/19/2022  Underlying infection could not be excluded  · ProBNP 15,094  · Patient received Lasix 40 IV in ED  · Received cefepime and Flagyl in ED  Patient has completed 7 days of IV cefepime  · Received IV Solu-Medrol in ED  No wheezing on auscultation    · CT chest-pulmonary edema with moderate size bilateral pleural effusions with significant bibasilar atelectasis and concomitant multifocal pneumonia   · Obstructive series showed persistent pulmonary edema with multifocal pneumonia    Aspiration pneumonia ([address] )  Assessment & Plan  Repeat procalcitonin level was 0 54 initially, continues to remain mildly elevated  · Patient received cefepime and Flagyl in the ED   Completed 7 day course of cefepime 1 gram IV daily  · Patient febrile again to 101 1 on 12/1, likely aspiration pneumonitis  procalcitonin mildly elevated, blood cultures negative, COVID/flu/RSV negative, urine culture pending  · Due to recurrent fevers, ID was consulted who recommended monitoring off antibiotics  · Daughter aware that patient is at high risk for aspiration events due to his dysphagia  · Sputum culture with mixed respiratory hardik  · Urine for Legionella and pneumococcal antigens were negative  · Pulmonary input appreciated  · Blood cultures negative  · Repeat chest x-ray 12/1 with waxing and waning opacities    Acute on chronic diastolic congestive heart failure (HCC)  Assessment & Plan  Wt Readings from Last 3 Encounters:   12/02/22 83 2 kg (183 lb 6 8 oz)   11/22/22 84 5 kg (186 lb 4 8 oz)     Patient received IV Lasix in recent admission  · Chest x-ray upon admission shows worsening pulmonary edema  · Received 40 milligrams of Lasix IV in the ED and was continued on IV Lasix  Then transitioned to [place] which is currently on hold  · Left upper extremity venous Doppler showed no evidence of DVT  · CT chest showed pulmonary edema with moderate size bilateral pleural effusions left more than right  · Might need accept higher creatinine to maintain euvolemia  Esophageal dysphagia  Assessment & Plan  Patient reported no appetite for 2 days  Loss of appetite could be secondary to antibiotic  · Per speech therapy, patient at high risk for aspiration despite the level of diet  Currently on pureed diet with thin liquids with medications crushed with puree  · GI recommended to consider PEG tube if recurrent aspirations     · Continue pantoprazole daily  · Obstructive series showed barium throughout the colon with modest amount of stool along the rectosigmoid region  · Discussed with patient's daughter again and she will speak with the rest of the family regarding PEG placement  Discussed with her that patient is at high risk for recurrent aspiration pneumonia/hospitalizations      Left upper quadrant abdominal mass  Assessment & Plan  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  · Discussed with radiology as patient cannot get IV contrast for CT scan and radiologist recommended getting MRI for further evaluation  · Per nephrology okay to obtain MRI with gadolinium using class 2 gadolinium agents  · Spoke with patient's daughter again yesterday and she is interested in pursuing further workup for this abdominal mass    Constipation  Assessment & Plan  Patient with constipation  Continue MiraLax, senna q h s  · Continue current regimen  MiraLax increased to b i d  Dosing      BPH (benign prostatic hyperplasia)  Assessment & Plan  Continue Flomax, Cardura  · Patient with urinary retention requiring multiple straight cath  Per RN patient also with penile swelling and therefore Ayala catheter placed  · Urology input appreciated  Cardura discontinued and patient started on Flomax    Hyperlipidemia  Assessment & Plan  Continue statin    Elevated troponin  Assessment & Plan  Troponin [id number]  Elevated troponin in recent admission as well  Reports chest pressure when in respiratory distress  Denies history of CAD  · EKG no ischemic changes, read as AFib, rate 103, RBBB per prior provider  · No history of AFib  Prior EKG shows sinus rhythm with PACs /PVCs  repeat EKG showed sinus rhythm  · Recent 2D echo showed EF low normal, normal wall motion, grade 2 diastolic dysfunction, moderately dilated atriums  · Most likely non MI troponin elevation due to # 1 and CHF        Type 2 diabetes mellitus [facility])  Assessment & Plan  Lab Results   Component Value Date    HGBA1C 5 7 (H) 11/16/2022       Recent Labs     12/02/22  2043 12/03/22  0254 12/03/22  0717 12/03/22  1040   POCGLU 206* 163* 118 123     Patient was discharged on Prandin t i d  With meals  · Continue Prandin, Humalog sliding scale with Accu-Cheks q a c  And HS  · Currently on diabetic pureed Diet with thin liquids  Continue Lantus 10 units q h s  Stage 3 chronic kidney disease [facility])  Assessment & Plan  Lab Results   Component Value Date    EGFR 12 12/03/2022    EGFR 12 12/02/2022    EGFR 12 12/01/2022    CREATININE 3 78 (H) 12/03/2022    CREATININE 3 87 (H) 12/02/2022    CREATININE 3 90 (H) 12/01/2022     History of CKD 4, baseline creatinine appears to be around 1 5-1 9 in past 2 years in care everywhere  Creatinine during previous hospitalization ranged in 2 4-2 8  · Possible CKD progression  · Urinalysis was bland  · Received IV Lasix 40 mg in ED  · Creatinine continues to slowly improve  Received IV fluids for 10 hours per Nephrology on 11/30 and then restarted back on fluids  · Continue IV fluids  · Renal ultrasound showed no hydronephrosis moderate left renal atrophy  · Nephrology input appreciated    Primary hypertension  Assessment & Plan  Continue hydralazine, Coreg, Norvasc, Imdur  · Blood pressures overall acceptable  Thrush-resolved as of 12/3/2022  Assessment & Plan  Patient completed 7 days of nystatin suspension  · Started on Diflucan as patient was noted to have white plaques  Diflucan 200 milligrams X 1 dose and completed total of 7 days of treatment with Diflucan 100 milligram p o  Daily       VTE Pharmacologic Prophylaxis:   Heparin    Patient Centered Rounds: I performed bedside rounds with nursing staff today  Discussions with Specialists or Other Care Team Provider: yes - renal    Education and Discussions with Family / Patient: Updated  (daughter) via phone  Time Spent for Care: 30 minutes   More than 50% of total time spent on counseling and coordination of care as described above  Current Length of Stay: 11 day(s)  Current Patient Status: Inpatient   Certification Statement: The patient will continue to require additional inpatient hospital stay due to Chronic kidney disease with elevated creatinine compared to baseline, urinary retention, abdominal mass requiring MRI  Discharge Plan: Anticipate discharge in >72 hrs to rehab facility  Code Status: Level 1 - Full Code    Subjective:     Patient states he is not very hungry and with decreased p o  Intake  Reports feeling tired    Objective:     Vitals:   Temp (24hrs), Av 3 °F (36 8 °C), Min:98 °F (36 7 °C), Max:98 8 °F (37 1 °C)    Temp:  [98 °F (36 7 °C)-98 8 °F (37 1 °C)] 98 °F (36 7 °C)  HR:  [56-66] 62  Resp:  [18-20] 19  BP: (145-170)/(56-58) 168/57  SpO2:  [90 %-98 %] 98 %  Body mass index is 30 52 kg/m²  Input and Output Summary (last 24 hours): Intake/Output Summary (Last 24 hours) at 12/3/2022 1058  Last data filed at 12/3/2022 0600  Gross per 24 hour   Intake --   Output 1050 ml   Net -1050 ml       Physical Exam:   Physical Exam  Vitals reviewed  Constitutional:       General: He is not in acute distress  Appearance: He is ill-appearing (Chronically)  Comments: Sleepy but easily awakens to answer questions   HENT:      Head: Normocephalic and atraumatic  Eyes:      Extraocular Movements: Extraocular movements intact  Cardiovascular:      Rate and Rhythm: Normal rate and regular rhythm  Pulmonary:      Effort: Pulmonary effort is normal  No respiratory distress  Breath sounds: Rales present  No wheezing  Comments: Decreased breath sounds bilaterally  Abdominal:      General: Bowel sounds are normal  There is no distension  Palpations: Abdomen is soft  Tenderness: There is no abdominal tenderness     Genitourinary:     Comments: Ayala catheter in place        Additional Data:     Labs:  Results from last 7 days   Lab Units 12/02/22  0539 11/30/22  0634 11/29/22  0544   WBC Thousand/uL 13 15*   < > 14 54*   HEMOGLOBIN g/dL 8 0*   < > 8 4*   HEMATOCRIT % 25 3*   < > 26 3*   PLATELETS Thousands/uL 233   < > 211   NEUTROS PCT %  --   --  76*   LYMPHS PCT %  --   --  11*   MONOS PCT %  --   --  9   EOS PCT %  --   --  3    < > = values in this interval not displayed  Results from last 7 days   Lab Units 12/03/22  0533   SODIUM mmol/L 143   POTASSIUM mmol/L 4 9   CHLORIDE mmol/L 103   CO2 mmol/L 32   BUN mg/dL 119*   CREATININE mg/dL 3 78*   ANION GAP mmol/L 8   CALCIUM mg/dL 8 6   GLUCOSE RANDOM mg/dL 126         Results from last 7 days   Lab Units 12/03/22  1040 12/03/22  0717 12/03/22  0254 12/02/22  2043 12/02/22  1558 12/02/22  1142 12/02/22  0716 12/02/22  0155 12/01/22  2055 12/01/22  1604 12/01/22  1116 12/01/22  0709   POC GLUCOSE mg/dl 123 118 163* 206* 73 98 107 118 104 105 112 101         Results from last 7 days   Lab Units 12/02/22  0539 11/29/22  0544 11/27/22  0523   PROCALCITONIN ng/ml 0 34* 0 63* 0 36*       Lines/Drains:  Invasive Devices     Peripheral Intravenous Line  Duration           Peripheral IV 11/30/22 Dorsal (posterior); Left Hand 2 days          Drain  Duration           Urethral Catheter <1 day              Urinary Catheter:  Goal for removal: Voiding trial when ambulation improves               Imaging: Reviewed radiology reports from this admission including: chest xray    Recent Cultures (last 7 days):   Results from last 7 days   Lab Units 12/01/22  1043 11/30/22  1145 11/28/22  1502   BLOOD CULTURE  No Growth at 24 hrs    No Growth at 24 hrs   --   --    SPUTUM CULTURE   --   --  2+ Growth of   GRAM STAIN RESULT   --   --  Rare Epithelial cells per low power field*  1+ Polys*  1+ Gram negative rods*  Rare Gram positive rods*  Rare Gram positive cocci in pairs*   URINE CULTURE   --  Culture too young- will reincubate  --        Last 24 Hours Medication List:   Current Facility-Administered Medications   Medication Dose Route Frequency Provider Last Rate   • acetaminophen  650 mg Oral Q6H PRN KEISHA Newberry     • amLODIPine  10 mg Oral Daily KEISHA Newberry     • atorvastatin  40 mg Oral Daily KEISHA Newberry     • benzonatate  200 mg Oral TID KEISHA Moncada     • bisacodyl  10 mg Rectal Daily PRN Marcial Maya MD     • carvedilol  12 5 mg Oral BID With Meals KEISAH Newberry     • cholecalciferol  2,000 Units Oral Daily KEISHA Newberry     • vitamin B-12  500 mcg Oral Daily KEISHA Newberry     • dextromethorphan-guaiFENesin  10 mL Oral Q6H PRN KEISHA Newberry     • gabapentin  100 mg Oral BID KEISHA Newberry     • heparin (porcine)  5,000 Units Subcutaneous Q8H [facility] KEISHA Newberry     • hydrALAZINE  100 mg Oral TID Anali Yeung MD     • hydrocodone-chlorpheniramine polistirex  5 mL Oral HS Marcial Maya MD     • insulin glargine  10 Units Subcutaneous HS Marcial Maya MD     • insulin lispro  1-5 Units Subcutaneous TID AC KEISHA Newberry     • insulin lispro  1-5 Units Subcutaneous 0200 KEISHA Newberry     • insulin lispro  1-5 Units Subcutaneous HS KEISHA Newberry     • ipratropium  0 5 mg Nebulization TID KEISHA Newberry     • isosorbide mononitrate  60 mg Oral Daily KEISHA Newberry     • labetalol  10 mg Intravenous Q6H PRN Erna Ritchie PA-C     • levalbuterol  0 63 mg Nebulization Q4H PRN KEISHA Newberry     • levalbuterol  1 25 mg Nebulization TID KEISHA Newberry      And   • sodium chloride  3 mL Nebulization TID KEISHA Newberry     • multi-electrolyte  50 mL/hr Intravenous Continuous Minetta Romberg, MD 50 mL/hr (12/02/22 9965)   • nystatin   Topical BID KEISHA Newberry     • ondansetron  4 mg Intravenous Q6H PRN KEISHA Newberry     • pantoprazole  40 mg Oral Early Morning KEISHA Newberry     • polyethylene glycol  17 g Oral BID Tasha Brown DO     • repaglinide  0 5 mg Oral TID Gee Caban MD     • senna  1 tablet Oral HS Alex Lombardo MD     • tamsulosin  0 4 mg Oral Daily With Db Naqvi MD          Today, Patient Was Seen By: Cb Groves DO    **Please Note: This note may have been constructed using a voice recognition system  **

## 2022-12-03 NOTE — ASSESSMENT & PLAN NOTE
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  · Discussed with radiology as patient cannot get IV contrast for CT scan and radiologist recommended getting MRI for further evaluation  · Per nephrology okay to obtain MRI with gadolinium using class 2 gadolinium agents  · Spoke with patient's daughter again yesterday and she is interested in pursuing further workup for this abdominal mass

## 2022-12-03 NOTE — PLAN OF CARE
Problem: RESPIRATORY - ADULT  Goal: Achieves optimal ventilation and oxygenation  Description: INTERVENTIONS:  - Assess for changes in respiratory status  - Assess for changes in mentation and behavior  - Position to facilitate oxygenation and minimize respiratory effort  - Oxygen administered by appropriate delivery if ordered  - Initiate smoking cessation education as indicated  - Encourage broncho-pulmonary hygiene including cough, deep breathe, Incentive Spirometry  - Assess the need for suctioning and aspirate as needed  - Assess and instruct to report SOB or any respiratory difficulty  - Respiratory Therapy support as indicated  Outcome: Progressing     Problem: Prexisting or High Potential for Compromised Skin Integrity  Goal: Skin integrity is maintained or improved  Description: INTERVENTIONS:  - Identify patients at risk for skin breakdown  - Assess and monitor skin integrity  - Assess and monitor nutrition and hydration status  - Monitor labs   - Assess for incontinence   - Turn and reposition patient  - Assist with mobility/ambulation  - Relieve pressure over bony prominences  - Avoid friction and shearing  - Provide appropriate hygiene as needed including keeping skin clean and dry  - Evaluate need for skin moisturizer/barrier cream  - Collaborate with interdisciplinary team   - Patient/family teaching  - Consider wound care consult   Outcome: Progressing     Problem: MOBILITY - ADULT  Goal: Maintain or return to baseline ADL function  Description: INTERVENTIONS:  -  Assess patient's ability to carry out ADLs; assess patient's baseline for ADL function and identify physical deficits which impact ability to perform ADLs (bathing, care of mouth/teeth, toileting, grooming, dressing, etc )  - Assess/evaluate cause of self-care deficits   - Assess range of motion  - Assess patient's mobility; develop plan if impaired  - Assess patient's need for assistive devices and provide as appropriate  - Encourage maximum independence but intervene and supervise when necessary  - Involve family in performance of ADLs  - Assess for home care needs following discharge   - Consider OT consult to assist with ADL evaluation and planning for discharge  - Provide patient education as appropriate  Outcome: Progressing  Goal: Maintains/Returns to pre admission functional level  Description: INTERVENTIONS:  - Perform BMAT or MOVE assessment daily    - Set and communicate daily mobility goal to care team and patient/family/caregiver  - Collaborate with rehabilitation services on mobility goals if consulted  - Perform Range of Motion 3 times a day  - Reposition patient every 2 hours    - Dangle patient 3  times a day  - Stand patient 3 times a day  - Ambulate patient 3 times a day  - Out of bed to chair 3 times a day   - Out of bed for meals 3 times a day  - Out of bed for toileting  - Record patient progress and toleration of activity level   Outcome: Progressing     Problem: Potential for Falls  Goal: Patient will remain free of falls  Description: INTERVENTIONS:  - Educate patient/family on patient safety including physical limitations  - Instruct patient to call for assistance with activity   - Consult OT/PT to assist with strengthening/mobility   - Keep Call bell within reach  - Keep bed low and locked with side rails adjusted as appropriate  - Keep care items and personal belongings within reach  - Initiate and maintain comfort rounds  - Make Fall Risk Sign visible to staff  - Offer Toileting every 2  Hours, in advance of need  - Initiate/Maintain bed alarm  - Obtain necessary fall risk management equipment: urinal, walker  - Apply yellow socks and bracelet for high fall risk patients  - Consider moving patient to room near nurses station  Outcome: Progressing     Problem: PAIN - ADULT  Goal: Verbalizes/displays adequate comfort level or baseline comfort level  Description: Interventions:  - Encourage patient to monitor pain and request assistance  - Assess pain using appropriate pain scale  - Administer analgesics based on type and severity of pain and evaluate response  - Implement non-pharmacological measures as appropriate and evaluate response  - Consider cultural and social influences on pain and pain management  - Notify physician/advanced practitioner if interventions unsuccessful or patient reports new pain  Outcome: Progressing     Problem: INFECTION - ADULT  Goal: Absence or prevention of progression during hospitalization  Description: INTERVENTIONS:  - Assess and monitor for signs and symptoms of infection  - Monitor lab/diagnostic results  - Monitor all insertion sites, i e  indwelling lines, tubes, and drains  - Monitor endotracheal if appropriate and nasal secretions for changes in amount and color  - [place] appropriate cooling/warming therapies per order  - Administer medications as ordered  - Instruct and encourage patient and family to use good hand hygiene technique  - Identify and instruct in appropriate isolation precautions for identified infection/condition  Outcome: Progressing  Goal: Absence of fever/infection during neutropenic period  Description: INTERVENTIONS:  - Monitor WBC    Outcome: Progressing     Problem: SAFETY ADULT  Goal: Maintain or return to baseline ADL function  Description: INTERVENTIONS:  -  Assess patient's ability to carry out ADLs; assess patient's baseline for ADL function and identify physical deficits which impact ability to perform ADLs (bathing, care of mouth/teeth, toileting, grooming, dressing, etc )  - Assess/evaluate cause of self-care deficits   - Assess range of motion  - Assess patient's mobility; develop plan if impaired  - Assess patient's need for assistive devices and provide as appropriate  - Encourage maximum independence but intervene and supervise when necessary  - Involve family in performance of ADLs  - Assess for home care needs following discharge   - Consider OT consult to assist with ADL evaluation and planning for discharge  - Provide patient education as appropriate  Outcome: Progressing  Goal: Maintains/Returns to pre admission functional level  Description: INTERVENTIONS:  - Perform BMAT or MOVE assessment daily    - Set and communicate daily mobility goal to care team and patient/family/caregiver  - Collaborate with rehabilitation services on mobility goals if consulted  - Perform Range of Motion 3 times a day  - Reposition patient every 2  hours    - Dangle patient 3 times a day  - Stand patient 3 times a day  - Ambulate patient 3 times a day  - Out of bed to chair 3 times a day   - Out of bed for meals 3 times a day  - Out of bed for toileting  - Record patient progress and toleration of activity level   Outcome: Progressing  Goal: Patient will remain free of falls  Description: INTERVENTIONS:  - Educate patient/family on patient safety including physical limitations  - Instruct patient to call for assistance with activity   - Consult OT/PT to assist with strengthening/mobility   - Keep Call bell within reach  - Keep bed low and locked with side rails adjusted as appropriate  - Keep care items and personal belongings within reach  - Initiate and maintain comfort rounds  - Make Fall Risk Sign visible to staff  - Offer Toileting every 2 Hours, in advance of need  - Initiate/Maintain bed alarm  - Obtain necessary fall risk management equipment: urinal, yellow socks  - Apply yellow socks and bracelet for high fall risk patients  - Consider moving patient to room near nurses station  Outcome: Progressing     Problem: DISCHARGE PLANNING  Goal: Discharge to home or other facility with appropriate resources  Description: INTERVENTIONS:  - Identify barriers to discharge w/patient and caregiver  - Arrange for needed discharge resources and transportation as appropriate  - Identify discharge learning needs (meds, wound care, etc )  - Arrange for interpretive services to assist at discharge as needed  - Refer to Case Management Department for coordinating discharge planning if the patient needs post-hospital services based on physician/advanced practitioner order or complex needs related to functional status, cognitive ability, or social support system  Outcome: Progressing     Problem: Knowledge Deficit  Goal: Patient/family/caregiver demonstrates understanding of disease process, treatment plan, medications, and discharge instructions  Description: Complete learning assessment and assess knowledge base  Interventions:  - Provide teaching at level of understanding  - Provide teaching via preferred learning methods  Outcome: Progressing     Problem: Nutrition/Hydration-ADULT  Goal: Nutrient/Hydration intake appropriate for improving, restoring or maintaining nutritional needs  Description: Monitor and assess patient's nutrition/hydration status for malnutrition  Collaborate with interdisciplinary team and initiate plan and interventions as ordered  Monitor patient's weight and dietary intake as ordered or per policy  Utilize nutrition screening tool and intervene as necessary  Determine patient's food preferences and provide high-protein, high-caloric foods as appropriate       INTERVENTIONS:  - Monitor oral intake, urinary output, labs, and treatment plans  - Assess nutrition and hydration status and recommend course of action  - Evaluate amount of meals eaten  - Assist patient with eating if necessary   - Allow adequate time for meals  - Recommend/ encourage appropriate diets, oral nutritional supplements, and vitamin/mineral supplements  - Order, calculate, and assess calorie counts as needed  - Assess need for intravenous fluids  - Provide nutrition/hydration education as appropriate  - Include patient/family/caregiver in decisions related to nutrition  Outcome: Progressing

## 2022-12-03 NOTE — PHYSICAL THERAPY NOTE
PT TREATMENT     12/03/22 1130   PT Last Visit   PT Visit Date 12/03/22   Note Type   Note Type Treatment   Pain Assessment   Pain Assessment Tool 0-10   Pain Score No Pain   Restrictions/Precautions   Other Precautions Fall Risk;Bed Alarm; Chair Alarm;O2   General   Chart Reviewed Yes   Family/Caregiver Present Yes  (pt's dtr)   Cognition   Overall Cognitive Status WFL   Arousal/Participation Cooperative   Attention Within functional limits   Orientation Level Oriented X4   Following Commands Follows multistep commands with increased time or repetition   Subjective   Subjective "ok"   Bed Mobility   Supine to Sit 2  Maximal assistance   Additional items Assist x 1;Verbal cues; Increased time required;LE management;HOB elevated   Sit to Supine 2  Maximal assistance   Additional items Assist x 2;Verbal cues; Increased time required;LE management   Additional Comments Pt sat EOB x 7-8 mins working on static sitting balance EOB prior to initiating trans/standing   Transfers   Sit to Stand 2  Maximal assistance   Additional items Assist x 2;Verbal cues; Increased time required   Stand to Sit 2  Maximal assistance   Additional items Assist x 2;Verbal cues; Increased time required   Additional Comments Pt sit to stand tranfer with max A + 2 and stood with the RW x 45 seconds first trial and 30 seconds the 2nd trial - pt very fatigued after standing trials   Balance   Static Sitting   (F- to brief periods of F-)   Static Standing Poor   Activity Tolerance   Activity Tolerance Patient limited by fatigue;Treatment limited secondary to medical complications (Comment)  (weakness and deconditioning)   Assessment   Problem List Decreased strength;Decreased range of motion;Decreased endurance; Impaired balance;Decreased mobility; Decreased safety awareness   Assessment Pt agreeeable to PT session this AM  Pt with limited tolerance to all activities due to fatigue, generalized weakness and deconditioning   Pt will cont to benefit from skilled PT to increase pt's strength and mobility with progression as able  The patient's AM-[facility] Basic Mobility Inpatient Short Form Raw Score is 8  A Raw score of less than or equal to 16 suggests the patient may benefit from discharge to post-acute rehabilitation services  Please also refer to the recommendation of the Physical Therapist for safe discharge planning  Plan   Treatment/Interventions ADL retraining;Functional transfer training;LE strengthening/ROM; Therapeutic exercise; Endurance training;Patient/family training;Equipment eval/education; Bed mobility;Gait training; Compensatory technique education   PT Frequency Other (Comment)  (5x/wk)   Recommendation   PT Discharge Recommendation Post acute rehabilitation services   AM-[facility] Basic Mobility Inpatient   Turning in Bed Without Bedrails 2   Lying on Back to Sitting on Edge of Flat Bed 2   Moving Bed to Chair 1   Standing Up From Chair 1   Walk in Room 1   Climb 3-5 Stairs 1   Basic Mobility Inpatient Raw Score 8   Turning Head Towards Sound 3   Follow Simple Instructions 3   Low Function Basic Mobility Raw Score 14   Low Function Basic Mobility Standardized Score 22 01   Highest Level Of Mobility   JH-HLM Goal 3: Sit at edge of bed   JH-HLM Achieved 5: Stand (1 or more minutes)   Education   Education Provided Mobility training;Assistive device   Patient Explanation/teachback used; Reinforcement needed   End of Consult   Patient Position at End of Consult Supine;Bed/Chair alarm activated; All needs within reach   [facility] Insurance Number  [address] [place] [address] [id number]

## 2022-12-03 NOTE — PROGRESS NOTES
Progress Note - Urology      Patient: Mohamud Osorio   : 10/19/1929 Sex: male   MRN: [id number]     CSN: [id number]  Unit/Bed#: [address] [place] [address]     SUBJECTIVE:   Alert awake  Discussed doxazosin for his voiding issues appears to be on it for hypertension also  Held with pressure rising  No issues with catheter      Objective   Vitals: /57   Pulse 63   Temp 98 5 °F (36 9 °C)   Resp 19   Ht 5' 5" (1 651 m)   Wt 83 2 kg (183 lb 6 8 oz)   SpO2 90%   BMI 30 52 kg/m²     I/O last 24 hours: In: -   Out: 1050 [Urine:1050]      Physical Exam:   General Alert awake   Normocephalic atraumatic PERRLA  Lungs clear bilaterally  Cardiac normal S1 normal S2  Abdomen soft, flank pain  Extremities no edema      Lab Results: CBC:   Lab Results   Component Value Date    WBC 13 15 (H) 2022    HGB 8 0 (L) 2022    HCT 25 3 (L) 2022    MCV 94 2022     2022    MCH 29 6 2022    MCHC 31 6 2022    RDW 16 2 (H) 2022    MPV 11 8 2022    NRBC 0 2022     CMP:   Lab Results   Component Value Date     2022    CO2 32 2022     (H) 2022    CREATININE 3 78 (H) 2022    CALCIUM 8 6 2022    AST  2022      Comment:      No result  If an AST is required for patient care, it must be ordered separately  Specimen collection should occur prior to Sulfasalazine administration due to the potential for falsely depressed results       ALT 27 2022    ALKPHOS 92 2022    EGFR 12 2022     Urinalysis:   Lab Results   Component Value Date    COLORU Reena 2022    CLARITYU Slightly Cloudy 2022    SPECGRAV 1 020 2022    PHUR 5 5 2022    LEUKOCYTESUR Trace (A) 2022    NITRITE Negative 2022    GLUCOSEU Negative 2022    KETONESU Negative 2022    BILIRUBINUR Negative 2022    BLOODU Negative 2022     Urine Culture:   Lab Results   Component Value Date    URINECX Culture too young- will reincubate 11/30/2022     PSA: No results found for: PSA      Assessment/ Plan:  Urinary tension  BPH obstruction holding doxazosin now on tamsulosin 0 4 mg  Discussed with patient  Will continue Ayala catheter till ambulating and attempt voiding trial possibly in short-term rehab  In light of D seeing doxazosin blood pressure is rising Nephrology considering adding clonidine          Arpan Bland MD

## 2022-12-03 NOTE — ASSESSMENT & PLAN NOTE
Repeat procalcitonin level was 0 54 initially, continues to remain mildly elevated  · Patient received cefepime and Flagyl in the ED   Completed 7 day course of cefepime 1 gram IV daily  · Patient febrile again to 101 1 on 12/1, likely aspiration pneumonitis    procalcitonin mildly elevated, blood cultures negative, COVID/flu/RSV negative, urine culture pending  · Due to recurrent fevers, ID was consulted who recommended monitoring off antibiotics  · Daughter aware that patient is at high risk for aspiration events due to his dysphagia  · Sputum culture with mixed respiratory hardik  · Urine for Legionella and pneumococcal antigens were negative  · Pulmonary input appreciated  · Blood cultures negative  · Repeat chest x-ray 12/1 with waxing and waning opacities

## 2022-12-03 NOTE — ASSESSMENT & PLAN NOTE
Patient presented with acute respiratory distress shortly after eating a few spoonful of puree diet in STR, patient reported chest pressure and SOB  Patient was satting 85-95% on oxygen 3 L per STR transfer paper  Patient was belching after eating/drinking limited amount of diet/water per staff  · Patient was discharged on the day of admission after being hospitalized for aspiration pneumonia, acute on chronic diastolic CHF, dysphagia  Received IV Lasix and 7 days of IV Rocephin and Flagyl  Seen by GI, underwent EGD, found to have oropharyngeal dysphagia with esophageal dysmotility, no obvious esophageal stricture  Underwent Barium swallow study which showed moderate to severe esophageal dysmotility with significant residual contrast remaining in the esophagus at the end of the study  Patient was recommended regular diet by speech and discharged on regular diet per STR  · Likely secondary to multifocal pneumonia and also CHF  · Patient required BiPAP on ED arrival   ABG post BiPAP essentially unremarkable  Then titrated down to 6 liters oxygen upon admission  Currently on 1 liter oxygen  · Chest x-ray - Pulmonary edema, worse when comparing to 11/19/2022  Underlying infection could not be excluded  · ProBNP 15,094  · Patient received Lasix 40 IV in ED  · Received cefepime and Flagyl in ED  Patient has completed 7 days of IV cefepime  · Received IV Solu-Medrol in ED  No wheezing on auscultation    · CT chest-pulmonary edema with moderate size bilateral pleural effusions with significant bibasilar atelectasis and concomitant multifocal pneumonia   · Obstructive series showed persistent pulmonary edema with multifocal pneumonia

## 2022-12-03 NOTE — ASSESSMENT & PLAN NOTE
Continue Flomax, Cardura  · Patient with urinary retention requiring multiple straight cath  Per RN patient also with penile swelling and therefore Ayala catheter placed  · Urology input appreciated    Cardura discontinued and patient started on Flomax

## 2022-12-03 NOTE — PLAN OF CARE
Problem: RESPIRATORY - ADULT  Goal: Achieves optimal ventilation and oxygenation  Description: INTERVENTIONS:  - Assess for changes in respiratory status  - Assess for changes in mentation and behavior  - Position to facilitate oxygenation and minimize respiratory effort  - Oxygen administered by appropriate delivery if ordered  - Initiate smoking cessation education as indicated  - Encourage broncho-pulmonary hygiene including cough, deep breathe, Incentive Spirometry  - Assess the need for suctioning and aspirate as needed  - Assess and instruct to report SOB or any respiratory difficulty  - Respiratory Therapy support as indicated  Outcome: Progressing     Problem: Prexisting or High Potential for Compromised Skin Integrity  Goal: Skin integrity is maintained or improved  Description: INTERVENTIONS:  - Identify patients at risk for skin breakdown  - Assess and monitor skin integrity  - Assess and monitor nutrition and hydration status  - Monitor labs   - Assess for incontinence   - Turn and reposition patient  - Assist with mobility/ambulation  - Relieve pressure over bony prominences  - Avoid friction and shearing  - Provide appropriate hygiene as needed including keeping skin clean and dry  - Evaluate need for skin moisturizer/barrier cream  - Collaborate with interdisciplinary team   - Patient/family teaching  - Consider wound care consult   Outcome: Progressing     Problem: MOBILITY - ADULT  Goal: Maintain or return to baseline ADL function  Description: INTERVENTIONS:  -  Assess patient's ability to carry out ADLs; assess patient's baseline for ADL function and identify physical deficits which impact ability to perform ADLs (bathing, care of mouth/teeth, toileting, grooming, dressing, etc )  - Assess/evaluate cause of self-care deficits   - Assess range of motion  - Assess patient's mobility; develop plan if impaired  - Assess patient's need for assistive devices and provide as appropriate  - Encourage maximum independence but intervene and supervise when necessary  - Involve family in performance of ADLs  - Assess for home care needs following discharge   - Consider OT consult to assist with ADL evaluation and planning for discharge  - Provide patient education as appropriate  Outcome: Progressing  Goal: Maintains/Returns to pre admission functional level  Description: INTERVENTIONS:  - Perform BMAT or MOVE assessment daily    - Set and communicate daily mobility goal to care team and patient/family/caregiver  - Collaborate with rehabilitation services on mobility goals if consulted  - Perform Range of Motion    times a day  - Reposition patient every    hours    - Dangle patient    times a day  - Stand patient    times a day  - Ambulate patient  times a day  - Out of bed to chair  times a day   - Out of bed for meals  times a day  - Out of bed for toileting  - Record patient progress and toleration of activity level   Outcome: Progressing     Problem: Potential for Falls  Goal: Patient will remain free of falls  Description: INTERVENTIONS:  - Educate patient/family on patient safety including physical limitations  - Instruct patient to call for assistance with activity   - Consult OT/PT to assist with strengthening/mobility   - Keep Call bell within reach  - Keep bed low and locked with side rails adjusted as appropriate  - Keep care items and personal belongings within reach  - Initiate and maintain comfort rounds  - Make Fall Risk Sign visible to staff  - Offer Toileting every  Hours, in advance of need  - Initiate/Maintainalarm  - Obtain necessary fall risk management equipment:   - Apply yellow socks and bracelet for high fall risk patients  - Consider moving patient to room near nurses station  Outcome: Progressing     Problem: PAIN - ADULT  Goal: Verbalizes/displays adequate comfort level or baseline comfort level  Description: Interventions:  - Encourage patient to monitor pain and request assistance  - Assess pain using appropriate pain scale  - Administer analgesics based on type and severity of pain and evaluate response  - Implement non-pharmacological measures as appropriate and evaluate response  - Consider cultural and social influences on pain and pain management  - Notify physician/advanced practitioner if interventions unsuccessful or patient reports new pain  Outcome: Progressing     Problem: INFECTION - ADULT  Goal: Absence or prevention of progression during hospitalization  Description: INTERVENTIONS:  - Assess and monitor for signs and symptoms of infection  - Monitor lab/diagnostic results  - Monitor all insertion sites, i e  indwelling lines, tubes, and drains  - Monitor endotracheal if appropriate and nasal secretions for changes in amount and color  - Plummer appropriate cooling/warming therapies per order  - Administer medications as ordered  - Instruct and encourage patient and family to use good hand hygiene technique  - Identify and instruct in appropriate isolation precautions for identified infection/condition  Outcome: Progressing  Goal: Absence of fever/infection during neutropenic period  Description: INTERVENTIONS:  - Monitor WBC    Outcome: Progressing     Problem: SAFETY ADULT  Goal: Maintain or return to baseline ADL function  Description: INTERVENTIONS:  -  Assess patient's ability to carry out ADLs; assess patient's baseline for ADL function and identify physical deficits which impact ability to perform ADLs (bathing, care of mouth/teeth, toileting, grooming, dressing, etc )  - Assess/evaluate cause of self-care deficits   - Assess range of motion  - Assess patient's mobility; develop plan if impaired  - Assess patient's need for assistive devices and provide as appropriate  - Encourage maximum independence but intervene and supervise when necessary  - Involve family in performance of ADLs  - Assess for home care needs following discharge   - Consider OT consult to assist with ADL evaluation and planning for discharge  - Provide patient education as appropriate  Outcome: Progressing  Goal: Maintains/Returns to pre admission functional level  Description: INTERVENTIONS:  - Perform BMAT or MOVE assessment daily    - Set and communicate daily mobility goal to care team and patient/family/caregiver  - Collaborate with rehabilitation services on mobility goals if consulted  - Perform Range of Motion  times a day  - Reposition patient every  hours    - Dangle patient  times a day  - Stand patient  times a day  - Ambulate patient  times a day  - Out of bed to chair times a day   - Out of bed for meals  times a day  - Out of bed for toileting  - Record patient progress and toleration of activity level   Outcome: Progressing  Goal: Patient will remain free of falls  Description: INTERVENTIONS:  - Educate patient/family on patient safety including physical limitations  - Instruct patient to call for assistance with activity   - Consult OT/PT to assist with strengthening/mobility   - Keep Call bell within reach  - Keep bed low and locked with side rails adjusted as appropriate  - Keep care items and personal belongings within reach  - Initiate and maintain comfort rounds  - Make Fall Risk Sign visible to staff  - Offer Toileting every  Hours, in advance of need  - Initiate/Maintain alarm  - Obtain necessary fall risk management equipment:  - Apply yellow socks and bracelet for high fall risk patients  - Consider moving patient to room near nurses station  Outcome: Progressing     Problem: DISCHARGE PLANNING  Goal: Discharge to home or other facility with appropriate resources  Description: INTERVENTIONS:  - Identify barriers to discharge w/patient and caregiver  - Arrange for needed discharge resources and transportation as appropriate  - Identify discharge learning needs (meds, wound care, etc )  - Arrange for interpretive services to assist at discharge as needed  - Refer to Case Management Department for coordinating discharge planning if the patient needs post-hospital services based on physician/advanced practitioner order or complex needs related to functional status, cognitive ability, or social support system  Outcome: Progressing     Problem: Knowledge Deficit  Goal: Patient/family/caregiver demonstrates understanding of disease process, treatment plan, medications, and discharge instructions  Description: Complete learning assessment and assess knowledge base  Interventions:  - Provide teaching at level of understanding  - Provide teaching via preferred learning methods  Outcome: Progressing     Problem: Nutrition/Hydration-ADULT  Goal: Nutrient/Hydration intake appropriate for improving, restoring or maintaining nutritional needs  Description: Monitor and assess patient's nutrition/hydration status for malnutrition  Collaborate with interdisciplinary team and initiate plan and interventions as ordered  Monitor patient's weight and dietary intake as ordered or per policy  Utilize nutrition screening tool and intervene as necessary  Determine patient's food preferences and provide high-protein, high-caloric foods as appropriate       INTERVENTIONS:  - Monitor oral intake, urinary output, labs, and treatment plans  - Assess nutrition and hydration status and recommend course of action  - Evaluate amount of meals eaten  - Assist patient with eating if necessary   - Allow adequate time for meals  - Recommend/ encourage appropriate diets, oral nutritional supplements, and vitamin/mineral supplements  - Order, calculate, and assess calorie counts as needed  - Assess need for intravenous fluids  - Provide nutrition/hydration education as appropriate  - Include patient/family/caregiver in decisions related to nutrition  Outcome: Progressing

## 2022-12-04 LAB
ANION GAP SERPL CALCULATED.3IONS-SCNC: 8 MMOL/L (ref 4–13)
BUN SERPL-MCNC: 118 MG/DL (ref 5–25)
CALCIUM SERPL-MCNC: 8.6 MG/DL (ref 8.3–10.1)
CHLORIDE SERPL-SCNC: 106 MMOL/L (ref 96–108)
CO2 SERPL-SCNC: 31 MMOL/L (ref 21–32)
CREAT SERPL-MCNC: 3.55 MG/DL (ref 0.6–1.3)
GFR SERPL CREATININE-BSD FRML MDRD: 13 ML/MIN/1.73SQ M
GLUCOSE SERPL-MCNC: 111 MG/DL (ref 65–140)
GLUCOSE SERPL-MCNC: 114 MG/DL (ref 65–140)
GLUCOSE SERPL-MCNC: 114 MG/DL (ref 65–140)
GLUCOSE SERPL-MCNC: 148 MG/DL (ref 65–140)
GLUCOSE SERPL-MCNC: 157 MG/DL (ref 65–140)
GLUCOSE SERPL-MCNC: 158 MG/DL (ref 65–140)
POTASSIUM SERPL-SCNC: 4.8 MMOL/L (ref 3.5–5.3)
SODIUM SERPL-SCNC: 145 MMOL/L (ref 135–147)

## 2022-12-04 RX ADMIN — HYDRALAZINE HYDROCHLORIDE 100 MG: 25 TABLET ORAL at 21:55

## 2022-12-04 RX ADMIN — HYDRALAZINE HYDROCHLORIDE 100 MG: 25 TABLET ORAL at 09:01

## 2022-12-04 RX ADMIN — TAMSULOSIN HYDROCHLORIDE 0.4 MG: 0.4 CAPSULE ORAL at 15:49

## 2022-12-04 RX ADMIN — INSULIN LISPRO 1 UNITS: 100 INJECTION, SOLUTION INTRAVENOUS; SUBCUTANEOUS at 11:50

## 2022-12-04 RX ADMIN — INSULIN LISPRO 1 UNITS: 100 INJECTION, SOLUTION INTRAVENOUS; SUBCUTANEOUS at 21:56

## 2022-12-04 RX ADMIN — IPRATROPIUM BROMIDE 0.5 MG: 0.5 SOLUTION RESPIRATORY (INHALATION) at 07:11

## 2022-12-04 RX ADMIN — HEPARIN SODIUM 5000 UNITS: 5000 INJECTION INTRAVENOUS; SUBCUTANEOUS at 21:55

## 2022-12-04 RX ADMIN — LEVALBUTEROL HYDROCHLORIDE 1.25 MG: 1.25 SOLUTION, CONCENTRATE RESPIRATORY (INHALATION) at 20:22

## 2022-12-04 RX ADMIN — POLYETHYLENE GLYCOL 3350 17 G: 17 POWDER, FOR SOLUTION ORAL at 21:58

## 2022-12-04 RX ADMIN — ATORVASTATIN CALCIUM 40 MG: 40 TABLET, FILM COATED ORAL at 09:02

## 2022-12-04 RX ADMIN — IPRATROPIUM BROMIDE 0.5 MG: 0.5 SOLUTION RESPIRATORY (INHALATION) at 20:22

## 2022-12-04 RX ADMIN — GABAPENTIN 100 MG: 100 CAPSULE ORAL at 17:27

## 2022-12-04 RX ADMIN — BENZONATATE 200 MG: 100 CAPSULE ORAL at 21:55

## 2022-12-04 RX ADMIN — POLYETHYLENE GLYCOL 3350 17 G: 17 POWDER, FOR SOLUTION ORAL at 09:01

## 2022-12-04 RX ADMIN — NYSTATIN: 100000 POWDER TOPICAL at 17:27

## 2022-12-04 RX ADMIN — INSULIN GLARGINE 10 UNITS: 100 INJECTION, SOLUTION SUBCUTANEOUS at 21:55

## 2022-12-04 RX ADMIN — CARVEDILOL 12.5 MG: 12.5 TABLET, FILM COATED ORAL at 15:49

## 2022-12-04 RX ADMIN — REPAGLINIDE 0.5 MG: 1 TABLET ORAL at 07:59

## 2022-12-04 RX ADMIN — CARVEDILOL 12.5 MG: 12.5 TABLET, FILM COATED ORAL at 07:59

## 2022-12-04 RX ADMIN — ISOSORBIDE MONONITRATE 60 MG: 60 TABLET, EXTENDED RELEASE ORAL at 09:02

## 2022-12-04 RX ADMIN — HEPARIN SODIUM 5000 UNITS: 5000 INJECTION INTRAVENOUS; SUBCUTANEOUS at 13:22

## 2022-12-04 RX ADMIN — CYANOCOBALAMIN TAB 500 MCG 500 MCG: 500 TAB at 09:02

## 2022-12-04 RX ADMIN — AMLODIPINE BESYLATE 10 MG: 10 TABLET ORAL at 09:02

## 2022-12-04 RX ADMIN — PANTOPRAZOLE SODIUM 40 MG: 40 TABLET, DELAYED RELEASE ORAL at 06:16

## 2022-12-04 RX ADMIN — BENZONATATE 200 MG: 100 CAPSULE ORAL at 09:02

## 2022-12-04 RX ADMIN — ISODIUM CHLORIDE 3 ML: 0.03 SOLUTION RESPIRATORY (INHALATION) at 07:10

## 2022-12-04 RX ADMIN — REPAGLINIDE 0.5 MG: 1 TABLET ORAL at 11:50

## 2022-12-04 RX ADMIN — Medication 2000 UNITS: at 09:02

## 2022-12-04 RX ADMIN — LEVALBUTEROL HYDROCHLORIDE 1.25 MG: 1.25 SOLUTION, CONCENTRATE RESPIRATORY (INHALATION) at 13:13

## 2022-12-04 RX ADMIN — NYSTATIN: 100000 POWDER TOPICAL at 09:03

## 2022-12-04 RX ADMIN — GABAPENTIN 100 MG: 100 CAPSULE ORAL at 09:02

## 2022-12-04 RX ADMIN — REPAGLINIDE 0.5 MG: 1 TABLET ORAL at 15:49

## 2022-12-04 RX ADMIN — Medication 5 ML: at 21:56

## 2022-12-04 RX ADMIN — HEPARIN SODIUM 5000 UNITS: 5000 INJECTION INTRAVENOUS; SUBCUTANEOUS at 06:16

## 2022-12-04 RX ADMIN — SENNOSIDES 8.6 MG: 8.6 TABLET, FILM COATED ORAL at 21:55

## 2022-12-04 RX ADMIN — IPRATROPIUM BROMIDE 0.5 MG: 0.5 SOLUTION RESPIRATORY (INHALATION) at 13:13

## 2022-12-04 RX ADMIN — ISODIUM CHLORIDE 3 ML: 0.03 SOLUTION RESPIRATORY (INHALATION) at 20:22

## 2022-12-04 RX ADMIN — ISODIUM CHLORIDE 3 ML: 0.03 SOLUTION RESPIRATORY (INHALATION) at 13:13

## 2022-12-04 RX ADMIN — BENZONATATE 200 MG: 100 CAPSULE ORAL at 15:49

## 2022-12-04 RX ADMIN — LEVALBUTEROL HYDROCHLORIDE 1.25 MG: 1.25 SOLUTION, CONCENTRATE RESPIRATORY (INHALATION) at 07:11

## 2022-12-04 NOTE — PLAN OF CARE
Problem: RESPIRATORY - ADULT  Goal: Achieves optimal ventilation and oxygenation  Description: INTERVENTIONS:  - Assess for changes in respiratory status  - Assess for changes in mentation and behavior  - Position to facilitate oxygenation and minimize respiratory effort  - Oxygen administered by appropriate delivery if ordered  - Initiate smoking cessation education as indicated  - Encourage broncho-pulmonary hygiene including cough, deep breathe, Incentive Spirometry  - Assess the need for suctioning and aspirate as needed  - Assess and instruct to report SOB or any respiratory difficulty  - Respiratory Therapy support as indicated  Outcome: Progressing     Problem: Prexisting or High Potential for Compromised Skin Integrity  Goal: Skin integrity is maintained or improved  Description: INTERVENTIONS:  - Identify patients at risk for skin breakdown  - Assess and monitor skin integrity  - Assess and monitor nutrition and hydration status  - Monitor labs   - Assess for incontinence   - Turn and reposition patient  - Assist with mobility/ambulation  - Relieve pressure over bony prominences  - Avoid friction and shearing  - Provide appropriate hygiene as needed including keeping skin clean and dry  - Evaluate need for skin moisturizer/barrier cream  - Collaborate with interdisciplinary team   - Patient/family teaching  - Consider wound care consult   Outcome: Progressing     Problem: MOBILITY - ADULT  Goal: Maintain or return to baseline ADL function  Description: INTERVENTIONS:  -  Assess patient's ability to carry out ADLs; assess patient's baseline for ADL function and identify physical deficits which impact ability to perform ADLs (bathing, care of mouth/teeth, toileting, grooming, dressing, etc )  - Assess/evaluate cause of self-care deficits   - Assess range of motion  - Assess patient's mobility; develop plan if impaired  - Assess patient's need for assistive devices and provide as appropriate  - Encourage maximum independence but intervene and supervise when necessary  - Involve family in performance of ADLs  - Assess for home care needs following discharge   - Consider OT consult to assist with ADL evaluation and planning for discharge  - Provide patient education as appropriate  Outcome: Progressing  Goal: Maintains/Returns to pre admission functional level  Description: INTERVENTIONS:  - Perform BMAT or MOVE assessment daily    - Set and communicate daily mobility goal to care team and patient/family/caregiver  - Collaborate with rehabilitation services on mobility goals if consulted  - Perform Range of Motion 3 times a day  - Reposition patient every 2 hours    - Dangle patient 3 times a day  - Stand patient 3 times a day  - Ambulate patient 3 times a day  - Out of bed to chair 3 times a day   - Out of bed for meals 3 times a day  - Out of bed for toileting  - Record patient progress and toleration of activity level   Outcome: Progressing     Problem: Potential for Falls  Goal: Patient will remain free of falls  Description: INTERVENTIONS:  - Educate patient/family on patient safety including physical limitations  - Instruct patient to call for assistance with activity   - Consult OT/PT to assist with strengthening/mobility   - Keep Call bell within reach  - Keep bed low and locked with side rails adjusted as appropriate  - Keep care items and personal belongings within reach  - Initiate and maintain comfort rounds  - Make Fall Risk Sign visible to staff  - Offer Toileting every 2  Hours, in advance of need  - Initiate/Maintain bed alarm  - Obtain necessary fall risk management equipment: yellow socks  - Apply yellow socks and bracelet for high fall risk patients  - Consider moving patient to room near nurses station  Outcome: Progressing     Problem: PAIN - ADULT  Goal: Verbalizes/displays adequate comfort level or baseline comfort level  Description: Interventions:  - Encourage patient to monitor pain and request assistance  - Assess pain using appropriate pain scale  - Administer analgesics based on type and severity of pain and evaluate response  - Implement non-pharmacological measures as appropriate and evaluate response  - Consider cultural and social influences on pain and pain management  - Notify physician/advanced practitioner if interventions unsuccessful or patient reports new pain  Outcome: Progressing     Problem: INFECTION - ADULT  Goal: Absence or prevention of progression during hospitalization  Description: INTERVENTIONS:  - Assess and monitor for signs and symptoms of infection  - Monitor lab/diagnostic results  - Monitor all insertion sites, i e  indwelling lines, tubes, and drains  - Monitor endotracheal if appropriate and nasal secretions for changes in amount and color  - [place] appropriate cooling/warming therapies per order  - Administer medications as ordered  - Instruct and encourage patient and family to use good hand hygiene technique  - Identify and instruct in appropriate isolation precautions for identified infection/condition  Outcome: Progressing  Goal: Absence of fever/infection during neutropenic period  Description: INTERVENTIONS:  - Monitor WBC    Outcome: Progressing     Problem: SAFETY ADULT  Goal: Maintain or return to baseline ADL function  Description: INTERVENTIONS:  -  Assess patient's ability to carry out ADLs; assess patient's baseline for ADL function and identify physical deficits which impact ability to perform ADLs (bathing, care of mouth/teeth, toileting, grooming, dressing, etc )  - Assess/evaluate cause of self-care deficits   - Assess range of motion  - Assess patient's mobility; develop plan if impaired  - Assess patient's need for assistive devices and provide as appropriate  - Encourage maximum independence but intervene and supervise when necessary  - Involve family in performance of ADLs  - Assess for home care needs following discharge   - Consider OT consult to assist with ADL evaluation and planning for discharge  - Provide patient education as appropriate  Outcome: Progressing  Goal: Maintains/Returns to pre admission functional level  Description: INTERVENTIONS:  - Perform BMAT or MOVE assessment daily    - Set and communicate daily mobility goal to care team and patient/family/caregiver  - Collaborate with rehabilitation services on mobility goals if consulted  - Perform Range of Motion 3 times a day  - Reposition patient every 2 hours    - Dangle patient 3 times a day  - Stand patient 3 times a day  - Ambulate patient 3 times a day  - Out of bed to chair 3 times a day   - Out of bed for meals 3 times a day  - Out of bed for toileting  - Record patient progress and toleration of activity level   Outcome: Progressing  Goal: Patient will remain free of falls  Description: INTERVENTIONS:  - Educate patient/family on patient safety including physical limitations  - Instruct patient to call for assistance with activity   - Consult OT/PT to assist with strengthening/mobility   - Keep Call bell within reach  - Keep bed low and locked with side rails adjusted as appropriate  - Keep care items and personal belongings within reach  - Initiate and maintain comfort rounds  - Make Fall Risk Sign visible to staff  - Offer Toileting every 2 Hours, in advance of need  - Initiate/Maintain bed alarm  - Obtain necessary fall risk management equipment: yellow socks  - Apply yellow socks and bracelet for high fall risk patients  - Consider moving patient to room near nurses station  Outcome: Progressing     Problem: DISCHARGE PLANNING  Goal: Discharge to home or other facility with appropriate resources  Description: INTERVENTIONS:  - Identify barriers to discharge w/patient and caregiver  - Arrange for needed discharge resources and transportation as appropriate  - Identify discharge learning needs (meds, wound care, etc )  - Arrange for interpretive services to assist at discharge as needed  - Refer to Case Management Department for coordinating discharge planning if the patient needs post-hospital services based on physician/advanced practitioner order or complex needs related to functional status, cognitive ability, or social support system  Outcome: Progressing     Problem: Knowledge Deficit  Goal: Patient/family/caregiver demonstrates understanding of disease process, treatment plan, medications, and discharge instructions  Description: Complete learning assessment and assess knowledge base  Interventions:  - Provide teaching at level of understanding  - Provide teaching via preferred learning methods  Outcome: Progressing     Problem: Nutrition/Hydration-ADULT  Goal: Nutrient/Hydration intake appropriate for improving, restoring or maintaining nutritional needs  Description: Monitor and assess patient's nutrition/hydration status for malnutrition  Collaborate with interdisciplinary team and initiate plan and interventions as ordered  Monitor patient's weight and dietary intake as ordered or per policy  Utilize nutrition screening tool and intervene as necessary  Determine patient's food preferences and provide high-protein, high-caloric foods as appropriate       INTERVENTIONS:  - Monitor oral intake, urinary output, labs, and treatment plans  - Assess nutrition and hydration status and recommend course of action  - Evaluate amount of meals eaten  - Assist patient with eating if necessary   - Allow adequate time for meals  - Recommend/ encourage appropriate diets, oral nutritional supplements, and vitamin/mineral supplements  - Order, calculate, and assess calorie counts as needed  - Assess need for intravenous fluids  - Provide nutrition/hydration education as appropriate  - Include patient/family/caregiver in decisions related to nutrition  Outcome: Progressing

## 2022-12-04 NOTE — PLAN OF CARE
Problem: RESPIRATORY - ADULT  Goal: Achieves optimal ventilation and oxygenation  Description: INTERVENTIONS:  - Assess for changes in respiratory status  - Assess for changes in mentation and behavior  - Position to facilitate oxygenation and minimize respiratory effort  - Oxygen administered by appropriate delivery if ordered  - Initiate smoking cessation education as indicated  - Encourage broncho-pulmonary hygiene including cough, deep breathe, Incentive Spirometry  - Assess the need for suctioning and aspirate as needed  - Assess and instruct to report SOB or any respiratory difficulty  - Respiratory Therapy support as indicated  Outcome: Progressing     Problem: Prexisting or High Potential for Compromised Skin Integrity  Goal: Skin integrity is maintained or improved  Description: INTERVENTIONS:  - Identify patients at risk for skin breakdown  - Assess and monitor skin integrity  - Assess and monitor nutrition and hydration status  - Monitor labs   - Assess for incontinence   - Turn and reposition patient  - Assist with mobility/ambulation  - Relieve pressure over bony prominences  - Avoid friction and shearing  - Provide appropriate hygiene as needed including keeping skin clean and dry  - Evaluate need for skin moisturizer/barrier cream  - Collaborate with interdisciplinary team   - Patient/family teaching  - Consider wound care consult   Outcome: Progressing     Problem: MOBILITY - ADULT  Goal: Maintain or return to baseline ADL function  Description: INTERVENTIONS:  -  Assess patient's ability to carry out ADLs; assess patient's baseline for ADL function and identify physical deficits which impact ability to perform ADLs (bathing, care of mouth/teeth, toileting, grooming, dressing, etc )  - Assess/evaluate cause of self-care deficits   - Assess range of motion  - Assess patient's mobility; develop plan if impaired  - Assess patient's need for assistive devices and provide as appropriate  - Encourage maximum independence but intervene and supervise when necessary  - Involve family in performance of ADLs  - Assess for home care needs following discharge   - Consider OT consult to assist with ADL evaluation and planning for discharge  - Provide patient education as appropriate  Outcome: Progressing  Goal: Maintains/Returns to pre admission functional level  Description: INTERVENTIONS:  - Perform BMAT or MOVE assessment daily    - Set and communicate daily mobility goal to care team and patient/family/caregiver     - Collaborate with rehabilitation services on mobility goals if consulted  - Out of bed for toileting  - Record patient progress and toleration of activity level   Outcome: Progressing     Problem: Potential for Falls  Goal: Patient will remain free of falls  Description: INTERVENTIONS:  - Educate patient/family on patient safety including physical limitations  - Instruct patient to call for assistance with activity   - Consult OT/PT to assist with strengthening/mobility   - Keep Call bell within reach  - Keep bed low and locked with side rails adjusted as appropriate  - Keep care items and personal belongings within reach  - Initiate and maintain comfort rounds  - Make Fall Risk Sign visible to staff  - Apply yellow socks and bracelet for high fall risk patients  - Consider moving patient to room near nurses station  Outcome: Progressing     Problem: PAIN - ADULT  Goal: Verbalizes/displays adequate comfort level or baseline comfort level  Description: Interventions:  - Encourage patient to monitor pain and request assistance  - Assess pain using appropriate pain scale  - Administer analgesics based on type and severity of pain and evaluate response  - Implement non-pharmacological measures as appropriate and evaluate response  - Consider cultural and social influences on pain and pain management  - Notify physician/advanced practitioner if interventions unsuccessful or patient reports new pain  Outcome: Progressing     Problem: INFECTION - ADULT  Goal: Absence or prevention of progression during hospitalization  Description: INTERVENTIONS:  - Assess and monitor for signs and symptoms of infection  - Monitor lab/diagnostic results  - Monitor all insertion sites, i e  indwelling lines, tubes, and drains  - Monitor endotracheal if appropriate and nasal secretions for changes in amount and color  - [place] appropriate cooling/warming therapies per order  - Administer medications as ordered  - Instruct and encourage patient and family to use good hand hygiene technique  - Identify and instruct in appropriate isolation precautions for identified infection/condition  Outcome: Progressing  Goal: Absence of fever/infection during neutropenic period  Description: INTERVENTIONS:  - Monitor WBC    Outcome: Progressing     Problem: SAFETY ADULT  Goal: Maintain or return to baseline ADL function  Description: INTERVENTIONS:  -  Assess patient's ability to carry out ADLs; assess patient's baseline for ADL function and identify physical deficits which impact ability to perform ADLs (bathing, care of mouth/teeth, toileting, grooming, dressing, etc )  - Assess/evaluate cause of self-care deficits   - Assess range of motion  - Assess patient's mobility; develop plan if impaired  - Assess patient's need for assistive devices and provide as appropriate  - Encourage maximum independence but intervene and supervise when necessary  - Involve family in performance of ADLs  - Assess for home care needs following discharge   - Consider OT consult to assist with ADL evaluation and planning for discharge  - Provide patient education as appropriate  Outcome: Progressing  Goal: Maintains/Returns to pre admission functional level  Description: INTERVENTIONS:  - Perform BMAT or MOVE assessment daily    - Set and communicate daily mobility goal to care team and patient/family/caregiver     - Collaborate with rehabilitation services on mobility goals if consulted  - Out of bed for toileting  - Record patient progress and toleration of activity level   Outcome: Progressing  Goal: Patient will remain free of falls  Description: INTERVENTIONS:  - Educate patient/family on patient safety including physical limitations  - Instruct patient to call for assistance with activity   - Consult OT/PT to assist with strengthening/mobility   - Keep Call bell within reach  - Keep bed low and locked with side rails adjusted as appropriate  - Keep care items and personal belongings within reach  - Initiate and maintain comfort rounds  - Make Fall Risk Sign visible to staff  - Apply yellow socks and bracelet for high fall risk patients  - Consider moving patient to room near nurses station  Outcome: Progressing     Problem: DISCHARGE PLANNING  Goal: Discharge to home or other facility with appropriate resources  Description: INTERVENTIONS:  - Identify barriers to discharge w/patient and caregiver  - Arrange for needed discharge resources and transportation as appropriate  - Identify discharge learning needs (meds, wound care, etc )  - Arrange for interpretive services to assist at discharge as needed  - Refer to Case Management Department for coordinating discharge planning if the patient needs post-hospital services based on physician/advanced practitioner order or complex needs related to functional status, cognitive ability, or social support system  Outcome: Progressing     Problem: Knowledge Deficit  Goal: Patient/family/caregiver demonstrates understanding of disease process, treatment plan, medications, and discharge instructions  Description: Complete learning assessment and assess knowledge base    Interventions:  - Provide teaching at level of understanding  - Provide teaching via preferred learning methods  Outcome: Progressing     Problem: Nutrition/Hydration-ADULT  Goal: Nutrient/Hydration intake appropriate for improving, restoring or maintaining nutritional needs  Description: Monitor and assess patient's nutrition/hydration status for malnutrition  Collaborate with interdisciplinary team and initiate plan and interventions as ordered  Monitor patient's weight and dietary intake as ordered or per policy  Utilize nutrition screening tool and intervene as necessary  Determine patient's food preferences and provide high-protein, high-caloric foods as appropriate       INTERVENTIONS:  - Monitor oral intake, urinary output, labs, and treatment plans  - Assess nutrition and hydration status and recommend course of action  - Evaluate amount of meals eaten  - Assist patient with eating if necessary   - Allow adequate time for meals  - Recommend/ encourage appropriate diets, oral nutritional supplements, and vitamin/mineral supplements  - Order, calculate, and assess calorie counts as needed  - Assess need for intravenous fluids  - Provide nutrition/hydration education as appropriate  - Include patient/family/caregiver in decisions related to nutrition  Outcome: Progressing

## 2022-12-04 NOTE — PROGRESS NOTES
L[address] 45  Progress Note Ingrid Bruce 10/19/1929, 80 y o  male MRN: [id number]  Unit/Bed#: [facility] Encounter: [id number]  Primary Care Provider: Pam Gage PA-C   Date and time admitted to hospital: 11/22/2022  8:41 PM    * Acute respiratory failure with hypoxia [facility])  Assessment & Plan  Patient presented with acute respiratory distress shortly after eating a few spoonful of puree diet in STR, patient reported chest pressure and SOB  Patient was satting 85-95% on oxygen 3 L per STR transfer paper  Patient was belching after eating/drinking limited amount of diet/water per staff  · Patient was discharged on the day of admission after being hospitalized for aspiration pneumonia, acute on chronic diastolic CHF, dysphagia  Received IV Lasix and 7 days of IV Rocephin and Flagyl  Seen by GI, underwent EGD, found to have oropharyngeal dysphagia with esophageal dysmotility, no obvious esophageal stricture  Underwent Barium swallow study which showed moderate to severe esophageal dysmotility with significant residual contrast remaining in the esophagus at the end of the study  Patient was recommended regular diet by speech and discharged on regular diet per STR  · Likely secondary to multifocal pneumonia and CHF  · Patient required BiPAP on ED arrival   ABG post BiPAP essentially unremarkable  Then titrated down to 6 liters oxygen upon admission  Currently 1 liter oxygen  · Chest x-ray - Pulmonary edema, worse when comparing to 11/19/2022  Underlying infection could not be excluded  · ProBNP 15,094  · Patient received Lasix 40 IV in ED  · Received cefepime and Flagyl in ED  Completed 7 days of IV cefepime  · Received IV Solu-Medrol in ED  No wheezing on auscultation    · CT chest-pulmonary edema with moderate size bilateral pleural effusions with significant bibasilar atelectasis and concomitant multifocal pneumonia   · Obstructive series showed persistent pulmonary edema with multifocal pneumonia    Aspiration pneumonia ([facility] [address] )  Assessment & Plan  Repeat procalcitonin level was 0 54 initially, continues to remain mildly elevated  · Patient received cefepime and Flagyl in the ED   Completed 7 day course of cefepime 1 gram IV daily  · Patient febrile again to 101 1 on 12/1, likely aspiration pneumonitis  procalcitonin mildly elevated, blood cultures negative, COVID/flu/RSV negative, urine culture with small colony of Enterococcus  · Due to recurrent fevers, ID was consulted who recommended monitoring off antibiotics  · Daughter/patient aware that he is at high risk for aspiration events due to his dysphagia  · Sputum culture with mixed respiratory hardik  · Urine for Legionella and pneumococcal antigens were negative  · Pulmonary input appreciated  · Blood cultures negative  · Repeat chest x-ray 12/1 with waxing and waning opacities    Acute on chronic diastolic congestive heart failure (HCC)  Assessment & Plan  Wt Readings from Last 3 Encounters:   12/04/22 83 3 kg (183 lb 9 6 oz)   11/22/22 84 5 kg (186 lb 4 8 oz)     Patient received IV Lasix in recent admission  · Chest x-ray upon admission shows worsening pulmonary edema  · Received 40 milligrams of Lasix IV in the ED and was continued on IV Lasix  Then transitioned to [facility] which is currently being held  · Left upper extremity venous Doppler showed no evidence of DVT  · CT chest showed pulmonary edema with moderate size bilateral pleural effusions left more than right  · Might need accept higher creatinine to maintain euvolemia  Esophageal dysphagia  Assessment & Plan  Patient reported no appetite for 2 days  · Per speech therapy, patient at high risk for aspiration despite the level of diet  Currently on pureed diet with thin liquids with medications crushed with puree  · GI recommended to consider PEG tube if recurrent aspirations     · Continue pantoprazole daily  · Obstructive series showed barium throughout the colon with modest amount of stool along the rectosigmoid region  · Discussed with patient's daughter/patient again today  They are not interested in PEG tube placement  Discussed with her that patient is at high risk for recurrent aspiration pneumonia/hospitalizations      Left upper quadrant abdominal mass  Assessment & Plan  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  · Discussed with radiology as patient cannot get IV contrast for CT scan and radiologist recommended getting MRI for further evaluation  · Per nephrology okay to obtain MRI with gadolinium using class 2 gadolinium agents  · Spoke with patient's daughter again today and she is interested in pursuing further workup for this abdominal mass    Constipation  Assessment & Plan  Patient with constipation  Continue MiraLax, senna q h s  · Continue current regimen  MiraLax increased to b i d  Dosing      BPH (benign prostatic hyperplasia)  Assessment & Plan  Patient with urinary retention requiring multiple straight cath  Per RN patient also with penile swelling and therefore Ayala catheter placed  · Urology input appreciated  Cardura discontinued and patient started on Flomax    Hyperlipidemia  Assessment & Plan  Continue statin    Elevated troponin  Assessment & Plan  Troponin [id number]  Elevated troponin in recent admission as well  Reports chest pressure when in respiratory distress  Denies history of CAD  · EKG no ischemic changes, read as AFib, rate 103, RBBB per prior provider  · No history of AFib  Prior EKG shows sinus rhythm with PACs /PVCs  repeat EKG showed sinus rhythm  · Recent 2D echo showed EF low normal, normal wall motion, grade 2 diastolic dysfunction, moderately dilated atriums  · Most likely non MI troponin elevation due to # 1 and CHF        Type 2 diabetes mellitus [facility])  Assessment & Plan  Lab Results   Component Value Date    HGBA1C 5 7 (H) 11/16/2022 Recent Labs     12/04/22  0218 12/04/22  0729 12/04/22  1111 12/04/22  1537   POCGLU 148* 114 157* 111     Patient was discharged on Prandin t i d  With meals  · Continue Prandin, Humalog sliding scale with Accu-Cheks q a c  And HS  · Currently on diabetic pureed Diet with thin liquids  Continue Lantus 10 units q h s  Stage 3 chronic kidney disease [facility])  Assessment & Plan  Lab Results   Component Value Date    EGFR 13 12/04/2022    EGFR 12 12/03/2022    EGFR 12 12/02/2022    CREATININE 3 55 (H) 12/04/2022    CREATININE 3 78 (H) 12/03/2022    CREATININE 3 87 (H) 12/02/2022     History of CKD 4, baseline creatinine appears to be around 1 5-1 9 in past 2 years in care everywhere  Creatinine during previous hospitalization ranged in 2 4-2 8  · Possible CKD progression  · Urinalysis was bland  · Received IV Lasix 40 mg in ED  · Creatinine continues to slowly trend down  Received IV fluids for 10 hours per Nephrology on 11/30 --> restarted back on fluids, currently off fluids  · Renal ultrasound showed no hydronephrosis moderate left renal atrophy  · Nephrology input appreciated    Primary hypertension  Assessment & Plan  Continue hydralazine, Coreg, Norvasc, Imdur  · Blood pressures overall acceptable  Thrush-resolved as of 12/3/2022  Assessment & Plan  Patient completed 7 days of nystatin suspension  · Started on Diflucan as patient was noted to have white plaques  Diflucan 200 milligrams X 1 dose and completed total of 7 days of treatment with Diflucan 100 milligram p o  Daily       VTE Pharmacologic Prophylaxis:   Heparin    Patient Centered Rounds: I performed bedside rounds with nursing staff today  Discussions with Specialists or Other Care Team Provider: yes - renal    Education and Discussions with Family / Patient: Updated  (daughter) via phone  Time Spent for Care: 45 minutes   More than 50% of total time spent on counseling and coordination of care as described above     Current Length of Stay: 12 day(s)  Current Patient Status: Inpatient   Certification Statement: The patient will continue to require additional inpatient hospital stay due to Abdominal mass requiring MRI, chronic kidney disease with creatinine higher than baseline  Discharge Plan: Anticipate discharge in 48-72 hrs to rehab facility  Code Status: Level 1 - Full Code    Subjective:     Patient reports cough after drinking something  States he does not want to eat but denies any abdominal pain    Objective:     Vitals:   Temp (24hrs), Av 6 °F (37 °C), Min:98 1 °F (36 7 °C), Max:98 9 °F (37 2 °C)    Temp:  [98 1 °F (36 7 °C)-98 9 °F (37 2 °C)] 98 1 °F (36 7 °C)  HR:  [58-64] 60  Resp:  [17-20] 18  BP: (107-166)/(53-63) 163/63  SpO2:  [87 %-100 %] 94 %  Body mass index is 30 55 kg/m²  Input and Output Summary (last 24 hours): Intake/Output Summary (Last 24 hours) at 2022 0930  Last data filed at 2022 0601  Gross per 24 hour   Intake --   Output 1050 ml   Net -1050 ml       Physical Exam:   Physical Exam  Vitals reviewed  Constitutional:       General: He is not in acute distress  Appearance: He is ill-appearing  HENT:      Head: Normocephalic and atraumatic  Eyes:      General:         Right eye: No discharge  Left eye: No discharge  Extraocular Movements: Extraocular movements intact  Cardiovascular:      Rate and Rhythm: Normal rate and regular rhythm  Pulmonary:      Effort: Pulmonary effort is normal  No respiratory distress  Breath sounds: Rales present  No wheezing  Abdominal:      General: Bowel sounds are normal  There is no distension  Palpations: Abdomen is soft  Tenderness: There is no abdominal tenderness  Neurological:      Mental Status: He is alert           Additional Data:     Labs:  Results from last 7 days   Lab Units 22  0539 22  0634 22  0544   WBC Thousand/uL 13 15*   < > 14 54*   HEMOGLOBIN g/dL 8 0* < > 8 4*   HEMATOCRIT % 25 3*   < > 26 3*   PLATELETS Thousands/uL 233   < > 211   NEUTROS PCT %  --   --  76*   LYMPHS PCT %  --   --  11*   MONOS PCT %  --   --  9   EOS PCT %  --   --  3    < > = values in this interval not displayed  Results from last 7 days   Lab Units 12/04/22  0552   SODIUM mmol/L 145   POTASSIUM mmol/L 4 8   CHLORIDE mmol/L 106   CO2 mmol/L 31   BUN mg/dL 118*   CREATININE mg/dL 3 55*   ANION GAP mmol/L 8   CALCIUM mg/dL 8 6   GLUCOSE RANDOM mg/dL 114         Results from last 7 days   Lab Units 12/04/22  0729 12/04/22  0218 12/03/22  2059 12/03/22  1604 12/03/22  1058 12/03/22  1040 12/03/22  0717 12/03/22  0254 12/02/22  2043 12/02/22  1558 12/02/22  1142 12/02/22  0716   POC GLUCOSE mg/dl 114 148* 181* 171* 128 123 118 163* 206* 73 98 107         Results from last 7 days   Lab Units 12/02/22  0539 11/29/22  0544   PROCALCITONIN ng/ml 0 34* 0 63*       Lines/Drains:  Invasive Devices     Peripheral Intravenous Line  Duration           Peripheral IV 11/30/22 Dorsal (posterior); Left Hand 3 days          Drain  Duration           Urethral Catheter 1 day              Urinary Catheter:  Goal for removal: Voiding trial when ambulation improves               Imaging: No pertinent imaging reviewed  Recent Cultures (last 7 days):   Results from last 7 days   Lab Units 12/01/22  1043 11/30/22  1145 11/28/22  1502   BLOOD CULTURE  No Growth at 48 hrs    No Growth at 48 hrs   --   --    SPUTUM CULTURE   --   --  2+ Growth of   GRAM STAIN RESULT   --   --  Rare Epithelial cells per low power field*  1+ Polys*  1+ Gram negative rods*  Rare Gram positive rods*  Rare Gram positive cocci in pairs*   URINE CULTURE   --  20,000-29,000 cfu/ml Alpha Hemolytic Streptococcus*  --        Last 24 Hours Medication List:   Current Facility-Administered Medications   Medication Dose Route Frequency Provider Last Rate   • acetaminophen  650 mg Oral Q6H PRN KEISHA Newberry     • amLODIPine  10 mg Oral Daily KEISHA Newberry     • atorvastatin  40 mg Oral Daily KEISHA Newberry     • benzonatate  200 mg Oral TID KEISHA Oviedo     • bisacodyl  10 mg Rectal Daily PRN Rhonda Rodas MD     • carvedilol  12 5 mg Oral BID With Meals KEISHA Newberry     • cholecalciferol  2,000 Units Oral Daily KEISHA Newberry     • vitamin B-12  500 mcg Oral Daily KEISHA Newberry     • dextromethorphan-guaiFENesin  10 mL Oral Q6H PRN KEISHA Newberry     • gabapentin  100 mg Oral BID KEISHA Newberry     • heparin (porcine)  5,000 Units Subcutaneous Q8H [address] KEISHA Newberry     • hydrALAZINE  100 mg Oral TID Miley Tenorio MD     • hydrocodone-chlorpheniramine polistirex  5 mL Oral HS Rhonda Rodas MD     • insulin glargine  10 Units Subcutaneous HS Rhonda Rodas MD     • insulin lispro  1-5 Units Subcutaneous TID AC KEISHA Newberry     • insulin lispro  1-5 Units Subcutaneous 0200 KEISHA Newberry     • insulin lispro  1-5 Units Subcutaneous HS KEISHA Newberry     • ipratropium  0 5 mg Nebulization TID KEISHA Newberry     • isosorbide mononitrate  60 mg Oral Daily KEISHA Newberry     • labetalol  10 mg Intravenous Q6H PRN Hai Monsalve PA-C     • levalbuterol  0 63 mg Nebulization Q4H PRN KEISHA Newberry     • levalbuterol  1 25 mg Nebulization TID KEISHA Newberry      And   • sodium chloride  3 mL Nebulization TID KEISHA Newberry     • nystatin   Topical BID KEISHA Newberry     • ondansetron  4 mg Intravenous Q6H PRN KEISHA Newberry     • pantoprazole  40 mg Oral Early Morning KEISHA Newberry     • polyethylene glycol  17 g Oral BID Tasha Brown DO     • repaglinide  0 5 mg Oral TID AC Rhonda Rodas MD     • senna  1 tablet Oral HS Rhonda Rodas MD     • tamsulosin  0 4 mg Oral Daily With Matias Perkins MD          Today, Patient Was Seen By: [id number] Patel, DO    **Please Note: This note may have been constructed using a voice recognition system  **

## 2022-12-04 NOTE — PROGRESS NOTES
NEPHROLOGY PROGRESS NOTE   Payton Romero 80 y o  male MRN: [id number]  Unit/Bed#: [address] [place] [address] Encounter: [id number]    ASSESSMENT & PLAN:  12-year-old male with history of diabetes mellitus, hypertension, chronic kidney disease, CHF, esophageal dysmotility presented to [facility] after presenting with shortness of breath  Nephrology consulted for CKD  Recent hospital admission between November 15 to November 22 for respiratory failure due to pneumonia and fluid overload  Was discharged on oral Lasix 20 mg daily after treatment with IV Lasix  Discharge creatinine was around 2 68  Now again presenting with shortness of breath and nephrology consulted for worsening creatinine which was on admission 2 83  Elevated creatinine on chronic kidney disease stage 3  -baseline creatinine from 2021 was around 1 6-2 0  -admission creatinine 2 83 with use of oral Lasix since last hospital admission   -possibly CKD progression from cardiorenal syndrome and age-related nephron loss  -UA was bland, renal ultrasound without hydronephrosis but finding of left renal atrophy  - renal function has worsened during the hospital stay to peak cr 3 9 mg/dL on 12/1 in the setting of diuresis and urinary retention     -Patient was getting treatment with oral torsemide 40 mg daily which was stopped on 11/29 in the setting of creatinine worsening  Was treated with IV fluid on 11/30 as well as 12/1   -patient is now with Ayala catheter, renal function improving to creatinine 3 5  Volume status acceptable  Has some crackles possibly due to aspiration  Would continue to hold diuretics at this time and continue to monitor renal function      Primary hypertension  -blood pressure still elevated today a m  But has been labile   -dose of hydralazine was increased to 100 mg t i d  On 11/27, continue at current dose    Continue amlodipine 10 mg daily, carvedilol 12 5 mg twice daily,  Imdur 60 mg once daily  -patient is now off doxazosin so possibility of increase in blood pressure is higher   -not increasing Coreg due to risk of bradycardia    -continue to monitor blood pressure    Fluid overload/acute on chronic diastolic CHF  -CT was suggestive of pulmonary edema with moderate size pleural effusion left more than right  -volume status has improved status post IV Lasix and then was transitioned to oral torsemide 40 mg daily but with worsening of renal function on 11/29, torsemide was held  Continue to hold torsemide for now and monitor volume status closely  May need to consider lower dose 20 mg daily once renal function improves back to creatinine 2 7-2 8 but currently in the setting of creatinine elevated than baseline would continue to hold diuretics as mentioned above    Aspiration pneumonia, status post antibiotic last dose on 11/29  Concern for ongoing aspiration, continue aspiration precautions  Shortness of breath likely combination of fluid overload as well as aspiration pneumonia-improving status post diuresis    BPH-was taken off doxazosin and started on Flomax on 12/02    Esophageal dysphagia:  Noted discussion for PEG tube placement  Anemia, hemoglobin low at 8 0 g dL, continue to monitor    Urinary retention, seen by Urology, recommended to continue lock  -was taken of doxazosin and started on Flomax  Left upper quadrant mass:  Renal ultrasound suggestive of solid hypoechoic mass in the left upper quadrant medial to the spleen as seen on previous CT scan   -recommend workup and management per primary team   CT scan on hold due to barium in the GI tract, also discussed with primary team about risk of contrast nephropathy  if plan for CT with IV contrast ,  may need to discuss with patient and recommend pre and post contrast hydration    -okay to do MRI with gadolinium using class 2 agents    Discussed with primary team    Nephrology Disposition Recommendations  Not medically stable for discharge from a Nephrology Standpoint Patient continues to require treatment for sirisha      SUBJECTIVE:  No new complaints  No chest pain or shortness of breath  On oxygen by nasal cannula, as per nursing staff has been aspirating    OBJECTIVE:  Current Weight: Weight - Scale: 83 3 kg (183 lb 9 6 oz)  Vitals:    12/04/22 0753   BP: 163/63   Pulse: 60   Resp: 18   Temp: 98 1 °F (36 7 °C)   SpO2: 94%       Intake/Output Summary (Last 24 hours) at 12/4/2022 1150  Last data filed at 12/4/2022 0601  Gross per 24 hour   Intake --   Output 1050 ml   Net -1050 ml       Physical Exam  General:  Ill looking, awake  On oxygen by nasal cannula  Eyes: Conjunctivae pink,  Sclera anicteric  ENT: lips and mucous membranes moist  Neck: supple   Chest:  Bilateral course basal crackles  CVS: S1 & S2 present, normal rate, regular rhythm, no murmur    Abdomen: soft, non-tender, non-distended, Bowel sounds normoactive  Extremities: no edema of  legs  Skin: no rash  Neuro: awake, alert, oriented  Psych: Mood and affect appropriate     Medications:    Current Facility-Administered Medications:   •  acetaminophen (TYLENOL) tablet 650 mg, 650 mg, Oral, Q6H PRN, APOLONIA NewberryNP, 650 mg at 12/01/22 0808  •  amLODIPine (NORVASC) tablet 10 mg, 10 mg, Oral, Daily, Emilee Link, CRNP, 10 mg at 12/04/22 0076  •  atorvastatin (LIPITOR) tablet 40 mg, 40 mg, Oral, Daily, Emilee Link CRNP, 40 mg at 12/04/22 0793  •  benzonatate (TESSALON PERLES) capsule 200 mg, 200 mg, Oral, TID, APOLONIA EscotoNP, 200 mg at 12/04/22 8673  •  bisacodyl (DULCOLAX) rectal suppository 10 mg, 10 mg, Rectal, Daily PRN, Stella Aguero MD, 10 mg at 12/02/22 1206  •  carvedilol (COREG) tablet 12 5 mg, 12 5 mg, Oral, BID With Meals, KEISHA Newberry, 12 5 mg at 12/04/22 0759  •  cholecalciferol (VITAMIN D3) tablet 2,000 Units, 2,000 Units, Oral, Daily, KEISHA Newberry, 2,000 Units at 12/04/22 0902  •  cyanocobalamin (VITAMIN B-12) tablet 500 mcg, 500 mcg, Oral, Daily, KEISHA Newberry, 500 mcg at 12/04/22 0902  •  dextromethorphan-guaiFENesin (ROBITUSSIN DM) oral syrup 10 mL, 10 mL, Oral, Q6H PRN, KEISHA Newberry, 10 mL at 12/03/22 1711  •  gabapentin (NEURONTIN) capsule 100 mg, 100 mg, Oral, BID, KEISHA Newberry, 100 mg at 12/04/22 3036  •  heparin (porcine) subcutaneous injection 5,000 Units, 5,000 Units, Subcutaneous, Q8H [address], 5,000 Units at 12/04/22 0616 **AND** [CANCELED] Platelet count, , , Once, KEISHA Newberry  •  hydrALAZINE (APRESOLINE) tablet 100 mg, 100 mg, Oral, TID, Nat Guerra MD, 100 mg at 12/04/22 0901  •  hydrocodone-chlorpheniramine polistirex (TUSSIONEX) ER suspension 5 mL, 5 mL, Oral, HS, Mary Lou Alves MD, 5 mL at 12/03/22 2219  •  insulin glargine (LANTUS) subcutaneous injection 10 Units 0 1 mL, 10 Units, Subcutaneous, HS, Mary Lou Alves MD, 10 Units at 12/03/22 2219  •  insulin lispro (HumaLOG) 100 units/mL subcutaneous injection 1-5 Units, 1-5 Units, Subcutaneous, TID AC, 1 Units at 12/03/22 1628 **AND** Fingerstick Glucose (POCT), , , TID AC, KEISHA Newberry  •  insulin lispro (HumaLOG) 100 units/mL subcutaneous injection 1-5 Units, 1-5 Units, Subcutaneous, 0200, KEISHA Newberry, 1 Units at 12/03/22 0257  •  insulin lispro (HumaLOG) 100 units/mL subcutaneous injection 1-5 Units, 1-5 Units, Subcutaneous, HS, KEISHA Newberry, 1 Units at 12/03/22 2221  •  ipratropium (ATROVENT) 0 02 % inhalation solution 0 5 mg, 0 5 mg, Nebulization, TID, KEISHA Newberry, 0 5 mg at 12/04/22 1455  •  isosorbide mononitrate (IMDUR) 24 hr tablet 60 mg, 60 mg, Oral, Daily, KEISHA Newberry, 60 mg at 12/04/22 0902  •  labetalol (NORMODYNE) injection 10 mg, 10 mg, Intravenous, Q6H PRN, Agustina Schneider PA-C, 10 mg at 11/24/22 0241  •  levalbuterol (XOPENEX) inhalation solution 0 63 mg, 0 63 mg, Nebulization, Q4H PRN, KEISHA Newberry, 0 63 mg at 12/01/22 0807  •  levalbuterol (XOPENEX) inhalation solution 1 25 mg, 1 25 mg, Nebulization, TID, 1 25 mg at 12/04/22 0711 **AND** sodium chloride 0 9 % inhalation solution 3 mL, 3 mL, Nebulization, TID, KEISHA Newberry, 3 mL at 12/04/22 0710  •  nystatin (MYCOSTATIN) powder, , Topical, BID, KEISHA Newberry, Given at 12/04/22 0903  •  ondansetron (ZOFRAN) injection 4 mg, 4 mg, Intravenous, Q6H PRN, KEISHA Newberry, 4 mg at 11/28/22 1038  •  pantoprazole (PROTONIX) EC tablet 40 mg, 40 mg, Oral, Early Morning, KEISHA Newberry, 40 mg at 12/04/22 9985  •  polyethylene glycol (MIRALAX) packet 17 g, 17 g, Oral, BID, Tasha Brown DO, 17 g at 12/04/22 0901  •  repaglinide (PRANDIN) tablet 0 5 mg, 0 5 mg, Oral, TID AC, Steve Yan MD, 0 5 mg at 12/04/22 0759  •  senna (SENOKOT) tablet 8 6 mg, 1 tablet, Oral, HS, Elissa Dunlap MD, 8 6 mg at 12/03/22 2220  •  tamsulosin (FLOMAX) capsule 0 4 mg, 0 4 mg, Oral, Daily With Colleen Raymond MD, 0 4 mg at 12/03/22 1604    Invasive Devices:        Lab Results:   Results from last 7 days   Lab Units 12/04/22  0552 12/03/22  0533 12/02/22  0539 12/01/22  0521 11/30/22  0634 11/29/22  0544   WBC Thousand/uL  --   --  13 15*  --  14 73* 14 54*   HEMOGLOBIN g/dL  --   --  8 0*  --  8 2* 8 4*   HEMATOCRIT %  --   --  25 3*  --  26 2* 26 3*   PLATELETS Thousands/uL  --   --  233  --  219 211   POTASSIUM mmol/L 4 8 4 9 4 7   < > 4 8 4 6   CHLORIDE mmol/L 106 103 103   < > 101 103   CO2 mmol/L 31 32 31   < > 32 35*   BUN mg/dL 118* 119* 117*   < > 119* 115*   CREATININE mg/dL 3 55* 3 78* 3 87*   < > 3 77* 3 43*   CALCIUM mg/dL 8 6 8 6 8 3   < > 8 4 8 4    < > = values in this interval not displayed  Previous work up:         Portions of the record may have been created with voice recognition software  Occasional wrong word or "sound a like" substitutions may have occurred due to the inherent limitations of voice recognition software  Read the chart carefully and recognize, using context, where substitutions have occurred  If you have any questions, please contact the dictating provider

## 2022-12-05 ENCOUNTER — APPOINTMENT (INPATIENT)
Dept: RADIOLOGY | Facility: HOSPITAL | Age: 87
End: 2022-12-05

## 2022-12-05 LAB
ANION GAP SERPL CALCULATED.3IONS-SCNC: 7 MMOL/L (ref 4–13)
BACTERIA UR CULT: ABNORMAL
BACTERIA UR CULT: ABNORMAL
BUN SERPL-MCNC: 113 MG/DL (ref 5–25)
CALCIUM SERPL-MCNC: 8.7 MG/DL (ref 8.3–10.1)
CHLORIDE SERPL-SCNC: 105 MMOL/L (ref 96–108)
CO2 SERPL-SCNC: 32 MMOL/L (ref 21–32)
CREAT SERPL-MCNC: 3.28 MG/DL (ref 0.6–1.3)
ERYTHROCYTE [DISTWIDTH] IN BLOOD BY AUTOMATED COUNT: 16.4 % (ref 11.6–15.1)
GFR SERPL CREATININE-BSD FRML MDRD: 15 ML/MIN/1.73SQ M
GLUCOSE SERPL-MCNC: 102 MG/DL (ref 65–140)
GLUCOSE SERPL-MCNC: 113 MG/DL (ref 65–140)
GLUCOSE SERPL-MCNC: 137 MG/DL (ref 65–140)
GLUCOSE SERPL-MCNC: 211 MG/DL (ref 65–140)
GLUCOSE SERPL-MCNC: 96 MG/DL (ref 65–140)
GLUCOSE SERPL-MCNC: 99 MG/DL (ref 65–140)
HCT VFR BLD AUTO: 25.2 % (ref 36.5–49.3)
HGB BLD-MCNC: 7.8 G/DL (ref 12–17)
MCH RBC QN AUTO: 29.4 PG (ref 26.8–34.3)
MCHC RBC AUTO-ENTMCNC: 31 G/DL (ref 31.4–37.4)
MCV RBC AUTO: 95 FL (ref 82–98)
PLATELET # BLD AUTO: 292 THOUSANDS/UL (ref 149–390)
PMV BLD AUTO: 12.2 FL (ref 8.9–12.7)
POTASSIUM SERPL-SCNC: 4.6 MMOL/L (ref 3.5–5.3)
RBC # BLD AUTO: 2.65 MILLION/UL (ref 3.88–5.62)
SODIUM SERPL-SCNC: 144 MMOL/L (ref 135–147)
WBC # BLD AUTO: 13.91 THOUSAND/UL (ref 4.31–10.16)

## 2022-12-05 RX ORDER — SENNOSIDES 8.6 MG
2 TABLET ORAL
Status: DISCONTINUED | OUTPATIENT
Start: 2022-12-05 | End: 2022-12-10 | Stop reason: HOSPADM

## 2022-12-05 RX ADMIN — HEPARIN SODIUM 5000 UNITS: 5000 INJECTION INTRAVENOUS; SUBCUTANEOUS at 05:23

## 2022-12-05 RX ADMIN — ACETAMINOPHEN 650 MG: 325 TABLET, FILM COATED ORAL at 21:33

## 2022-12-05 RX ADMIN — LEVALBUTEROL HYDROCHLORIDE 1.25 MG: 1.25 SOLUTION, CONCENTRATE RESPIRATORY (INHALATION) at 13:01

## 2022-12-05 RX ADMIN — ISODIUM CHLORIDE 3 ML: 0.03 SOLUTION RESPIRATORY (INHALATION) at 19:51

## 2022-12-05 RX ADMIN — NYSTATIN: 100000 POWDER TOPICAL at 08:20

## 2022-12-05 RX ADMIN — ISODIUM CHLORIDE 3 ML: 0.03 SOLUTION RESPIRATORY (INHALATION) at 07:40

## 2022-12-05 RX ADMIN — GABAPENTIN 100 MG: 100 CAPSULE ORAL at 08:13

## 2022-12-05 RX ADMIN — HEPARIN SODIUM 5000 UNITS: 5000 INJECTION INTRAVENOUS; SUBCUTANEOUS at 13:28

## 2022-12-05 RX ADMIN — HEPARIN SODIUM 5000 UNITS: 5000 INJECTION INTRAVENOUS; SUBCUTANEOUS at 21:29

## 2022-12-05 RX ADMIN — PANTOPRAZOLE SODIUM 40 MG: 40 TABLET, DELAYED RELEASE ORAL at 05:23

## 2022-12-05 RX ADMIN — BENZONATATE 200 MG: 100 CAPSULE ORAL at 08:14

## 2022-12-05 RX ADMIN — ISODIUM CHLORIDE 3 ML: 0.03 SOLUTION RESPIRATORY (INHALATION) at 13:01

## 2022-12-05 RX ADMIN — CYANOCOBALAMIN TAB 500 MCG 500 MCG: 500 TAB at 08:13

## 2022-12-05 RX ADMIN — IPRATROPIUM BROMIDE 0.5 MG: 0.5 SOLUTION RESPIRATORY (INHALATION) at 19:51

## 2022-12-05 RX ADMIN — IPRATROPIUM BROMIDE 0.5 MG: 0.5 SOLUTION RESPIRATORY (INHALATION) at 07:40

## 2022-12-05 RX ADMIN — HYDRALAZINE HYDROCHLORIDE 100 MG: 25 TABLET ORAL at 19:47

## 2022-12-05 RX ADMIN — IPRATROPIUM BROMIDE 0.5 MG: 0.5 SOLUTION RESPIRATORY (INHALATION) at 13:00

## 2022-12-05 RX ADMIN — ATORVASTATIN CALCIUM 40 MG: 40 TABLET, FILM COATED ORAL at 08:14

## 2022-12-05 RX ADMIN — REPAGLINIDE 0.5 MG: 1 TABLET ORAL at 07:07

## 2022-12-05 RX ADMIN — CARVEDILOL 12.5 MG: 12.5 TABLET, FILM COATED ORAL at 08:20

## 2022-12-05 RX ADMIN — TAMSULOSIN HYDROCHLORIDE 0.4 MG: 0.4 CAPSULE ORAL at 19:47

## 2022-12-05 RX ADMIN — LEVALBUTEROL HYDROCHLORIDE 1.25 MG: 1.25 SOLUTION, CONCENTRATE RESPIRATORY (INHALATION) at 19:51

## 2022-12-05 RX ADMIN — POLYETHYLENE GLYCOL 3350 17 G: 17 POWDER, FOR SOLUTION ORAL at 08:13

## 2022-12-05 RX ADMIN — INSULIN GLARGINE 10 UNITS: 100 INJECTION, SOLUTION SUBCUTANEOUS at 21:29

## 2022-12-05 RX ADMIN — LEVALBUTEROL HYDROCHLORIDE 1.25 MG: 1.25 SOLUTION, CONCENTRATE RESPIRATORY (INHALATION) at 07:40

## 2022-12-05 RX ADMIN — BENZONATATE 200 MG: 100 CAPSULE ORAL at 21:29

## 2022-12-05 RX ADMIN — INSULIN LISPRO 1 UNITS: 100 INJECTION, SOLUTION INTRAVENOUS; SUBCUTANEOUS at 21:29

## 2022-12-05 RX ADMIN — POLYETHYLENE GLYCOL 3350 17 G: 17 POWDER, FOR SOLUTION ORAL at 21:29

## 2022-12-05 RX ADMIN — ACETAMINOPHEN 650 MG: 325 TABLET, FILM COATED ORAL at 08:13

## 2022-12-05 RX ADMIN — AMLODIPINE BESYLATE 10 MG: 10 TABLET ORAL at 08:20

## 2022-12-05 RX ADMIN — HYDRALAZINE HYDROCHLORIDE 100 MG: 25 TABLET ORAL at 08:19

## 2022-12-05 RX ADMIN — NYSTATIN: 100000 POWDER TOPICAL at 19:44

## 2022-12-05 RX ADMIN — ISOSORBIDE MONONITRATE 60 MG: 60 TABLET, EXTENDED RELEASE ORAL at 08:49

## 2022-12-05 RX ADMIN — SENNOSIDES 17.2 MG: 8.6 TABLET, FILM COATED ORAL at 21:29

## 2022-12-05 RX ADMIN — ONDANSETRON 4 MG: 2 INJECTION INTRAMUSCULAR; INTRAVENOUS at 11:20

## 2022-12-05 RX ADMIN — Medication 5 ML: at 21:29

## 2022-12-05 RX ADMIN — BISACODYL 10 MG: 10 SUPPOSITORY RECTAL at 19:46

## 2022-12-05 RX ADMIN — CARVEDILOL 12.5 MG: 12.5 TABLET, FILM COATED ORAL at 19:47

## 2022-12-05 RX ADMIN — GABAPENTIN 100 MG: 100 CAPSULE ORAL at 19:47

## 2022-12-05 RX ADMIN — Medication 2000 UNITS: at 08:18

## 2022-12-05 NOTE — ASSESSMENT & PLAN NOTE
Lab Results   Component Value Date    HGBA1C 5 7 (H) 11/16/2022       Recent Labs     12/04/22  0218 12/04/22  0729 12/04/22  1111 12/04/22  1537   POCGLU 148* 114 157* 111     Patient was discharged on Prandin t i d  With meals  · Continue Prandin, Humalog sliding scale with Accu-Cheks q a c  And HS  · Currently on diabetic pureed Diet with thin liquids  Continue Lantus 10 units q h s

## 2022-12-05 NOTE — ASSESSMENT & PLAN NOTE
Patient with urinary retention requiring multiple straight cath  Per RN patient also with penile swelling and therefore Ayala catheter placed  · Urology input appreciated    Cardura discontinued and patient started on Flomax

## 2022-12-05 NOTE — ASSESSMENT & PLAN NOTE
Troponin [id number]  Elevated troponin in recent admission as well  Reports chest pressure when in respiratory distress  Denies history of CAD  · EKG no ischemic changes, read as AFib, rate 103, RBBB per prior provider  · No history of AFib  Prior EKG shows sinus rhythm with PACs /PVCs  repeat EKG showed sinus rhythm  · Recent 2D echo showed EF low normal, normal wall motion, grade 2 diastolic dysfunction, moderately dilated atriums  · Most likely non MI troponin elevation due to # 1 and CHF

## 2022-12-05 NOTE — ASSESSMENT & PLAN NOTE
Repeat procalcitonin level was 0 54 initially, continues to remain mildly elevated  · Patient received cefepime and Flagyl in the ED   Completed 7 day course of cefepime 1 gram IV daily  · Patient febrile again to 101 1 on 12/1, likely aspiration pneumonitis    procalcitonin mildly elevated, blood cultures negative, COVID/flu/RSV negative, urine culture with small colony of Enterococcus  · Due to recurrent fevers, ID was consulted who recommended monitoring off antibiotics  · Daughter/patient aware that he is at high risk for aspiration events due to his dysphagia  · Sputum culture with mixed respiratory hardik  · Urine for Legionella and pneumococcal antigens were negative  · Pulmonary input appreciated  · Blood cultures negative  · Repeat chest x-ray 12/1 with waxing and waning opacities

## 2022-12-05 NOTE — ASSESSMENT & PLAN NOTE
Lab Results   Component Value Date    EGFR 13 12/04/2022    EGFR 12 12/03/2022    EGFR 12 12/02/2022    CREATININE 3 55 (H) 12/04/2022    CREATININE 3 78 (H) 12/03/2022    CREATININE 3 87 (H) 12/02/2022     History of CKD 4, baseline creatinine appears to be around 1 5-1 9 in past 2 years in care everywhere  Creatinine during previous hospitalization ranged in 2 4-2 8  · Possible CKD progression  · Urinalysis was bland  · Received IV Lasix 40 mg in ED  · Creatinine continues to slowly trend down    Received IV fluids for 10 hours per Nephrology on 11/30 --> restarted back on fluids, currently off fluids  · Renal ultrasound showed no hydronephrosis moderate left renal atrophy  · Nephrology input appreciated

## 2022-12-05 NOTE — OCCUPATIONAL THERAPY NOTE
Occupational Therapy Treatment Note       12/05/22 1051   Note Type   Note Type Treatment   Pain Assessment   Pain Assessment Tool 0-10   Pain Score No Pain   Restrictions/Precautions   Other Precautions Chair Alarm; Bed Alarm; Fall Risk;Hard of hearing   ADL   Grooming Assistance 3  Moderate Assistance   Grooming Deficit Increased time to complete;Wash/dry hands; Wash/dry face; Teeth care;Denture care   Grooming Comments Face washing, denture care and application; Increased time to complete due to fatigue; required frequent rest breaks throughout activity   Toileting Assistance  1  Total Assistance   Toileting Comments Patient with urinary catheter   Bed Mobility   Supine to Sit 2  Maximal assistance   Additional items Assist x 1; Increased time required   Additional Comments Required min=mod assist to maintain static sitting balance EOB in preparation for functional mobility and ADLs   Transfers   Sit to Stand 2  Maximal assistance   Additional items Assist x 2   Stand to Sit 2  Maximal assistance   Additional items Assist x 2   Additional Comments STS from EOB with  use of RW for support; max verbal and tactile cues for trunk extension to maintain upright posture   Functional Mobility   Functional Mobility 2  Maximal assistance  (Assist x 2)   Additional Comments Few steps from bed to chair with RW; increased time, max verbal and tactile cues for safe transfer technique   Cognition   Overall Cognitive Status [facility]   Arousal/Participation Alert; Cooperative   Orientation Level Oriented X4   Following Commands Follows multistep commands with increased time or repetition   Activity Tolerance   Activity Tolerance Patient limited by fatigue; Other (Comment)  (Limited by generalized weakness)   Assessment   Assessment Patient seen for OT treatment this AM  Patient alert and cooperative; limited during therapy session by fatigue, generalized weakness   Patient required max assist x 2 for functional transfers today with RW; required mod assist to complete grooming task  All functional tasks required increased time due to fatigue and low activity tolerance, frequent rest breaks with all tasks  The patient's raw score on the AM-PAC Daily Activity inpatient short form is 11, standardized score is 29 04, less than 39 4  Patients at this level are likely to benefit from discharge to post-acute rehabilitation services  Please refer to the recommendation of the Occupational Therapist for safe discharge planning  At end of session patient seated in recliner chair with all needs met, chair alarm set  Continue OT per POC     Plan   OT Frequency 3-5x/wk   Recommendation   OT Discharge Recommendation Post acute rehabilitation services   [facility] Daily Activity Inpatient   Lower Body Dressing 1   Bathing 1   Toileting 1   Upper Body Dressing 2   Grooming 3   Eating 3   Daily Activity Raw Score 11   Daily Activity Standardized Score (Calc for Raw Score >=11) 29 04   AM-[facility] Applied Cognition Inpatient   Following a Speech/Presentation 3   Understanding Ordinary Conversation 4   Taking Medications 3   Remembering Where Things Are Placed or Put Away 4   Remembering List of 4-5 Errands 3   Taking Care of Complicated Tasks 3   Applied Cognition Raw Score 20   Applied Cognition Standardized Score 53 76   Licensure   [place] License Number  Jeff [place] OTR/VINI [id number]

## 2022-12-05 NOTE — PROGRESS NOTES
Progress Note - Infectious Disease   Fidenico Matthew 80 y o  male MRN: [id number]  Unit/Bed#: 207 [facility] Encounter: [id number]      Impression/Plan:    1  Recurrent SIRS-fever and leukocytosis  The patient has had intermittent fevers despite 14 days of antibiotics, which were stopped 11/29; suspect this is due to recurrent aspiration pneumonitis  His hypoxia is likely multifactorial, due to aspiration in setting of dysmotility of the esophagus, pulmonary edema, possible pneumonia  At this point, the patient has received more than a sufficient course of antibiotics for pneumonia (14 days) and continues to have intermittent fevers and leukocytosis, waxing and waning mental status  Further antibiotics will not prevent aspiration or the development of infection if aspiration occurs  There has not been clinical worsening off antibiotics, if anything fevers are now less frequent   -continue monitoring off antibiotics    -Consider re-discussion with daughter about PEG placement, although will not completely eliminate the risk of aspiration     -due to recurrent aspiration, if possible, would advise the long term care facility not to bring the patient into the hospital for a 1 time fever, but only if fever prolonged or if he has other signs of infection (hypoxia, hypotension)    2  Acute hypoxic respiratory failure  Likely due to pulmonary edema, recurrent aspiration pneumonitis  May have had component of aspiration pneumonia but has received sufficient amount of antibiotics for this  Remains stable on 2L NC    -monitor O2   -aspiration precautions    3  SHAHID on CKD  Creatinine/BUN worsening after admission, now plateaued and seems to be improving  Nephrology is following  4  Esophageal dysmotility  Risk factor for recurrent aspiration  GI recommended PEG during prior admission, but daughter would like to hold off on this  -Aspiration precautions   -consider discussion of a PEG    5   Acute on chronic diastolic heart failure  Volume management per nephrology    Above management plan discussed with the patient at bedside  ID will follow  Antibiotics:  Status post 15 days Ceftriaxonemetronidazole-->Cefepime  Off antibiotics day 6    Subjective:  No overnight events noted  Tmax 100 8 this morning  There have been no other clinical changes  The patient is stable on 2L NC  He continues to have an intermittent productive cough  Denies shortness of breath  Reports intermittent epigastric abdominal pain  Objective:  Vitals:  Temp:  [97 8 °F (36 6 °C)-100 8 °F (38 2 °C)] 100 8 °F (38 2 °C)  HR:  [62-70] 70  Resp:  [18-20] 20  BP: (113-168)/(54-69) 163/57  SpO2:  [90 %-96 %] 92 %  Temp (24hrs), Av 7 °F (37 1 °C), Min:97 8 °F (36 6 °C), Max:100 8 °F (38 2 °C)  Current: Temperature: (!) 100 8 °F (38 2 °C)    Physical Exam:   General: lethargic, chronically debilitated  Head: normocephalic, atraumatic  Mouth: no oral or pharyngeal lesions   Neck: trachea midline   CV: RRR, no murmurs   Lungs: decreased breath sounds bilaterally   Abdomen: no distension or tenderness to palpation   Extremities: generalized edema  Skin: no rashes, erythema   Neuro:lethargic but arousable to voice and answering questions appropriately  Psych: calm, cooperative    Labs:    All pertinent labs and imaging studies were personally reviewed  Results from last 7 days   Lab Units 22  0530 22  0539 22  0634   WBC Thousand/uL 13 91* 13 15* 14 73*   HEMOGLOBIN g/dL 7 8* 8 0* 8 2*   PLATELETS Thousands/uL 292 233 219     Results from last 7 days   Lab Units 22  0530 22  0552 22  0533   SODIUM mmol/L 144 145 143   POTASSIUM mmol/L 4 6 4 8 4 9   CHLORIDE mmol/L 105 106 103   CO2 mmol/L 32 31 32   BUN mg/dL 113* 118* 119*   CREATININE mg/dL 3 28* 3 55* 3 78*   EGFR ml/min/1 73sq m 15 13 12   CALCIUM mg/dL 8 7 8 6 8 6     Results from last 7 days   Lab Units 22  0539 22  0544   PROCALCITONIN ng/ml 0 34* 0 63* Micro:  Results from last 7 days   Lab Units 12/01/22  1043 11/30/22  1145 11/28/22  1502   BLOOD CULTURE  No Growth at 72 hrs  No Growth at 72 hrs   --   --    SPUTUM CULTURE   --   --  2+ Growth of   GRAM STAIN RESULT   --   --  Rare Epithelial cells per low power field*  1+ Polys*  1+ Gram negative rods*  Rare Gram positive rods*  Rare Gram positive cocci in pairs*   URINE CULTURE   --  20,000-29,000 cfu/ml Enterococcus faecium*  <10,000 cfu/ml Candida albicans*  --        Imaging:  Pertinent imaging in PACS personally reviewed  CXR 12/2 with   waxing and waning airspace opacities with stable pleural effusions

## 2022-12-05 NOTE — ASSESSMENT & PLAN NOTE
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

## 2022-12-05 NOTE — PLAN OF CARE
Problem: OCCUPATIONAL THERAPY ADULT  Goal: Performs self-care activities at highest level of function for planned discharge setting  See evaluation for individualized goals  Description: Treatment Interventions: ADL retraining, Functional transfer training, Endurance training, Patient/family training, Equipment evaluation/education, Activityengagement, Compensatory technique education, UE strengthening/ROM          See flowsheet documentation for full assessment, interventions and recommendations  Outcome: Progressing  Note: Limitation: Decreased ADL status, Decreased UE strength, Decreased Safe judgement during ADL, Decreased endurance, Decreased high-level ADLs, Decreased self-care trans (decreased balance and mobility)  Prognosis: Good  Assessment: Patient seen for OT treatment this AM  Patient alert and cooperative; limited during therapy session by fatigue, generalized weakness  Patient required max assist x 2 for functional transfers today with RW; required mod assist to complete grooming task  All functional tasks required increased time due to fatigue and low activity tolerance, frequent rest breaks with all tasks  The patient's raw score on the -PAC Daily Activity inpatient short form is 11, standardized score is 29 04, less than 39 4  Patients at this level are likely to benefit from discharge to post-acute rehabilitation services  Please refer to the recommendation of the Occupational Therapist for safe discharge planning  At end of session patient seated in recliner chair with all needs met, chair alarm set  Continue OT per POC       OT Discharge Recommendation: Post acute rehabilitation services

## 2022-12-05 NOTE — PHYSICAL THERAPY NOTE
PT TREATMENT       12/05/22 1038   PT Last Visit   PT Visit Date 12/05/22   Note Type   Note Type Treatment   Pain Assessment   Pain Assessment Tool 0-10   Pain Score No Pain   Patient's Stated Pain Goal No pain   Hospital Pain Intervention(s) Rest;Repositioned   Multiple Pain Sites No   Restrictions/Precautions   Weight Bearing Precautions Per Order No   Other Precautions Fall Risk;Bed Alarm; Chair Alarm;Pain   General   Chart Reviewed Yes   Family/Caregiver Present No   Cognition   Overall Cognitive Status WFL   Arousal/Participation Cooperative   Attention Within functional limits   Orientation Level Oriented X4   Following Commands Follows multistep commands with increased time or repetition   Subjective   Subjective "today is not a good day  my stomach is upset"   Bed Mobility   Supine to Sit 2  Maximal assistance   Additional items Assist x 1;Verbal cues   Additional Comments Pt transferred to bedside chair   Transfers   Sit to Stand 2  Maximal assistance   Additional items Assist x 2;Verbal cues   Stand to Sit 2  Maximal assistance   Additional items Assist x 2;Verbal cues   Stand pivot 2  Maximal assistance   Additional items Assist x 2;Verbal cues   Ambulation/Elevation   Gait pattern Wide DIMAS; Short stride; Step to;Shuffling; Foward flexed   Gait Assistance 2  Maximal assist   Additional items Assist x 2;Verbal cues   Assistive Device Rolling walker   Distance 2 feet to bedside chair   Stair Management Assistance Not tested   Balance   Static Sitting Fair -  (intermittent posterior LOB requires Min/Mod A to correct)   Dynamic Sitting Poor +   Static Standing Poor   Dynamic Standing Poor   Ambulatory Poor -   Activity Tolerance   Activity Tolerance Patient tolerated treatment well;Patient limited by fatigue   Nurse Made Aware yes   Exercises   Neuro re-ed Pt able to perform seated exercise including ankle pumps, quad sets, heel slides, hip abd/add x 10 reps bilat   Assessment   Prognosis Good   Problem List Decreased strength;Decreased endurance; Impaired balance;Decreased mobility; Decreased safety awareness;Pain   Assessment Pt agreeable to PT session this AM  Pt able to perform supine > sit with Max A x 1 person assist  Able to sit at edge of bed x 2-3 minutes with Fair - to Poor + sitting balance, intermittent assist due to posterior LOB  Pt able to transfer to bedside chair with Max A x 2  Once sitting in bedside chair able to perform exercise as mentioned above with good response, intermittent assist to improve form/mobility  The patient's AM-PAC Basic Mobility Inpatient Short Form Raw Score is 11  A Raw score of less than or equal to 16 suggests the patient may benefit from discharge to post-acute rehabilitation services  Please also refer to the recommendation of the Physical Therapist for safe discharge planning  Goals   Patient Goals to get better   Plan   Treatment/Interventions ADL retraining;Functional transfer training;LE strengthening/ROM; Therapeutic exercise; Endurance training;Bed mobility;Gait training;Spoke to nursing   Progress Progressing toward goals   PT Frequency Other (Comment)  (5x/week)   Recommendation   PT Discharge Recommendation Post acute rehabilitation services   AM-PAC Basic Mobility Inpatient   Turning in Bed Without Bedrails 2   Lying on Back to Sitting on Edge of Flat Bed 2   Moving Bed to Chair 2   Standing Up From Chair 2   Walk in Room 2   Climb 3-5 Stairs 1   Basic Mobility Inpatient Raw Score 11   Basic Mobility Standardized Score 30 25   Turning Head Towards Sound 3   Follow Simple Instructions 3   Low Function Basic Mobility Raw Score 17   Low Function Basic Mobility Standardized Score 27 46   Highest Level Of Mobility   JH-HLM Goal 4: Move to chair/commode   JH-HLM Achieved 4: Move to chair/commode   Education   Education Provided Mobility training;Assistive device   Patient Explanation/teachback used; Reinforcement needed   End of Consult   Patient Position at End of Consult Bedside chair;Bed/Chair alarm activated; All needs within reach   [employer] Insurance Number  Ally Briggs [id number]

## 2022-12-05 NOTE — PLAN OF CARE
Problem: PHYSICAL THERAPY ADULT  Goal: Performs mobility at highest level of function for planned discharge setting  See evaluation for individualized goals  Description: Treatment/Interventions: ADL retraining, Functional transfer training, LE strengthening/ROM, Therapeutic exercise, Endurance training, Bed mobility, Gait training, Spoke to nursing          See flowsheet documentation for full assessment, interventions and recommendations  Outcome: Progressing  Note: Prognosis: Good  Problem List: Decreased strength, Decreased endurance, Impaired balance, Decreased mobility, Decreased safety awareness, Pain  Assessment: Pt agreeable to PT session this AM  Pt able to perform supine > sit with Max A x 1 person assist  Able to sit at edge of bed x 2-3 minutes with Fair - to Poor + sitting balance, intermittent assist due to posterior LOB  Pt able to transfer to bedside chair with Max A x 2  Once sitting in bedside chair able to perform exercise as mentioned above with good response, intermittent assist to improve form/mobility  PT Discharge Recommendation: Post acute rehabilitation services    See flowsheet documentation for full assessment

## 2022-12-05 NOTE — PROGRESS NOTES
[address] [facility] NOTE   Alena Vasquez 80 y o  male MRN: [id number]  Unit/Bed#: 207 [address] Encounter: [id number]  Reason for Consult: SHAHID    ASSESSMENT and PLAN:  80year old with history of CKD admitted with shortness of breath  We are consulted for evaluation management of SHAHID  # Acute Kidney Injury   - Baseline creatinine 1 6-2 0  - Admission creatinine 2 8 with peak of 3 9 and currently trending down to 3 28  - Etiology was thought to be due to over-diuresis and a component of urinary retention  - Diuretics are currently on hold  - Ayala catheter is in place  - No indication for dialysis    # Chronic kidney disease stage 3  - Etiology, most likely cardiorenal syndrome and age-related nephron loss    # Hypertension  - Current regimen includes hydralazine 100 mg t i d , amlodipine 10 mg daily, Coreg 12 5 mg b i d  Imdur 60 mg daily  - Patient is now off doxazosin and switch to Flomax per Urology    # Volume  - CT scan admission was consistent with pulmonary edema with moderate size pleural effusion left more than right status post IV and oral diuretics  - Diuretics are currently on hold due to SHAHID  - Currently wheezing and may benefit from bronchodilators    # Anemia   - Continue to monitor hemoglobin  - Transfuse to keep hemoglobin more than 7    # Electrolytes/Acid base status   - Electrolytes and acid base status within normal limits    # Left upper quadrant mass  - Renal ultrasound suggestive of solid hypoechoic mass in left upper quadrant medial to spleen  - MRI ordered for further evaluation    DISPOSITION:  Renal function is currently improving  Continue to hold diuretics  Maintain Ayala catheter as per Urology  Trend BMP daily  SUBJECTIVE / 24H INTERVAL HISTORY:  Urine output recorded as 875 cc  Currently on 2 L nasal cannula oxygen  Denies dyspnea  Review of Systems   Constitutional: Negative for chills and fever  HENT: Negative for ear pain and sore throat      Eyes: Negative for pain and visual disturbance  Respiratory: Negative for cough and shortness of breath  Cardiovascular: Negative for chest pain and palpitations  Gastrointestinal: Negative for abdominal pain and vomiting  Genitourinary: Negative for dysuria and hematuria  Musculoskeletal: Negative for arthralgias and back pain  Skin: Negative for color change and rash  Neurological: Negative for seizures and syncope  All other systems reviewed and are negative  OBJECTIVE:  Current Weight: Weight - Scale: 84 2 kg (185 lb 11 2 oz)  Vitals:    12/04/22 2024 12/04/22 2151 12/04/22 2338 12/05/22 0537   BP:  167/56 168/56    BP Location:  Right arm Right arm    Pulse:  62 63    Resp:   20    Temp:   98 4 °F (36 9 °C)    TempSrc:   Axillary    SpO2: 96% 93% 95%    Weight:    84 2 kg (185 lb 11 2 oz)   Height:           Intake/Output Summary (Last 24 hours) at 12/5/2022 0640  Last data filed at 12/5/2022 0410  Gross per 24 hour   Intake --   Output 875 ml   Net -875 ml     Physical Exam  Vitals and nursing note reviewed  Constitutional:       General: He is not in acute distress  Appearance: He is well-developed  HENT:      Head: Normocephalic and atraumatic  Eyes:      Conjunctiva/sclera: Conjunctivae normal    Cardiovascular:      Rate and Rhythm: Normal rate and regular rhythm  Heart sounds: No murmur heard  Pulmonary:      Effort: Pulmonary effort is normal  No respiratory distress  Breath sounds: Wheezing present  Abdominal:      Palpations: Abdomen is soft  Tenderness: There is no abdominal tenderness  Musculoskeletal:         General: No swelling  Cervical back: Neck supple  Right lower leg: No edema  Left lower leg: No edema  Skin:     General: Skin is warm and dry  Capillary Refill: Capillary refill takes less than 2 seconds  Neurological:      Mental Status: He is alert     Psychiatric:         Mood and Affect: Mood normal  Medications:    Current Facility-Administered Medications:   •  acetaminophen (TYLENOL) tablet 650 mg, 650 mg, Oral, Q6H PRN, Emilee Link, KEISHA, 650 mg at 12/01/22 0808  •  amLODIPine (NORVASC) tablet 10 mg, 10 mg, Oral, Daily, Cujeane Link, CRNP, 10 mg at 12/04/22 0561  •  atorvastatin (LIPITOR) tablet 40 mg, 40 mg, Oral, Daily, Cuidoc Link, CRNP, 40 mg at 12/04/22 2413  •  benzonatate (TESSALON PERLES) capsule 200 mg, 200 mg, Oral, TID, KEISHA Castano, 200 mg at 12/04/22 2155  •  bisacodyl (DULCOLAX) rectal suppository 10 mg, 10 mg, Rectal, Daily PRN, Naz Khan MD, 10 mg at 12/02/22 1206  •  carvedilol (COREG) tablet 12 5 mg, 12 5 mg, Oral, BID With Meals, KEISHA Newberry, 12 5 mg at 12/04/22 1549  •  cholecalciferol (VITAMIN D3) tablet 2,000 Units, 2,000 Units, Oral, Daily, Emilee Link CRNP, 2,000 Units at 12/04/22 0902  •  cyanocobalamin (VITAMIN B-12) tablet 500 mcg, 500 mcg, Oral, Daily, Emilee Link, CRNORI, 500 mcg at 12/04/22 0902  •  dextromethorphan-guaiFENesin (ROBITUSSIN DM) oral syrup 10 mL, 10 mL, Oral, Q6H PRN, Emilee Link, CRNORI, 10 mL at 12/03/22 1711  •  gabapentin (NEURONTIN) capsule 100 mg, 100 mg, Oral, BID, Emilee Link, CRNP, 100 mg at 12/04/22 1727  •  heparin (porcine) subcutaneous injection 5,000 Units, 5,000 Units, Subcutaneous, Q8H [address], 5,000 Units at 12/05/22 0523 **AND** [CANCELED] Platelet count, , , Once, KEISHA Newberry  •  hydrALAZINE (APRESOLINE) tablet 100 mg, 100 mg, Oral, TID, Emmie Mixon MD, 100 mg at 12/04/22 2155  •  hydrocodone-chlorpheniramine polistirex (TUSSIONEX) ER suspension 5 mL, 5 mL, Oral, HS, Naz Khan MD, 5 mL at 12/04/22 2156  •  insulin glargine (LANTUS) subcutaneous injection 10 Units 0 1 mL, 10 Units, Subcutaneous, HS, Steven Yan MD, 10 Units at 12/04/22 2155  •  insulin lispro (HumaLOG) 100 units/mL subcutaneous injection 1-5 Units, 1-5 Units, Subcutaneous, TID AC, 1 Units at 12/04/22 1150 **AND** Fingerstick Glucose (POCT), , , TID AC, Emilee Link, APOLONIANP  •  insulin lispro (HumaLOG) 100 units/mL subcutaneous injection 1-5 Units, 1-5 Units, Subcutaneous, 0200, Emilee Link, CRNP, 1 Units at 12/03/22 0257  •  insulin lispro (HumaLOG) 100 units/mL subcutaneous injection 1-5 Units, 1-5 Units, Subcutaneous, HS, Emilee Link CRNP, 1 Units at 12/04/22 2156  •  ipratropium (ATROVENT) 0 02 % inhalation solution 0 5 mg, 0 5 mg, Nebulization, TID, Emilee Link, APOLONIANP, 0 5 mg at 12/04/22 2022  •  isosorbide mononitrate (IMDUR) 24 hr tablet 60 mg, 60 mg, Oral, Daily, Emilee Link, KEISHA, 60 mg at 12/04/22 0902  •  labetalol (NORMODYNE) injection 10 mg, 10 mg, Intravenous, Q6H PRN, Agustina JOSÉ ANTONIO Schneider, 10 mg at 11/24/22 0241  •  levalbuterol (XOPENEX) inhalation solution 0 63 mg, 0 63 mg, Nebulization, Q4H PRN, Emilee Link, APOLONIANP, 0 63 mg at 12/01/22 0807  •  levalbuterol (XOPENEX) inhalation solution 1 25 mg, 1 25 mg, Nebulization, TID, 1 25 mg at 12/04/22 2022 **AND** sodium chloride 0 9 % inhalation solution 3 mL, 3 mL, Nebulization, TID, Emilee Link, APOLONIANP, 3 mL at 12/04/22 2022  •  nystatin (MYCOSTATIN) powder, , Topical, BID, APOLONIA NewberryNP, Given at 12/04/22 1727  •  ondansetron (ZOFRAN) injection 4 mg, 4 mg, Intravenous, Q6H PRN, Emilee Link, CRNP, 4 mg at 11/28/22 1038  •  pantoprazole (PROTONIX) EC tablet 40 mg, 40 mg, Oral, Early Morning, KEISHA Newberry, 40 mg at 12/05/22 7939  •  polyethylene glycol (MIRALAX) packet 17 g, 17 g, Oral, BID, Tasha Brown DO, 17 g at 12/04/22 2158  •  repaglinide (PRANDIN) tablet 0 5 mg, 0 5 mg, Oral, TID AC, Steve Yan MD, 0 5 mg at 12/04/22 1549  •  senna (SENOKOT) tablet 8 6 mg, 1 tablet, Oral, HS, Steve Yan MD, 8 6 mg at 12/04/22 2155  •  tamsulosin (FLOMAX) capsule 0 4 mg, 0 4 mg, Oral, Daily With Dyana Larsen MD, 0 4 mg at 12/04/22 1549    Laboratory Results:  Results from last 7 days   Lab Units 12/05/22  0530 12/04/22  2010 12/03/22  0533 12/02/22  0539 12/01/22  0521 11/30/22  0634 11/29/22  0544   WBC Thousand/uL 13 91*  --   --  13 15*  --  14 73* 14 54*   HEMOGLOBIN g/dL 7 8*  --   --  8 0*  --  8 2* 8 4*   HEMATOCRIT % 25 2*  --   --  25 3*  --  26 2* 26 3*   PLATELETS Thousands/uL 292  --   --  233  --  219 211   POTASSIUM mmol/L 4 6 4 8 4 9 4 7 4 8 4 8 4 6   CHLORIDE mmol/L 105 106 103 103 101 101 103   CO2 mmol/L 32 31 32 31 31 32 35*   BUN mg/dL 113* 118* 119* 117* 123* 119* 115*   CREATININE mg/dL 3 28* 3 55* 3 78* 3 87* 3 90* 3 77* 3 43*   CALCIUM mg/dL 8 7 8 6 8 6 8 3 8 3 8 4 8 4       Portions of the record may have been created with voice recognition software  Occasional wrong word or "sound a like" substitutions may have occurred due to the inherent limitations of voice recognition software  Read the chart carefully and recognize, using context, where substitutions have occurred  If you have any questions, please contact the dictating provider

## 2022-12-05 NOTE — ASSESSMENT & PLAN NOTE
Patient presented with acute respiratory distress shortly after eating a few spoonful of puree diet in STR, patient reported chest pressure and SOB  Patient was satting 85-95% on oxygen 3 L per STR transfer paper  Patient was belching after eating/drinking limited amount of diet/water per staff  · Patient was discharged on the day of admission after being hospitalized for aspiration pneumonia, acute on chronic diastolic CHF, dysphagia  Received IV Lasix and 7 days of IV Rocephin and Flagyl  Seen by GI, underwent EGD, found to have oropharyngeal dysphagia with esophageal dysmotility, no obvious esophageal stricture  Underwent Barium swallow study which showed moderate to severe esophageal dysmotility with significant residual contrast remaining in the esophagus at the end of the study  Patient was recommended regular diet by speech and discharged on regular diet per STR  · Likely secondary to multifocal pneumonia and CHF  · Patient required BiPAP on ED arrival   ABG post BiPAP essentially unremarkable  Then titrated down to 6 liters oxygen upon admission  Currently 1 liter oxygen  · Chest x-ray - Pulmonary edema, worse when comparing to 11/19/2022  Underlying infection could not be excluded  · ProBNP 15,094  · Patient received Lasix 40 IV in ED  · Received cefepime and Flagyl in ED  Completed 7 days of IV cefepime  · Received IV Solu-Medrol in ED  No wheezing on auscultation    · CT chest-pulmonary edema with moderate size bilateral pleural effusions with significant bibasilar atelectasis and concomitant multifocal pneumonia   · Obstructive series showed persistent pulmonary edema with multifocal pneumonia

## 2022-12-05 NOTE — ASSESSMENT & PLAN NOTE
Patient reported no appetite for 2 days  · Per speech therapy, patient at high risk for aspiration despite the level of diet  Currently on pureed diet with thin liquids with medications crushed with puree  · GI recommended to consider PEG tube if recurrent aspirations  · Continue pantoprazole daily  · Obstructive series showed barium throughout the colon with modest amount of stool along the rectosigmoid region  · Discussed with patient's daughter/patient again today  They are not interested in PEG tube placement   Discussed with her that patient is at high risk for recurrent aspiration pneumonia/hospitalizations

## 2022-12-05 NOTE — ASSESSMENT & PLAN NOTE
Wt Readings from Last 3 Encounters:   12/04/22 83 3 kg (183 lb 9 6 oz)   11/22/22 84 5 kg (186 lb 4 8 oz)     Patient received IV Lasix in recent admission  · Chest x-ray upon admission shows worsening pulmonary edema  · Received 40 milligrams of Lasix IV in the ED and was continued on IV Lasix  Then transitioned to [facility] which is currently being held  · Left upper extremity venous Doppler showed no evidence of DVT  · CT chest showed pulmonary edema with moderate size bilateral pleural effusions left more than right  · Might need accept higher creatinine to maintain euvolemia

## 2022-12-05 NOTE — PROGRESS NOTES
[facility]  Progress Note Leticia Wang 10/19/1929, 80 y o  male MRN: [id number]  Unit/Bed#: [facility] Encounter: [id number]  Primary Care Provider: Luke Virgen PA-C   Date and time admitted to hospital: 11/22/2022  8:41 PM    * Acute respiratory failure with hypoxia [facility])  Assessment & Plan  Patient presented with acute respiratory distress shortly after eating a few spoonful of puree diet in STR, patient reported chest pressure and SOB  Patient was satting 85-95% on oxygen 3 L per STR transfer paper  Patient was belching after eating/drinking limited amount of diet/water per staff  · Patient was discharged on the day of admission after being hospitalized for aspiration pneumonia, acute on chronic diastolic CHF, dysphagia  Received IV Lasix and 7 days of IV Rocephin and Flagyl  Seen by GI, underwent EGD, found to have oropharyngeal dysphagia with esophageal dysmotility, no obvious esophageal stricture  Underwent Barium swallow study which showed moderate to severe esophageal dysmotility with significant residual contrast remaining in the esophagus at the end of the study  Patient was recommended regular diet by speech and discharged on regular diet per STR  · Likely secondary to multifocal pneumonia and CHF  · Patient required BiPAP on ED arrival   ABG post BiPAP essentially unremarkable  Then titrated down to 6 liters oxygen upon admission  Currently on 2 liter oxygen  · Chest x-ray - Pulmonary edema, worse when comparing to 11/19/2022  Underlying infection could not be excluded  · ProBNP 15,094  · Patient received Lasix 40 IV in ED  · Received cefepime and Flagyl in ED  Completed 7 days of IV cefepime  · Received IV Solu-Medrol in ED  No wheezing on auscultation    · CT chest-pulmonary edema with moderate size bilateral pleural effusions with significant bibasilar atelectasis and concomitant multifocal pneumonia   · Obstructive series showed persistent pulmonary edema with multifocal pneumonia    Aspiration pneumonia ([facility] [place] [address] )  Assessment & Plan  Repeat procalcitonin level was 0 54 initially, continues to remain mildly elevated  · Patient received cefepime and Flagyl in the ED   Completed 7 day course of cefepime 1 gram IV daily  · Patient febrile again to 101 1 on 12/1 and 100 8 today, likely aspiration pneumonitis  procalcitonin mildly elevated, blood cultures negative, COVID/flu/RSV negative, urine culture with small colony of Enterococcus not requiring treatment per ID  · Due to recurrent fevers, ID was consulted who recommended monitoring off antibiotics  · Daughter/patient aware that he is at high risk for aspiration events due to his dysphagia although did speak with them that PEG tube placement does not guarantee zero risk of aspiration  · Sputum culture with mixed respiratory hardik  · Urine for Legionella and pneumococcal antigens were negative  · Blood cultures negative  · Repeat chest x-ray 12/1 with waxing and waning opacities    Acute on chronic diastolic congestive heart failure (HCC)  Assessment & Plan  Wt Readings from Last 3 Encounters:   12/05/22 84 2 kg (185 lb 11 2 oz)   11/22/22 84 5 kg (186 lb 4 8 oz)     Patient received IV Lasix in recent admission  · Chest x-ray upon admission shows worsening pulmonary edema  · Received 40 milligrams of Lasix IV in the ED and was continued on IV Lasix  Then transitioned to [facility] which is currently on hold  · Left upper extremity venous Doppler showed no evidence of DVT  · CT chest showed pulmonary edema with moderate size bilateral pleural effusions left more than right  · Might need accept higher creatinine to maintain euvolemia  Esophageal dysphagia  Assessment & Plan  Per speech therapy, patient at high risk for aspiration despite the level of diet    Currently on pureed diet with thin liquids with medications crushed with puree  · Continue pantoprazole daily  · Obstructive series showed barium throughout the colon with modest amount of stool along the rectosigmoid region  · Discussed with patient's daughter/patient again today  They will speak with the rest of the family members regarding PEG tube placement  Discussed with her that patient is at high risk for recurrent aspiration pneumonia/hospitalizations      Left upper quadrant abdominal mass  Assessment & Plan  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  · Discussed with radiology previously and radiologist recommended getting MRI for further evaluation  · Per nephrology okay to obtain MRI with gadolinium using class 2 gadolinium agents  · Spoke with radiologist again today who recommended CT scan with p o  Contrast for further evaluation  Discussed with radiologist patient's issues with dysphagia and aspiration  Per discussion NG tube was placed so patient can get contrast which will be discontinued after getting CT scan  · Spoke with patient's daughter again today and she is interested in pursuing further workup for this abdominal mass    Constipation  Assessment & Plan  Patient with constipation  Continue MiraLax, senna q h s  · Continue current regimen  MiraLax increased to b i d  Dosing      BPH (benign prostatic hyperplasia)  Assessment & Plan  Patient with urinary retention requiring multiple straight cath  Per RN patient also with penile swelling and therefore Ayala catheter placed  · Urology input appreciated  Cardura was discontinued and started Flomax  · Per Urology voiding trial once patient is more mobile    Hyperlipidemia  Assessment & Plan  Continue statin    Elevated troponin  Assessment & Plan  Troponin [phone]  Elevated troponin in recent admission as well  Reports chest pressure when in respiratory distress  Denies history of CAD  · EKG no ischemic changes, read as AFib, rate 103, RBBB per prior provider  · No history of AFib  Prior EKG shows sinus rhythm with PACs /PVCs  repeat EKG showed sinus rhythm  · Recent 2D echo showed EF low normal, normal wall motion, grade 2 diastolic dysfunction, moderately dilated atriums  · Most likely non MI troponin elevation due to # 1 and CHF  Type 2 diabetes mellitus [facility])  Assessment & Plan  Lab Results   Component Value Date    HGBA1C 5 7 (H) 11/16/2022       Recent Labs     12/05/22  0209 12/05/22  0743 12/05/22  1055 12/05/22  1610   POCGLU 102 99 113 137     Patient was discharged on Prandin t i d  With meals  · Continue Prandin, Humalog sliding scale with Accu-Cheks q a c  And HS  · Currently on diabetic pureed Diet with thin liquids  Continue Lantus 10 units q h s  Stage 3 chronic kidney disease [facility])  Assessment & Plan  Lab Results   Component Value Date    EGFR 15 12/05/2022    EGFR 13 12/04/2022    EGFR 12 12/03/2022    CREATININE 3 28 (H) 12/05/2022    CREATININE 3 55 (H) 12/04/2022    CREATININE 3 78 (H) 12/03/2022     History of CKD 4, baseline creatinine appears to be around 1 5-1 9 in past 2 years in care everywhere  Creatinine during previous hospitalization ranged in 2 4-2 8  · Possible CKD progression  · Urinalysis was bland  · Received IV Lasix 40 mg in ED  · Creatinine continues to slowly improve  Received IV fluids for 10 hours per Nephrology on 11/30 --> restarted back on fluids --> IV fluids discontinued  · Renal ultrasound showed no hydronephrosis moderate left renal atrophy  · Nephrology input appreciated    Primary hypertension  Assessment & Plan  Continue hydralazine, Coreg, Norvasc, Imdur  · Blood pressures overall stable    Thrush-resolved as of 12/3/2022  Assessment & Plan  Patient completed 7 days of nystatin suspension  · Started on Diflucan as patient was noted to have white plaques  Diflucan 200 milligrams X 1 dose and completed total of 7 days of treatment with Diflucan 100 milligram p o   Daily       VTE Pharmacologic Prophylaxis:   heparin    Patient Centered Rounds: I performed bedside rounds with nursing staff today  Discussions with Specialists or Other Care Team Provider: yes - ID, renal    Education and Discussions with Family / Patient: Updated  (daughter) at bedside  Time Spent for Care: 40 min  More than 50% of total time spent on counseling and coordination of care as described above  Current Length of Stay: 13 day(s)  Current Patient Status: Inpatient   Certification Statement: The patient will continue to require additional inpatient hospital stay due to Chronic kidney disease with elevated creatinine, recurrent aspiration events requiring possible PEG tube placement  Discharge Plan: Anticipate discharge in >72 hrs to rehab facility  Code Status: Level 3 - DNAR and DNI    Subjective:     Patient states he is tired of being in and out of the hospital   reports lack of appetite  Objective:     Vitals:   Temp (24hrs), Av 1 °F (37 3 °C), Min:98 4 °F (36 9 °C), Max:100 8 °F (38 2 °C)    Temp:  [98 4 °F (36 9 °C)-100 8 °F (38 2 °C)] 98 5 °F (36 9 °C)  HR:  [62-70] 62  Resp:  [18-20] 18  BP: (151-168)/(56-58) 151/58  SpO2:  [89 %-96 %] 92 %  Body mass index is 30 9 kg/m²  Input and Output Summary (last 24 hours): Intake/Output Summary (Last 24 hours) at 2022 1920  Last data filed at 2022 1100  Gross per 24 hour   Intake --   Output 850 ml   Net -850 ml       Physical Exam:   Physical Exam  Vitals reviewed  Constitutional:       General: He is not in acute distress  Appearance: He is ill-appearing (Chronically)  HENT:      Head: Normocephalic and atraumatic  Ears:      Comments: Hard of hearing  Eyes:      General:         Right eye: No discharge  Left eye: No discharge  Cardiovascular:      Rate and Rhythm: Normal rate and regular rhythm  Pulmonary:      Effort: Pulmonary effort is normal  No respiratory distress  Breath sounds: No wheezing or rales        Comments: Decreased breath sounds bilaterally  Abdominal:      General: Bowel sounds are normal  There is no distension  Palpations: Abdomen is soft  Tenderness: There is no abdominal tenderness  Musculoskeletal:      Right lower leg: No edema  Left lower leg: No edema  Neurological:      Mental Status: He is alert  Additional Data:     Labs:  Results from last 7 days   Lab Units 12/05/22  0530 11/30/22  0634 11/29/22  0544   WBC Thousand/uL 13 91*   < > 14 54*   HEMOGLOBIN g/dL 7 8*   < > 8 4*   HEMATOCRIT % 25 2*   < > 26 3*   PLATELETS Thousands/uL 292   < > 211   NEUTROS PCT %  --   --  76*   LYMPHS PCT %  --   --  11*   MONOS PCT %  --   --  9   EOS PCT %  --   --  3    < > = values in this interval not displayed  Results from last 7 days   Lab Units 12/05/22  0530   SODIUM mmol/L 144   POTASSIUM mmol/L 4 6   CHLORIDE mmol/L 105   CO2 mmol/L 32   BUN mg/dL 113*   CREATININE mg/dL 3 28*   ANION GAP mmol/L 7   CALCIUM mg/dL 8 7   GLUCOSE RANDOM mg/dL 96         Results from last 7 days   Lab Units 12/05/22  1610 12/05/22  1055 12/05/22  0743 12/05/22  0209 12/04/22  2047 12/04/22  1537 12/04/22  1111 12/04/22  0729 12/04/22  0218 12/03/22  2059 12/03/22  1604 12/03/22  1058   POC GLUCOSE mg/dl 137 113 99 102 158* 111 157* 114 148* 181* 171* 128         Results from last 7 days   Lab Units 12/02/22  0539 11/29/22  0544   PROCALCITONIN ng/ml 0 34* 0 63*       Lines/Drains:  Invasive Devices     Peripheral Intravenous Line  Duration           Peripheral IV 11/30/22 Dorsal (posterior); Left Hand 4 days          Drain  Duration           Urethral Catheter 3 days              Urinary Catheter:  Goal for removal: Voiding trial when ambulation improves               Imaging: No pertinent imaging reviewed  Recent Cultures (last 7 days):   Results from last 7 days   Lab Units 12/01/22  1043 11/30/22  1145   BLOOD CULTURE  No Growth After 4 Days  No Growth After 4 Days    --    URINE CULTURE   --  20,000-29,000 cfu/ml Enterococcus  faecium VRE*  <10,000 cfu/ml Candida albicans*       Last 24 Hours Medication List:   Current Facility-Administered Medications   Medication Dose Route Frequency Provider Last Rate   • acetaminophen  650 mg Oral Q6H PRN KEISHA Newberry     • amLODIPine  10 mg Oral Daily KEISHA Newberry     • atorvastatin  40 mg Oral Daily KEISHA Newberry     • benzonatate  200 mg Oral TID KEISHA Merida     • bisacodyl  10 mg Rectal Daily PRN Valerie Lee MD     • carvedilol  12 5 mg Oral BID With Meals KEISHA Newberry     • cholecalciferol  2,000 Units Oral Daily KEISHA Newberry     • vitamin B-12  500 mcg Oral Daily KEISHA Newberry     • dextromethorphan-guaiFENesin  10 mL Oral Q6H PRN KEISHA Newberry     • gabapentin  100 mg Oral BID KEISHA Newberry     • heparin (porcine)  5,000 Units Subcutaneous Q8H [address] KEISHA Newberry     • hydrALAZINE  100 mg Oral TID Shun Mckeon MD     • hydrocodone-chlorpheniramine polistirex  5 mL Oral HS Valerie Lee MD     • insulin glargine  10 Units Subcutaneous HS Valerie Lee MD     • insulin lispro  1-5 Units Subcutaneous TID AC KEISHA Newberry     • insulin lispro  1-5 Units Subcutaneous 0200 KEISHA Newberry     • insulin lispro  1-5 Units Subcutaneous HS KEISHA Newberry     • ipratropium  0 5 mg Nebulization TID KEISHA Newberry     • isosorbide mononitrate  60 mg Oral Daily KEISHA Newberry     • labetalol  10 mg Intravenous Q6H PRN Bridgette Carrillo PA-C     • levalbuterol  0 63 mg Nebulization Q4H PRN KEISHA Newberry     • levalbuterol  1 25 mg Nebulization TID KEISHA Newberry      And   • sodium chloride  3 mL Nebulization TID KEISHA Newberry     • nystatin   Topical BID KEISHA Newberry     • ondansetron  4 mg Intravenous Q6H PRN KEISHA Newberry     • pantoprazole  40 mg Oral Early Morning KEISHA Newberry     • polyethylene glycol  17 g Oral BID Tasha Brown DO     • repaglinide  0 5 mg Oral TID AC Valerie Lee MD     • senna  1 tablet Oral HS Jeanne Ivory MD     • tamsulosin  0 4 mg Oral Daily With Abby Garcia MD          Today, Patient Was Seen By: Jie Carmona DO    **Please Note: This note may have been constructed using a voice recognition system  **

## 2022-12-06 PROBLEM — R65.10 SIRS (SYSTEMIC INFLAMMATORY RESPONSE SYNDROME) (HCC): Status: ACTIVE | Noted: 2022-12-06

## 2022-12-06 LAB
ANION GAP SERPL CALCULATED.3IONS-SCNC: 6 MMOL/L (ref 4–13)
BACTERIA BLD CULT: NORMAL
BACTERIA BLD CULT: NORMAL
BUN SERPL-MCNC: 113 MG/DL (ref 5–25)
CALCIUM SERPL-MCNC: 8.6 MG/DL (ref 8.3–10.1)
CHLORIDE SERPL-SCNC: 103 MMOL/L (ref 96–108)
CO2 SERPL-SCNC: 30 MMOL/L (ref 21–32)
CREAT SERPL-MCNC: 3.22 MG/DL (ref 0.6–1.3)
ERYTHROCYTE [DISTWIDTH] IN BLOOD BY AUTOMATED COUNT: 16.4 % (ref 11.6–15.1)
GFR SERPL CREATININE-BSD FRML MDRD: 15 ML/MIN/1.73SQ M
GLUCOSE SERPL-MCNC: 133 MG/DL (ref 65–140)
GLUCOSE SERPL-MCNC: 138 MG/DL (ref 65–140)
GLUCOSE SERPL-MCNC: 153 MG/DL (ref 65–140)
GLUCOSE SERPL-MCNC: 194 MG/DL (ref 65–140)
GLUCOSE SERPL-MCNC: 86 MG/DL (ref 65–140)
GLUCOSE SERPL-MCNC: 86 MG/DL (ref 65–140)
HCT VFR BLD AUTO: 25.9 % (ref 36.5–49.3)
HGB BLD-MCNC: 8 G/DL (ref 12–17)
MCH RBC QN AUTO: 29.6 PG (ref 26.8–34.3)
MCHC RBC AUTO-ENTMCNC: 30.9 G/DL (ref 31.4–37.4)
MCV RBC AUTO: 96 FL (ref 82–98)
PLATELET # BLD AUTO: 267 THOUSANDS/UL (ref 149–390)
PMV BLD AUTO: 11.8 FL (ref 8.9–12.7)
POTASSIUM SERPL-SCNC: 4.8 MMOL/L (ref 3.5–5.3)
RBC # BLD AUTO: 2.7 MILLION/UL (ref 3.88–5.62)
SODIUM SERPL-SCNC: 139 MMOL/L (ref 135–147)
WBC # BLD AUTO: 11.63 THOUSAND/UL (ref 4.31–10.16)

## 2022-12-06 RX ADMIN — Medication 2000 UNITS: at 08:44

## 2022-12-06 RX ADMIN — NYSTATIN: 100000 POWDER TOPICAL at 08:43

## 2022-12-06 RX ADMIN — ISODIUM CHLORIDE 3 ML: 0.03 SOLUTION RESPIRATORY (INHALATION) at 20:22

## 2022-12-06 RX ADMIN — HEPARIN SODIUM 5000 UNITS: 5000 INJECTION INTRAVENOUS; SUBCUTANEOUS at 05:52

## 2022-12-06 RX ADMIN — ATORVASTATIN CALCIUM 40 MG: 40 TABLET, FILM COATED ORAL at 08:44

## 2022-12-06 RX ADMIN — TAMSULOSIN HYDROCHLORIDE 0.4 MG: 0.4 CAPSULE ORAL at 17:28

## 2022-12-06 RX ADMIN — HEPARIN SODIUM 5000 UNITS: 5000 INJECTION INTRAVENOUS; SUBCUTANEOUS at 21:35

## 2022-12-06 RX ADMIN — ISOSORBIDE MONONITRATE 60 MG: 60 TABLET, EXTENDED RELEASE ORAL at 08:44

## 2022-12-06 RX ADMIN — INSULIN GLARGINE 10 UNITS: 100 INJECTION, SOLUTION SUBCUTANEOUS at 21:35

## 2022-12-06 RX ADMIN — POLYETHYLENE GLYCOL 3350 17 G: 17 POWDER, FOR SOLUTION ORAL at 08:44

## 2022-12-06 RX ADMIN — ISODIUM CHLORIDE 3 ML: 0.03 SOLUTION RESPIRATORY (INHALATION) at 14:32

## 2022-12-06 RX ADMIN — Medication 5 ML: at 21:35

## 2022-12-06 RX ADMIN — LEVALBUTEROL HYDROCHLORIDE 1.25 MG: 1.25 SOLUTION, CONCENTRATE RESPIRATORY (INHALATION) at 08:20

## 2022-12-06 RX ADMIN — CYANOCOBALAMIN TAB 500 MCG 500 MCG: 500 TAB at 08:44

## 2022-12-06 RX ADMIN — CARVEDILOL 12.5 MG: 12.5 TABLET, FILM COATED ORAL at 08:44

## 2022-12-06 RX ADMIN — LEVALBUTEROL HYDROCHLORIDE 1.25 MG: 1.25 SOLUTION, CONCENTRATE RESPIRATORY (INHALATION) at 20:22

## 2022-12-06 RX ADMIN — ISODIUM CHLORIDE 3 ML: 0.03 SOLUTION RESPIRATORY (INHALATION) at 08:19

## 2022-12-06 RX ADMIN — NYSTATIN: 100000 POWDER TOPICAL at 17:28

## 2022-12-06 RX ADMIN — HYDRALAZINE HYDROCHLORIDE 100 MG: 25 TABLET ORAL at 21:36

## 2022-12-06 RX ADMIN — LEVALBUTEROL HYDROCHLORIDE 1.25 MG: 1.25 SOLUTION, CONCENTRATE RESPIRATORY (INHALATION) at 14:32

## 2022-12-06 RX ADMIN — REPAGLINIDE 0.5 MG: 1 TABLET ORAL at 12:14

## 2022-12-06 RX ADMIN — POLYETHYLENE GLYCOL 3350 17 G: 17 POWDER, FOR SOLUTION ORAL at 21:35

## 2022-12-06 RX ADMIN — HYDRALAZINE HYDROCHLORIDE 100 MG: 25 TABLET ORAL at 17:28

## 2022-12-06 RX ADMIN — BENZONATATE 200 MG: 100 CAPSULE ORAL at 08:44

## 2022-12-06 RX ADMIN — HEPARIN SODIUM 5000 UNITS: 5000 INJECTION INTRAVENOUS; SUBCUTANEOUS at 14:48

## 2022-12-06 RX ADMIN — AMLODIPINE BESYLATE 10 MG: 10 TABLET ORAL at 08:44

## 2022-12-06 RX ADMIN — HYDRALAZINE HYDROCHLORIDE 100 MG: 25 TABLET ORAL at 08:44

## 2022-12-06 RX ADMIN — IPRATROPIUM BROMIDE 0.5 MG: 0.5 SOLUTION RESPIRATORY (INHALATION) at 08:20

## 2022-12-06 RX ADMIN — INSULIN LISPRO 1 UNITS: 100 INJECTION, SOLUTION INTRAVENOUS; SUBCUTANEOUS at 21:37

## 2022-12-06 RX ADMIN — HYDRALAZINE HYDROCHLORIDE 100 MG: 25 TABLET ORAL at 00:08

## 2022-12-06 RX ADMIN — SENNOSIDES 17.2 MG: 8.6 TABLET, FILM COATED ORAL at 21:35

## 2022-12-06 RX ADMIN — BENZONATATE 200 MG: 100 CAPSULE ORAL at 21:35

## 2022-12-06 RX ADMIN — PANTOPRAZOLE SODIUM 40 MG: 40 TABLET, DELAYED RELEASE ORAL at 05:52

## 2022-12-06 RX ADMIN — IPRATROPIUM BROMIDE 0.5 MG: 0.5 SOLUTION RESPIRATORY (INHALATION) at 14:32

## 2022-12-06 RX ADMIN — CARVEDILOL 12.5 MG: 12.5 TABLET, FILM COATED ORAL at 17:28

## 2022-12-06 RX ADMIN — GABAPENTIN 100 MG: 100 CAPSULE ORAL at 08:44

## 2022-12-06 RX ADMIN — IPRATROPIUM BROMIDE 0.5 MG: 0.5 SOLUTION RESPIRATORY (INHALATION) at 20:22

## 2022-12-06 RX ADMIN — BENZONATATE 200 MG: 100 CAPSULE ORAL at 17:28

## 2022-12-06 RX ADMIN — REPAGLINIDE 0.5 MG: 1 TABLET ORAL at 17:28

## 2022-12-06 RX ADMIN — REPAGLINIDE 0.5 MG: 1 TABLET ORAL at 06:00

## 2022-12-06 RX ADMIN — GABAPENTIN 100 MG: 100 CAPSULE ORAL at 17:28

## 2022-12-06 NOTE — ASSESSMENT & PLAN NOTE
Wt Readings from Last 3 Encounters:   12/05/22 84 2 kg (185 lb 11 2 oz)   11/22/22 84 5 kg (186 lb 4 8 oz)     Patient received IV Lasix in recent admission  · Chest x-ray upon admission shows worsening pulmonary edema  · Received 40 milligrams of Lasix IV in the ED and was continued on IV Lasix  Then transitioned to [facility] which is currently on hold  · Left upper extremity venous Doppler showed no evidence of DVT  · CT chest showed pulmonary edema with moderate size bilateral pleural effusions left more than right  · Might need accept higher creatinine to maintain euvolemia

## 2022-12-06 NOTE — ASSESSMENT & PLAN NOTE
Troponin [id number]  Elevated troponin in recent admission as well  Reports chest pressure when in respiratory distress  Denies history of CAD  · EKG no ischemic changes, read as AFib, rate 103, RBBB per prior provider  · No history of AFib  Prior EKG shows sinus rhythm with PACs /PVCs  repeat EKG showed sinus rhythm  · Recent 2D echo showed EF low normal, normal wall motion, grade 2 diastolic dysfunction, moderately dilated atriums  · Most likely non MI troponin elevation due to # 1 and CHF

## 2022-12-06 NOTE — ASSESSMENT & PLAN NOTE
Wt Readings from Last 3 Encounters:   12/06/22 83 9 kg (185 lb)   11/22/22 84 5 kg (186 lb 4 8 oz)     Patient received IV Lasix in recent admission  · Chest x-ray upon admission shows worsening pulmonary edema  · Received 40 milligrams of Lasix IV in the ED and was continued on IV Lasix  Then transitioned to [facility] which is currently on hold  · Left upper extremity venous Doppler showed no evidence of DVT  · CT chest showed pulmonary edema with moderate size bilateral pleural effusions left more than right  · Might need accept higher creatinine to maintain euvolemia    · His diuretics have been on hold in the setting of elevated creatinine

## 2022-12-06 NOTE — PLAN OF CARE
Problem: RESPIRATORY - ADULT  Goal: Achieves optimal ventilation and oxygenation  Description: INTERVENTIONS:  - Assess for changes in respiratory status  - Assess for changes in mentation and behavior  - Position to facilitate oxygenation and minimize respiratory effort  - Oxygen administered by appropriate delivery if ordered  - Initiate smoking cessation education as indicated  - Encourage broncho-pulmonary hygiene including cough, deep breathe, Incentive Spirometry  - Assess the need for suctioning and aspirate as needed  - Assess and instruct to report SOB or any respiratory difficulty  - Respiratory Therapy support as indicated  Outcome: Progressing     Problem: Prexisting or High Potential for Compromised Skin Integrity  Goal: Skin integrity is maintained or improved  Description: INTERVENTIONS:  - Identify patients at risk for skin breakdown  - Assess and monitor skin integrity  - Assess and monitor nutrition and hydration status  - Monitor labs   - Assess for incontinence   - Turn and reposition patient  - Assist with mobility/ambulation  - Relieve pressure over bony prominences  - Avoid friction and shearing  - Provide appropriate hygiene as needed including keeping skin clean and dry  - Evaluate need for skin moisturizer/barrier cream  - Collaborate with interdisciplinary team   - Patient/family teaching  - Consider wound care consult   Outcome: Progressing     Problem: MOBILITY - ADULT  Goal: Maintain or return to baseline ADL function  Description: INTERVENTIONS:  -  Assess patient's ability to carry out ADLs; assess patient's baseline for ADL function and identify physical deficits which impact ability to perform ADLs (bathing, care of mouth/teeth, toileting, grooming, dressing, etc )  - Assess/evaluate cause of self-care deficits   - Assess range of motion  - Assess patient's mobility; develop plan if impaired  - Assess patient's need for assistive devices and provide as appropriate  - Encourage maximum independence but intervene and supervise when necessary  - Involve family in performance of ADLs  - Assess for home care needs following discharge   - Consider OT consult to assist with ADL evaluation and planning for discharge  - Provide patient education as appropriate  Outcome: Progressing  Goal: Maintains/Returns to pre admission functional level  Description: INTERVENTIONS:  - Perform BMAT or MOVE assessment daily    - Set and communicate daily mobility goal to care team and patient/family/caregiver  - Collaborate with rehabilitation services on mobility goals if consulted  - Perform Range of Motion 3 times a day  - Reposition patient every 3 hours    - Dangle patient 3 times a day  - Stand patient 3 times a day  - Ambulate patient 3 times a day  - Out of bed to chair 3 times a day   - Out of bed for meals 3 times a day  - Out of bed for toileting  - Record patient progress and toleration of activity level   Outcome: Progressing     Problem: Potential for Falls  Goal: Patient will remain free of falls  Description: INTERVENTIONS:  - Educate patient/family on patient safety including physical limitations  - Instruct patient to call for assistance with activity   - Consult OT/PT to assist with strengthening/mobility   - Keep Call bell within reach  - Keep bed low and locked with side rails adjusted as appropriate  - Keep care items and personal belongings within reach  - Initiate and maintain comfort rounds  - Make Fall Risk Sign visible to staff  - Offer Toileting every 2 Hours, in advance of need  - Initiate/Maintain 1alarm  - Obtain necessary fall risk management equipment: call bell use, bed alarm  - Apply yellow socks and bracelet for high fall risk patients  - Consider moving patient to room near nurses station  Outcome: Progressing     Problem: PAIN - ADULT  Goal: Verbalizes/displays adequate comfort level or baseline comfort level  Description: Interventions:  - Encourage patient to monitor pain and request assistance  - Assess pain using appropriate pain scale  - Administer analgesics based on type and severity of pain and evaluate response  - Implement non-pharmacological measures as appropriate and evaluate response  - Consider cultural and social influences on pain and pain management  - Notify physician/advanced practitioner if interventions unsuccessful or patient reports new pain  Outcome: Progressing     Problem: INFECTION - ADULT  Goal: Absence or prevention of progression during hospitalization  Description: INTERVENTIONS:  - Assess and monitor for signs and symptoms of infection  - Monitor lab/diagnostic results  - Monitor all insertion sites, i e  indwelling lines, tubes, and drains  - Monitor endotracheal if appropriate and nasal secretions for changes in amount and color  - [place] appropriate cooling/warming therapies per order  - Administer medications as ordered  - Instruct and encourage patient and family to use good hand hygiene technique  - Identify and instruct in appropriate isolation precautions for identified infection/condition  Outcome: Progressing  Goal: Absence of fever/infection during neutropenic period  Description: INTERVENTIONS:  - Monitor WBC    Outcome: Progressing     Problem: SAFETY ADULT  Goal: Maintain or return to baseline ADL function  Description: INTERVENTIONS:  -  Assess patient's ability to carry out ADLs; assess patient's baseline for ADL function and identify physical deficits which impact ability to perform ADLs (bathing, care of mouth/teeth, toileting, grooming, dressing, etc )  - Assess/evaluate cause of self-care deficits   - Assess range of motion  - Assess patient's mobility; develop plan if impaired  - Assess patient's need for assistive devices and provide as appropriate  - Encourage maximum independence but intervene and supervise when necessary  - Involve family in performance of ADLs  - Assess for home care needs following discharge   - Consider OT consult to assist with ADL evaluation and planning for discharge  - Provide patient education as appropriate  Outcome: Progressing  Goal: Maintains/Returns to pre admission functional level  Description: INTERVENTIONS:  - Perform BMAT or MOVE assessment daily    - Set and communicate daily mobility goal to care team and patient/family/caregiver  - Collaborate with rehabilitation services on mobility goals if consulted  - Perform Range of Motion 3 times a day  - Reposition patient every 3 hours    - Dangle patient 3 times a day  - Stand patient 3 times a day  - Ambulate patient 3 times a day  - Out of bed to chair 3 times a day   - Out of bed for meals 3 times a day  - Out of bed for toileting  - Record patient progress and toleration of activity level   Outcome: Progressing  Goal: Patient will remain free of falls  Description: INTERVENTIONS:  - Educate patient/family on patient safety including physical limitations  - Instruct patient to call for assistance with activity   - Consult OT/PT to assist with strengthening/mobility   - Keep Call bell within reach  - Keep bed low and locked with side rails adjusted as appropriate  - Keep care items and personal belongings within reach  - Initiate and maintain comfort rounds  - Make Fall Risk Sign visible to staff  - Offer Toileting every 2 Hours, in advance of need  - Initiate/Maintain 1alarm  - Obtain necessary fall risk management equipment: call bell use  - Apply yellow socks and bracelet for high fall risk patients  - Consider moving patient to room near nurses station  Outcome: Progressing     Problem: DISCHARGE PLANNING  Goal: Discharge to home or other facility with appropriate resources  Description: INTERVENTIONS:  - Identify barriers to discharge w/patient and caregiver  - Arrange for needed discharge resources and transportation as appropriate  - Identify discharge learning needs (meds, wound care, etc )  - Arrange for interpretive services to assist at discharge as needed  - Refer to Case Management Department for coordinating discharge planning if the patient needs post-hospital services based on physician/advanced practitioner order or complex needs related to functional status, cognitive ability, or social support system  Outcome: Progressing     Problem: Knowledge Deficit  Goal: Patient/family/caregiver demonstrates understanding of disease process, treatment plan, medications, and discharge instructions  Description: Complete learning assessment and assess knowledge base  Interventions:  - Provide teaching at level of understanding  - Provide teaching via preferred learning methods  Outcome: Progressing     Problem: Nutrition/Hydration-ADULT  Goal: Nutrient/Hydration intake appropriate for improving, restoring or maintaining nutritional needs  Description: Monitor and assess patient's nutrition/hydration status for malnutrition  Collaborate with interdisciplinary team and initiate plan and interventions as ordered  Monitor patient's weight and dietary intake as ordered or per policy  Utilize nutrition screening tool and intervene as necessary  Determine patient's food preferences and provide high-protein, high-caloric foods as appropriate       INTERVENTIONS:  - Monitor oral intake, urinary output, labs, and treatment plans  - Assess nutrition and hydration status and recommend course of action  - Evaluate amount of meals eaten  - Assist patient with eating if necessary   - Allow adequate time for meals  - Recommend/ encourage appropriate diets, oral nutritional supplements, and vitamin/mineral supplements  - Order, calculate, and assess calorie counts as needed  - Assess need for intravenous fluids  - Provide nutrition/hydration education as appropriate  - Include patient/family/caregiver in decisions related to nutrition  Outcome: Progressing

## 2022-12-06 NOTE — ASSESSMENT & PLAN NOTE
Patient presented with acute respiratory distress shortly after eating a few spoonful of puree diet in STR, patient reported chest pressure and SOB  Patient was satting 85-95% on oxygen 3 L per STR transfer paper  Patient was belching after eating/drinking limited amount of diet/water per staff  · Patient was discharged on the day of admission after being hospitalized for aspiration pneumonia, acute on chronic diastolic CHF, dysphagia  Received IV Lasix and 7 days of IV Rocephin and Flagyl  Seen by GI, underwent EGD, found to have oropharyngeal dysphagia with esophageal dysmotility, no obvious esophageal stricture  Underwent Barium swallow study which showed moderate to severe esophageal dysmotility with significant residual contrast remaining in the esophagus at the end of the study  Patient was recommended regular diet by speech and discharged on regular diet per STR  · Likely secondary to multifocal pneumonia and CHF  · Patient required BiPAP on ED arrival   ABG post BiPAP essentially unremarkable  Then titrated down to 6 liters oxygen upon admission  Patient continues to require 1 L oxygen to maintain O2 saturation in the low to mid 90s  · Chest x-ray - Pulmonary edema, worse when comparing to 11/19/2022  Underlying infection could not be excluded  · ProBNP 15,094  · Patient received Lasix 40 IV in ED  · Received cefepime and Flagyl in ED  Completed 7 days of IV cefepime  · Received IV Solu-Medrol in ED  No wheezing on auscultation    · CT chest-pulmonary edema with moderate size bilateral pleural effusions with significant bibasilar atelectasis and concomitant multifocal pneumonia   · Obstructive series showed persistent pulmonary edema with multifocal pneumonia  · CT of the abdomen pelvis still showing bilateral pleural effusions with bibasilar atelectasis

## 2022-12-06 NOTE — PLAN OF CARE
Problem: PHYSICAL THERAPY ADULT  Goal: Performs mobility at highest level of function for planned discharge setting  See evaluation for individualized goals  Description: Treatment/Interventions: ADL retraining, Functional transfer training, LE strengthening/ROM, Therapeutic exercise, Endurance training, Bed mobility, Gait training, Spoke to nursing          See flowsheet documentation for full assessment, interventions and recommendations  Outcome: Progressing  Note: Prognosis: Good  Problem List: Decreased strength, Decreased endurance, Impaired balance, Decreased mobility, Pain, Decreased safety awareness  Assessment: Pt agreeable to PT session this AM  Pt able to perform exercise in supine position as mentioned below with intermittent assist/demo in order to improve form/ROM  Pt continues to require Mod-Max A for bed mobility + transfer to standing  Pt with increased posterior LOB in standing today with 1 person assist, standing for approx 10-15 seconds and then requesting to sit  Repositioned for comfort with VERONIQUE Thompson present to assist         PT Discharge Recommendation: Post acute rehabilitation services    See flowsheet documentation for full assessment

## 2022-12-06 NOTE — ACP (ADVANCE CARE PLANNING)
Serious Illness Conversation    1  What is your understanding now of where you are with your illness? Prognostic Understanding: overestimates prognosis     2  How much information about what is likely to be ahead with your illness would you like to have? Daughter wants to be fully informed     3  What did you (clinician) communicate to the patient? Prognostic Communication: Function - I hope that this is not the case, but I’m worried that this may be as strong as you will feel, and things are likely to get more difficult  Multiple discussions with daughter     3  If your health situation worsens, what are your most important goals? Patient states "I do not know"     5  What are the biggest fears and worries about the future and your health? States he is getting tired of being in and out of the hospital     6  What abilities are so critical to your life that you cannot imagine living without them? 7  What gives you strength as you think about the future with your illness? 8  If you become sicker, how much are you willing to go through for the possibility of gaining more time? Be on a ventilator: No Be uncomfortable: No   Patient initially stated that he did not want to return to the hospital if his condition were to worsen after discharge but later stated "I do not know"  Patient also stated that he does not know if he wants a feeding tube but will get one if his family decides   5  How much does your proxy and family know about your priorities and wishes? Long discussion with patient and his daughter at bedside  Spoke with them regarding PEG tube placement to prevent frequent aspiration events which can in turn cause fevers/respiratory distress/cough  Patient initially stated he did not want a PEG tube but later stated that he will get one if his family agrees    Patient did state "I do not know" to multiple questions and states that he is uncomfortable sitting in the chair and can't think right now   Spoke with patient and daughter to speak with the rest of the family members and let us know if the plan is for PEG tube placement as a would need to be done prior to discharge  Discussed with them that prior to discharge will readdress whether patient would want to be hospitalized again if he worsens after discharge  After discussion with patient and daughter will change code status to DNR/DNI  For now continue with current treatment  This will help us make sure that your treatment plans reflect what’s important to you  How does this plan sound to you? I will do everything I can to help you through this    Patient/daughter verbalized understanding of the plan     I have spent 30 minutes speaking with my patient on advanced care planning today or during this visit     Advanced directives

## 2022-12-06 NOTE — ASSESSMENT & PLAN NOTE
Lab Results   Component Value Date    HGBA1C 5 7 (H) 11/16/2022       Recent Labs     12/05/22 2052 12/06/22  0149 12/06/22  0740 12/06/22  1124   POCGLU 211* 133 86 153*     Patient was discharged on Prandin t i d  With meals  · Continue Prandin, Humalog sliding scale with Accu-Cheks q a c  And HS  · Currently on diabetic pureed Diet with thin liquids  Continue Lantus 10 units q h s

## 2022-12-06 NOTE — ASSESSMENT & PLAN NOTE
Lab Results   Component Value Date    EGFR 15 12/05/2022    EGFR 13 12/04/2022    EGFR 12 12/03/2022    CREATININE 3 28 (H) 12/05/2022    CREATININE 3 55 (H) 12/04/2022    CREATININE 3 78 (H) 12/03/2022     History of CKD 4, baseline creatinine appears to be around 1 5-1 9 in past 2 years in care everywhere  Creatinine during previous hospitalization ranged in 2 4-2 8  · Possible CKD progression  · Urinalysis was bland  · Received IV Lasix 40 mg in ED  · Creatinine continues to slowly improve    Received IV fluids for 10 hours per Nephrology on 11/30 --> restarted back on fluids --> IV fluids discontinued  · Renal ultrasound showed no hydronephrosis moderate left renal atrophy  · Nephrology input appreciated

## 2022-12-06 NOTE — PROGRESS NOTES
Progress Note - Infectious Disease   Jose A Rodriguez 80 y o  male MRN: [id number]  Unit/Bed#: 207 Tammy Royal Encounter: [id number]      Impression/Plan:    1  Recurrent SIRS-fever and leukocytosis  The patient has had intermittent fevers despite 14 days of antibiotics, which were stopped 11/29; suspect this is due to recurrent aspiration pneumonitis  His hypoxia is likely multifactorial, due to aspiration in setting of dysmotility of the esophagus, pulmonary edema, possible pneumonia  The patient received more than a sufficient course of antibiotics for pneumonia (14 days) and continues to have intermittent fevers although frequency of fevers improving off antibiotics and leukocytosis is also now improving  Further antibiotics will not prevent aspiration or the development of infection if aspiration occurs  -continue monitoring off antibiotics    -Consider re-discussion with daughter about PEG placement, although will not completely eliminate the risk of aspiration  2  Acute hypoxic respiratory failure  Likely due to pulmonary edema, recurrent aspiration pneumonitis  May have had component of aspiration pneumonia but has received sufficient amount of antibiotics for this  Remains stable on 2L NC    -monitor O2   -aspiration precautions    3  SHAHID on CKD  Creatinine/BUN worsening after admission, now improving  Nephrology is following  4  Esophageal dysmotility  Risk factor for recurrent aspiration  GI recommended PEG during prior admission, but daughter would like to hold off on this  -Aspiration precautions   -consider discussion of a PEG    5  Acute on chronic diastolic heart failure  Volume management per nephrology    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, 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  May be causing some of the esophageal dysmotility and abdominal pain the patient is having  -MRI or CT A/P with contrast per primary    Above management plan discussed with the patient at bedside   ID will follow  Antibiotics:  Status post 15 days Ceftriaxonemetronidazole-->Cefepime  Off antibiotics day 7    Subjective:  No overnight events noted  Tmax was 100 8 yesterday morning but the patient has since remained afebrile  WBC count downtrending  The patient is feeling okay today, he is more awake  Improved appetite today, no abdominal pain  No shortness of breath  Objective:  Vitals:  Temp:  [97 9 °F (36 6 °C)-98 5 °F (36 9 °C)] 98 3 °F (36 8 °C)  HR:  [59-64] 61  Resp:  [18] 18  BP: (140-151)/(53-58) 140/53  SpO2:  [89 %-98 %] 98 %  Temp (24hrs), Av 4 °F (36 9 °C), Min:97 9 °F (36 6 °C), Max:98 5 °F (36 9 °C)  Current: Temperature: 98 3 °F (36 8 °C)    Physical Exam:   General: Chronically debilitated  Head: normocephalic, atraumatic  Mouth: no oral or pharyngeal lesions   Neck: trachea midline   CV: RRR, no murmurs   Lungs: decreased breath sounds bilaterally   Abdomen: no distension or tenderness to palpation   Extremities: generalized edema  Skin: no rashes, erythema   Neuro: more awake today, answering questions appropriately  Psych: calm, cooperative    Labs: All pertinent labs and imaging studies were personally reviewed  Results from last 7 days   Lab Units 22  0551 22  0530 22  0539   WBC Thousand/uL 11 63* 13 91* 13 15*   HEMOGLOBIN g/dL 8 0* 7 8* 8 0*   PLATELETS Thousands/uL 267 292 233     Results from last 7 days   Lab Units 22  0551 22  0530 22  0552   SODIUM mmol/L 139 144 145   POTASSIUM mmol/L 4 8 4 6 4 8   CHLORIDE mmol/L 103 105 106   CO2 mmol/L 30 32 31   BUN mg/dL 113* 113* 118*   CREATININE mg/dL 3 22* 3 28* 3 55*   EGFR ml/min/1 73sq m 15 15 13   CALCIUM mg/dL 8 6 8 7 8 6     Results from last 7 days   Lab Units 22  0539   PROCALCITONIN ng/ml 0 34*                   Micro:  Results from last 7 days   Lab Units 22  1043 22  1145   BLOOD CULTURE  No Growth After 4 Days  No Growth After 4 Days    --    URINE CULTURE   -- 20,000-29,000 cfu/ml Enterococcus  faecium VRE*  <10,000 cfu/ml Candida albicans*       Imaging:  Pertinent imaging in PACS personally reviewed  CXR 12/2 with   waxing and waning airspace opacities with stable pleural effusions

## 2022-12-06 NOTE — ASSESSMENT & PLAN NOTE
Patient with urinary retention requiring multiple straight cath  Per RN patient also with penile swelling and therefore Ayala catheter placed  · Urology input appreciated    Cardura was discontinued and started Flomax  · Per Urology voiding trial once patient is more mobile

## 2022-12-06 NOTE — PHYSICAL THERAPY NOTE
PT RE-EVALUATION     12/06/22 1150   PT Last Visit   PT Visit Date 12/06/22   Note Type   Note type Re-Evaluation   Pain Assessment   Pain Assessment Tool 0-10   Pain Score No Pain   Effect of Pain on Daily Activities limits mobility/rest   Patient's Stated Pain Goal No pain   Hospital Pain Intervention(s) Rest;Repositioned   Multiple Pain Sites No   Restrictions/Precautions   Weight Bearing Precautions Per Order No   Other Precautions Bed Alarm; Chair Alarm; Fall Risk;Pain   General   Family/Caregiver Present No   Cognition   Overall Cognitive Status WFL   Arousal/Participation Cooperative   Attention Within functional limits   Orientation Level Oriented X4   Following Commands Follows multistep commands with increased time or repetition   Subjective   Subjective "I feel a little better today"   RLE Assessment   RLE Assessment WFL  (Grossly 3 to 3+/5)   LLE Assessment   LLE Assessment WFL  (Grossly 3 to 3+/5)   Bed Mobility   Supine to Sit 3  Moderate assistance   Additional items Assist x 1;Verbal cues   Sit to Supine 2  Maximal assistance   Additional items Assist x 1;Verbal cues   Transfers   Sit to Stand 2  Maximal assistance   Additional items Assist x 1;Verbal cues   Stand to Sit 2  Maximal assistance   Additional items Assist x 1;Verbal cues   Additional Comments Pt declining OOB to chair today - reports he sat for hours yesterday and was not comfortable   Ambulation/Elevation   Ambulation/Elevation Additional Comments unable to ambulate today, standing with posterior LOB   Balance   Static Sitting Fair -   Dynamic Sitting Poor +   Static Standing Poor   Dynamic Standing Poor   Activity Tolerance   Activity Tolerance Patient tolerated treatment well;Patient limited by fatigue   Nurse Made Aware yes   Assessment   Prognosis Good   Problem List Decreased strength;Decreased endurance; Impaired balance;Decreased mobility;Pain;Decreased safety awareness   Assessment Pt agreeable to PT session this AM  Pt able to perform exercise in supine position as mentioned below with intermittent assist/demo in order to improve form/ROM  Pt continues to require Mod-Max A for bed mobility + transfer to standing  Pt with increased posterior LOB in standing today with 1 person assist, standing for approx 10-15 seconds and then requesting to sit  Repositioned for comfort with VERONIQUE Spicer present to assist    Goals   Patient Goals to get better   Plan   Treatment/Interventions ADL retraining;Functional transfer training;LE strengthening/ROM; Therapeutic exercise; Endurance training;Bed mobility;Gait training;Spoke to nursing   PT Frequency Other (Comment)  (5x/week)   Recommendation   PT Discharge Recommendation Post acute rehabilitation services   AM-PAC Basic Mobility Inpatient   Turning in Bed Without Bedrails 3   Lying on Back to Sitting on Edge of Flat Bed 2   Moving Bed to Chair 2   Standing Up From Chair 2   Walk in Room 2   Climb 3-5 Stairs 1   Basic Mobility Inpatient Raw Score 12   Basic Mobility Standardized Score 32 23   Turning Head Towards Sound 3   Follow Simple Instructions 3   Low Function Basic Mobility Raw Score 18   Low Function Basic Mobility Standardized Score 29 25   Highest Level Of Mobility   -[facility] Goal 4: Move to chair/commode   JH-HLM Achieved 3: Sit at edge of bed   Barthel Index   Feeding 10   Bathing 0   Grooming Score 0   Dressing Score 5   Bladder Score 10   Bowels Score 5   Toilet Use Score 5   Transfers (Bed/Chair) Score 5   Mobility (Level Surface) Score 0   Stairs Score 0   Barthel Index Score 40   Exercises   Neuro re-ed Pt able to perform supine/seated exercise including ankle pumps, LAQ, quad/glute sets x 10 reps bilat   End of Consult   Patient Position at End of Consult Supine;Bed/Chair alarm activated; All needs within reach   [facility] [place] Insurance Number  Ally Briggs [id number]

## 2022-12-06 NOTE — PLAN OF CARE
Problem: RESPIRATORY - ADULT  Goal: Achieves optimal ventilation and oxygenation  Description: INTERVENTIONS:  - Assess for changes in respiratory status  - Assess for changes in mentation and behavior  - Position to facilitate oxygenation and minimize respiratory effort  - Oxygen administered by appropriate delivery if ordered  - Initiate smoking cessation education as indicated  - Encourage broncho-pulmonary hygiene including cough, deep breathe, Incentive Spirometry  - Assess the need for suctioning and aspirate as needed  - Assess and instruct to report SOB or any respiratory difficulty  - Respiratory Therapy support as indicated  12/6/2022 1003 by Von Hartmann RN  Outcome: Progressing  12/6/2022 0956 by Von Hartmann RN  Outcome: Progressing     Problem: Prexisting or High Potential for Compromised Skin Integrity  Goal: Skin integrity is maintained or improved  Description: INTERVENTIONS:  - Identify patients at risk for skin breakdown  - Assess and monitor skin integrity  - Assess and monitor nutrition and hydration status  - Monitor labs   - Assess for incontinence   - Turn and reposition patient  - Assist with mobility/ambulation  - Relieve pressure over bony prominences  - Avoid friction and shearing  - Provide appropriate hygiene as needed including keeping skin clean and dry  - Evaluate need for skin moisturizer/barrier cream  - Collaborate with interdisciplinary team   - Patient/family teaching  - Consider wound care consult   12/6/2022 1003 by Von Hartmann RN  Outcome: Progressing  12/6/2022 0956 by Von Hartmann RN  Outcome: Progressing     Problem: MOBILITY - ADULT  Goal: Maintain or return to baseline ADL function  Description: INTERVENTIONS:  -  Assess patient's ability to carry out ADLs; assess patient's baseline for ADL function and identify physical deficits which impact ability to perform ADLs (bathing, care of mouth/teeth, toileting, grooming, dressing, etc )  - Assess/evaluate cause of self-care deficits   - Assess range of motion  - Assess patient's mobility; develop plan if impaired  - Assess patient's need for assistive devices and provide as appropriate  - Encourage maximum independence but intervene and supervise when necessary  - Involve family in performance of ADLs  - Assess for home care needs following discharge   - Consider OT consult to assist with ADL evaluation and planning for discharge  - Provide patient education as appropriate  12/6/2022 1003 by Samuel Farnsworth RN  Outcome: Progressing  12/6/2022 0956 by Samuel Farnsworth RN  Outcome: Progressing  Goal: Maintains/Returns to pre admission functional level  Description: INTERVENTIONS:  - Perform BMAT or MOVE assessment daily    - Set and communicate daily mobility goal to care team and patient/family/caregiver  - Collaborate with rehabilitation services on mobility goals if consulted  - Perform Range of Motion 3 times a day  - Reposition patient every 3 hours    - Dangle patient 3 times a day  - Stand patient 3 times a day  - Ambulate patient 3 times a day  - Out of bed to chair 3 times a day   - Out of bed for meals 3 times a day  - Out of bed for toileting  - Record patient progress and toleration of activity level   12/6/2022 1003 by Samuel Farnsworth RN  Outcome: Progressing  12/6/2022 0956 by Samuel Farnsworth RN  Outcome: Progressing     Problem: Potential for Falls  Goal: Patient will remain free of falls  Description: INTERVENTIONS:  - Educate patient/family on patient safety including physical limitations  - Instruct patient to call for assistance with activity   - Consult OT/PT to assist with strengthening/mobility   - Keep Call bell within reach  - Keep bed low and locked with side rails adjusted as appropriate  - Keep care items and personal belongings within reach  - Initiate and maintain comfort rounds  - Make Fall Risk Sign visible to staff  - Offer Toileting every 2 Hours, in advance of need  - Initiate/Maintain 1alarm  - Obtain necessary fall risk management equipment: call bel use  - Apply yellow socks and bracelet for high fall risk patients  - Consider moving patient to room near nurses station  12/6/2022 1003 by Leyla Braun RN  Outcome: Progressing  12/6/2022 0956 by Leyla Braun RN  Outcome: Progressing     Problem: PAIN - ADULT  Goal: Verbalizes/displays adequate comfort level or baseline comfort level  Description: Interventions:  - Encourage patient to monitor pain and request assistance  - Assess pain using appropriate pain scale  - Administer analgesics based on type and severity of pain and evaluate response  - Implement non-pharmacological measures as appropriate and evaluate response  - Consider cultural and social influences on pain and pain management  - Notify physician/advanced practitioner if interventions unsuccessful or patient reports new pain  12/6/2022 1003 by Leyla Braun RN  Outcome: Progressing  12/6/2022 0956 by Leyla Braun RN  Outcome: Progressing     Problem: INFECTION - ADULT  Goal: Absence or prevention of progression during hospitalization  Description: INTERVENTIONS:  - Assess and monitor for signs and symptoms of infection  - Monitor lab/diagnostic results  - Monitor all insertion sites, i e  indwelling lines, tubes, and drains  - Monitor endotracheal if appropriate and nasal secretions for changes in amount and color  - [place] appropriate cooling/warming therapies per order  - Administer medications as ordered  - Instruct and encourage patient and family to use good hand hygiene technique  - Identify and instruct in appropriate isolation precautions for identified infection/condition  12/6/2022 1003 by Leyla Braun RN  Outcome: Progressing  12/6/2022 0956 by Leyla Braun RN  Outcome: Progressing  Goal: Absence of fever/infection during neutropenic period  Description: INTERVENTIONS:  - Monitor WBC    12/6/2022 1003 by Leyla Braun RN  Outcome: Progressing  12/6/2022 0956 by Alysha Cuevas RN  Outcome: Progressing     Problem: SAFETY ADULT  Goal: Maintain or return to baseline ADL function  Description: INTERVENTIONS:  -  Assess patient's ability to carry out ADLs; assess patient's baseline for ADL function and identify physical deficits which impact ability to perform ADLs (bathing, care of mouth/teeth, toileting, grooming, dressing, etc )  - Assess/evaluate cause of self-care deficits   - Assess range of motion  - Assess patient's mobility; develop plan if impaired  - Assess patient's need for assistive devices and provide as appropriate  - Encourage maximum independence but intervene and supervise when necessary  - Involve family in performance of ADLs  - Assess for home care needs following discharge   - Consider OT consult to assist with ADL evaluation and planning for discharge  - Provide patient education as appropriate  12/6/2022 1003 by Alysha Cuevas RN  Outcome: Progressing  12/6/2022 0956 by Alysha Cuevas RN  Outcome: Progressing  Goal: Maintains/Returns to pre admission functional level  Description: INTERVENTIONS:  - Perform BMAT or MOVE assessment daily    - Set and communicate daily mobility goal to care team and patient/family/caregiver  - Collaborate with rehabilitation services on mobility goals if consulted  - Perform Range of Motion 3 times a day  - Reposition patient every 3 hours    - Dangle patient 3 times a day  - Stand patient 3 times a day  - Ambulate patient 3 times a day  - Out of bed to chair 3 times a day   - Out of bed for meals 3 times a day  - Out of bed for toileting  - Record patient progress and toleration of activity level   12/6/2022 1003 by Alysha Cuevas RN  Outcome: Progressing  12/6/2022 0956 by Alysha Cuevas RN  Outcome: Progressing  Goal: Patient will remain free of falls  Description: INTERVENTIONS:  - Educate patient/family on patient safety including physical limitations  - Instruct patient to call for assistance with activity   - Consult OT/PT to assist with strengthening/mobility   - Keep Call bell within reach  - Keep bed low and locked with side rails adjusted as appropriate  - Keep care items and personal belongings within reach  - Initiate and maintain comfort rounds  - Make Fall Risk Sign visible to staff  - Offer Toileting every 2 Hours, in advance of need  - Initiate/Maintain 1alarm  - Obtain necessary fall risk management equipment: call bell use  - Apply yellow socks and bracelet for high fall risk patients  - Consider moving patient to room near nurses station  12/6/2022 1003 by Nela Saenz RN  Outcome: Progressing  12/6/2022 0956 by Nela Saenz RN  Outcome: Progressing     Problem: DISCHARGE PLANNING  Goal: Discharge to home or other facility with appropriate resources  Description: INTERVENTIONS:  - Identify barriers to discharge w/patient and caregiver  - Arrange for needed discharge resources and transportation as appropriate  - Identify discharge learning needs (meds, wound care, etc )  - Arrange for interpretive services to assist at discharge as needed  - Refer to Case Management Department for coordinating discharge planning if the patient needs post-hospital services based on physician/advanced practitioner order or complex needs related to functional status, cognitive ability, or social support system  12/6/2022 1003 by Nela Saenz RN  Outcome: Progressing  12/6/2022 0956 by Nela Saenz RN  Outcome: Progressing     Problem: Knowledge Deficit  Goal: Patient/family/caregiver demonstrates understanding of disease process, treatment plan, medications, and discharge instructions  Description: Complete learning assessment and assess knowledge base    Interventions:  - Provide teaching at level of understanding  - Provide teaching via preferred learning methods  12/6/2022 1003 by Nela Saenz RN  Outcome: Progressing  12/6/2022 0956 by Nela Saenz RN  Outcome: Progressing     Problem: Nutrition/Hydration-ADULT  Goal: Nutrient/Hydration intake appropriate for improving, restoring or maintaining nutritional needs  Description: Monitor and assess patient's nutrition/hydration status for malnutrition  Collaborate with interdisciplinary team and initiate plan and interventions as ordered  Monitor patient's weight and dietary intake as ordered or per policy  Utilize nutrition screening tool and intervene as necessary  Determine patient's food preferences and provide high-protein, high-caloric foods as appropriate       INTERVENTIONS:  - Monitor oral intake, urinary output, labs, and treatment plans  - Assess nutrition and hydration status and recommend course of action  - Evaluate amount of meals eaten  - Assist patient with eating if necessary   - Allow adequate time for meals  - Recommend/ encourage appropriate diets, oral nutritional supplements, and vitamin/mineral supplements  - Order, calculate, and assess calorie counts as needed  - Assess need for intravenous fluids  - Provide nutrition/hydration education as appropriate  - Include patient/family/caregiver in decisions related to nutrition  12/6/2022 1003 by Nieves Scott RN  Outcome: Progressing  12/6/2022 0956 by Nieves Scott RN  Outcome: Progressing

## 2022-12-06 NOTE — ASSESSMENT & PLAN NOTE
20 level has been minimally elevated since admission  · Patient received cefepime and Flagyl in the ED   Completed 7 day course of cefepime 1 gram IV daily  · Patient febrile again to 101 1 on 12/1 and 100 8 today, likely aspiration pneumonitis    procalcitonin mildly elevated, blood cultures negative, COVID/flu/RSV negative, urine culture with small colony of Enterococcus not requiring treatment per ID  · Due to recurrent fevers, ID was consulted who recommended monitoring off antibiotics  · Daughter/patient aware that he is at high risk for aspiration events due to his dysphagia although did speak with them that PEG tube placement does not guarantee zero risk of aspiration  · Sputum culture with mixed respiratory hardik  · Urine for Legionella and pneumococcal antigens were negative  · Blood cultures negative  · Repeat chest x-ray 12/1 with waxing and waning opacities  · CT abdomen pelvis showing bilateral pleural effusions with bibasilar atelectasis  · Recurrent SIRS suspected secondary to aspiration pneumonitis

## 2022-12-06 NOTE — PROGRESS NOTES
[facility] [address]  Progress Note Kacie Flanagan 10/19/1929, 80 y o  male MRN: [id number]  Unit/Bed#: [facility] Encounter: [id number]  Primary Care Provider: Damon Quezada PA-C   Date and time admitted to hospital: 11/22/2022  8:41 PM    * Acute respiratory failure with hypoxia [facility])  Assessment & Plan  Patient presented with acute respiratory distress shortly after eating a few spoonful of puree diet in STR, patient reported chest pressure and SOB  Patient was satting 85-95% on oxygen 3 L per STR transfer paper  Patient was belching after eating/drinking limited amount of diet/water per staff  · Patient was discharged on the day of admission after being hospitalized for aspiration pneumonia, acute on chronic diastolic CHF, dysphagia  Received IV Lasix and 7 days of IV Rocephin and Flagyl  Seen by GI, underwent EGD, found to have oropharyngeal dysphagia with esophageal dysmotility, no obvious esophageal stricture  Underwent Barium swallow study which showed moderate to severe esophageal dysmotility with significant residual contrast remaining in the esophagus at the end of the study  Patient was recommended regular diet by speech and discharged on regular diet per STR  · Likely secondary to multifocal pneumonia and CHF  · Patient required BiPAP on ED arrival   ABG post BiPAP essentially unremarkable  Then titrated down to 6 liters oxygen upon admission  Patient continues to require 1 L oxygen to maintain O2 saturation in the low to mid 90s  · Chest x-ray - Pulmonary edema, worse when comparing to 11/19/2022  Underlying infection could not be excluded  · ProBNP 15,094  · Patient received Lasix 40 IV in ED  · Received cefepime and Flagyl in ED  Completed 7 days of IV cefepime  · Received IV Solu-Medrol in ED  No wheezing on auscultation    · CT chest-pulmonary edema with moderate size bilateral pleural effusions with significant bibasilar atelectasis and concomitant multifocal pneumonia   · Obstructive series showed persistent pulmonary edema with multifocal pneumonia  · CT of the abdomen pelvis still showing bilateral pleural effusions with bibasilar atelectasis    Esophageal dysphagia  Assessment & Plan  Per speech therapy, patient at high risk for aspiration despite the level of diet  Currently on pureed diet with thin liquids with medications crushed with puree  · Continue pantoprazole daily  · Obstructive series showed barium throughout the colon with modest amount of stool along the rectosigmoid region  · Dr Marcello Najera with patient's daughter/patient   They will speak with the rest of the family members regarding PEG tube placement  Discussed with her that patient is at high risk for recurrent aspiration pneumonia/hospitalizations      Acute kidney injury superimposed on CKD [facility])  Assessment & Plan  Lab Results   Component Value Date    EGFR 15 12/06/2022    EGFR 15 12/05/2022    EGFR 13 12/04/2022    CREATININE 3 22 (H) 12/06/2022    CREATININE 3 28 (H) 12/05/2022    CREATININE 3 55 (H) 12/04/2022     History of CKD 4, baseline creatinine appears to be around 1 5-1 9 in past 2 years in care everywhere  Creatinine during previous hospitalization ranged in 2 4-2 8  Admission creatinine was 2 8 which peaked at 3 9  · Possible CKD progression  · Urinalysis was bland  · Received IV Lasix 40 mg in ED  Patient also initially received IV diuretics and was later transitioned to [facility] which have been on hold  · Patient received IV fluids from 11/30 couple of days  · Renal ultrasound showed no hydronephrosis moderate left renal atrophy  · Nephrology input appreciated  · No indication for dialysis at the current time  Continue conservative kidney management plan given his advanced age per nephrology    · Nutritional supplements were changed from Glucerna to Nepro    SIRS (systemic inflammatory response syndrome) [facility])  Assessment & Plan  Patient continues to have intermittent fevers with persistent elevation of white count despite receiving 14 days of antibiotics  Jacob Naylor was treated with Rocephin and Flagyl during prior hospitalization and also received cefepime for 7 days during this hospitalization  ID Input appreciated  Monitor off antibiotics  Current SIRS likely secondary to aspiration pneumonitis    Left upper quadrant abdominal mass  Assessment & Plan  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  · Discussed with radiology previously and radiologist recommended getting MRI for further evaluation  · Per nephrology okay to obtain MRI with gadolinium using class 2 gadolinium agents  · Dr Fiordaliza Do with radiologist again  who recommended CT scan with p o  Contrast for further evaluation  Discussed with radiologist patient's issues with dysphagia and aspiration  NG-tube was placed for contrast administration and patient had a CAT scan of the abdomen pelvis  · Abdomen pelvis showed left upper quadrant mass which may be arising from the gastric fundus and tail of the pancreas without any evidence of invasion of adjacent structures or metastatic disease in the abdomen-CAT scan with contrast using pancreatic mass protocol or MRI was suggested    Acute on chronic diastolic congestive heart failure (HCC)  Assessment & Plan  Wt Readings from Last 3 Encounters:   12/06/22 83 9 kg (185 lb)   11/22/22 84 5 kg (186 lb 4 8 oz)     Patient received IV Lasix in recent admission  · Chest x-ray upon admission shows worsening pulmonary edema  · Received 40 milligrams of Lasix IV in the ED and was continued on IV Lasix  Then transitioned to [facility] which is currently on hold  · Left upper extremity venous Doppler showed no evidence of DVT  · CT chest showed pulmonary edema with moderate size bilateral pleural effusions left more than right  · Might need accept higher creatinine to maintain euvolemia    · His diuretics have been on hold in the setting of elevated creatinine        Aspiration pneumonia [facility])  Assessment & Plan  20 level has been minimally elevated since admission  · Patient received cefepime and Flagyl in the ED   Completed 7 day course of cefepime 1 gram IV daily  · Patient febrile again to 101 1 on 12/1 and 100 8 today, likely aspiration pneumonitis  procalcitonin mildly elevated, blood cultures negative, COVID/flu/RSV negative, urine culture with small colony of Enterococcus not requiring treatment per ID  · Due to recurrent fevers, ID was consulted who recommended monitoring off antibiotics  · Daughter/patient aware that he is at high risk for aspiration events due to his dysphagia although did speak with them that PEG tube placement does not guarantee zero risk of aspiration  · Sputum culture with mixed respiratory hardik  · Urine for Legionella and pneumococcal antigens were negative  · Blood cultures negative  · Repeat chest x-ray 12/1 with waxing and waning opacities  · CT abdomen pelvis showing bilateral pleural effusions with bibasilar atelectasis  · Recurrent SIRS suspected secondary to aspiration pneumonitis    Constipation  Assessment & Plan  Patient with constipation  Continue MiraLax, senna q h s  · MiraLAX for symptoms to twice daily dosing  · Had huge bowel movement yesterday      BPH (benign prostatic hyperplasia)  Assessment & Plan  Patient with urinary retention requiring multiple straight cath  Per RN patient also with penile swelling and therefore Ayala catheter placed  · Urology input appreciated  Cardura was discontinued and started Flomax  · Per Urology voiding trial once patient is more mobile    Hyperlipidemia  Assessment & Plan  Continue statin    Elevated troponin  Assessment & Plan  Troponin [id number]  Elevated troponin in recent admission as well  Reports chest pressure when in respiratory distress  Denies history of CAD    · EKG no ischemic changes, read as AFib, rate 103, RBBB per prior provider  · No history of AFib  Prior EKG shows sinus rhythm with PACs /PVCs  repeat EKG showed sinus rhythm  · Recent 2D echo showed EF low normal, normal wall motion, grade 2 diastolic dysfunction, moderately dilated atriums  · Most likely non MI troponin elevation due to # 1 and CHF  Type 2 diabetes mellitus [facility])  Assessment & Plan  Lab Results   Component Value Date    HGBA1C 5 7 (H) 2022       Recent Labs     22  2052 22  0149 22  0740 22  1124   POCGLU 211* 133 86 153*     Patient was discharged on Prandin t i d  With meals  · Continue Prandin, Humalog sliding scale with Accu-Cheks q a c  And HS  · Currently on diabetic pureed Diet with thin liquids  Continue Lantus 10 units q h s  Primary hypertension  Assessment & Plan  Continue hydralazine, Coreg, Norvasc, Imdur  · Blood pressures overall stable        VTE Pharmacologic Prophylaxis:   High Risk (Score >/= 5) - Pharmacological DVT Prophylaxis Ordered: heparin  Sequential Compression Devices Ordered  Patient Centered Rounds: I performed bedside rounds with nursing staff today  Discussions with Specialists or Other Care Team Provider: Nephrology    Education and Discussions with Family / Patient: Updated  (daughter) via phone  Time Spent for Care: 45 minutes  More than 50% of total time spent on counseling and coordination of care as described above  Current Length of Stay: 14 day(s)  Current Patient Status: Inpatient   Certification Statement: The patient will continue to require additional inpatient hospital stay due to Aspiration pneumonia, SHAHID  Discharge Plan: Anticipate discharge in 24-48 hrs to rehab facility  Code Status: Level 3 - DNAR and DNI    Subjective:   Patient had a very large bowel movement yesterday  Patient denies any shortness of breath  Occasional cough      Objective:     Vitals:   Temp (24hrs), Av 6 °F (37 °C), Min:97 9 °F (36 6 °C), Max:99 7 °F (37 6 °C)    Temp:  [97 9 °F (36 6 °C)-99 7 °F (37 6 °C)] 99 7 °F (37 6 °C)  HR:  [59-64] 63  Resp:  [18] 18  BP: (140-151)/(53-58) 147/55  SpO2:  [89 %-98 %] 94 %  Body mass index is 30 79 kg/m²  Input and Output Summary (last 24 hours): Intake/Output Summary (Last 24 hours) at 12/6/2022 1530  Last data filed at 12/6/2022 0557  Gross per 24 hour   Intake 500 ml   Output 675 ml   Net -175 ml       Physical Exam:   Physical Exam  Constitutional:       Appearance: Normal appearance  HENT:      Head: Normocephalic and atraumatic  Eyes:      Extraocular Movements: Extraocular movements intact  Pupils: Pupils are equal, round, and reactive to light  Cardiovascular:      Rate and Rhythm: Normal rate and regular rhythm  Heart sounds: No murmur heard  No gallop  Pulmonary:      Effort: Pulmonary effort is normal       Breath sounds: Normal breath sounds  Comments: Decreased breath sounds at the bases  Abdominal:      General: Bowel sounds are normal       Palpations: Abdomen is soft  Tenderness: There is no abdominal tenderness  Musculoskeletal:         General: No swelling or deformity  Normal range of motion  Cervical back: Normal range of motion and neck supple  Right lower leg: Edema present  Left lower leg: Edema present  Skin:     General: Skin is warm and dry  Neurological:      General: No focal deficit present  Mental Status: He is alert            Additional Data:     Labs:  Results from last 7 days   Lab Units 12/06/22  0551   WBC Thousand/uL 11 63*   HEMOGLOBIN g/dL 8 0*   HEMATOCRIT % 25 9*   PLATELETS Thousands/uL 267     Results from last 7 days   Lab Units 12/06/22  0551   SODIUM mmol/L 139   POTASSIUM mmol/L 4 8   CHLORIDE mmol/L 103   CO2 mmol/L 30   BUN mg/dL 113*   CREATININE mg/dL 3 22*   ANION GAP mmol/L 6   CALCIUM mg/dL 8 6   GLUCOSE RANDOM mg/dL 86         Results from last 7 days   Lab Units 12/06/22  1124 12/06/22  0740 12/06/22  0149 12/05/22 2052 12/05/22  1610 12/05/22  1055 12/05/22  0743 12/05/22  0209 12/04/22  2047 12/04/22  1537 12/04/22  1111 12/04/22  0729   POC GLUCOSE mg/dl 153* 86 133 211* 137 113 99 102 158* 111 157* 114         Results from last 7 days   Lab Units 12/02/22  0539   PROCALCITONIN ng/ml 0 34*       Lines/Drains:  Invasive Devices     Peripheral Intravenous Line  Duration           Peripheral IV 12/06/22 Right Forearm <1 day          Drain  Duration           Urethral Catheter 4 days              Urinary Catheter:  Goal for removal: Voiding trial when ambulation improves               Imaging: Reviewed radiology reports from this admission including: chest xray and abdominal/pelvic CT    Recent Cultures (last 7 days):   Results from last 7 days   Lab Units 12/01/22  1043 11/30/22  1145   BLOOD CULTURE  No Growth After 5 Days  No Growth After 5 Days    --    URINE CULTURE   --  20,000-29,000 cfu/ml Enterococcus  faecium VRE*  <10,000 cfu/ml Candida albicans*       Last 24 Hours Medication List:   Current Facility-Administered Medications   Medication Dose Route Frequency Provider Last Rate   • acetaminophen  650 mg Oral Q6H PRN KEISHA Newberry     • amLODIPine  10 mg Oral Daily KEISHA Newberry     • atorvastatin  40 mg Oral Daily KEISHA Newberry     • benzonatate  200 mg Oral TID KEISHA Oviedo     • bisacodyl  10 mg Rectal Daily PRN Rhonda Rodas MD     • carvedilol  12 5 mg Oral BID With Meals KEISHA Newberry     • cholecalciferol  2,000 Units Oral Daily KEISHA Newberry     • vitamin B-12  500 mcg Oral Daily KEISHA Newberry     • dextromethorphan-guaiFENesin  10 mL Oral Q6H PRN KEISHA Newberry     • gabapentin  100 mg Oral BID KEISHA Newberry     • heparin (porcine)  5,000 Units Subcutaneous Q8H [facility] KEISHA Newberry     • hydrALAZINE  100 mg Oral TID Miley Tenorio MD     • hydrocodone-chlorpheniramine polistirex  5 mL Oral HS Rhonda Rodas MD     • insulin glargine 10 Units Subcutaneous HS Mirian Sheikh MD     • insulin lispro  1-5 Units Subcutaneous TID AC KEISHA Newberry     • insulin lispro  1-5 Units Subcutaneous 0200 KEISHA Newberry     • insulin lispro  1-5 Units Subcutaneous HS KEISHA Newberry     • ipratropium  0 5 mg Nebulization TID KEISHA Newberry     • isosorbide mononitrate  60 mg Oral Daily KEISHA Newberry     • labetalol  10 mg Intravenous Q6H PRN John Benitez PA-C     • levalbuterol  0 63 mg Nebulization Q4H PRN KEISHA Newberry     • levalbuterol  1 25 mg Nebulization TID KEISHA Newberry      And   • sodium chloride  3 mL Nebulization TID KEISHA Newberry     • nystatin   Topical BID KEISHA Newberry     • ondansetron  4 mg Intravenous Q6H PRN KEISHA Newberry     • pantoprazole  40 mg Oral Early Morning KEISHA Newberry     • polyethylene glycol  17 g Oral BID Tasha Brown DO     • repaglinide  0 5 mg Oral TID AC Mirian Sheikh MD     • senna  2 tablet Oral HS KEISHA Newberry     • tamsulosin  0 4 mg Oral Daily With Praneeth Jensen MD          Today, Patient Was Seen By: Mirian Sheikh MD    **Please Note: This note may have been constructed using a voice recognition system  **

## 2022-12-06 NOTE — ASSESSMENT & PLAN NOTE
Patient continues to have intermittent fevers with persistent elevation of white count despite receiving 14 days of antibiotics  Joey Rios was treated with Rocephin and Flagyl during prior hospitalization and also received cefepime for 7 days during this hospitalization  ID Input appreciated    Monitor off antibiotics  Current SIRS likely secondary to aspiration pneumonitis

## 2022-12-06 NOTE — PROGRESS NOTES
[address] [facility] NOTE   Yonatan Marks 80 y o  male MRN: [id number]  Unit/Bed#: 207 Tammy Royal Encounter: [id number]  Reason for Consult: SHAHID    ASSESSMENT and PLAN:  80year old with history of CKD admitted with shortness of breath  We are consulted for evaluation management of SHAHID  # Acute Kidney Injury   - Baseline creatinine 1 6-2 0  - Admission creatinine 2 8 with peak of 3 9 and currently trending down to 3 2  - Etiology was thought to be due to over-diuresis and a component of urinary retention  - Diuretics are currently on hold  - Ayala catheter is in place  - BUN is elevated but no signs of uremia  - Change nutritional supplements from Glucerna to Nepro  - No indication for dialysis; would consider conservative kidney management given advanced age    # Chronic kidney disease stage 3  - Etiology, most likely cardiorenal syndrome and age-related nephron loss    # Hypertension  - Current regimen includes hydralazine 100 mg t i d , amlodipine 10 mg daily, Coreg 12 5 mg b i d  Imdur 60 mg daily  - Patient is now off doxazosin and switch to Flomax per Urology    # Volume  - CT scan admission was consistent with pulmonary edema with moderate size pleural effusion left more than right status post IV and oral diuretics  - Diuretics are currently on hold due to SHAHID    # Anemia   - Continue to monitor hemoglobin  - Transfuse to keep hemoglobin more than 7    # Electrolytes/Acid base status   - Electrolytes and acid base status within normal limits    # Left upper quadrant mass  - Renal ultrasound suggestive of solid hypoechoic mass in left upper quadrant medial to spleen  - CT abdomen without IV contrast ompleted yesterday, results pending  - Okay to perform MRI with gadolinium if needed for further evaluation    DISPOSITION:  Renal function is currently improving  Continue to hold diuretics  Maintain Ayala catheter as per Urology  Switch nutritional supplements to Nepro due to elevated BUN    Trend [facility] daily  SUBJECTIVE / 24H INTERVAL HISTORY:  Urine output recorded as 1100 cc  Patient is awake and alert and responding appropriately  Denies dyspnea  Review of Systems   Constitutional: Negative for chills and fever  HENT: Negative for ear pain and sore throat  Eyes: Negative for pain and visual disturbance  Respiratory: Negative for cough and shortness of breath  Cardiovascular: Negative for chest pain and palpitations  Gastrointestinal: Negative for abdominal pain and vomiting  Genitourinary: Negative for dysuria and hematuria  Musculoskeletal: Negative for arthralgias and back pain  Skin: Negative for color change and rash  Neurological: Negative for seizures and syncope  All other systems reviewed and are negative  OBJECTIVE:  Current Weight: Weight - Scale: 83 9 kg (185 lb)  Vitals:    12/05/22 2306 12/06/22 0007 12/06/22 0557 12/06/22 0744   BP: 142/54 140/54  140/53   BP Location:       Pulse: 59 59  64   Resp:       Temp: 98 5 °F (36 9 °C)   98 3 °F (36 8 °C)   TempSrc:       SpO2: 95% 94%  90%   Weight:   83 9 kg (185 lb)    Height:           Intake/Output Summary (Last 24 hours) at 12/6/2022 0801  Last data filed at 12/6/2022 0557  Gross per 24 hour   Intake 500 ml   Output 1100 ml   Net -600 ml     Physical Exam  Vitals and nursing note reviewed  Constitutional:       General: He is not in acute distress  Appearance: He is well-developed  HENT:      Head: Normocephalic and atraumatic  Eyes:      Conjunctiva/sclera: Conjunctivae normal    Cardiovascular:      Rate and Rhythm: Normal rate and regular rhythm  Pulses: Normal pulses  Heart sounds: Normal heart sounds  No murmur heard  Pulmonary:      Effort: Pulmonary effort is normal  No respiratory distress  Breath sounds: Normal breath sounds  Abdominal:      General: Abdomen is flat  Bowel sounds are normal       Palpations: Abdomen is soft  Tenderness: There is no abdominal tenderness  Musculoskeletal:         General: No swelling  Cervical back: Neck supple  Right lower leg: No edema  Left lower leg: No edema  Skin:     General: Skin is warm and dry  Capillary Refill: Capillary refill takes less than 2 seconds  Neurological:      Mental Status: He is alert     Psychiatric:         Mood and Affect: Mood normal        Medications:    Current Facility-Administered Medications:   •  acetaminophen (TYLENOL) tablet 650 mg, 650 mg, Oral, Q6H PRN, Cuiyin Nadiarik, CRNP, 650 mg at 12/05/22 2133  •  amLODIPine (NORVASC) tablet 10 mg, 10 mg, Oral, Daily, Cuiyin Nadiarik, CRNP, 10 mg at 12/05/22 0820  •  atorvastatin (LIPITOR) tablet 40 mg, 40 mg, Oral, Daily, Cuiyin Nadiarik, CRNP, 40 mg at 12/05/22 7928  •  benzonatate (TESSALON PERLES) capsule 200 mg, 200 mg, Oral, TID, Cleotha Prost, CRNP, 200 mg at 12/05/22 2129  •  bisacodyl (DULCOLAX) rectal suppository 10 mg, 10 mg, Rectal, Daily PRN, Janine Fry MD, 10 mg at 12/05/22 1946  •  carvedilol (COREG) tablet 12 5 mg, 12 5 mg, Oral, BID With Meals, Cuidoc Zunigarijose, CRNP, 12 5 mg at 12/05/22 1947  •  cholecalciferol (VITAMIN D3) tablet 2,000 Units, 2,000 Units, Oral, Daily, Cuidoc Zunigarijose, CRNP, 2,000 Units at 12/05/22 0818  •  cyanocobalamin (VITAMIN B-12) tablet 500 mcg, 500 mcg, Oral, Daily, Cuidoc Zunigarijose, CRNP, 500 mcg at 12/05/22 0813  •  dextromethorphan-guaiFENesin (ROBITUSSIN DM) oral syrup 10 mL, 10 mL, Oral, Q6H PRN, Cuidoc Zunigarik, CRNP, 10 mL at 12/03/22 1711  •  gabapentin (NEURONTIN) capsule 100 mg, 100 mg, Oral, BID, Cuiyin Nadiarik, CRNP, 100 mg at 12/05/22 1947  •  heparin (porcine) subcutaneous injection 5,000 Units, 5,000 Units, Subcutaneous, Q8H [facility], 5,000 Units at 12/06/22 0552 **AND** [CANCELED] Platelet count, , , Once, KEISHA Newberry  •  hydrALAZINE (APRESOLINE) tablet 100 mg, 100 mg, Oral, TID, Tiny Teixeira MD, 100 mg at 12/06/22 0008  •  hydrocodone-chlorpheniramine polistirex (TUSSIONEX) ER suspension 5 mL, 5 mL, Oral, HS, Steve Yan MD, 5 mL at 12/05/22 2129  •  insulin glargine (LANTUS) subcutaneous injection 10 Units 0 1 mL, 10 Units, Subcutaneous, HS, Steve Yan MD, 10 Units at 12/05/22 2129  •  insulin lispro (HumaLOG) 100 units/mL subcutaneous injection 1-5 Units, 1-5 Units, Subcutaneous, TID AC, 1 Units at 12/04/22 1150 **AND** Fingerstick Glucose (POCT), , , TID AC, KEISHA Newberry  •  insulin lispro (HumaLOG) 100 units/mL subcutaneous injection 1-5 Units, 1-5 Units, Subcutaneous, 0200, KEISHA Newberry, 1 Units at 12/03/22 0257  •  insulin lispro (HumaLOG) 100 units/mL subcutaneous injection 1-5 Units, 1-5 Units, Subcutaneous, HS, KEISHA Newberry, 1 Units at 12/05/22 2129  •  ipratropium (ATROVENT) 0 02 % inhalation solution 0 5 mg, 0 5 mg, Nebulization, TID, KEISHA Newberry, 0 5 mg at 12/05/22 1951  •  isosorbide mononitrate (IMDUR) 24 hr tablet 60 mg, 60 mg, Oral, Daily, KEISHA Newberry, 60 mg at 12/05/22 0849  •  labetalol (NORMODYNE) injection 10 mg, 10 mg, Intravenous, Q6H PRN, Agustina JOSÉ ANTONIO Schneider, 10 mg at 11/24/22 0241  •  levalbuterol (XOPENEX) inhalation solution 0 63 mg, 0 63 mg, Nebulization, Q4H PRN, KEISHA Newberry, 0 63 mg at 12/01/22 0807  •  levalbuterol (XOPENEX) inhalation solution 1 25 mg, 1 25 mg, Nebulization, TID, 1 25 mg at 12/05/22 1951 **AND** sodium chloride 0 9 % inhalation solution 3 mL, 3 mL, Nebulization, TID, KEISHA Newberry, 3 mL at 12/05/22 1951  •  nystatin (MYCOSTATIN) powder, , Topical, BID, KEISHA Newberry, Given at 12/05/22 1944  •  ondansetron (ZOFRAN) injection 4 mg, 4 mg, Intravenous, Q6H PRN, KEISHA Newberry, 4 mg at 12/05/22 1120  •  pantoprazole (PROTONIX) EC tablet 40 mg, 40 mg, Oral, Early Morning, KEISHA Newberry, 40 mg at 12/06/22 8130  •  polyethylene glycol (MIRALAX) packet 17 g, 17 g, Oral, BID, Tasha Brown DO, 17 g at 12/05/22 2129  •  repaglinide (PRANDIN) tablet 0 5 mg, 0 5 mg, Oral, TID Andressa RAY MD Farrah, 0 5 mg at 12/06/22 0600  •  senna (SENOKOT) tablet 17 2 mg, 2 tablet, Oral, HS, KEISHA Newberry, 17 2 mg at 12/05/22 2129  •  tamsulosin (FLOMAX) capsule 0 4 mg, 0 4 mg, Oral, Daily With Dennis Childress MD, 0 4 mg at 12/05/22 1947    Laboratory Results:  Results from last 7 days   Lab Units 12/06/22  0551 12/05/22  0530 12/04/22  0552 12/03/22  0533 12/02/22  0539 12/01/22  0521 11/30/22  0634   WBC Thousand/uL 11 63* 13 91*  --   --  13 15*  --  14 73*   HEMOGLOBIN g/dL 8 0* 7 8*  --   --  8 0*  --  8 2*   HEMATOCRIT % 25 9* 25 2*  --   --  25 3*  --  26 2*   PLATELETS Thousands/uL 267 292  --   --  233  --  219   POTASSIUM mmol/L 4 8 4 6 4 8 4 9 4 7 4 8 4 8   CHLORIDE mmol/L 103 105 106 103 103 101 101   CO2 mmol/L 30 32 31 32 31 31 32   BUN mg/dL 113* 113* 118* 119* 117* 123* 119*   CREATININE mg/dL 3 22* 3 28* 3 55* 3 78* 3 87* 3 90* 3 77*   CALCIUM mg/dL 8 6 8 7 8 6 8 6 8 3 8 3 8 4       Portions of the record may have been created with voice recognition software  Occasional wrong word or "sound a like" substitutions may have occurred due to the inherent limitations of voice recognition software  Read the chart carefully and recognize, using context, where substitutions have occurred  If you have any questions, please contact the dictating provider

## 2022-12-06 NOTE — CASE MANAGEMENT
Case Management Discharge Planning Note    Patient name Kourtney Schmidt  Location [address] [address] [address] MRN [id number]  : 10/19/1929 Date 2022       Current Admission Date: 2022  Current Admission Diagnosis:Acute respiratory failure with hypoxia [facility])   Patient Active Problem List    Diagnosis Date Noted   • Constipation 2022   • Left upper quadrant abdominal mass 2022   • Chronic kidney disease    • Acute respiratory failure with hypoxia ([address] ) 2022   • Acute on chronic diastolic congestive heart failure ([address] ) 2022   • BPH (benign prostatic hyperplasia) 2022   • Acute diastolic (congestive) heart failure ([address] ) 2022   • PVCs (premature ventricular contractions) 2022   • Hyperlipidemia 2022   • Primary hypertension 11/15/2022   • Aspiration pneumonia ([facility] ) 11/15/2022   • CHF (congestive heart failure) ([facility] ) 11/15/2022   • Acute kidney injury superimposed on CKD ([facility] ) 11/15/2022   • Type 2 diabetes mellitus ([facility] ) 11/15/2022   • Esophageal dysphagia 11/15/2022   • Elevated troponin 11/15/2022      LOS (days): 14  Geometric Mean LOS (GMLOS) (days): 5 20  Days to GMLOS:-8 5     OBJECTIVE:  Risk of Unplanned Readmission Score: 24 17     Current admission status: Inpatient   Preferred Pharmacy:   [facility] [address]  [place] [id number]  Phone: [phone] Fax: [phone]    Primary Care Provider: Cash Brady PA-C    Primary Insurance: MEDICARE  Secondary Insurance: [employer] [place]    DISCHARGE DETAILS:    Discharge planning discussed with[de-identified] DaughterLaxmi of Choice: Yes  Comments - Freedom of Choice: SW following to assist with DCP  SW placed call to daughter to review plans and facility choice  Daughter said she was going ot [address] yesterday afternoon    Per daughter she did go to [facility] and while there it was recommended that [facility] may be an option for pt  Per daughter she talked with a representative about therapy options after peg tube placement to help pt regain function  Per daughter family is still trying to decide on peg tube insertion  Daughter is waiting for call back from [facility]  Daughter requested referral be made once again to [facility]  SW offered to have meeting with care team to assist with decision  Daughter requesting to talk with physician about test results  Dr Jenn Warner notified of request   Darryle Eriksson will continue to follow to assist with planning  CM contacted family/caregiver?: Yes  Were Treatment Team discharge recommendations reviewed with patient/caregiver?: Yes  Did patient/caregiver verbalize understanding of patient care needs?: N/A- going to facility  Were patient/caregiver advised of the risks associated with not following Treatment Team discharge recommendations?: Yes    Contacts  Patient Contacts: Son keyesr)  Relationship to Patient[de-identified] Family  Contact Method: Phone  Phone Number: [phone]  Reason/Outcome: Continuity of Care, Discharge Planning    Other Referral/Resources/Interventions Provided:  Interventions: Acute Rehab  Referral Comments: Referral made to [facility] as requested by daughter  Skilled rehab bed at [facility]      Treatment Team Recommendation: Short Term Rehab  Discharge Destination Plan[de-identified] Short Term Rehab  Transport at Discharge : [facility] Ambulance

## 2022-12-06 NOTE — ASSESSMENT & PLAN NOTE
Patient with constipation  Continue MiraLax, senna q h s  · MiraLAX for symptoms to twice daily dosing    · Had huge bowel movement yesterday

## 2022-12-06 NOTE — ASSESSMENT & PLAN NOTE
Per speech therapy, patient at high risk for aspiration despite the level of diet  Currently on pureed diet with thin liquids with medications crushed with puree  · Continue pantoprazole daily  · Obstructive series showed barium throughout the colon with modest amount of stool along the rectosigmoid region  · Dr Jenaro Garza with patient's daughter/patient   They will speak with the rest of the family members regarding PEG tube placement   Discussed with her that patient is at high risk for recurrent aspiration pneumonia/hospitalizations

## 2022-12-06 NOTE — ASSESSMENT & PLAN NOTE
Lab Results   Component Value Date    EGFR 15 12/06/2022    EGFR 15 12/05/2022    EGFR 13 12/04/2022    CREATININE 3 22 (H) 12/06/2022    CREATININE 3 28 (H) 12/05/2022    CREATININE 3 55 (H) 12/04/2022     History of CKD 4, baseline creatinine appears to be around 1 5-1 9 in past 2 years in care everywhere  Creatinine during previous hospitalization ranged in 2 4-2 8  Admission creatinine was 2 8 which peaked at 3 9  · Possible CKD progression  · Urinalysis was bland  · Received IV Lasix 40 mg in ED  Patient also initially received IV diuretics and was later transitioned to [facility] which have been on hold  · Patient received IV fluids from 11/30 couple of days  · Renal ultrasound showed no hydronephrosis moderate left renal atrophy  · Nephrology input appreciated  · No indication for dialysis at the current time  Continue conservative kidney management plan given his advanced age per nephrology    · Nutritional supplements were changed from Glucerna to Nepro

## 2022-12-06 NOTE — ASSESSMENT & PLAN NOTE
Repeat procalcitonin level was 0 54 initially, continues to remain mildly elevated  · Patient received cefepime and Flagyl in the ED   Completed 7 day course of cefepime 1 gram IV daily  · Patient febrile again to 101 1 on 12/1 and 100 8 today, likely aspiration pneumonitis    procalcitonin mildly elevated, blood cultures negative, COVID/flu/RSV negative, urine culture with small colony of Enterococcus not requiring treatment per ID  · Due to recurrent fevers, ID was consulted who recommended monitoring off antibiotics  · Daughter/patient aware that he is at high risk for aspiration events due to his dysphagia although did speak with them that PEG tube placement does not guarantee zero risk of aspiration  · Sputum culture with mixed respiratory hardik  · Urine for Legionella and pneumococcal antigens were negative  · Blood cultures negative  · Repeat chest x-ray 12/1 with waxing and waning opacities

## 2022-12-06 NOTE — ASSESSMENT & PLAN NOTE
Lab Results   Component Value Date    HGBA1C 5 7 (H) 11/16/2022       Recent Labs     12/05/22  0209 12/05/22  0743 12/05/22  1055 12/05/22  1610   POCGLU 102 99 113 137     Patient was discharged on Prandin t i d  With meals  · Continue Prandin, Humalog sliding scale with Accu-Cheks q a c  And HS  · Currently on diabetic pureed Diet with thin liquids  Continue Lantus 10 units q h s

## 2022-12-06 NOTE — ASSESSMENT & PLAN NOTE
Troponin [id number]  Elevated troponin in recent admission as well  Reports chest pressure when in respiratory distress  Denies history of CAD  · EKG no ischemic changes, read as AFib, rate 103, RBBB per prior provider  · No history of AFib  Prior EKG shows sinus rhythm with PACs /PVCs  repeat EKG showed sinus rhythm  · Recent 2D echo showed EF low normal, normal wall motion, grade 2 diastolic dysfunction, moderately dilated atriums  · Most likely non MI troponin elevation due to # 1 and CHF

## 2022-12-06 NOTE — ASSESSMENT & PLAN NOTE
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  · Discussed with radiology previously and radiologist recommended getting MRI for further evaluation  · Per nephrology okay to obtain MRI with gadolinium using class 2 gadolinium agents  · Dr Miki Pickett with radiologist again  who recommended CT scan with p o  Contrast for further evaluation  Discussed with radiologist patient's issues with dysphagia and aspiration    NG-tube was placed for contrast administration and patient had a CAT scan of the abdomen pelvis  · Abdomen pelvis showed left upper quadrant mass which may be arising from the gastric fundus and tail of the pancreas without any evidence of invasion of adjacent structures or metastatic disease in the abdomen-CAT scan with contrast using pancreatic mass protocol or MRI was suggested

## 2022-12-06 NOTE — ASSESSMENT & PLAN NOTE
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  · Discussed with radiology previously and radiologist recommended getting MRI for further evaluation  · Per nephrology okay to obtain MRI with gadolinium using class 2 gadolinium agents  · Spoke with radiologist again today who recommended CT scan with p o  Contrast for further evaluation  Discussed with radiologist patient's issues with dysphagia and aspiration    Per discussion NG tube was placed so patient can get contrast which will be discontinued after getting CT scan  · Spoke with patient's daughter again today and she is interested in pursuing further workup for this abdominal mass

## 2022-12-06 NOTE — PLAN OF CARE
Problem: RESPIRATORY - ADULT  Goal: Achieves optimal ventilation and oxygenation  Description: INTERVENTIONS:  - Assess for changes in respiratory status  - Assess for changes in mentation and behavior  - Position to facilitate oxygenation and minimize respiratory effort  - Oxygen administered by appropriate delivery if ordered  - Initiate smoking cessation education as indicated  - Encourage broncho-pulmonary hygiene including cough, deep breathe, Incentive Spirometry  - Assess the need for suctioning and aspirate as needed  - Assess and instruct to report SOB or any respiratory difficulty  - Respiratory Therapy support as indicated  Outcome: Progressing     Problem: MOBILITY - ADULT  Goal: Maintain or return to baseline ADL function  Description: INTERVENTIONS:  -  Assess patient's ability to carry out ADLs; assess patient's baseline for ADL function and identify physical deficits which impact ability to perform ADLs (bathing, care of mouth/teeth, toileting, grooming, dressing, etc )  - Assess/evaluate cause of self-care deficits   - Assess range of motion  - Assess patient's mobility; develop plan if impaired  - Assess patient's need for assistive devices and provide as appropriate  - Encourage maximum independence but intervene and supervise when necessary  - Involve family in performance of ADLs  - Assess for home care needs following discharge   - Consider OT consult to assist with ADL evaluation and planning for discharge  - Provide patient education as appropriate  Outcome: Progressing  Goal: Maintains/Returns to pre admission functional level  Description: INTERVENTIONS:  - Perform BMAT or MOVE assessment daily    - Set and communicate daily mobility goal to care team and patient/family/caregiver     - Collaborate with rehabilitation services on mobility goals if consulted  - Out of bed for toileting  - Record patient progress and toleration of activity level   Outcome: Progressing     Problem: Potential for Falls  Goal: Patient will remain free of falls  Description: INTERVENTIONS:  -  Assess patient's ability to carry out ADLs; assess patient's baseline for ADL function and identify physical deficits which impact ability to perform ADLs (bathing, care of mouth/teeth, toileting, grooming, dressing, etc )  - Assess/evaluate cause of self-care deficits   - Assess range of motion  - Assess patient's mobility; develop plan if impaired  - Assess patient's need for assistive devices and provide as appropriate  - Encourage maximum independence but intervene and supervise when necessary  - Involve family in performance of ADLs  - Assess for home care needs following discharge   - Consider OT consult to assist with ADL evaluation and planning for discharge  - Provide patient education as appropriate  Outcome: Progressing     Problem: Nutrition/Hydration-ADULT  Goal: Nutrient/Hydration intake appropriate for improving, restoring or maintaining nutritional needs  Description: Monitor and assess patient's nutrition/hydration status for malnutrition  Collaborate with interdisciplinary team and initiate plan and interventions as ordered  Monitor patient's weight and dietary intake as ordered or per policy  Utilize nutrition screening tool and intervene as necessary  Determine patient's food preferences and provide high-protein, high-caloric foods as appropriate       INTERVENTIONS:  - Monitor oral intake, urinary output, labs, and treatment plans  - Assess nutrition and hydration status and recommend course of action  - Evaluate amount of meals eaten  - Assist patient with eating if necessary   - Allow adequate time for meals  - Recommend/ encourage appropriate diets, oral nutritional supplements, and vitamin/mineral supplements  - Order, calculate, and assess calorie counts as needed  - Assess need for intravenous fluids  - Provide nutrition/hydration education as appropriate  - Include patient/family/caregiver in decisions related to nutrition  Outcome: Progressing

## 2022-12-06 NOTE — ASSESSMENT & PLAN NOTE
Patient presented with acute respiratory distress shortly after eating a few spoonful of puree diet in STR, patient reported chest pressure and SOB  Patient was satting 85-95% on oxygen 3 L per STR transfer paper  Patient was belching after eating/drinking limited amount of diet/water per staff  · Patient was discharged on the day of admission after being hospitalized for aspiration pneumonia, acute on chronic diastolic CHF, dysphagia  Received IV Lasix and 7 days of IV Rocephin and Flagyl  Seen by GI, underwent EGD, found to have oropharyngeal dysphagia with esophageal dysmotility, no obvious esophageal stricture  Underwent Barium swallow study which showed moderate to severe esophageal dysmotility with significant residual contrast remaining in the esophagus at the end of the study  Patient was recommended regular diet by speech and discharged on regular diet per STR  · Likely secondary to multifocal pneumonia and CHF  · Patient required BiPAP on ED arrival   ABG post BiPAP essentially unremarkable  Then titrated down to 6 liters oxygen upon admission  Currently on 2 liter oxygen  · Chest x-ray - Pulmonary edema, worse when comparing to 11/19/2022  Underlying infection could not be excluded  · ProBNP 15,094  · Patient received Lasix 40 IV in ED  · Received cefepime and Flagyl in ED  Completed 7 days of IV cefepime  · Received IV Solu-Medrol in ED  No wheezing on auscultation    · CT chest-pulmonary edema with moderate size bilateral pleural effusions with significant bibasilar atelectasis and concomitant multifocal pneumonia   · Obstructive series showed persistent pulmonary edema with multifocal pneumonia

## 2022-12-06 NOTE — ASSESSMENT & PLAN NOTE
Patient: Cliff Stewart Date: 2017   : 1939 Attending: Jerry Hardwick MD   78 year old male        Chiefcomplaint:TABITHA    Pt seen at the request of Dr. Reeves    HPI from initial consult:    79 yo M with h/o carotid artery stenosis, malignant neoplasm of prostate s/p prostatectomy, peripheral vascular disease s/p bilateral fem-pop bypass, tobacco use disorder, 4.1cm infrarenal AAA, essential hypertension, and complex left renal cyst who underwent L robotic partial nephrectomy (, Azucena). Procedure was complex: had hypotension probably related to blood loss - 1 liter - related to complex partial nephrectomy - hilar tumor with atherosclerotic blood vessels. Given IVF, blood, went for renal angio and had left renal branch embolization. Developed TABITHA with metabolic/lactic acidosis and decreased urine output. NaHCO3 drip running now. K 5.5 HCO3 11 lactate 10.4      Subjective/24 hour events:  On pressors  On vent, intubated   Not making much urine   WBC elevated   BUN high still   Gall bladder drain placed  Got HD yesterday     Past Medical History:   Diagnosis Date   • Carotid artery stenosis    • Chronic kidney disease, unspecified    • Contact dermatitis and other eczema, due to unspecified cause    • Diverticulosis of colon (without mention of hemorrhage) 02   • Elevated prostate specific antigen (PSA) 01    Dr Johnston   • Full dentures     Full upper and lower dentures   • Impaired fasting glucose    • Infectious mononucleosis 21yrs old    Mononucleosis   • Left kidney mass 2017   • Malignant neoplasm of prostate (CMS/HCC)     s/p radical prostatectomy   • Mixed hyperlipidemia    • Peripheral vascular disease, unspecified (CMS/HCC)     s/p aorto-fem bypass   • Skin cancer 2017    right ear pinnae   • Tobacco use disorder 2009   • Unspecified essential hypertension    • Unspecified hemorrhoids without mention of complication 02    internal       Past  Per speech therapy, patient at high risk for aspiration despite the level of diet  Currently on pureed diet with thin liquids with medications crushed with puree  · Continue pantoprazole daily  · Obstructive series showed barium throughout the colon with modest amount of stool along the rectosigmoid region  · Discussed with patient's daughter/patient again today  They will speak with the rest of the family members regarding PEG tube placement   Discussed with her that patient is at high risk for recurrent aspiration pneumonia/hospitalizations Surgical History:   Procedure Laterality Date   • ABD PARACENTESIS DIAG THER WO IMAG GUIDANCE  6/14/2017        • ALFRED, SINGLE LEVEL NO STRESS  5/7/01    Ankle brachial indices sugg.of minimal to moderate bilat.lower ext.arteriovascular diseae.Due to the misinterpretation of the patient's Rx the pt.may return to our dept.for toe pressure measurements if desired at no cg. Pressure gradient which may reflect   • ANGIO EXTREMITY UNILAT  12/7/2010    [facility] Severe PVD Both aortofemoral grafts open / superficial fem arteries occluded from ostium through adductor anal / both popliteal arteries mild disease   • BYPASS GRAFT AORTOFEMORAL  1988   • COLONOSCOPY REMOVE LESION HOT BX OR CAUTERY  11/11/02    diverticulosis, internal hemorrhoids; Colon due 11/2012   • ECHO M-MODE/2D/DOPPLER (ROUTINE)  10/28/2014, 04/12/2017 2014 - LVEF - 72%; 2017 - LVEF - 67% MIld valvular disease   • ECHOCARDIOGRAM  12/05/2007    normal   • EYE SURGERY Bilateral ~2011    Bilateral cataract extractions with IOL   • NM PASTORA PER RST/STRS PHARMACOLO  12/10/2007    persantine stress normal left ej fract 67%   • REMV PROSTATE,RETROPUB,RADICAL  9/29/01   • TYMPANOSTOMY  11/11/02    R myringotomy   • US CAROTID COMPLETE BILAT  12/11/2007    normal   • VEIN BYPASS GRAFT,FEM-POP  2/1/2011    Boyd Wallis; [facility]   • VEIN BYPASS GRAFT,FEM-POP  3/21/2011    Dr. Boyd Wallis, [facility]        ALLERGIES:  No Known Allergies    Medications/Infusions:  Prescriptions Prior to Admission   Medication Sig Dispense Refill   • chlorhexidine (HIBICLENS) 4 % topical liquid Use as instructed 120 mL 0   • fluocinonide (LIDEX) 0.05 % ointment Apply to affected area on arms and legs twice a day. 60 g 4   • chlorhexidine (HIBICLENS) 4 % topical liquid Use as instructed 120 mL 0   • Ferrous Sulfate (IRON) 325 (65 FE) MG Tab Take by mouth daily.     • lisinopril (PRINIVIL,ZESTRIL) 40 MG tablet Take 1 tablet by mouth daily. 90 tablet 3   • cilostazol (PLETAL) 100 MG tablet Take 1  tablet by mouth 2 times daily. 180 tablet 3   • simvastatin (ZOCOR) 80 MG tablet Take 1 tablet by mouth daily. (Patient taking differently: Take 80 mg by mouth nightly. ) 90 tablet 3   • oxyCODONE/APAP (PERCOCET) 5-325 MG per tablet Take 1 tablet by mouth every 4 hours as needed for Pain. 20 tablet 0   • [DISCONTINUED] clopidogrel (PLAVIX) 75 MG tablet Take 1 tablet by mouth daily. (Patient not taking: Reported on 5/31/2017) 90 tablet 3       Scheduled:   • pantoprazole  40 mg Intravenous Nightly   • ursodiol  500 mg Per NG tube TID   • linezolid  600 mg Intravenous 2 times per day   • piperacillin-tazobactam (ZOSYN) extended interval IVPB  3.375 g Intravenous 2 times per day   • heparin (porcine)  5,000 Units Subcutaneous 3 times per day   • metoCLOPramide  5 mg Intravenous 4 times per day   • OLANZapine  5 mg Sublingual Nightly   • nicotine  1 patch Transdermal Daily    And   • nicotine  1 patch Transdermal Daily   • albuterol-ipratropium 2.5 mg/0.5 mg  3 mL Nebulization Q4H Resp   • chlorhexidine gluconate  15 mL Swish & Spit 2 times per day   • sodium chloride (PF)  2 mL Injection 2 times per day   • insulin lispro   Subcutaneous 4 times per day       Continuous Infusions:   • dexmedetomidine (PRECEDEX) 400 mcg in sodium chloride 0.9% 100 mL premix infusion Stopped (06/14/17 0331)   • norepinephrine (LEVOPHED) 4 mg in sodium chloride 0.9% 250 mL infusion 6 mcg/min (06/14/17 0646)   • sodium chloride 0.9% infusion 10 mL/hr at 06/14/17 0554   • dextrose 5 % infusion Stopped (06/03/17 1223)       Social History     Social History   • Marital status:      Spouse name: Donita   • Number of children: 4   • Years of education: N/A     Occupational History   • , retired    •  [employer] #[id number]     Social History Main Topics   • Smoking status: Current Every Day Smoker     Packs/day: 0.75     Years: 59.00   • Smokeless tobacco: Never Used      Comment: Tried chantix in 2009- had nightmares   •  Alcohol use No   • Drug use: No   • Sexual activity: Not Currently     Other Topics Concern   • Seat Belt Yes     Social History Narrative    no pneumovax to date    last td 7 yr ago       Family History   Problem Relation Age of Onset   • Prostate Cancer Brother    • Cancer Sister    • Heart Father      heart failure   • Hypertension Father    • Alcohol/Drug Father      heavy drinker   • Cancer Mother      breast   • Cancer Brother      Prostate CA at age 63- had chemo   • Diabetes Other      Denies   • Stroke Other      Denies       ROS: Unable to obtain due to patients clinical condition, on vent.      Vital Last Value 24 Hour Range   Temperature 99.3 °F (37.4 °C) Temp  Min: 98.3 °F (36.8 °C)  Max: 99.7 °F (37.6 °C)   Pulse 88 Pulse  Min: 84  Max: 118   Respiratory 27 Resp  Min: 15  Max: 48   Blood Pressure (!) 79/55 BP  Min: 76/48  Max: 111/72   Art /53 Arterial Line BP  Min: 88/46  Max: 132/67   Pulse Oximetry 100 % SpO2  Min: 94 %  Max: 100 %     Vital Today Admitted   Weight 70.4 kg Weight: 66.6 kg   Height N/A Height: 5' 9\" (175.3 cm)   BMI N/A BMI (Calculated): 21.73     Weight over the past 48 Hours:  Patient Vitals for the past 48 hrs:   Weight   06/13/17 0600 72.5 kg   06/13/17 0730 72.5 kg   06/13/17 1155 69.7 kg   06/14/17 0500 70.4 kg        Hemodynamics:      Last Value 24 Hour Range   CVP 7 mmHg No Data Recorded   PAS/PAD   No Data Recorded   PCWP   No Data Recorded   CO   No Data Recorded   CI   No Data Recorded   SVR   No Data Recorded   SV02   No Data Recorded     Intake/Output:    Last Stool Occurrence: 1 (06/09/17 2100)         I/O last 3 completed shifts:  In: 1279.8 [I.V.:728.8; NG/GT:151; IV Piggyback:400]  Out: 3481.5 [Urine:23; Drains:758.5; Other:2000; Stool:700]      Intake/Output Summary (Last 24 hours) at 06/14/17 1235  Last data filed at 06/14/17 0800   Gross per 24 hour   Intake          1279.79 ml   Output           1471.5 ml   Net          -191.71 ml       Physical  Exam:  Gen: Awake, no acute distress.   Headand Eyes: atraumatic, normocephalic, pink conjunctivae, pupils equal,   ENT: no external ear and nose lesions, intubated   Neck: supple neck, trachea midline  C/L: Decreased BS bases symmetric chest expansion, no wheeze or crackles   CVS: Regular rate and rhythm, no rubs or murmurs   Abd: soft, nontender, +bs, non distended   Ext: no cyanosis, clubbing, +2 edema   Skin: warm, dry, intact, no nodules  Neuro: non focal, moves extremities spontaneously    Laboratory Results:    Lab Results   Component Value Date    FIO2 30 06/13/2017    APH 7.39 06/13/2017    APCO2 41 06/13/2017    APO2 85 06/13/2017    AHCO3 24 06/13/2017    ASAT 98 06/02/2017    POTASSIUM 4.3 06/14/2017     Lab Results   Component Value Date    INR 1.6 06/08/2017    PTT 50 (H) 06/08/2017    VB12 445 05/12/2016    CRP 23.4 (H) 06/13/2017    INTAC 44 04/12/2016    PST 22 12/02/2016    FERR 26 12/02/2016    HGB 8.7 (L) 06/14/2017    TP 7.0 04/26/2016    ALBUMIN 2.6 (L) 06/14/2017    LACTA 1.9 06/13/2017       Urine Panel  Lab Results   Component Value Date    UNITR NEGATIVE 06/11/2017    UCR 85.20 10/18/2016    UKET TRACE (A) 06/11/2017    USPG 1.020 06/11/2017    UPROT 100 (A) 06/11/2017    UWBC SMALL (A) 06/11/2017    URBC LARGE (A) 06/11/2017    UBILI NEGATIVE 06/11/2017    UPH 5.5 06/11/2017    UBACTR NONE SEEN 06/11/2017       Recent Labs      06/12/17   0400  06/13/17   0340  06/13/17   0815  06/14/17   0320   SODIUM  143  143   --   142   POTASSIUM  3.7  4.1   --   4.3   CHLORIDE  104  102   --   102   CO2  23  23   --   26   ANIONGAP  20  22*   --   18   BUN  82*  99*   --   67*   CREATININE  3.41*  4.53*   --   3.25*   GFRA  19  13   --   20   GFRNA  16  12   --   17   GLUCOSE  127*  136*   --   141*   TIMOTHY  1.14*  1.12*   --   1.14*   CALCIUM  8.3*  8.3*   --   8.3*   MG  2.4  2.4   --   2.4   PHOS  2.9  4.5   --   5.0*   ALKPT  97  93   --   111   BILIRUBIN  14.0*  17.1*   --   20.0*   AST  58*  79*    --   89*   GPT  40  35   --   41   LACTA  1.9   --   1.9   --    WBC  64.5*  66.7*   --   77.5*   HGB  8.1*  8.0*   --   8.7*   HCT  24.5*  25.1*   --   27.8*   PLT  85*  120*   --   165         Diagnosis/Plan:  · Acute kidney injury - acute tubular necrosis due to hypotension, blood loss   · Oliguric   · CKD 3 at baseline 1.3-1.8, nonproteinuric  · Complex L renal cyst s/p complex Left Robotic Assisted Laparoscopic Partial Nephrectomy  · Hypotension: on pressors - BPs are still borderline  · Hyperkalemia: Improved,   · Volume overload: HD yesterday with 2 kg goal     HD today for solute clearance  No UF     Discussed with SICU team        Lashanda Nicole MD  Transplant Nephrology

## 2022-12-07 PROBLEM — D64.9 ANEMIA: Status: ACTIVE | Noted: 2022-12-07

## 2022-12-07 LAB
ANION GAP SERPL CALCULATED.3IONS-SCNC: 7 MMOL/L (ref 4–13)
BASOPHILS # BLD AUTO: 0.04 THOUSANDS/ÂΜL (ref 0–0.1)
BASOPHILS NFR BLD AUTO: 0 % (ref 0–1)
BUN SERPL-MCNC: 119 MG/DL (ref 5–25)
CALCIUM SERPL-MCNC: 8.4 MG/DL (ref 8.3–10.1)
CHLORIDE SERPL-SCNC: 102 MMOL/L (ref 96–108)
CO2 SERPL-SCNC: 32 MMOL/L (ref 21–32)
CREAT SERPL-MCNC: 3.5 MG/DL (ref 0.6–1.3)
EOSINOPHIL # BLD AUTO: 0.45 THOUSAND/ÂΜL (ref 0–0.61)
EOSINOPHIL NFR BLD AUTO: 4 % (ref 0–6)
ERYTHROCYTE [DISTWIDTH] IN BLOOD BY AUTOMATED COUNT: 16.6 % (ref 11.6–15.1)
GFR SERPL CREATININE-BSD FRML MDRD: 14 ML/MIN/1.73SQ M
GLUCOSE SERPL-MCNC: 119 MG/DL (ref 65–140)
GLUCOSE SERPL-MCNC: 120 MG/DL (ref 65–140)
GLUCOSE SERPL-MCNC: 121 MG/DL (ref 65–140)
GLUCOSE SERPL-MCNC: 183 MG/DL (ref 65–140)
GLUCOSE SERPL-MCNC: 92 MG/DL (ref 65–140)
GLUCOSE SERPL-MCNC: 92 MG/DL (ref 65–140)
HCT VFR BLD AUTO: 24.5 % (ref 36.5–49.3)
HGB BLD-MCNC: 7.6 G/DL (ref 12–17)
IMM GRANULOCYTES # BLD AUTO: 0.11 THOUSAND/UL (ref 0–0.2)
IMM GRANULOCYTES NFR BLD AUTO: 1 % (ref 0–2)
LYMPHOCYTES # BLD AUTO: 1.21 THOUSANDS/ÂΜL (ref 0.6–4.47)
LYMPHOCYTES NFR BLD AUTO: 10 % (ref 14–44)
MCH RBC QN AUTO: 29.7 PG (ref 26.8–34.3)
MCHC RBC AUTO-ENTMCNC: 31 G/DL (ref 31.4–37.4)
MCV RBC AUTO: 96 FL (ref 82–98)
MONOCYTES # BLD AUTO: 1.04 THOUSAND/ÂΜL (ref 0.17–1.22)
MONOCYTES NFR BLD AUTO: 9 % (ref 4–12)
NEUTROPHILS # BLD AUTO: 8.99 THOUSANDS/ÂΜL (ref 1.85–7.62)
NEUTS SEG NFR BLD AUTO: 76 % (ref 43–75)
NRBC BLD AUTO-RTO: 0 /100 WBCS
PLATELET # BLD AUTO: 263 THOUSANDS/UL (ref 149–390)
PMV BLD AUTO: 12 FL (ref 8.9–12.7)
POTASSIUM SERPL-SCNC: 5 MMOL/L (ref 3.5–5.3)
RBC # BLD AUTO: 2.56 MILLION/UL (ref 3.88–5.62)
SODIUM SERPL-SCNC: 141 MMOL/L (ref 135–147)
WBC # BLD AUTO: 11.84 THOUSAND/UL (ref 4.31–10.16)

## 2022-12-07 RX ORDER — FUROSEMIDE 10 MG/ML
40 INJECTION INTRAMUSCULAR; INTRAVENOUS EVERY 12 HOURS
Status: COMPLETED | OUTPATIENT
Start: 2022-12-07 | End: 2022-12-07

## 2022-12-07 RX ORDER — ALBUMIN (HUMAN) 12.5 G/50ML
12.5 SOLUTION INTRAVENOUS 2 TIMES DAILY
Status: DISCONTINUED | OUTPATIENT
Start: 2022-12-07 | End: 2022-12-08

## 2022-12-07 RX ADMIN — REPAGLINIDE 0.5 MG: 1 TABLET ORAL at 16:40

## 2022-12-07 RX ADMIN — CYANOCOBALAMIN TAB 500 MCG 500 MCG: 500 TAB at 08:12

## 2022-12-07 RX ADMIN — ALBUMIN (HUMAN) 12.5 G: 0.25 INJECTION, SOLUTION INTRAVENOUS at 08:13

## 2022-12-07 RX ADMIN — AMLODIPINE BESYLATE 10 MG: 10 TABLET ORAL at 08:12

## 2022-12-07 RX ADMIN — IPRATROPIUM BROMIDE 0.5 MG: 0.5 SOLUTION RESPIRATORY (INHALATION) at 13:53

## 2022-12-07 RX ADMIN — FUROSEMIDE 40 MG: 10 INJECTION, SOLUTION INTRAMUSCULAR; INTRAVENOUS at 20:00

## 2022-12-07 RX ADMIN — BENZONATATE 200 MG: 100 CAPSULE ORAL at 22:27

## 2022-12-07 RX ADMIN — GABAPENTIN 100 MG: 100 CAPSULE ORAL at 17:00

## 2022-12-07 RX ADMIN — PANTOPRAZOLE SODIUM 40 MG: 40 TABLET, DELAYED RELEASE ORAL at 05:40

## 2022-12-07 RX ADMIN — CARVEDILOL 12.5 MG: 12.5 TABLET, FILM COATED ORAL at 08:12

## 2022-12-07 RX ADMIN — ALBUMIN (HUMAN) 12.5 G: 0.25 INJECTION, SOLUTION INTRAVENOUS at 22:26

## 2022-12-07 RX ADMIN — GUAIFENESIN AND DEXTROMETHORPHAN 10 ML: 100; 10 SYRUP ORAL at 16:39

## 2022-12-07 RX ADMIN — REPAGLINIDE 0.5 MG: 1 TABLET ORAL at 11:40

## 2022-12-07 RX ADMIN — FUROSEMIDE 40 MG: 10 INJECTION, SOLUTION INTRAMUSCULAR; INTRAVENOUS at 09:05

## 2022-12-07 RX ADMIN — IPRATROPIUM BROMIDE 0.5 MG: 0.5 SOLUTION RESPIRATORY (INHALATION) at 07:09

## 2022-12-07 RX ADMIN — LABETALOL HYDROCHLORIDE 10 MG: 5 INJECTION, SOLUTION INTRAVENOUS at 23:38

## 2022-12-07 RX ADMIN — HYDRALAZINE HYDROCHLORIDE 100 MG: 25 TABLET ORAL at 08:12

## 2022-12-07 RX ADMIN — INSULIN GLARGINE 10 UNITS: 100 INJECTION, SOLUTION SUBCUTANEOUS at 22:25

## 2022-12-07 RX ADMIN — LEVALBUTEROL HYDROCHLORIDE 1.25 MG: 1.25 SOLUTION, CONCENTRATE RESPIRATORY (INHALATION) at 07:09

## 2022-12-07 RX ADMIN — ISOSORBIDE MONONITRATE 60 MG: 60 TABLET, EXTENDED RELEASE ORAL at 08:12

## 2022-12-07 RX ADMIN — GUAIFENESIN AND DEXTROMETHORPHAN 10 ML: 100; 10 SYRUP ORAL at 06:12

## 2022-12-07 RX ADMIN — IPRATROPIUM BROMIDE 0.5 MG: 0.5 SOLUTION RESPIRATORY (INHALATION) at 20:39

## 2022-12-07 RX ADMIN — Medication 2000 UNITS: at 08:12

## 2022-12-07 RX ADMIN — ISODIUM CHLORIDE 3 ML: 0.03 SOLUTION RESPIRATORY (INHALATION) at 20:39

## 2022-12-07 RX ADMIN — POLYETHYLENE GLYCOL 3350 17 G: 17 POWDER, FOR SOLUTION ORAL at 08:11

## 2022-12-07 RX ADMIN — NYSTATIN 1 APPLICATION: 100000 POWDER TOPICAL at 17:00

## 2022-12-07 RX ADMIN — LEVALBUTEROL HYDROCHLORIDE 1.25 MG: 1.25 SOLUTION, CONCENTRATE RESPIRATORY (INHALATION) at 13:53

## 2022-12-07 RX ADMIN — INSULIN LISPRO 1 UNITS: 100 INJECTION, SOLUTION INTRAVENOUS; SUBCUTANEOUS at 16:40

## 2022-12-07 RX ADMIN — HEPARIN SODIUM 5000 UNITS: 5000 INJECTION INTRAVENOUS; SUBCUTANEOUS at 22:25

## 2022-12-07 RX ADMIN — TAMSULOSIN HYDROCHLORIDE 0.4 MG: 0.4 CAPSULE ORAL at 16:40

## 2022-12-07 RX ADMIN — CARVEDILOL 12.5 MG: 12.5 TABLET, FILM COATED ORAL at 16:40

## 2022-12-07 RX ADMIN — HEPARIN SODIUM 5000 UNITS: 5000 INJECTION INTRAVENOUS; SUBCUTANEOUS at 05:40

## 2022-12-07 RX ADMIN — HEPARIN SODIUM 5000 UNITS: 5000 INJECTION INTRAVENOUS; SUBCUTANEOUS at 13:57

## 2022-12-07 RX ADMIN — ATORVASTATIN CALCIUM 40 MG: 40 TABLET, FILM COATED ORAL at 08:12

## 2022-12-07 RX ADMIN — HYDRALAZINE HYDROCHLORIDE 100 MG: 25 TABLET ORAL at 16:40

## 2022-12-07 RX ADMIN — BENZONATATE 200 MG: 100 CAPSULE ORAL at 16:40

## 2022-12-07 RX ADMIN — Medication 5 ML: at 22:26

## 2022-12-07 RX ADMIN — POLYETHYLENE GLYCOL 3350 17 G: 17 POWDER, FOR SOLUTION ORAL at 22:25

## 2022-12-07 RX ADMIN — HYDRALAZINE HYDROCHLORIDE 100 MG: 25 TABLET ORAL at 22:36

## 2022-12-07 RX ADMIN — LEVALBUTEROL HYDROCHLORIDE 1.25 MG: 1.25 SOLUTION, CONCENTRATE RESPIRATORY (INHALATION) at 20:39

## 2022-12-07 RX ADMIN — NYSTATIN 1 APPLICATION: 100000 POWDER TOPICAL at 08:13

## 2022-12-07 RX ADMIN — BENZONATATE 200 MG: 100 CAPSULE ORAL at 08:12

## 2022-12-07 RX ADMIN — GABAPENTIN 100 MG: 100 CAPSULE ORAL at 08:12

## 2022-12-07 RX ADMIN — REPAGLINIDE 0.5 MG: 1 TABLET ORAL at 06:06

## 2022-12-07 RX ADMIN — SENNOSIDES 17.2 MG: 8.6 TABLET, FILM COATED ORAL at 22:27

## 2022-12-07 NOTE — PLAN OF CARE
Problem: RESPIRATORY - ADULT  Goal: Achieves optimal ventilation and oxygenation  Description: INTERVENTIONS:  - Assess for changes in respiratory status  - Assess for changes in mentation and behavior  - Position to facilitate oxygenation and minimize respiratory effort  - Oxygen administered by appropriate delivery if ordered  - Initiate smoking cessation education as indicated  - Encourage broncho-pulmonary hygiene including cough, deep breathe, Incentive Spirometry  - Assess the need for suctioning and aspirate as needed  - Assess and instruct to report SOB or any respiratory difficulty  - Respiratory Therapy support as indicated  Outcome: Progressing     Problem: Nutrition/Hydration-ADULT  Goal: Nutrient/Hydration intake appropriate for improving, restoring or maintaining nutritional needs  Description: Monitor and assess patient's nutrition/hydration status for malnutrition  Collaborate with interdisciplinary team and initiate plan and interventions as ordered  Monitor patient's weight and dietary intake as ordered or per policy  Utilize nutrition screening tool and intervene as necessary  Determine patient's food preferences and provide high-protein, high-caloric foods as appropriate       INTERVENTIONS:  - Monitor oral intake, urinary output, labs, and treatment plans  - Assess nutrition and hydration status and recommend course of action  - Evaluate amount of meals eaten  - Assist patient with eating if necessary   - Allow adequate time for meals  - Recommend/ encourage appropriate diets, oral nutritional supplements, and vitamin/mineral supplements  - Order, calculate, and assess calorie counts as needed  - Assess need for intravenous fluids  - Provide nutrition/hydration education as appropriate  - Include patient/family/caregiver in decisions related to nutrition  Outcome: Progressing

## 2022-12-07 NOTE — ASSESSMENT & PLAN NOTE
Hemoglobin has been fluctuating in the range of 7 5-8 5  Stable  Transfuse to keep hemoglobin more than 7

## 2022-12-07 NOTE — ASSESSMENT & PLAN NOTE
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  · Discussed with radiology previously and radiologist recommended getting MRI for further evaluation  · Per nephrology okay to obtain MRI with gadolinium using class 2 gadolinium agents  · Dr Rancho Royal with radiologist again  who recommended CT scan with p o  Contrast for further evaluation  Discussed with radiologist patient's issues with dysphagia and aspiration    NG-tube was placed for contrast administration and patient had a CAT scan of the abdomen pelvis  · Abdomen pelvis showed left upper quadrant mass which may be arising from the gastric fundus and tail of the pancreas without any evidence of invasion of adjacent structures or metastatic disease in the abdomen-CAT scan with contrast using pancreatic mass protocol or MRI was suggested  · Robby the goals of care and further prognosis in detail with the patient's daughter

## 2022-12-07 NOTE — CASE MANAGEMENT
Case Management Discharge Planning Note    Patient name Christel Dominguez  Location [address] [address] [address] MRN [id number]  : 10/19/1929 Date 2022       Current Admission Date: 2022  Current Admission Diagnosis:Acute respiratory failure with hypoxia [facility])   Patient Active Problem List    Diagnosis Date Noted   • Anemia 2022   • SIRS (systemic inflammatory response syndrome) ([address] ) 2022   • Constipation 2022   • Left upper quadrant abdominal mass 2022   • Chronic kidney disease    • Acute respiratory failure with hypoxia ([address] ) 2022   • Acute on chronic diastolic congestive heart failure ([address] ) 2022   • BPH (benign prostatic hyperplasia) 2022   • Acute diastolic (congestive) heart failure ([address] ) 2022   • PVCs (premature ventricular contractions) 2022   • Hyperlipidemia 2022   • Primary hypertension 11/15/2022   • Aspiration pneumonia ([address] ) 11/15/2022   • CHF (congestive heart failure) ([address] ) 11/15/2022   • Acute kidney injury superimposed on CKD ([facility] ) 11/15/2022   • Type 2 diabetes mellitus ([address] ) 11/15/2022   • Esophageal dysphagia 11/15/2022   • Elevated troponin 11/15/2022      LOS (days): 15  Geometric Mean LOS (GMLOS) (days): 5 20  Days to GMLOS:-9 5     OBJECTIVE:  Risk of Unplanned Readmission Score: 24 96         Current admission status: Inpatient   Preferred Pharmacy:   [address] [facility]  [address]  [facility] [address]  Phone: [phone] Fax: [phone]    Primary Care Provider: Graciela Lomax PA-C    Primary Insurance: MEDICARE  Secondary Insurance: [employer] OF [place]    DISCHARGE DETAILS:                                          Other Referral/Resources/Interventions Provided:  Interventions:  Other (Specify) (Care team meeting scheduling)  Referral Comments: CM s/w Dr Maki Leaver at rounds this AM and determined need for care team meeting with daughter to discuss care plan, discharge plans, and goals of care  CM set TT to Dr Guillaume Harris and Dr Isabel Crane outling same to inquire about availability  Plan for meeting tomorro around 10-1030am  CM attempted to contact daughter Neftali Ibarra to discuss above and inquire about her availability to participate in person or via telephone  Left message requesting call back  CM reached out to [facility] requesting referral review and determination  Per Precious Kaye at [facility], patient is tentatively accepted for acute rehab but states that if patients daughter chooses not to place PEG tube they would request a 2 days calorie count to ensure his PO intake is sufficient  Providers aware

## 2022-12-07 NOTE — ASSESSMENT & PLAN NOTE
20 level has been minimally elevated since admission  · Patient received cefepime and Flagyl in the ED   Completed 7 day course of cefepime 1 gram IV daily  · Patient febrile again to 101 1 on 12/1 and 100 8 today, likely aspiration pneumonitis    procalcitonin mildly elevated, blood cultures negative, COVID/flu/RSV negative, urine culture with small colony of Enterococcus not requiring treatment per ID  · Due to recurrent fevers, ID was consulted who recommended monitoring off antibiotics  · Daughter/patient aware that he is at high risk for aspiration events due to his dysphagia although did speak with them that PEG tube placement does not guarantee zero risk of aspiration  · Sputum culture with mixed respiratory hardik  · Urine for Legionella and pneumococcal antigens were negative  · Blood cultures negative  · Repeat chest x-ray 12/1 with waxing and waning opacities  · Chest x-ray from 12/5/2022 showing multifocal consolidation  · CT abdomen pelvis showing bilateral pleural effusions with bibasilar atelectasis  · Recurrent SIRS suspected secondary to aspiration pneumonitis  · White Count is improving along with fever curve

## 2022-12-07 NOTE — ASSESSMENT & PLAN NOTE
Patient with constipation  Continue MiraLax, senna q h s  · MiraLAX for symptoms to twice daily dosing  · Continue bowel regimen    Patient had a large bowel movement on 12/5/2022

## 2022-12-07 NOTE — PHYSICAL THERAPY NOTE
PT TREATMENT       12/07/22 1345   PT Last Visit   PT Visit Date 12/07/22   Note Type   Note Type Treatment   Pain Assessment   Pain Assessment Tool 0-10   Pain Score 5   Pain Location/Orientation Location: Generalized   Pain Onset/Description Onset: Ongoing   Effect of Pain on Daily Activities limits mobility/rest   Patient's Stated Pain Goal No pain   Hospital Pain Intervention(s) Rest;Repositioned   Multiple Pain Sites No   Restrictions/Precautions   Weight Bearing Precautions Per Order No   Other Precautions Bed Alarm; Chair Alarm; Fall Risk;Pain   General   Chart Reviewed Yes   Family/Caregiver Present Yes  (dtr at beginning of session)   Cognition   Overall Cognitive Status WFL   Arousal/Participation Cooperative   Orientation Level Oriented X4   Following Commands Follows multistep commands with increased time or repetition   Subjective   Subjective "i'm so uncomfortable  No I don't want to the chair just fix me"   Bed Mobility   Rolling R 3  Moderate assistance   Additional items Assist x 1;Verbal cues   Rolling L 3  Moderate assistance   Additional items Assist x 1;Verbal cues   Additional Comments Refusing OOB today - reports fatigue and wants assist to reposition   Balance   Static Sitting Fair -   Activity Tolerance   Activity Tolerance Patient limited by fatigue;Patient limited by pain   Nurse Made Aware yes VERONIQUE Zamarripa   Exercises   Neuro re-ed Refusing exercise today despite verbal encouragement - reports fatigue and wants to be repositioned   Assessment   Prognosis Good   Problem List Decreased strength;Decreased range of motion;Decreased endurance; Impaired balance;Decreased mobility;Pain;Decreased skin integrity   Assessment Pt requesting assist with repositioning  Able to roll side to side with Mod A  Dependent x 2 for repositioning in bed  Pt declines OOB + exercise today due to fatigue/pain  The patient's AM-PAC Basic Mobility Inpatient Short Form Raw Score is 12   A Raw score of less than or equal to 16 suggests the patient may benefit from discharge to post-acute rehabilitation services  Please also refer to the recommendation of the Physical Therapist for safe discharge planning  Goals   Patient Goals to get better   Plan   Treatment/Interventions ADL retraining;Functional transfer training;LE strengthening/ROM; Therapeutic exercise; Endurance training;Bed mobility;Gait training;Spoke to nursing   Progress Slow progress, decreased activity tolerance   PT Frequency Other (Comment)  (5x/week)   Recommendation   PT Discharge Recommendation Post acute rehabilitation services   AM-PAC Basic Mobility Inpatient   Turning in Bed Without Bedrails 3   Lying on Back to Sitting on Edge of Flat Bed 2   Moving Bed to Chair 2   Standing Up From Chair 2   Walk in Room 2   Climb 3-5 Stairs 1   Basic Mobility Inpatient Raw Score 12   Basic Mobility Standardized Score 32 23   Turning Head Towards Sound 3   Follow Simple Instructions 3   Low Function Basic Mobility Raw Score 18   Low Function Basic Mobility Standardized Score 29 25   Highest Level Of Mobility   JH-HLM Goal 4: Move to chair/commode   JH-HLM Achieved 2: Bed activities/Dependent transfer   Education   Education Provided Mobility training;Home exercise program   Patient Explanation/teachback used; Reinforcement needed   End of Consult   Patient Position at End of Consult Supine;Bed/Chair alarm activated; All needs within reach   [employer] License Number  [employer] [id number]

## 2022-12-07 NOTE — ASSESSMENT & PLAN NOTE
Patient presented with acute respiratory distress shortly after eating a few spoonful of puree diet in STR, patient reported chest pressure and SOB  Patient was satting 85-95% on oxygen 3 L per STR transfer paper  Patient was belching after eating/drinking limited amount of diet/water per staff  · Patient was discharged on the day of admission after being hospitalized for aspiration pneumonia, acute on chronic diastolic CHF, dysphagia  Received IV Lasix and 7 days of IV Rocephin and Flagyl  Seen by GI, underwent EGD, found to have oropharyngeal dysphagia with esophageal dysmotility, no obvious esophageal stricture  Underwent Barium swallow study which showed moderate to severe esophageal dysmotility with significant residual contrast remaining in the esophagus at the end of the study  Patient was recommended regular diet by speech and discharged on regular diet per STR  · Likely secondary to multifocal pneumonia and CHF  · Patient required BiPAP on ED arrival   ABG post BiPAP essentially unremarkable  Then titrated down to 6 liters oxygen upon admission  Patient was down to 1 L oxygen but currently again up to 3 to suction to keep O2 saturation in low 90s  · Chest x-ray upon admission- Pulmonary edema, worse when comparing to 11/19/2022  Underlying infection could not be excluded  · ProBNP 15,094  · Repeat chest x-ray from 12/5/2022 with persistent bilateral consolidation without any pleural effusion or atelectasis  · Patient received Lasix 40 IV in ED  · Received cefepime and Flagyl in ED  Completed 7 days of IV cefepime  · Received IV Solu-Medrol in ED  No wheezing on auscultation    · CT chest-pulmonary edema with moderate size bilateral pleural effusions with significant bibasilar atelectasis and concomitant multifocal pneumonia   · Obstructive series showed persistent pulmonary edema with multifocal pneumonia  · CT of the abdomen pelvis still showing bilateral pleural effusions with bibasilar atelectasis  · Chest x-ray from 12/5/2022 with extensive bilateral consolidation without any effusion  · CT abdomen pelvis from 12/5/2022 showing bilateral pleural effusion  · Patient restarted back on diuretics with albumin

## 2022-12-07 NOTE — ASSESSMENT & PLAN NOTE
Lab Results   Component Value Date    HGBA1C 5 7 (H) 11/16/2022       Recent Labs     12/06/22 2052 12/07/22  0146 12/07/22  0703 12/07/22  1116   POCGLU 194* 120 92 119     Patient was discharged on Prandin t i d  With meals  · Continue Prandin, Humalog sliding scale with Accu-Cheks q a c  And HS  · Currently on diabetic pureed Diet with thin liquids  Continue Lantus 10 units q h s

## 2022-12-07 NOTE — ASSESSMENT & PLAN NOTE
Wt Readings from Last 3 Encounters:   12/07/22 86 6 kg (190 lb 14 7 oz)   11/22/22 84 5 kg (186 lb 4 8 oz)     Patient received IV Lasix in recent admission  · Chest x-ray upon admission shows worsening pulmonary edema  · Received 40 milligrams of Lasix IV in the ED and was continued on IV Lasix  Then transitioned to [facility] which is currently on hold  · Left upper extremity venous Doppler showed no evidence of DVT  · CT chest upon admission showed pulmonary edema with moderate size bilateral pleural effusions left more than right  · Might need accept higher creatinine to maintain euvolemia    · Diuretics have been on hold in setting of elevated creatinine  · Chest x-ray from 12/5/2022 showed persistent extensive bilateral consolidation without any pleural effusion or pneumothorax  · CT abdomen pelvis showed bilateral pleural effusions  · Started back on Lasix 40 mg IV every 12 hours along with albumin 12 5 g every 12 hours  ·

## 2022-12-07 NOTE — ASSESSMENT & PLAN NOTE
Troponin [id number]  Elevated troponin in recent admission as well  Reports chest pressure when in respiratory distress  Denies history of CAD  · EKG no ischemic changes, read as AFib, rate 103, RBBB per prior provider  · No history of AFib  Prior EKG shows sinus rhythm with PACs /PVCs  repeat EKG showed sinus rhythm  · Recent 2D echo showed EF low normal, normal wall motion, grade 2 diastolic dysfunction, moderately dilated atriums  · Most likely non MI troponin elevation due to # 1 and CHF

## 2022-12-07 NOTE — ASSESSMENT & PLAN NOTE
Patient continues to have intermittent fevers with persistent elevation of white count despite receiving 14 days of antibiotics  Mayuri Ferguson was treated with Rocephin and Flagyl during prior hospitalization and also received cefepime for 7 days during this hospitalization  ID Input appreciated    Monitor off antibiotics  Current SIRS likely secondary to aspiration pneumonitis  White count is improving and fever curve improving

## 2022-12-07 NOTE — ASSESSMENT & PLAN NOTE
Per speech therapy, patient at high risk for aspiration despite the level of diet    Currently on pureed diet with thin liquids with medications crushed with puree  · Continue pantoprazole daily  · Obstructive series showed barium throughout the colon with modest amount of stool along the rectosigmoid region  · Discussions held with the patient and family by myself and Dr Lilliana Seals regarding the risks and benefits of PEG placement-family agreeable for PEG placement currently

## 2022-12-07 NOTE — PROGRESS NOTES
[address] [facility] NOTE   Alena Vasquez 80 y o  male MRN: [id number]  Unit/Bed#: 207 Tammy Royal Encounter: [id number]  Reason for Consult: SHAHID    ASSESSMENT and PLAN:  80year old with history of CKD admitted with shortness of breath  We are consulted for evaluation management of SHAHID  # Acute Kidney Injury   - Baseline creatinine 1 6-2 0  - Admission creatinine 2 8 with peak of 3 9 and currently 3 5  - Etiology was thought to be due to over-diuresis and a component of urinary retention  - Currently appears volume overloaded again probably in setting of third spacing from hypoalbuminemia  - Give IV albumin 12 5 g followed by IV Lasix 40 mg x2 today   - Ayala catheter is in place  - BUN is elevated but no signs of uremia  - Changed nutritional supplements from Glucerna to Nepro  - No indication for dialysis; would consider conservative kidney management given advanced age    # Chronic kidney disease stage 3  - Etiology, most likely cardiorenal syndrome and age-related nephron loss    # Hypertension  - Current regimen includes hydralazine 100 mg t i d , amlodipine 10 mg daily, Coreg 12 5 mg b i d   Imdur 60 mg daily  - Patient is now off doxazosin and switch to Flomax per Urology    # Volume  - CT scan admission was consistent with pulmonary edema with moderate size pleural effusion left more than right status post IV and oral diuretics  - Repeat CT scan 12/06 shows bilateral pleural effusions  - Trial of IV diuresis undercover of IV albumin today    # Anemia   - Continue to monitor hemoglobin  - Transfuse to keep hemoglobin more than 7    # Electrolytes/Acid base status   - Electrolytes and acid base status within normal limits    # Left upper quadrant mass  - Renal ultrasound suggestive of solid hypoechoic mass in left upper quadrant medial to spleen  - CT abdomen without IV contrast ompleted showing gastric fundus mass  - Okay to perform MRI with gadolinium if needed for further evaluation    DISPOSITION:  Currently appears volume overloaded again probably in setting of third spacing from hypoalbuminemia  Give IV albumin 12 5 g followed by IV Lasix 40 mg x2 today  SUBJECTIVE / 24H INTERVAL HISTORY:  Urine output recorded as 600 cc  This morning complains of some trouble with breathing  Review of Systems   Constitutional: Negative for appetite change, chills, fatigue and fever  Eyes: Negative for visual disturbance  Respiratory: Positive for shortness of breath  Negative for cough  Cardiovascular: Negative for chest pain and leg swelling  Gastrointestinal: Negative for abdominal pain, nausea and vomiting  Endocrine: Negative for polyuria  Genitourinary: Negative for decreased urine volume, difficulty urinating, dysuria, flank pain, frequency, hematuria and urgency  Musculoskeletal: Negative for arthralgias, back pain and joint swelling  Skin: Negative for rash  Neurological: Negative for dizziness, tremors, weakness and numbness  OBJECTIVE:  Current Weight: Weight - Scale: 86 6 kg (190 lb 14 7 oz)  Vitals:    12/06/22 2136 12/06/22 2329 12/07/22 0321 12/07/22 0544   BP: 133/52 140/52 160/66    BP Location:       Pulse: 64 64 64    Resp:  14 18    Temp:   97 5 °F (36 4 °C)    TempSrc:       SpO2: 94% 91% 91%    Weight:    86 6 kg (190 lb 14 7 oz)   Height:           Intake/Output Summary (Last 24 hours) at 12/7/2022 9396  Last data filed at 12/7/2022 0545  Gross per 24 hour   Intake --   Output 600 ml   Net -600 ml     Physical Exam  Vitals and nursing note reviewed  Constitutional:       General: He is not in acute distress  Appearance: He is well-developed  HENT:      Head: Normocephalic and atraumatic  Eyes:      Conjunctiva/sclera: Conjunctivae normal    Cardiovascular:      Rate and Rhythm: Normal rate and regular rhythm  Pulses: Normal pulses  Heart sounds: Normal heart sounds  No murmur heard    Pulmonary:      Effort: Pulmonary effort is normal  No respiratory distress  Breath sounds: Normal breath sounds  Abdominal:      General: Abdomen is flat  Bowel sounds are normal       Palpations: Abdomen is soft  Tenderness: There is no abdominal tenderness  Musculoskeletal:         General: No swelling  Cervical back: Neck supple  Right lower leg: No edema  Left lower leg: No edema  Comments: Significant bilateral upper extremity swelling   Skin:     General: Skin is warm and dry  Capillary Refill: Capillary refill takes less than 2 seconds  Neurological:      Mental Status: He is alert     Psychiatric:         Mood and Affect: Mood normal        Medications:    Current Facility-Administered Medications:   •  acetaminophen (TYLENOL) tablet 650 mg, 650 mg, Oral, Q6H PRN, KEISHA Newberry, 650 mg at 12/05/22 2133  •  albumin human (FLEXBUMIN) 25 % injection 12 5 g, 12 5 g, Intravenous, BID, Antony Swift MD  •  amLODIPine (NORVASC) tablet 10 mg, 10 mg, Oral, Daily, KEISHA Newberry, 10 mg at 12/06/22 0844  •  atorvastatin (LIPITOR) tablet 40 mg, 40 mg, Oral, Daily, KEISHA Newberry, 40 mg at 12/06/22 7270  •  benzonatate (TESSALON PERLES) capsule 200 mg, 200 mg, Oral, TID, KEISHA Lombardo, 200 mg at 12/06/22 2135  •  bisacodyl (DULCOLAX) rectal suppository 10 mg, 10 mg, Rectal, Daily PRN, Sherrell Yu MD, 10 mg at 12/05/22 1946  •  carvedilol (COREG) tablet 12 5 mg, 12 5 mg, Oral, BID With Meals, KEISHA Newberry, 12 5 mg at 12/06/22 1728  •  cholecalciferol (VITAMIN D3) tablet 2,000 Units, 2,000 Units, Oral, Daily, KEISHA Newberry, 2,000 Units at 12/06/22 0844  •  cyanocobalamin (VITAMIN B-12) tablet 500 mcg, 500 mcg, Oral, Daily, KEISHA Newberry, 500 mcg at 12/06/22 0844  •  dextromethorphan-guaiFENesin (ROBITUSSIN DM) oral syrup 10 mL, 10 mL, Oral, Q6H PRN, KEISHA Newberry, 10 mL at 12/07/22 0612  •  furosemide (LASIX) injection 40 mg, 40 mg, Intravenous, Q12H, Antony Senora Blunt, MD  •  gabapentin (NEURONTIN) capsule 100 mg, 100 mg, Oral, BID, KEISHA Newberry, 100 mg at 12/06/22 1728  •  heparin (porcine) subcutaneous injection 5,000 Units, 5,000 Units, Subcutaneous, Q8H [facility], 5,000 Units at 12/07/22 0540 **AND** [CANCELED] Platelet count, , , Once, KEISHA Newberry  •  hydrALAZINE (APRESOLINE) tablet 100 mg, 100 mg, Oral, TID, Sarita Amaya MD, 100 mg at 12/06/22 2136  •  hydrocodone-chlorpheniramine polistirex (TUSSIONEX) ER suspension 5 mL, 5 mL, Oral, HS, Isis Yan MD, 5 mL at 12/06/22 2135  •  insulin glargine (LANTUS) subcutaneous injection 10 Units 0 1 mL, 10 Units, Subcutaneous, HS, Isis Yan MD, 10 Units at 12/06/22 2135  •  insulin lispro (HumaLOG) 100 units/mL subcutaneous injection 1-5 Units, 1-5 Units, Subcutaneous, TID AC, 1 Units at 12/04/22 1150 **AND** Fingerstick Glucose (POCT), , , TID AC, KEISHA Newberry  •  insulin lispro (HumaLOG) 100 units/mL subcutaneous injection 1-5 Units, 1-5 Units, Subcutaneous, 0200, KEISHA Newberry, 1 Units at 12/03/22 0257  •  insulin lispro (HumaLOG) 100 units/mL subcutaneous injection 1-5 Units, 1-5 Units, Subcutaneous, HS, KEISHA Newberry, 1 Units at 12/06/22 2137  •  ipratropium (ATROVENT) 0 02 % inhalation solution 0 5 mg, 0 5 mg, Nebulization, TID, KEISHA Newberry, 0 5 mg at 12/06/22 2022  •  isosorbide mononitrate (IMDUR) 24 hr tablet 60 mg, 60 mg, Oral, Daily, KEISHA Newberry, 60 mg at 12/06/22 0844  •  labetalol (NORMODYNE) injection 10 mg, 10 mg, Intravenous, Q6H PRN, Agustina Schneider PA-C, 10 mg at 11/24/22 0241  •  levalbuterol (XOPENEX) inhalation solution 0 63 mg, 0 63 mg, Nebulization, Q4H PRN, KEISHA Newberry, 0 63 mg at 12/01/22 0807  •  levalbuterol (XOPENEX) inhalation solution 1 25 mg, 1 25 mg, Nebulization, TID, 1 25 mg at 12/06/22 2022 **AND** sodium chloride 0 9 % inhalation solution 3 mL, 3 mL, Nebulization, TID, KEISHA Newberry, 3 mL at 12/06/22 2022  •  nystatin (MYCOSTATIN) powder, , Topical, BID, KEISHA Newberry, Given at 12/06/22 1728  •  ondansetron (ZOFRAN) injection 4 mg, 4 mg, Intravenous, Q6H PRN, KEISHA Newberry, 4 mg at 12/05/22 1120  •  pantoprazole (PROTONIX) EC tablet 40 mg, 40 mg, Oral, Early Morning, KEISHA Newberry, 40 mg at 12/07/22 0540  •  polyethylene glycol (MIRALAX) packet 17 g, 17 g, Oral, BID, Tasha Brown DO, 17 g at 12/06/22 2135  •  repaglinide (PRANDIN) tablet 0 5 mg, 0 5 mg, Oral, TID AC, Steve Yan MD, 0 5 mg at 12/07/22 0606  •  senna (SENOKOT) tablet 17 2 mg, 2 tablet, Oral, HS, KEISHA Newberry, 17 2 mg at 12/06/22 2135  •  tamsulosin (FLOMAX) capsule 0 4 mg, 0 4 mg, Oral, Daily With Beena Miller MD, 0 4 mg at 12/06/22 1728    Laboratory Results:  Results from last 7 days   Lab Units 12/07/22  0539 12/06/22  0551 12/05/22  0530 12/04/22  0552 12/03/22  0533 12/02/22  0539 12/01/22  0521   WBC Thousand/uL 11 84* 11 63* 13 91*  --   --  13 15*  --    HEMOGLOBIN g/dL 7 6* 8 0* 7 8*  --   --  8 0*  --    HEMATOCRIT % 24 5* 25 9* 25 2*  --   --  25 3*  --    PLATELETS Thousands/uL 263 267 292  --   --  233  --    POTASSIUM mmol/L 5 0 4 8 4 6 4 8 4 9 4 7 4 8   CHLORIDE mmol/L 102 103 105 106 103 103 101   CO2 mmol/L 32 30 32 31 32 31 31   BUN mg/dL 119* 113* 113* 118* 119* 117* 123*   CREATININE mg/dL 3 50* 3 22* 3 28* 3 55* 3 78* 3 87* 3 90*   CALCIUM mg/dL 8 4 8 6 8 7 8 6 8 6 8 3 8 3       Portions of the record may have been created with voice recognition software  Occasional wrong word or "sound a like" substitutions may have occurred due to the inherent limitations of voice recognition software  Read the chart carefully and recognize, using context, where substitutions have occurred  If you have any questions, please contact the dictating provider

## 2022-12-07 NOTE — PROGRESS NOTES
[facility] [address]  Progress Note Analia Campbell 10/19/1929, 80 y o  male MRN: [id number]  Unit/Bed#: [facility] Royal Encounter: [id number]  Primary Care Provider: Ignacio Dsouza PA-C   Date and time admitted to hospital: 11/22/2022  8:41 PM    * Acute respiratory failure with hypoxia [facility])  Assessment & Plan  Patient presented with acute respiratory distress shortly after eating a few spoonful of puree diet in STR, patient reported chest pressure and SOB  Patient was satting 85-95% on oxygen 3 L per STR transfer paper  Patient was belching after eating/drinking limited amount of diet/water per staff  · Patient was discharged on the day of admission after being hospitalized for aspiration pneumonia, acute on chronic diastolic CHF, dysphagia  Received IV Lasix and 7 days of IV Rocephin and Flagyl  Seen by GI, underwent EGD, found to have oropharyngeal dysphagia with esophageal dysmotility, no obvious esophageal stricture  Underwent Barium swallow study which showed moderate to severe esophageal dysmotility with significant residual contrast remaining in the esophagus at the end of the study  Patient was recommended regular diet by speech and discharged on regular diet per STR  · Likely secondary to multifocal pneumonia and CHF  · Patient required BiPAP on ED arrival   ABG post BiPAP essentially unremarkable  Then titrated down to 6 liters oxygen upon admission  Patient was down to 1 L oxygen but currently again up to 3 to suction to keep O2 saturation in low 90s  · Chest x-ray upon admission- Pulmonary edema, worse when comparing to 11/19/2022  Underlying infection could not be excluded  · ProBNP 15,094  · Repeat chest x-ray from 12/5/2022 with persistent bilateral consolidation without any pleural effusion or atelectasis  · Patient received Lasix 40 IV in ED  · Received cefepime and Flagyl in ED  Completed 7 days of IV cefepime  · Received IV Solu-Medrol in ED    No wheezing on auscultation  · CT chest-pulmonary edema with moderate size bilateral pleural effusions with significant bibasilar atelectasis and concomitant multifocal pneumonia   · Obstructive series showed persistent pulmonary edema with multifocal pneumonia  · CT of the abdomen pelvis still showing bilateral pleural effusions with bibasilar atelectasis  · Chest x-ray from 12/5/2022 with extensive bilateral consolidation without any effusion  · CT abdomen pelvis from 12/5/2022 showing bilateral pleural effusion  · Patient restarted back on diuretics with albumin    Acute kidney injury superimposed on CKD [facility])  Assessment & Plan  Lab Results   Component Value Date    EGFR 14 12/07/2022    EGFR 15 12/06/2022    EGFR 15 12/05/2022    CREATININE 3 50 (H) 12/07/2022    CREATININE 3 22 (H) 12/06/2022    CREATININE 3 28 (H) 12/05/2022     History of CKD 4, baseline creatinine appears to be around 1 5-1 9 in past 2 years in care everywhere  Creatinine during previous hospitalization ranged in 2 4-2 8  Admission creatinine was 2 8 which peaked at 3 9  · Possible CKD progression  · Urinalysis was bland  · Received IV Lasix 40 mg in ED  Patient also initially received IV diuretics and was later transitioned to [facility] which have been on hold  · Patient received IV fluids from 11/30 for a couple of days  · Renal ultrasound showed no hydronephrosis moderate left renal atrophy  · Nephrology input appreciated  · No indication for dialysis at the current time  Continue conservative kidney management plan given his advanced age per nephrology    · Nutritional supplements were changed from Glucerna to Nepro  · CAT scan of the abdomen and pelvis on 12/5/2022 showed bilateral pleural effusions  · Chest x-ray from 12 5 showing persistent extensive bilateral consolidation without any effusion or pneumothorax  · Trial of Lasix with albumin in the setting of fluid overload    SIRS (systemic inflammatory response syndrome) (HCC)  Assessment & Plan  Patient continues to have intermittent fevers with persistent elevation of white count despite receiving 14 days of antibiotics  Job Walker was treated with Rocephin and Flagyl during prior hospitalization and also received cefepime for 7 days during this hospitalization  ID Input appreciated  Monitor off antibiotics  Current SIRS likely secondary to aspiration pneumonitis  White count is improving and fever curve improving    Left upper quadrant abdominal mass  Assessment & Plan  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  · Discussed with radiology previously and radiologist recommended getting MRI for further evaluation  · Per nephrology okay to obtain MRI with gadolinium using class 2 gadolinium agents  · Dr Carlos Rai with radiologist again  who recommended CT scan with p o  Contrast for further evaluation  Discussed with radiologist patient's issues with dysphagia and aspiration  NG-tube was placed for contrast administration and patient had a CAT scan of the abdomen pelvis  · Abdomen pelvis showed left upper quadrant mass which may be arising from the gastric fundus and tail of the pancreas without any evidence of invasion of adjacent structures or metastatic disease in the abdomen-CAT scan with contrast using pancreatic mass protocol or MRI was suggested  · Robby the goals of care and further prognosis in detail with the patient's daughter    Acute on chronic diastolic congestive heart failure (HCC)  Assessment & Plan  Wt Readings from Last 3 Encounters:   12/07/22 86 6 kg (190 lb 14 7 oz)   11/22/22 84 5 kg (186 lb 4 8 oz)     Patient received IV Lasix in recent admission  · Chest x-ray upon admission shows worsening pulmonary edema  · Received 40 milligrams of Lasix IV in the ED and was continued on IV Lasix    Then transitioned to [facility] which is currently on hold  · Left upper extremity venous Doppler showed no evidence of DVT  · CT chest upon admission showed pulmonary edema with moderate size bilateral pleural effusions left more than right  · Might need accept higher creatinine to maintain euvolemia  · Diuretics have been on hold in setting of elevated creatinine  · Chest x-ray from 12/5/2022 showed persistent extensive bilateral consolidation without any pleural effusion or pneumothorax  · CT abdomen pelvis showed bilateral pleural effusions  · Started back on Lasix 40 mg IV every 12 hours along with albumin 12 5 g every 12 hours  ·         Esophageal dysphagia  Assessment & Plan  Per speech therapy, patient at high risk for aspiration despite the level of diet  Currently on pureed diet with thin liquids with medications crushed with puree  · Continue pantoprazole daily  · Obstructive series showed barium throughout the colon with modest amount of stool along the rectosigmoid region  · Discussions held with the patient and family by myself and Dr Kristi Tucker regarding the risks and benefits of PEG placement-family agreeable for PEG placement currently      Aspiration pneumonia [facility])  Assessment & Plan  20 level has been minimally elevated since admission  · Patient received cefepime and Flagyl in the ED   Completed 7 day course of cefepime 1 gram IV daily  · Patient febrile again to 101 1 on 12/1 and 100 8 today, likely aspiration pneumonitis    procalcitonin mildly elevated, blood cultures negative, COVID/flu/RSV negative, urine culture with small colony of Enterococcus not requiring treatment per ID  · Due to recurrent fevers, ID was consulted who recommended monitoring off antibiotics  · Daughter/patient aware that he is at high risk for aspiration events due to his dysphagia although did speak with them that PEG tube placement does not guarantee zero risk of aspiration  · Sputum culture with mixed respiratory hardik  · Urine for Legionella and pneumococcal antigens were negative  · Blood cultures negative  · Repeat chest x-ray 12/1 with waxing and waning opacities  · Chest x-ray from 12/5/2022 showing multifocal consolidation  · CT abdomen pelvis showing bilateral pleural effusions with bibasilar atelectasis  · Recurrent SIRS suspected secondary to aspiration pneumonitis  · White Count is improving along with fever curve    Anemia  Assessment & Plan  Hemoglobin has been fluctuating in the range of 7 5-8 5  Stable  Transfuse to keep hemoglobin more than 7    Constipation  Assessment & Plan  Patient with constipation  Continue MiraLax, senna q h s  · MiraLAX for symptoms to twice daily dosing  · Continue bowel regimen  Patient had a large bowel movement on 12/5/2022      BPH (benign prostatic hyperplasia)  Assessment & Plan  Patient with urinary retention requiring multiple straight cath  Per RN patient also with penile swelling and therefore Ayala catheter placed  · Urology input appreciated  Cardura was discontinued and started Flomax  · Per Urology voiding trial once patient is more mobile    Hyperlipidemia  Assessment & Plan  Continue statin    Elevated troponin  Assessment & Plan  Troponin [id number]  Elevated troponin in recent admission as well  Reports chest pressure when in respiratory distress  Denies history of CAD  · EKG no ischemic changes, read as AFib, rate 103, RBBB per prior provider  · No history of AFib  Prior EKG shows sinus rhythm with PACs /PVCs  repeat EKG showed sinus rhythm  · Recent 2D echo showed EF low normal, normal wall motion, grade 2 diastolic dysfunction, moderately dilated atriums  · Most likely non MI troponin elevation due to # 1 and CHF  Type 2 diabetes mellitus [facility])  Assessment & Plan  Lab Results   Component Value Date    HGBA1C 5 7 (H) 11/16/2022       Recent Labs     12/06/22  2052 12/07/22  0146 12/07/22  0703 12/07/22  1116   POCGLU 194* 120 92 119     Patient was discharged on Prandin t i d  With meals  · Continue Prandin, Humalog sliding scale with Accu-Cheks q a c   And HS  · Currently on diabetic pureed Diet with thin liquids  Continue Lantus 10 units q h s  Primary hypertension  Assessment & Plan  Continue hydralazine, Coreg, Norvasc, Imdur  · Blood pressures overall stable        VTE Pharmacologic Prophylaxis:   Moderate Risk (Score 3-4) - Pharmacological DVT Prophylaxis Ordered: heparin  Patient Centered Rounds: I performed bedside rounds with nursing staff today  Discussions with Specialists or Other Care Team Provider: Nephrology    Education and Discussions with Family / Patient: Updated  (daughter) via phone  Time Spent for Care: 45 minutes  More than 50% of total time spent on counseling and coordination of care as described above  Current Length of Stay: 15 day(s)  Current Patient Status: Inpatient   Certification Statement: The patient will continue to require additional inpatient hospital stay due to Acute respiratory failure, acute kidney injury, esophageal dysmotility acute CHF  Discharge Plan: Anticipate discharge in 48-72 hrs to Pending course    Code Status: Level 3 - DNAR and DNI    Subjective:   Complains of cough  Denies any shortness of breath, chest pain, abdominal pain    Objective:     Vitals:   Temp (24hrs), Av 8 °F (37 1 °C), Min:97 5 °F (36 4 °C), Max:99 4 °F (37 4 °C)    Temp:  [97 5 °F (36 4 °C)-99 4 °F (37 4 °C)] 99 4 °F (37 4 °C)  HR:  [61-66] 61  Resp:  [14-18] 18  BP: (129-160)/(48-66) 138/51  SpO2:  [91 %-94 %] 91 %  Body mass index is 31 77 kg/m²  Input and Output Summary (last 24 hours): Intake/Output Summary (Last 24 hours) at 2022 1554  Last data filed at 2022 0545  Gross per 24 hour   Intake --   Output 600 ml   Net -600 ml       Physical Exam:   Physical Exam  Constitutional:       Appearance: Normal appearance  HENT:      Head: Normocephalic and atraumatic  Eyes:      Extraocular Movements: Extraocular movements intact  Pupils: Pupils are equal, round, and reactive to light     Cardiovascular: Rate and Rhythm: Normal rate and regular rhythm  Heart sounds: No murmur heard  No gallop  Pulmonary:      Effort: Pulmonary effort is normal       Breath sounds: Normal breath sounds  Abdominal:      General: Bowel sounds are normal       Palpations: Abdomen is soft  Tenderness: There is no abdominal tenderness  Musculoskeletal:         General: No swelling or deformity  Normal range of motion  Cervical back: Normal range of motion and neck supple  Comments: BiLateral upper extremity edema   Skin:     General: Skin is warm and dry  Neurological:      General: No focal deficit present  Mental Status: He is alert            Additional Data:     Labs:  Results from last 7 days   Lab Units 12/07/22  0539   WBC Thousand/uL 11 84*   HEMOGLOBIN g/dL 7 6*   HEMATOCRIT % 24 5*   PLATELETS Thousands/uL 263   NEUTROS PCT % 76*   LYMPHS PCT % 10*   MONOS PCT % 9   EOS PCT % 4     Results from last 7 days   Lab Units 12/07/22  0539   SODIUM mmol/L 141   POTASSIUM mmol/L 5 0   CHLORIDE mmol/L 102   CO2 mmol/L 32   BUN mg/dL 119*   CREATININE mg/dL 3 50*   ANION GAP mmol/L 7   CALCIUM mg/dL 8 4   GLUCOSE RANDOM mg/dL 92         Results from last 7 days   Lab Units 12/07/22  1552 12/07/22  1116 12/07/22  0703 12/07/22  0146 12/06/22  2052 12/06/22  1557 12/06/22  1124 12/06/22  0740 12/06/22  0149 12/05/22  2052 12/05/22  1610 12/05/22  1055   POC GLUCOSE mg/dl 183* 119 92 120 194* 138 153* 86 133 211* 137 113         Results from last 7 days   Lab Units 12/02/22  0539   PROCALCITONIN ng/ml 0 34*       Lines/Drains:  Invasive Devices     Peripheral Intravenous Line  Duration           Peripheral IV 12/06/22 Right Forearm 1 day          Drain  Duration           Urethral Catheter 5 days              Urinary Catheter:  Goal for removal: Voiding trial when ambulation improves               Imaging: Reviewed radiology reports from this admission including: chest xray and abdominal/pelvic CT    Recent Cultures (last 7 days):   Results from last 7 days   Lab Units 12/01/22  1043   BLOOD CULTURE  No Growth After 5 Days  No Growth After 5 Days         Last 24 Hours Medication List:   Current Facility-Administered Medications   Medication Dose Route Frequency Provider Last Rate   • acetaminophen  650 mg Oral Q6H PRN KEISHA Newberry     • albumin human  12 5 g Intravenous BID Grover Raymond MD     • amLODIPine  10 mg Oral Daily KEISHA Newberry     • atorvastatin  40 mg Oral Daily KEISHA Newberry     • benzonatate  200 mg Oral TID KEISHA Merida     • bisacodyl  10 mg Rectal Daily PRN Valerie Lee MD     • carvedilol  12 5 mg Oral BID With Meals KEISHA Newberry     • cholecalciferol  2,000 Units Oral Daily KEISHA Newberry     • vitamin B-12  500 mcg Oral Daily KEISHA Newberry     • dextromethorphan-guaiFENesin  10 mL Oral Q6H PRN KEISHA Newberry     • furosemide  40 mg Intravenous Q12H Grover Raymond MD     • gabapentin  100 mg Oral BID KEISHA Newberry     • heparin (porcine)  5,000 Units Subcutaneous Q8H [address] KEISHA Newberry     • hydrALAZINE  100 mg Oral TID Shun Mckeon MD     • hydrocodone-chlorpheniramine polistirex  5 mL Oral HS Valerie Lee MD     • insulin glargine  10 Units Subcutaneous HS Valerie Lee MD     • insulin lispro  1-5 Units Subcutaneous TID AC KEISHA Newberry     • insulin lispro  1-5 Units Subcutaneous 0200 KEISHA Newberry     • insulin lispro  1-5 Units Subcutaneous HS KEISHA Newberry     • ipratropium  0 5 mg Nebulization TID KEISHA Newberry     • isosorbide mononitrate  60 mg Oral Daily KEISHA Newberry     • labetalol  10 mg Intravenous Q6H PRN Bridgette Carrillo PA-C     • levalbuterol  0 63 mg Nebulization Q4H PRN KEISHA Newberry     • levalbuterol  1 25 mg Nebulization TID KEISHA Newberry      And   • sodium chloride  3 mL Nebulization TID KEISHA Newberry     • nystatin   Topical BID KEISHA Shirley • ondansetron  4 mg Intravenous Q6H PRN KEISHA Newberry     • pantoprazole  40 mg Oral Early Morning KEISHA Newberry     • polyethylene glycol  17 g Oral BID Tasha Brown DO     • repaglinide  0 5 mg Oral TID AC Chester Bob MD     • senna  2 tablet Oral HS KEISHA Newberry     • tamsulosin  0 4 mg Oral Daily With Kaia Alberts MD          Today, Patient Was Seen By: Chester Bob MD    **Please Note: This note may have been constructed using a voice recognition system  **

## 2022-12-07 NOTE — ASSESSMENT & PLAN NOTE
Lab Results   Component Value Date    EGFR 14 12/07/2022    EGFR 15 12/06/2022    EGFR 15 12/05/2022    CREATININE 3 50 (H) 12/07/2022    CREATININE 3 22 (H) 12/06/2022    CREATININE 3 28 (H) 12/05/2022     History of CKD 4, baseline creatinine appears to be around 1 5-1 9 in past 2 years in care everywhere  Creatinine during previous hospitalization ranged in 2 4-2 8  Admission creatinine was 2 8 which peaked at 3 9  · Possible CKD progression  · Urinalysis was bland  · Received IV Lasix 40 mg in ED  Patient also initially received IV diuretics and was later transitioned to [facility] which have been on hold  · Patient received IV fluids from 11/30 for a couple of days  · Renal ultrasound showed no hydronephrosis moderate left renal atrophy  · Nephrology input appreciated  · No indication for dialysis at the current time  Continue conservative kidney management plan given his advanced age per nephrology    · Nutritional supplements were changed from Glucerna to Nepro  · CAT scan of the abdomen and pelvis on 12/5/2022 showed bilateral pleural effusions  · Chest x-ray from 12 5 showing persistent extensive bilateral consolidation without any effusion or pneumothorax  · Trial of Lasix with albumin in the setting of fluid overload

## 2022-12-07 NOTE — PROGRESS NOTES
Progress Note - Infectious Disease   Christel Dominguez 80 y o  male MRN: [id number]  Unit/Bed#: 207 [facility] Encounter: [id number]      Impression/Plan:    1  Recurrent SIRS-fever and leukocytosis  The patient has had intermittent fevers despite 14 days of antibiotics, which were stopped 11/29; suspect this is due to recurrent aspiration pneumonitis  His hypoxia is likely multifactorial, due to aspiration in setting of dysmotility of the esophagus, pulmonary edema, possible pneumonia  The patient received more than a sufficient course of antibiotics for pneumonia (14 days)  Fevers overall improving, WBC count also trending down  Further antibiotics will not prevent aspiration or the development of infection if aspiration occurs  -continue monitoring off antibiotics    -Consider re-discussion with daughter about PEG placement, although will not completely eliminate the risk of aspiration  2  Acute hypoxic respiratory failure  Likely due to pulmonary edema, recurrent aspiration pneumonitis  May have had component of aspiration pneumonia but has received sufficient amount of antibiotics for this  Remains stable on 2-3L NC    -monitor O2   -aspiration precautions    3  SHAHID on CKD  Creatinine/BUN worsening after admission, continues to fluctuate  Nephrology is following  4  Esophageal dysmotility  Risk factor for recurrent aspiration  GI recommended PEG during prior admission, but daughter would like to hold off on this  -Aspiration precautions   -consider discussion of a PEG    5  Acute on chronic diastolic heart failure  Volume management per nephrology    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, 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  May be causing some of the esophageal dysmotility and abdominal pain the patient is having    -further work up per primary    Above management plan discussed with the patient at bedside  There are no current ID issues   ID will sign off at this time, please call with questions  Antibiotics:  Status post 15 days Ceftriaxonemetronidazole-->Cefepime  Off antibiotics day 8    Subjective:  No overnight events noted  The patient is afebrile, WBC count overall improving  Respiratory status relatively stable on 2-3L NC O2  He reports a nonproductive cough, denies shortness of breath, abdominal pain  Appetite is fair  Objective:  Vitals:  Temp:  [97 5 °F (36 4 °C)-99 7 °F (37 6 °C)] 98 9 °F (37 2 °C)  HR:  [61-66] 61  Resp:  [14-18] 18  BP: (129-160)/(48-66) 134/52  SpO2:  [90 %-94 %] 92 %  Temp (24hrs), Av 9 °F (37 2 °C), Min:97 5 °F (36 4 °C), Max:99 7 °F (37 6 °C)  Current: Temperature: 98 9 °F (37 2 °C)    Physical Exam:   General: Chronically debilitated  Head: normocephalic, atraumatic  Mouth: no oral or pharyngeal lesions   Neck: trachea midline   CV: RRR, no murmurs   Lungs: decreased breath sounds bilaterally   Abdomen: no distension or tenderness to palpation   Extremities: generalized edema  Skin: no rashes, erythema   Neuro: more awake today, answering questions appropriately  Psych: calm, cooperative    Labs: All pertinent labs and imaging studies were personally reviewed  Results from last 7 days   Lab Units 22  0539 22  0551 22  0530   WBC Thousand/uL 11 84* 11 63* 13 91*   HEMOGLOBIN g/dL 7 6* 8 0* 7 8*   PLATELETS Thousands/uL 263 267 292     Results from last 7 days   Lab Units 22  0539 22  0551 22  0530   SODIUM mmol/L 141 139 144   POTASSIUM mmol/L 5 0 4 8 4 6   CHLORIDE mmol/L 102 103 105   CO2 mmol/L 32 30 32   BUN mg/dL 119* 113* 113*   CREATININE mg/dL 3 50* 3 22* 3 28*   EGFR ml/min/1 73sq m 14 15 15   CALCIUM mg/dL 8 4 8 6 8 7     Results from last 7 days   Lab Units 22  0539   PROCALCITONIN ng/ml 0 34*                   Micro:  Results from last 7 days   Lab Units 22  1043 22  1145   BLOOD CULTURE  No Growth After 5 Days  No Growth After 5 Days    --    URINE CULTURE   --  20,000-29,000 cfu/ml Enterococcus  faecium VRE*  <10,000 cfu/ml Candida albicans*       Imaging:  Pertinent imaging in PACS personally reviewed  CXR 12/2 with   waxing and waning airspace opacities with stable pleural effusions

## 2022-12-08 PROBLEM — Z71.89 GOALS OF CARE, COUNSELING/DISCUSSION: Status: ACTIVE | Noted: 2022-12-08

## 2022-12-08 LAB
ALBUMIN SERPL BCP-MCNC: 2.3 G/DL (ref 3.5–5)
ANION GAP SERPL CALCULATED.3IONS-SCNC: 7 MMOL/L (ref 4–13)
BASOPHILS # BLD AUTO: 0.04 THOUSANDS/ÂΜL (ref 0–0.1)
BASOPHILS NFR BLD AUTO: 0 % (ref 0–1)
BUN SERPL-MCNC: 127 MG/DL (ref 5–25)
CALCIUM SERPL-MCNC: 8.4 MG/DL (ref 8.3–10.1)
CHLORIDE SERPL-SCNC: 101 MMOL/L (ref 96–108)
CO2 SERPL-SCNC: 33 MMOL/L (ref 21–32)
CREAT SERPL-MCNC: 3.54 MG/DL (ref 0.6–1.3)
EOSINOPHIL # BLD AUTO: 0.37 THOUSAND/ÂΜL (ref 0–0.61)
EOSINOPHIL NFR BLD AUTO: 3 % (ref 0–6)
ERYTHROCYTE [DISTWIDTH] IN BLOOD BY AUTOMATED COUNT: 16.2 % (ref 11.6–15.1)
GFR SERPL CREATININE-BSD FRML MDRD: 14 ML/MIN/1.73SQ M
GLUCOSE SERPL-MCNC: 125 MG/DL (ref 65–140)
GLUCOSE SERPL-MCNC: 129 MG/DL (ref 65–140)
GLUCOSE SERPL-MCNC: 134 MG/DL (ref 65–140)
GLUCOSE SERPL-MCNC: 136 MG/DL (ref 65–140)
GLUCOSE SERPL-MCNC: 136 MG/DL (ref 65–140)
GLUCOSE SERPL-MCNC: 179 MG/DL (ref 65–140)
HCT VFR BLD AUTO: 24.3 % (ref 36.5–49.3)
HGB BLD-MCNC: 7.6 G/DL (ref 12–17)
IMM GRANULOCYTES # BLD AUTO: 0.12 THOUSAND/UL (ref 0–0.2)
IMM GRANULOCYTES NFR BLD AUTO: 1 % (ref 0–2)
LYMPHOCYTES # BLD AUTO: 1.07 THOUSANDS/ÂΜL (ref 0.6–4.47)
LYMPHOCYTES NFR BLD AUTO: 9 % (ref 14–44)
MCH RBC QN AUTO: 29.7 PG (ref 26.8–34.3)
MCHC RBC AUTO-ENTMCNC: 31.3 G/DL (ref 31.4–37.4)
MCV RBC AUTO: 95 FL (ref 82–98)
MONOCYTES # BLD AUTO: 0.82 THOUSAND/ÂΜL (ref 0.17–1.22)
MONOCYTES NFR BLD AUTO: 7 % (ref 4–12)
NEUTROPHILS # BLD AUTO: 8.99 THOUSANDS/ÂΜL (ref 1.85–7.62)
NEUTS SEG NFR BLD AUTO: 80 % (ref 43–75)
NRBC BLD AUTO-RTO: 0 /100 WBCS
PLATELET # BLD AUTO: 240 THOUSANDS/UL (ref 149–390)
PMV BLD AUTO: 11.9 FL (ref 8.9–12.7)
POTASSIUM SERPL-SCNC: 4.7 MMOL/L (ref 3.5–5.3)
RBC # BLD AUTO: 2.56 MILLION/UL (ref 3.88–5.62)
SODIUM SERPL-SCNC: 141 MMOL/L (ref 135–147)
WBC # BLD AUTO: 11.41 THOUSAND/UL (ref 4.31–10.16)

## 2022-12-08 RX ORDER — POLYETHYLENE GLYCOL 3350 17 G/17G
17 POWDER, FOR SOLUTION ORAL 2 TIMES DAILY
Qty: 1020 G | Refills: 0 | Status: SHIPPED | OUTPATIENT
Start: 2022-12-08 | End: 2023-01-07

## 2022-12-08 RX ORDER — CARVEDILOL 12.5 MG/1
12.5 TABLET ORAL 2 TIMES DAILY WITH MEALS
Qty: 60 TABLET | Refills: 0 | Status: SHIPPED | OUTPATIENT
Start: 2022-12-08 | End: 2023-01-07

## 2022-12-08 RX ORDER — TAMSULOSIN HYDROCHLORIDE 0.4 MG/1
0.4 CAPSULE ORAL DAILY
Qty: 30 CAPSULE | Refills: 0 | Status: SHIPPED | OUTPATIENT
Start: 2022-12-08

## 2022-12-08 RX ORDER — GABAPENTIN 100 MG/1
100 CAPSULE ORAL 2 TIMES DAILY
Qty: 60 CAPSULE | Refills: 0 | Status: SHIPPED | OUTPATIENT
Start: 2022-12-08 | End: 2023-01-07

## 2022-12-08 RX ORDER — PANTOPRAZOLE SODIUM 40 MG/1
40 TABLET, DELAYED RELEASE ORAL
Qty: 30 TABLET | Refills: 0 | Status: SHIPPED | OUTPATIENT
Start: 2022-12-09

## 2022-12-08 RX ORDER — GUAIFENESIN/DEXTROMETHORPHAN 100-10MG/5
10 SYRUP ORAL EVERY 6 HOURS PRN
Qty: 118 ML | Refills: 0 | Status: SHIPPED | OUTPATIENT
Start: 2022-12-08

## 2022-12-08 RX ORDER — FUROSEMIDE 10 MG/ML
40 INJECTION INTRAMUSCULAR; INTRAVENOUS EVERY 12 HOURS
Status: COMPLETED | OUTPATIENT
Start: 2022-12-08 | End: 2022-12-08

## 2022-12-08 RX ORDER — LORAZEPAM 1 MG/1
1 TABLET ORAL EVERY 8 HOURS PRN
Qty: 10 TABLET | Refills: 0 | Status: SHIPPED | OUTPATIENT
Start: 2022-12-08 | End: 2022-12-18

## 2022-12-08 RX ORDER — AMLODIPINE BESYLATE 10 MG/1
10 TABLET ORAL DAILY
Qty: 30 TABLET | Refills: 0 | Status: SHIPPED | OUTPATIENT
Start: 2022-12-08 | End: 2023-01-07

## 2022-12-08 RX ORDER — REPAGLINIDE 0.5 MG/1
0.5 TABLET ORAL
Qty: 90 TABLET | Refills: 0 | Status: SHIPPED | OUTPATIENT
Start: 2022-12-08 | End: 2023-01-07

## 2022-12-08 RX ORDER — ATORVASTATIN CALCIUM 40 MG/1
40 TABLET, FILM COATED ORAL DAILY
Qty: 30 TABLET | Refills: 0 | Status: SHIPPED | OUTPATIENT
Start: 2022-12-08

## 2022-12-08 RX ORDER — FUROSEMIDE 20 MG/1
40 TABLET ORAL 2 TIMES DAILY
Qty: 60 TABLET | Refills: 0 | Status: SHIPPED | OUTPATIENT
Start: 2022-12-08

## 2022-12-08 RX ORDER — LEVALBUTEROL 1.25 MG/.5ML
1.25 SOLUTION, CONCENTRATE RESPIRATORY (INHALATION)
Qty: 120 ML | Refills: 0 | Status: SHIPPED | OUTPATIENT
Start: 2022-12-08

## 2022-12-08 RX ORDER — ONDANSETRON 4 MG/1
4 TABLET, FILM COATED ORAL EVERY 8 HOURS PRN
Qty: 20 TABLET | Refills: 0 | Status: SHIPPED | OUTPATIENT
Start: 2022-12-08

## 2022-12-08 RX ORDER — BENZONATATE 100 MG/1
100 CAPSULE ORAL 3 TIMES DAILY
Qty: 60 CAPSULE | Refills: 0 | Status: SHIPPED | OUTPATIENT
Start: 2022-12-08

## 2022-12-08 RX ORDER — SODIUM CHLORIDE FOR INHALATION 0.9 %
3 VIAL, NEBULIZER (ML) INHALATION
Qty: 120 ML | Refills: 0 | Status: SHIPPED | OUTPATIENT
Start: 2022-12-08

## 2022-12-08 RX ORDER — ISOSORBIDE MONONITRATE 60 MG/1
60 TABLET, EXTENDED RELEASE ORAL DAILY
Qty: 30 TABLET | Refills: 0 | Status: SHIPPED | OUTPATIENT
Start: 2022-12-08

## 2022-12-08 RX ORDER — BISACODYL 10 MG
10 SUPPOSITORY, RECTAL RECTAL DAILY PRN
Qty: 12 SUPPOSITORY | Refills: 0 | Status: SHIPPED | OUTPATIENT
Start: 2022-12-08

## 2022-12-08 RX ORDER — OXYCODONE HYDROCHLORIDE 5 MG/1
5 TABLET ORAL EVERY 8 HOURS PRN
Qty: 10 TABLET | Refills: 0 | Status: SHIPPED | OUTPATIENT
Start: 2022-12-08 | End: 2022-12-18

## 2022-12-08 RX ORDER — ACETAMINOPHEN 325 MG/1
650 TABLET ORAL EVERY 6 HOURS PRN
Qty: 20 TABLET | Refills: 0 | Status: SHIPPED | OUTPATIENT
Start: 2022-12-08

## 2022-12-08 RX ORDER — SENNOSIDES 8.6 MG
17.2 TABLET ORAL
Qty: 60 TABLET | Refills: 0 | Status: SHIPPED | OUTPATIENT
Start: 2022-12-08 | End: 2023-01-07

## 2022-12-08 RX ORDER — ALBUMIN (HUMAN) 12.5 G/50ML
12.5 SOLUTION INTRAVENOUS 2 TIMES DAILY
Status: COMPLETED | OUTPATIENT
Start: 2022-12-08 | End: 2022-12-08

## 2022-12-08 RX ORDER — HYDRALAZINE HYDROCHLORIDE 100 MG/1
100 TABLET, FILM COATED ORAL 2 TIMES DAILY
Qty: 60 TABLET | Refills: 0 | Status: SHIPPED | OUTPATIENT
Start: 2022-12-08

## 2022-12-08 RX ADMIN — FUROSEMIDE 40 MG: 10 INJECTION, SOLUTION INTRAMUSCULAR; INTRAVENOUS at 19:25

## 2022-12-08 RX ADMIN — HYDRALAZINE HYDROCHLORIDE 100 MG: 25 TABLET ORAL at 09:50

## 2022-12-08 RX ADMIN — CYANOCOBALAMIN TAB 500 MCG 500 MCG: 500 TAB at 09:49

## 2022-12-08 RX ADMIN — AMLODIPINE BESYLATE 10 MG: 10 TABLET ORAL at 09:50

## 2022-12-08 RX ADMIN — CARVEDILOL 12.5 MG: 12.5 TABLET, FILM COATED ORAL at 17:49

## 2022-12-08 RX ADMIN — HEPARIN SODIUM 5000 UNITS: 5000 INJECTION INTRAVENOUS; SUBCUTANEOUS at 13:53

## 2022-12-08 RX ADMIN — Medication 2000 UNITS: at 09:50

## 2022-12-08 RX ADMIN — NYSTATIN 1 APPLICATION: 100000 POWDER TOPICAL at 18:37

## 2022-12-08 RX ADMIN — POLYETHYLENE GLYCOL 3350 17 G: 17 POWDER, FOR SOLUTION ORAL at 22:13

## 2022-12-08 RX ADMIN — BENZONATATE 200 MG: 100 CAPSULE ORAL at 09:49

## 2022-12-08 RX ADMIN — BENZONATATE 200 MG: 100 CAPSULE ORAL at 17:49

## 2022-12-08 RX ADMIN — IPRATROPIUM BROMIDE 0.5 MG: 0.5 SOLUTION RESPIRATORY (INHALATION) at 20:55

## 2022-12-08 RX ADMIN — SENNOSIDES 17.2 MG: 8.6 TABLET, FILM COATED ORAL at 22:12

## 2022-12-08 RX ADMIN — ISOSORBIDE MONONITRATE 60 MG: 60 TABLET, EXTENDED RELEASE ORAL at 09:50

## 2022-12-08 RX ADMIN — ISODIUM CHLORIDE 3 ML: 0.03 SOLUTION RESPIRATORY (INHALATION) at 07:36

## 2022-12-08 RX ADMIN — ISODIUM CHLORIDE 3 ML: 0.03 SOLUTION RESPIRATORY (INHALATION) at 13:50

## 2022-12-08 RX ADMIN — GABAPENTIN 100 MG: 100 CAPSULE ORAL at 09:50

## 2022-12-08 RX ADMIN — HYDRALAZINE HYDROCHLORIDE 100 MG: 25 TABLET ORAL at 22:12

## 2022-12-08 RX ADMIN — CARVEDILOL 12.5 MG: 12.5 TABLET, FILM COATED ORAL at 09:49

## 2022-12-08 RX ADMIN — ATORVASTATIN CALCIUM 40 MG: 40 TABLET, FILM COATED ORAL at 09:50

## 2022-12-08 RX ADMIN — HEPARIN SODIUM 5000 UNITS: 5000 INJECTION INTRAVENOUS; SUBCUTANEOUS at 05:38

## 2022-12-08 RX ADMIN — LEVALBUTEROL HYDROCHLORIDE 1.25 MG: 1.25 SOLUTION, CONCENTRATE RESPIRATORY (INHALATION) at 13:50

## 2022-12-08 RX ADMIN — ISODIUM CHLORIDE 3 ML: 0.03 SOLUTION RESPIRATORY (INHALATION) at 20:55

## 2022-12-08 RX ADMIN — NYSTATIN 1 APPLICATION: 100000 POWDER TOPICAL at 09:52

## 2022-12-08 RX ADMIN — IPRATROPIUM BROMIDE 0.5 MG: 0.5 SOLUTION RESPIRATORY (INHALATION) at 07:36

## 2022-12-08 RX ADMIN — FUROSEMIDE 40 MG: 10 INJECTION, SOLUTION INTRAMUSCULAR; INTRAVENOUS at 09:50

## 2022-12-08 RX ADMIN — POLYETHYLENE GLYCOL 3350 17 G: 17 POWDER, FOR SOLUTION ORAL at 09:49

## 2022-12-08 RX ADMIN — BENZONATATE 200 MG: 100 CAPSULE ORAL at 22:12

## 2022-12-08 RX ADMIN — LEVALBUTEROL HYDROCHLORIDE 1.25 MG: 1.25 SOLUTION, CONCENTRATE RESPIRATORY (INHALATION) at 20:55

## 2022-12-08 RX ADMIN — REPAGLINIDE 0.5 MG: 1 TABLET ORAL at 09:49

## 2022-12-08 RX ADMIN — ALBUMIN (HUMAN) 12.5 G: 0.25 INJECTION, SOLUTION INTRAVENOUS at 07:51

## 2022-12-08 RX ADMIN — Medication 5 ML: at 22:11

## 2022-12-08 RX ADMIN — REPAGLINIDE 0.5 MG: 1 TABLET ORAL at 12:43

## 2022-12-08 RX ADMIN — IPRATROPIUM BROMIDE 0.5 MG: 0.5 SOLUTION RESPIRATORY (INHALATION) at 13:50

## 2022-12-08 RX ADMIN — LEVALBUTEROL HYDROCHLORIDE 1.25 MG: 1.25 SOLUTION, CONCENTRATE RESPIRATORY (INHALATION) at 07:36

## 2022-12-08 RX ADMIN — PANTOPRAZOLE SODIUM 40 MG: 40 TABLET, DELAYED RELEASE ORAL at 05:38

## 2022-12-08 RX ADMIN — GABAPENTIN 100 MG: 100 CAPSULE ORAL at 17:49

## 2022-12-08 RX ADMIN — HYDRALAZINE HYDROCHLORIDE 100 MG: 25 TABLET ORAL at 17:49

## 2022-12-08 RX ADMIN — ALBUMIN (HUMAN) 12.5 G: 0.25 INJECTION, SOLUTION INTRAVENOUS at 22:12

## 2022-12-08 RX ADMIN — REPAGLINIDE 0.5 MG: 1 TABLET ORAL at 17:49

## 2022-12-08 RX ADMIN — TAMSULOSIN HYDROCHLORIDE 0.4 MG: 0.4 CAPSULE ORAL at 17:49

## 2022-12-08 RX ADMIN — INSULIN GLARGINE 10 UNITS: 100 INJECTION, SOLUTION SUBCUTANEOUS at 22:11

## 2022-12-08 RX ADMIN — INSULIN LISPRO 1 UNITS: 100 INJECTION, SOLUTION INTRAVENOUS; SUBCUTANEOUS at 22:11

## 2022-12-08 RX ADMIN — HEPARIN SODIUM 5000 UNITS: 5000 INJECTION INTRAVENOUS; SUBCUTANEOUS at 22:11

## 2022-12-08 NOTE — PLAN OF CARE
Problem: RESPIRATORY - ADULT  Goal: Achieves optimal ventilation and oxygenation  Description: INTERVENTIONS:  - Assess for changes in respiratory status  - Assess for changes in mentation and behavior  - Position to facilitate oxygenation and minimize respiratory effort  - Oxygen administered by appropriate delivery if ordered  - Initiate smoking cessation education as indicated  - Encourage broncho-pulmonary hygiene including cough, deep breathe, Incentive Spirometry  - Assess the need for suctioning and aspirate as needed  - Assess and instruct to report SOB or any respiratory difficulty  - Respiratory Therapy support as indicated  Outcome: Progressing     Problem: Prexisting or High Potential for Compromised Skin Integrity  Goal: Skin integrity is maintained or improved  Description: INTERVENTIONS:  - Identify patients at risk for skin breakdown  - Assess and monitor skin integrity  - Assess and monitor nutrition and hydration status  - Monitor labs   - Assess for incontinence   - Turn and reposition patient  - Assist with mobility/ambulation  - Relieve pressure over bony prominences  - Avoid friction and shearing  - Provide appropriate hygiene as needed including keeping skin clean and dry  - Evaluate need for skin moisturizer/barrier cream  - Collaborate with interdisciplinary team   - Patient/family teaching  - Consider wound care consult   Outcome: Progressing     Problem: MOBILITY - ADULT  Goal: Maintain or return to baseline ADL function  Description: INTERVENTIONS:  -  Assess patient's ability to carry out ADLs; assess patient's baseline for ADL function and identify physical deficits which impact ability to perform ADLs (bathing, care of mouth/teeth, toileting, grooming, dressing, etc )  - Assess/evaluate cause of self-care deficits   - Assess range of motion  - Assess patient's mobility; develop plan if impaired  - Assess patient's need for assistive devices and provide as appropriate  - Encourage maximum independence but intervene and supervise when necessary  - Involve family in performance of ADLs  - Assess for home care needs following discharge   - Consider OT consult to assist with ADL evaluation and planning for discharge  - Provide patient education as appropriate  Outcome: Progressing  Goal: Maintains/Returns to pre admission functional level  Description: INTERVENTIONS:  - Perform BMAT or MOVE assessment daily    - Set and communicate daily mobility goal to care team and patient/family/caregiver  - Collaborate with rehabilitation services on mobility goals if consulted  - Perform Range of Motion 2 times a day  - Reposition patient every 2 hours    - Dangle patient 2 times a day  - Stand patient 2 times a day  - Ambulate patient 2 times a day  - Out of bed to chair 2 times a day   - Out of bed for meals 2 times a day  - Out of bed for toileting  - Record patient progress and toleration of activity level   Outcome: Progressing     Problem: Potential for Falls  Goal: Patient will remain free of falls  Description: INTERVENTIONS:  - Educate patient/family on patient safety including physical limitations  - Instruct patient to call for assistance with activity   - Consult OT/PT to assist with strengthening/mobility   - Keep Call bell within reach  - Keep bed low and locked with side rails adjusted as appropriate  - Keep care items and personal belongings within reach  - Initiate and maintain comfort rounds  - Make Fall Risk Sign visible to staff  - Offer Toileting every 2 Hours, in advance of need  - Initiate/Maintain bed alarm  - Obtain necessary fall risk management equipment: alarms  - Apply yellow socks and bracelet for high fall risk patients  - Consider moving patient to room near nurses station  Outcome: Progressing     Problem: PAIN - ADULT  Goal: Verbalizes/displays adequate comfort level or baseline comfort level  Description: Interventions:  - Encourage patient to monitor pain and request assistance  - Assess pain using appropriate pain scale  - Administer analgesics based on type and severity of pain and evaluate response  - Implement non-pharmacological measures as appropriate and evaluate response  - Consider cultural and social influences on pain and pain management  - Notify physician/advanced practitioner if interventions unsuccessful or patient reports new pain  Outcome: Progressing     Problem: INFECTION - ADULT  Goal: Absence or prevention of progression during hospitalization  Description: INTERVENTIONS:  - Assess and monitor for signs and symptoms of infection  - Monitor lab/diagnostic results  - Monitor all insertion sites, i e  indwelling lines, tubes, and drains  - Monitor endotracheal if appropriate and nasal secretions for changes in amount and color  - [place] appropriate cooling/warming therapies per order  - Administer medications as ordered  - Instruct and encourage patient and family to use good hand hygiene technique  - Identify and instruct in appropriate isolation precautions for identified infection/condition  Outcome: Progressing  Goal: Absence of fever/infection during neutropenic period  Description: INTERVENTIONS:  - Monitor WBC    Outcome: Progressing     Problem: SAFETY ADULT  Goal: Maintain or return to baseline ADL function  Description: INTERVENTIONS:  -  Assess patient's ability to carry out ADLs; assess patient's baseline for ADL function and identify physical deficits which impact ability to perform ADLs (bathing, care of mouth/teeth, toileting, grooming, dressing, etc )  - Assess/evaluate cause of self-care deficits   - Assess range of motion  - Assess patient's mobility; develop plan if impaired  - Assess patient's need for assistive devices and provide as appropriate  - Encourage maximum independence but intervene and supervise when necessary  - Involve family in performance of ADLs  - Assess for home care needs following discharge   - Consider OT consult to assist with ADL evaluation and planning for discharge  - Provide patient education as appropriate  Outcome: Progressing  Goal: Maintains/Returns to pre admission functional level  Description: INTERVENTIONS:  - Perform BMAT or MOVE assessment daily    - Set and communicate daily mobility goal to care team and patient/family/caregiver  - Collaborate with rehabilitation services on mobility goals if consulted  - Perform Range of Motion 2 times a day  - Reposition patient every 2 hours    - Dangle patient 2 times a day  - Stand patient 2 times a day  - Ambulate patient 2 times a day  - Out of bed to chair 2 times a day   - Out of bed for meals 2 times a day  - Out of bed for toileting  - Record patient progress and toleration of activity level   Outcome: Progressing  Goal: Patient will remain free of falls  Description: INTERVENTIONS:  - Educate patient/family on patient safety including physical limitations  - Instruct patient to call for assistance with activity   - Consult OT/PT to assist with strengthening/mobility   - Keep Call bell within reach  - Keep bed low and locked with side rails adjusted as appropriate  - Keep care items and personal belongings within reach  - Initiate and maintain comfort rounds  - Make Fall Risk Sign visible to staff  - Offer Toileting every 2 Hours, in advance of need  - Initiate/Maintain bed alarm  - Obtain necessary fall risk management equipment: alarm  - Apply yellow socks and bracelet for high fall risk patients  - Consider moving patient to room near nurses station  Outcome: Progressing     Problem: DISCHARGE PLANNING  Goal: Discharge to home or other facility with appropriate resources  Description: INTERVENTIONS:  - Identify barriers to discharge w/patient and caregiver  - Arrange for needed discharge resources and transportation as appropriate  - Identify discharge learning needs (meds, wound care, etc )  - Arrange for interpretive services to assist at discharge as needed  - Refer to Case Management Department for coordinating discharge planning if the patient needs post-hospital services based on physician/advanced practitioner order or complex needs related to functional status, cognitive ability, or social support system  Outcome: Progressing     Problem: Knowledge Deficit  Goal: Patient/family/caregiver demonstrates understanding of disease process, treatment plan, medications, and discharge instructions  Description: Complete learning assessment and assess knowledge base  Interventions:  - Provide teaching at level of understanding  - Provide teaching via preferred learning methods  Outcome: Progressing     Problem: Nutrition/Hydration-ADULT  Goal: Nutrient/Hydration intake appropriate for improving, restoring or maintaining nutritional needs  Description: Monitor and assess patient's nutrition/hydration status for malnutrition  Collaborate with interdisciplinary team and initiate plan and interventions as ordered  Monitor patient's weight and dietary intake as ordered or per policy  Utilize nutrition screening tool and intervene as necessary  Determine patient's food preferences and provide high-protein, high-caloric foods as appropriate       INTERVENTIONS:  - Monitor oral intake, urinary output, labs, and treatment plans  - Assess nutrition and hydration status and recommend course of action  - Evaluate amount of meals eaten  - Assist patient with eating if necessary   - Allow adequate time for meals  - Recommend/ encourage appropriate diets, oral nutritional supplements, and vitamin/mineral supplements  - Order, calculate, and assess calorie counts as needed  - Assess need for intravenous fluids  - Provide nutrition/hydration education as appropriate  - Include patient/family/caregiver in decisions related to nutrition  Outcome: Progressing

## 2022-12-08 NOTE — PROGRESS NOTES
[address] [facility] NOTE   Lorena Kasper 80 y o  male MRN: [id number]  Unit/Bed#: [address]  Encounter: [id number]  Reason for Consult: SHAHID    ASSESSMENT and PLAN:  80year old with history of CKD admitted with shortness of breath  We are consulted for evaluation management of SHAHID  # Acute Kidney Injury   - Baseline creatinine 1 6-2 0  - Admission creatinine 2 8 with peak of 3 9 and currently 3 5  - Etiology was thought to be due to over-diuresis and a component of urinary retention  - Currently appears volume overloaded again probably in setting of third spacing from hypoalbuminemia  - Give IV albumin 12 5 g followed by IV Lasix 40 mg x2 today   - Ayala catheter is in place  - BUN is elevated and he has asterixis concerning for uremia  - Changed nutritional supplements from Glucerna to Nepro  - Consider conservative kidney management given advanced age  - Family meeting today to discuss goals of care    # Chronic kidney disease stage 3  - Etiology, most likely cardiorenal syndrome and age-related nephron loss    # Hypertension  - Current regimen includes hydralazine 100 mg t i d , amlodipine 10 mg daily, Coreg 12 5 mg b i d   Imdur 60 mg daily  - Patient is now off doxazosin and switch to Flomax per Urology    # Volume  - CT scan admission was consistent with pulmonary edema with moderate size pleural effusion left more than right status post IV and oral diuretics  - Repeat CT scan 12/06 shows bilateral pleural effusions  - IV diuresis undercover of IV albumin today    # Anemia   - Continue to monitor hemoglobin  - Transfuse to keep hemoglobin more than 7    # Electrolytes/Acid base status   - Mild elevation of bicarbonate in setting of diuresis    # Left upper quadrant mass  - Renal ultrasound suggestive of solid hypoechoic mass in left upper quadrant medial to spleen  - CT abdomen without IV contrast ompleted showing gastric fundus mass  - Okay to perform MRI with gadolinium if needed for further evaluation    DISPOSITION:  Currently appears volume overloaded again probably in setting of third spacing from hypoalbuminemia  Give IV albumin 12 5 g followed by IV Lasix 40 mg x2 today  SUBJECTIVE / 24H INTERVAL HISTORY:  Urine output recorded as 900 cc  Denies dyspnea  Review of Systems   Constitutional: Negative for chills and fever  HENT: Negative for ear pain and sore throat  Eyes: Negative for pain and visual disturbance  Respiratory: Negative for cough and shortness of breath  Cardiovascular: Negative for chest pain and palpitations  Gastrointestinal: Negative for abdominal pain and vomiting  Genitourinary: Negative for dysuria and hematuria  Musculoskeletal: Negative for arthralgias and back pain  Skin: Negative for color change and rash  Neurological: Negative for seizures and syncope  All other systems reviewed and are negative  OBJECTIVE:  Current Weight: Weight - Scale: 83 3 kg (183 lb 11 2 oz)  Vitals:    12/07/22 2328 12/08/22 0112 12/08/22 0539 12/08/22 0600   BP: (!) 184/65 (!) 129/48 149/59    Pulse: 61 59 61    Resp: 20 20 20    Temp:       TempSrc:       SpO2: 93% 94% 94%    Weight:    83 3 kg (183 lb 11 2 oz)   Height:           Intake/Output Summary (Last 24 hours) at 12/8/2022 0726  Last data filed at 12/8/2022 0615  Gross per 24 hour   Intake --   Output 900 ml   Net -900 ml     Physical Exam  Vitals and nursing note reviewed  Constitutional:       General: He is not in acute distress  Appearance: He is well-developed  HENT:      Head: Normocephalic and atraumatic  Eyes:      Conjunctiva/sclera: Conjunctivae normal    Cardiovascular:      Rate and Rhythm: Normal rate and regular rhythm  Pulses: Normal pulses  Heart sounds: Normal heart sounds  No murmur heard  Pulmonary:      Effort: Pulmonary effort is normal  No respiratory distress  Comments: Decreased breath sounds bilaterally at bases  Abdominal:      General: Abdomen is flat  Bowel sounds are normal       Palpations: Abdomen is soft  Tenderness: There is no abdominal tenderness  Musculoskeletal:         General: No swelling  Cervical back: Neck supple  Comments: Bilateral upper extremity edema   Skin:     General: Skin is warm and dry  Capillary Refill: Capillary refill takes less than 2 seconds  Neurological:      Mental Status: He is alert     Psychiatric:         Mood and Affect: Mood normal        Medications:    Current Facility-Administered Medications:   •  acetaminophen (TYLENOL) tablet 650 mg, 650 mg, Oral, Q6H PRN, MigdaliaiAPOLONIA PateNP, 650 mg at 12/05/22 2133  •  albumin human (FLEXBUMIN) 25 % injection 12 5 g, 12 5 g, Intravenous, BID, Marianne Cavazos MD, 12 5 g at 12/07/22 2226  •  amLODIPine (NORVASC) tablet 10 mg, 10 mg, Oral, Daily, APOLONIA NewberryNP, 10 mg at 12/07/22 7981  •  atorvastatin (LIPITOR) tablet 40 mg, 40 mg, Oral, Daily, KEISHA Newberry, 40 mg at 12/07/22 4940  •  benzonatate (TESSALON PERLES) capsule 200 mg, 200 mg, Oral, TID, Emogene Agreste, CRNP, 200 mg at 12/07/22 2227  •  bisacodyl (DULCOLAX) rectal suppository 10 mg, 10 mg, Rectal, Daily PRN, Katie Gallo MD, 10 mg at 12/05/22 1946  •  carvedilol (COREG) tablet 12 5 mg, 12 5 mg, Oral, BID With Meals, KEISHA Newberry, 12 5 mg at 12/07/22 1640  •  cholecalciferol (VITAMIN D3) tablet 2,000 Units, 2,000 Units, Oral, Daily, APOLONIA NewberryNP, 2,000 Units at 12/07/22 9846  •  cyanocobalamin (VITAMIN B-12) tablet 500 mcg, 500 mcg, Oral, Daily, APOLONIA NewberryNP, 500 mcg at 12/07/22 9012  •  dextromethorphan-guaiFENesin (ROBITUSSIN DM) oral syrup 10 mL, 10 mL, Oral, Q6H PRN, APOLONIA NewberryNP, 10 mL at 12/07/22 1639  •  gabapentin (NEURONTIN) capsule 100 mg, 100 mg, Oral, BID, KEISHA Newberry, 100 mg at 12/07/22 1700  •  heparin (porcine) subcutaneous injection 5,000 Units, 5,000 Units, Subcutaneous, Q8H [address], 5,000 Units at 12/08/22 0538 **AND** [CANCELED] Platelet count, , , Once, KEISHA Newberry  •  hydrALAZINE (APRESOLINE) tablet 100 mg, 100 mg, Oral, TID, Kajal Phan MD, 100 mg at 12/07/22 2236  •  hydrocodone-chlorpheniramine polistirex (TUSSIONEX) ER suspension 5 mL, 5 mL, Oral, HS, Nguyen Chan MD, 5 mL at 12/07/22 2226  •  insulin glargine (LANTUS) subcutaneous injection 10 Units 0 1 mL, 10 Units, Subcutaneous, HS, Nguyen Chan MD, 10 Units at 12/07/22 2225  •  insulin lispro (HumaLOG) 100 units/mL subcutaneous injection 1-5 Units, 1-5 Units, Subcutaneous, TID AC, 1 Units at 12/07/22 1640 **AND** Fingerstick Glucose (POCT), , , TID AC, KEISHA Newberry  •  insulin lispro (HumaLOG) 100 units/mL subcutaneous injection 1-5 Units, 1-5 Units, Subcutaneous, 0200, KEISHA Newberry, 1 Units at 12/03/22 0257  •  insulin lispro (HumaLOG) 100 units/mL subcutaneous injection 1-5 Units, 1-5 Units, Subcutaneous, HS, KEISHA Newberry, 1 Units at 12/06/22 2137  •  ipratropium (ATROVENT) 0 02 % inhalation solution 0 5 mg, 0 5 mg, Nebulization, TID, KEIHSA Newberry, 0 5 mg at 12/07/22 2039  •  isosorbide mononitrate (IMDUR) 24 hr tablet 60 mg, 60 mg, Oral, Daily, KEISHA Newberry, 60 mg at 12/07/22 7764  •  labetalol (NORMODYNE) injection 10 mg, 10 mg, Intravenous, Q6H PRN, Agusitna Schneider PA-C, 10 mg at 12/07/22 2338  •  levalbuterol (Lelia Bold) inhalation solution 0 63 mg, 0 63 mg, Nebulization, Q4H PRN, KEISHA Newberry, 0 63 mg at 12/01/22 0807  •  levalbuterol (Lelia Bold) inhalation solution 1 25 mg, 1 25 mg, Nebulization, TID, 1 25 mg at 12/07/22 2039 **AND** sodium chloride 0 9 % inhalation solution 3 mL, 3 mL, Nebulization, TID, KEISHA Newberry, 3 mL at 12/07/22 2039  •  nystatin (MYCOSTATIN) powder, , Topical, BID, KEISHA Newberry, 1 application at 75/24/70 1700  •  ondansetron (ZOFRAN) injection 4 mg, 4 mg, Intravenous, Q6H PRN, KEISHA Newberry, 4 mg at 12/05/22 1120  •  pantoprazole (PROTONIX) EC tablet 40 mg, 40 mg, Oral, Early Morning, KEISHA Newberry, 40 mg at 12/08/22 2356  •  polyethylene glycol (MIRALAX) packet 17 g, 17 g, Oral, BID, Tashaescobar Brown DO, 17 g at 12/07/22 2225  •  repaglinide (PRANDIN) tablet 0 5 mg, 0 5 mg, Oral, TID AC, Steve Yan MD, 0 5 mg at 12/07/22 1640  •  senna (SENOKOT) tablet 17 2 mg, 2 tablet, Oral, HS, KEISHA Newberry, 17 2 mg at 12/07/22 2227  •  tamsulosin (FLOMAX) capsule 0 4 mg, 0 4 mg, Oral, Daily With Michelle Woodall MD, 0 4 mg at 12/07/22 1640    Laboratory Results:  Results from last 7 days   Lab Units 12/08/22  0556 12/07/22  0539 12/06/22  0551 12/05/22  0530 12/04/22  0552 12/03/22  0533 12/02/22  0539   WBC Thousand/uL 11 41* 11 84* 11 63* 13 91*  --   --  13 15*   HEMOGLOBIN g/dL 7 6* 7 6* 8 0* 7 8*  --   --  8 0*   HEMATOCRIT % 24 3* 24 5* 25 9* 25 2*  --   --  25 3*   PLATELETS Thousands/uL 240 263 267 292  --   --  233   POTASSIUM mmol/L 4 7 5 0 4 8 4 6 4 8 4 9 4 7   CHLORIDE mmol/L 101 102 103 105 106 103 103   CO2 mmol/L 33* 32 30 32 31 32 31   BUN mg/dL 127* 119* 113* 113* 118* 119* 117*   CREATININE mg/dL 3 54* 3 50* 3 22* 3 28* 3 55* 3 78* 3 87*   CALCIUM mg/dL 8 4 8 4 8 6 8 7 8 6 8 6 8 3     Portions of the record may have been created with voice recognition software  Occasional wrong word or "sound a like" substitutions may have occurred due to the inherent limitations of voice recognition software  Read the chart carefully and recognize, using context, where substitutions have occurred  If you have any questions, please contact the dictating provider

## 2022-12-08 NOTE — CASE MANAGEMENT
Case Management Progress Note    Patient name Krystina Guajardo  [address] MRN [id number]  : 10/19/1929 Date 2022       LOS (days): 16  Geometric Mean LOS (GMLOS) (days): 5 20  Days to GMLOS:-10 5        OBJECTIVE:    Current admission status: Inpatient  Preferred Pharmacy:   [facility] [address] [facility], [address] [id number]  Phone: [phone] Fax: [phone]    Primary Care Provider: Mao Nichole PA-C    Primary Insurance: MEDICARE  Secondary Insurance: Chey 35    PROGRESS NOTE:    SW following to assist with DCP  Daughter requested SW explore options for placement anticipating plan for comfort care/hospice pending family decision including back to [facility] with as well as [facility]  Daughter said she will be considering skilled facility options in case Claud[facility] at [facility] accept back  SW spoke with [place] at [facility]  [place] requested clinical information be faxed to [facility] for review  Clinical faxed as requested ([phone])  SW spoke with Magy Ramirez from [facility]  Facility can accept pt for skilled comfort care  [facility] admissions will contact pt's daughter to discuss availability  SW will continue to follow to monitor progress and assist with planning as needed

## 2022-12-08 NOTE — PHYSICAL THERAPY NOTE
PT TREATMENT       12/08/22 1513   PT Last Visit   PT Visit Date 12/08/22   Note Type   Note Type Treatment   Pain Assessment   Pain Assessment Tool 0-10   Pain Score No Pain   Patient's Stated Pain Goal No pain   Hospital Pain Intervention(s) Rest   Multiple Pain Sites No   Restrictions/Precautions   Weight Bearing Precautions Per Order No   Other Precautions Bed Alarm; Chair Alarm; Fall Risk;Pain   General   Chart Reviewed Yes   Family/Caregiver Present No   Cognition   Overall Cognitive Status WFL   Arousal/Participation Cooperative   Orientation Level Oriented X4   Following Commands Follows multistep commands with increased time or repetition   Subjective   Subjective "I don't know what I can do today"   Bed Mobility   Supine to Sit 3  Moderate assistance   Additional items Assist x 1;Verbal cues   Sit to Supine 2  Maximal assistance   Additional items Assist x 1;Verbal cues   Transfers   Sit to Stand Unable to assess  (declining OOB today due to fatigue)   Balance   Static Sitting Fair -   Dynamic Sitting Poor +   Activity Tolerance   Activity Tolerance Patient limited by fatigue   Nurse Made Aware yes   Exercises   Neuro re-ed Pt able to perform supine/seated exercise including ankle pumps, LAQ, seated hip marches, hip abd x 10 reps bilat   Assessment   Prognosis Good   Problem List Decreased strength;Decreased range of motion;Decreased endurance;Decreased mobility; Impaired balance;Decreased safety awareness;Pain   Assessment Pt agreeable to PT session this afternoon despite fatigue  Able to sit at edge of bed x 5 minutes with Fair- sitting balance, intermittent assist to maintain balance  Able to perform exercises as mentioned above x 10 reps with intermittent breaks due to fatigue  Repositioned for comfort in bed with all needs within reach  The patient's AM-PAC Basic Mobility Inpatient Short Form Raw Score is 12   A Raw score of less than or equal to 16 suggests the patient may benefit from discharge to post-acute rehabilitation services  Please also refer to the recommendation of the Physical Therapist for safe discharge planning  Goals   Patient Goals to get better   Plan   Treatment/Interventions ADL retraining;Functional transfer training;LE strengthening/ROM; Therapeutic exercise; Endurance training;Bed mobility;Gait training;Spoke to nursing   Progress Slow progress, decreased activity tolerance   PT Frequency Other (Comment)  (5x/week)   Recommendation   PT Discharge Recommendation Post acute rehabilitation services   AM-PAC Basic Mobility Inpatient   Turning in Bed Without Bedrails 3   Lying on Back to Sitting on Edge of Flat Bed 2   Moving Bed to Chair 2   Standing Up From Chair 2   Walk in Room 2   Climb 3-5 Stairs 1   Basic Mobility Inpatient Raw Score 12   Basic Mobility Standardized Score 32 23   Turning Head Towards Sound 3   Follow Simple Instructions 3   Low Function Basic Mobility Raw Score 18   Low Function Basic Mobility Standardized Score 29 25   Highest Level Of Mobility   JH-HLM Goal 4: Move to chair/commode   JH-HLM Achieved 3: Sit at edge of bed   Education   Education Provided Mobility training;Home exercise program   Patient Explanation/teachback used; Reinforcement needed   End of Consult   Patient Position at End of Consult Supine;Bed/Chair alarm activated; All needs within reach   [facility] Number  Ally Briggs [id number]

## 2022-12-08 NOTE — ESCALATED TEAM TX
Team Meeting Note    Patient name Joe Thomas  Location 18 [facility]  [place] [address] MRN [id number]  : 10/19/1929 Date 2022       Team Meeting  Meeting Type: Tx Team Meeting  Initial Conference Date: 22    Team Members Present  Team Members Present: Physician,   Physician Team Member: Dr Mirian Sheikh MD and Dr Pia Hartmann MD (nephrologist)  Social Work Team Member: MIGUEL Stein    Patient/Family Present  Patient Present: No  Patient's Family Present: Yes  Family Relationship: Child  Child: Francisco Casarez (daughter) present, Hemalatha Christiansen (son) and Shawna Avila (daughter) on speaker phone    Care team meeting held earlier today with pt's children to discuss current medical issues, treatment options/barriers and options for future care  Physicians provided information on pt's medical condition and overall prognosis  Physicians answered family's questions  Due to advanced age and multiple medical issues conservative treatment was recommended  Dr Shayy Hopkins and SAMSON talked with family about option for comfort care/hospice  Family planning on talking about options together and will notify MD/SAMSON when decision is made on plan  Team offered ongoing support and assistance with planning  After meeting SAMSON talked with daughter, Luke Gray, further about hospice care options including inpatient hospice, hospice at skilled nursing facility and home hospice

## 2022-12-08 NOTE — CASE MANAGEMENT
Case Management Progress Note    Patient name Esdras Sky  Location 18 [place] [address] [place] MRN [id number]  : 10/19/1929 Date 2022       LOS (days): 16  Geometric Mean LOS (GMLOS) (days): 5 20  Days to GMLOS:-10 5        OBJECTIVE:    Current admission status: Inpatient  Preferred Pharmacy:   [facility] [address] [facility] [address], [address] [place] [id number]  Phone: [phone] Fax: [phone]    Primary Care Provider: Demi Phillips PA-C    Primary Insurance: MEDICARE  Secondary Insurance: Chey 35    PROGRESS NOTE:    SW following to assist with DCP  Call received from [facility]  [facility] has spoken to [facility] which is the hospice contracted with [facility]  SW will make referral to [facility] to assist with planning  [facility] planning on calling pt's daughter to discuss plans to accept pt back  [facility] as [facility] as possible in order to get medications  SW notified Dr Misbah Diaz of above  SW will continue to follow to assist with planning as needed

## 2022-12-08 NOTE — ASSESSMENT & PLAN NOTE
Wt Readings from Last 3 Encounters:   12/08/22 83 3 kg (183 lb 11 2 oz)   11/22/22 84 5 kg (186 lb 4 8 oz)     Patient received IV Lasix in recent admission  · Chest x-ray upon admission shows worsening pulmonary edema  · Received 40 milligrams of Lasix IV in the ED and was continued on IV Lasix  Then transitioned to [facility] which is currently on hold  · Left upper extremity venous Doppler showed no evidence of DVT  · CT chest upon admission showed pulmonary edema with moderate size bilateral pleural effusions left more than right  · Might need accept higher creatinine to maintain euvolemia    · Diuretics have been on hold in setting of elevated creatinine  · Chest x-ray from 12/5/2022 showed persistent extensive bilateral consolidation without any pleural effusion or pneumothorax  · CT abdomen pelvis showed bilateral pleural effusions  · Started back on Lasix 40 mg IV every 12 hours along with albumin 12 5 g every 12 hours  · Possible discharge on Lasix 40 mg p o  twice daily

## 2022-12-08 NOTE — PLAN OF CARE
Problem: RESPIRATORY - ADULT  Goal: Achieves optimal ventilation and oxygenation  Description: INTERVENTIONS:  - Assess for changes in respiratory status  - Assess for changes in mentation and behavior  - Position to facilitate oxygenation and minimize respiratory effort  - Oxygen administered by appropriate delivery if ordered  - Initiate smoking cessation education as indicated  - Encourage broncho-pulmonary hygiene including cough, deep breathe, Incentive Spirometry  - Assess the need for suctioning and aspirate as needed  - Assess and instruct to report SOB or any respiratory difficulty  - Respiratory Therapy support as indicated  Outcome: Progressing     Problem: MOBILITY - ADULT  Goal: Maintain or return to baseline ADL function  Description: INTERVENTIONS:  -  Assess patient's ability to carry out ADLs; assess patient's baseline for ADL function and identify physical deficits which impact ability to perform ADLs (bathing, care of mouth/teeth, toileting, grooming, dressing, etc )  - Assess/evaluate cause of self-care deficits   - Assess range of motion  - Assess patient's mobility; develop plan if impaired  - Assess patient's need for assistive devices and provide as appropriate  - Encourage maximum independence but intervene and supervise when necessary  - Involve family in performance of ADLs  - Assess for home care needs following discharge   - Consider OT consult to assist with ADL evaluation and planning for discharge  - Provide patient education as appropriate  Outcome: Progressing     Problem: INFECTION - ADULT  Goal: Absence of fever/infection during neutropenic period  Description: INTERVENTIONS:  - Monitor WBC    Outcome: Progressing

## 2022-12-08 NOTE — ASSESSMENT & PLAN NOTE
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  · Discussed with radiology previously and radiologist recommended getting MRI for further evaluation  · Per nephrology okay to obtain MRI with gadolinium using class 2 gadolinium agents  · Dr Willa Palacios with radiologist again  who recommended CT scan with p o  Contrast for further evaluation  Discussed with radiologist patient's issues with dysphagia and aspiration    NG-tube was placed for contrast administration and patient had a CAT scan of the abdomen pelvis  · Abdomen pelvis showed left upper quadrant mass which may be arising from the gastric fundus and tail of the pancreas without any evidence of invasion of adjacent structures or metastatic disease in the abdomen-CAT scan with contrast using pancreatic mass protocol or MRI was suggested  · Discussed goals of care and possible discharge on comfort care

## 2022-12-08 NOTE — ASSESSMENT & PLAN NOTE
Patient continues to have intermittent fevers with persistent elevation of white count despite receiving 14 days of antibiotics  Lauren Vallejo was treated with Rocephin and Flagyl during prior hospitalization and also received cefepime for 7 days during this hospitalization  ID Input appreciated    Monitor off antibiotics  Current SIRS likely secondary to aspiration pneumonitis  White count is improving and fever curve improving

## 2022-12-08 NOTE — ASSESSMENT & PLAN NOTE
Lab Results   Component Value Date    HGBA1C 5 7 (H) 11/16/2022       Recent Labs     12/08/22  0200 12/08/22  0712 12/08/22  1103 12/08/22  1559   POCGLU 136 134 129 136     Patient was discharged on Prandin t i d  With meals  · Continue Prandin, Humalog sliding scale with Accu-Cheks q a c  And HS  · Currently on diabetic pureed Diet with thin liquids  Continue Lantus 10 units q h s

## 2022-12-08 NOTE — PROGRESS NOTES
[address]  Progress Note Tanner Ranks 10/19/1929, 80 y o  male MRN: [id number]  Unit/Bed#: [facility] Encounter: [id number]  Primary Care Provider: Driss Hollis PA-C   Date and time admitted to hospital: 11/22/2022  8:41 PM    Goals of care, counseling/discussion  Assessment & Plan  Prolonged discussion with patient's both daughters and son along with case management together with nephrology  Discussed the overall prognosis regarding worsening kidney function with persistent fluid overload, esophageal dysmotility and overall deconditioning  Family to discuss amongst themselves and possibly proceed with comfort care    * Acute respiratory failure with hypoxia [facility])  Assessment & Plan  Patient presented with acute respiratory distress shortly after eating a few spoonful of puree diet in STR, patient reported chest pressure and SOB  Patient was satting 85-95% on oxygen 3 L per STR transfer paper  Patient was belching after eating/drinking limited amount of diet/water per staff  · Patient was discharged on the day of admission after being hospitalized for aspiration pneumonia, acute on chronic diastolic CHF, dysphagia  Received IV Lasix and 7 days of IV Rocephin and Flagyl  Seen by GI, underwent EGD, found to have oropharyngeal dysphagia with esophageal dysmotility, no obvious esophageal stricture  Underwent Barium swallow study which showed moderate to severe esophageal dysmotility with significant residual contrast remaining in the esophagus at the end of the study  Patient was recommended regular diet by speech and discharged on regular diet per STR  · Likely secondary to multifocal pneumonia and CHF  · Patient required BiPAP on ED arrival   ABG post BiPAP essentially unremarkable  Then titrated down to 6 liters oxygen upon admission    Patient was down to 1 L oxygen but currently again up to 3 to suction to keep O2 saturation in low 90s  · Chest x-ray upon admission- Pulmonary edema, worse when comparing to 11/19/2022  Underlying infection could not be excluded  · ProBNP 15,094  · Repeat chest x-ray from 12/5/2022 with persistent bilateral consolidation without any pleural effusion or atelectasis  · Patient received Lasix 40 IV in ED  · Received cefepime and Flagyl in ED  Completed 7 days of IV cefepime  · Received IV Solu-Medrol in ED  No wheezing on auscultation  · CT chest-pulmonary edema with moderate size bilateral pleural effusions with significant bibasilar atelectasis and concomitant multifocal pneumonia   · Obstructive series showed persistent pulmonary edema with multifocal pneumonia  · CT of the abdomen pelvis still showing bilateral pleural effusions with bibasilar atelectasis  · Chest x-ray from 12/5/2022 with extensive bilateral consolidation without any effusion  · CT abdomen pelvis from 12/5/2022 showing bilateral pleural effusion  · Patient restarted back on diuretics with albumin    Acute kidney injury superimposed on CKD [facility])  Assessment & Plan  Lab Results   Component Value Date    EGFR 14 12/08/2022    EGFR 14 12/07/2022    EGFR 15 12/06/2022    CREATININE 3 54 (H) 12/08/2022    CREATININE 3 50 (H) 12/07/2022    CREATININE 3 22 (H) 12/06/2022     History of CKD 4, baseline creatinine appears to be around 1 5-1 9 in past 2 years in care everywhere  Creatinine during previous hospitalization ranged in 2 4-2 8  Admission creatinine was 2 8 which peaked at 3 9  · Possible CKD progression  · Urinalysis was bland  · Received IV Lasix 40 mg in ED  Patient also initially received IV diuretics and was later transitioned to [facility] which have been on hold  · Patient received IV fluids from 11/30 for a couple of days  · Renal ultrasound showed no hydronephrosis moderate left renal atrophy  · Nephrology input appreciated  · No indication for dialysis at the current time    Continue conservative kidney management plan given his advanced age per nephrology  · Nutritional supplements were changed from Glucerna to Nepro  · CAT scan of the abdomen and pelvis on 12/5/2022 showed bilateral pleural effusions  · Chest x-ray from 12 5 showing persistent extensive bilateral consolidation without any effusion or pneumothorax  · Trial of Lasix with albumin in the setting of fluid overload  · Prolonged discussion with family in coordination with nephrology  Patient not a candidate for dialysis  · Recommend  conservative treatment and possible discharge on comfort care    SIRS (systemic inflammatory response syndrome) ([facility] [address] )  Assessment & Plan  Patient continues to have intermittent fevers with persistent elevation of white count despite receiving 14 days of antibiotics  Mayuri Ferguson was treated with Rocephin and Flagyl during prior hospitalization and also received cefepime for 7 days during this hospitalization  ID Input appreciated  Monitor off antibiotics  Current SIRS likely secondary to aspiration pneumonitis  White count is improving and fever curve improving    Left upper quadrant abdominal mass  Assessment & Plan  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  · Discussed with radiology previously and radiologist recommended getting MRI for further evaluation  · Per nephrology okay to obtain MRI with gadolinium using class 2 gadolinium agents  · Dr Vickers Pay with radiologist again  who recommended CT scan with p o  Contrast for further evaluation  Discussed with radiologist patient's issues with dysphagia and aspiration    NG-tube was placed for contrast administration and patient had a CAT scan of the abdomen pelvis  · Abdomen pelvis showed left upper quadrant mass which may be arising from the gastric fundus and tail of the pancreas without any evidence of invasion of adjacent structures or metastatic disease in the abdomen-CAT scan with contrast using pancreatic mass protocol or MRI was suggested  · Discussed goals of care and possible discharge on comfort care    Acute on chronic diastolic congestive heart failure (HCC)  Assessment & Plan  Wt Readings from Last 3 Encounters:   12/08/22 83 3 kg (183 lb 11 2 oz)   11/22/22 84 5 kg (186 lb 4 8 oz)     Patient received IV Lasix in recent admission  · Chest x-ray upon admission shows worsening pulmonary edema  · Received 40 milligrams of Lasix IV in the ED and was continued on IV Lasix  Then transitioned to [facility] which is currently on hold  · Left upper extremity venous Doppler showed no evidence of DVT  · CT chest upon admission showed pulmonary edema with moderate size bilateral pleural effusions left more than right  · Might need accept higher creatinine to maintain euvolemia  · Diuretics have been on hold in setting of elevated creatinine  · Chest x-ray from 12/5/2022 showed persistent extensive bilateral consolidation without any pleural effusion or pneumothorax  · CT abdomen pelvis showed bilateral pleural effusions  · Started back on Lasix 40 mg IV every 12 hours along with albumin 12 5 g every 12 hours  · Possible discharge on Lasix 40 mg p o  twice daily        Esophageal dysphagia  Assessment & Plan  Per speech therapy, patient at high risk for aspiration despite the level of diet  Currently on pureed diet with thin liquids with medications crushed with puree  · Continue pantoprazole daily  · Obstructive series showed barium throughout the colon with modest amount of stool along the rectosigmoid region  · Family meeting held and patient possibly to be discharged on comfort care      Aspiration pneumonia [facility])  Assessment & Plan  20 level has been minimally elevated since admission  · Patient received cefepime and Flagyl in the ED   Completed 7 day course of cefepime 1 gram IV daily  · Patient febrile again to 101 1 on 12/1 and 100 8 today, likely aspiration pneumonitis    procalcitonin mildly elevated, blood cultures negative, COVID/flu/RSV negative, urine culture with small colony of Enterococcus not requiring treatment per ID  · Due to recurrent fevers, ID was consulted who recommended monitoring off antibiotics  · Daughter/patient aware that he is at high risk for aspiration events due to his dysphagia although did speak with them that PEG tube placement does not guarantee zero risk of aspiration  · Sputum culture with mixed respiratory hardik  · Urine for Legionella and pneumococcal antigens were negative  · Blood cultures negative  · Repeat chest x-ray 12/1 with waxing and waning opacities  · Chest x-ray from 12/5/2022 showing multifocal consolidation  · CT abdomen pelvis showing bilateral pleural effusions with bibasilar atelectasis  · Recurrent SIRS suspected secondary to aspiration pneumonitis  · White Count is improving along with fever curve    Anemia  Assessment & Plan  Hemoglobin has been fluctuating in the range of 7 5-8 5  Stable  Transfuse to keep hemoglobin more than 7    Constipation  Assessment & Plan  Patient with constipation  Continue MiraLax, senna q h s  · MiraLAX for symptoms to twice daily dosing  · Continue bowel regimen  Patient had a large bowel movement on 12/5/2022      BPH (benign prostatic hyperplasia)  Assessment & Plan  Patient with urinary retention requiring multiple straight cath  Per RN patient also with penile swelling and therefore Ayala catheter placed  · Urology input appreciated  Cardura was discontinued and started Flomax  · Per Urology voiding trial once patient is more mobile    Hyperlipidemia  Assessment & Plan  Continue statin    Type 2 diabetes mellitus [facility])  Assessment & Plan  Lab Results   Component Value Date    HGBA1C 5 7 (H) 11/16/2022       Recent Labs     12/08/22  0200 12/08/22  0712 12/08/22  1103 12/08/22  1559   POCGLU 136 134 129 136     Patient was discharged on Prandin t i d  With meals  · Continue Prandin, Humalog sliding scale with Accu-Cheks q a c   And HS  · Currently on diabetic pureed Diet with thin liquids  Continue Lantus 10 units q h s  Primary hypertension  Assessment & Plan  Continue hydralazine, Coreg, Norvasc, Imdur  · Blood pressures overall stable          VTE Pharmacologic Prophylaxis:   High Risk (Score >/= 5) - Pharmacological DVT Prophylaxis Ordered: heparin  Sequential Compression Devices Ordered  Patient Centered Rounds: I performed bedside rounds with nursing staff today  Discussions with Specialists or Other Care Team Provider: Nephrology    Education and Discussions with Family / Patient: Updated  (son and daughter) via phone  Time Spent for Care: 70 minutes  More than 50% of total time spent on counseling and coordination of care as described above  Current Length of Stay: 16 day(s)  Current Patient Status: Inpatient   Certification Statement: The patient will continue to require additional inpatient hospital stay due to Acute CHF, acute kidney injury aspiration pneumonia  Discharge Plan: Anticipate discharge in 24-48 hrs to Rehab facility    Code Status: Level 3 - DNAR and DNI    Subjective:   Patient denies any shortness of breath, chest pain, abdominal pain  Objective:     Vitals:   Temp (24hrs), Av 7 °F (36 5 °C), Min:97 5 °F (36 4 °C), Max:97 9 °F (36 6 °C)    Temp:  [97 5 °F (36 4 °C)-97 9 °F (36 6 °C)] 97 9 °F (36 6 °C)  HR:  [59-64] 64  Resp:  [18-20] 19  BP: (129-188)/(48-65) 165/59  SpO2:  [88 %-98 %] 89 %  Body mass index is 30 57 kg/m²  Input and Output Summary (last 24 hours): Intake/Output Summary (Last 24 hours) at 2022 1858  Last data filed at 2022 1200  Gross per 24 hour   Intake 410 ml   Output 600 ml   Net -190 ml       Physical Exam:   Physical Exam  Constitutional:       Appearance: Normal appearance  HENT:      Head: Normocephalic and atraumatic  Eyes:      Extraocular Movements: Extraocular movements intact        Pupils: Pupils are equal, round, and reactive to light  Cardiovascular:      Rate and Rhythm: Normal rate and regular rhythm  Heart sounds: No murmur heard  No gallop  Pulmonary:      Effort: Pulmonary effort is normal       Breath sounds: Normal breath sounds  Abdominal:      General: Bowel sounds are normal       Palpations: Abdomen is soft  Tenderness: There is no abdominal tenderness  Musculoskeletal:         General: No swelling or deformity  Normal range of motion  Cervical back: Normal range of motion and neck supple  Comments: Bilateral upper extremity edema   Skin:     General: Skin is warm and dry  Neurological:      General: No focal deficit present  Mental Status: He is alert        Comments: Some asterexis          Additional Data:     Labs:  Results from last 7 days   Lab Units 12/08/22  0556   WBC Thousand/uL 11 41*   HEMOGLOBIN g/dL 7 6*   HEMATOCRIT % 24 3*   PLATELETS Thousands/uL 240   NEUTROS PCT % 80*   LYMPHS PCT % 9*   MONOS PCT % 7   EOS PCT % 3     Results from last 7 days   Lab Units 12/08/22  0556   SODIUM mmol/L 141   POTASSIUM mmol/L 4 7   CHLORIDE mmol/L 101   CO2 mmol/L 33*   BUN mg/dL 127*   CREATININE mg/dL 3 54*   ANION GAP mmol/L 7   CALCIUM mg/dL 8 4   ALBUMIN g/dL 2 3*   GLUCOSE RANDOM mg/dL 125         Results from last 7 days   Lab Units 12/08/22  1559 12/08/22  1103 12/08/22  0712 12/08/22  0200 12/07/22  2113 12/07/22  1552 12/07/22  1116 12/07/22  0703 12/07/22  0146 12/06/22  2052 12/06/22  1557 12/06/22  1124   POC GLUCOSE mg/dl 136 129 134 136 121 183* 119 92 120 194* 138 153*         Results from last 7 days   Lab Units 12/02/22  0539   PROCALCITONIN ng/ml 0 34*       Lines/Drains:  Invasive Devices     Peripheral Intravenous Line  Duration           Peripheral IV 12/06/22 Right Forearm 2 days          Drain  Duration           Urethral Catheter 6 days              Urinary Catheter:  Goal for removal: Voiding trial when ambulation improves               Imaging: Reviewed radiology reports from this admission including: chest xray and chest CT scan    Recent Cultures (last 7 days):         Last 24 Hours Medication List:   Current Facility-Administered Medications   Medication Dose Route Frequency Provider Last Rate   • acetaminophen  650 mg Oral Q6H PRN KEISHA Newberry     • albumin human  12 5 g Intravenous BID Raina Chan MD     • amLODIPine  10 mg Oral Daily KEISHA Newberry     • atorvastatin  40 mg Oral Daily KEISHA Newberry     • benzonatate  200 mg Oral TID KEISHA Hurtado     • bisacodyl  10 mg Rectal Daily PRN Dillon Rodriguez MD     • carvedilol  12 5 mg Oral BID With Meals KEISHA Newberry     • cholecalciferol  2,000 Units Oral Daily KEISHA Newberry     • vitamin B-12  500 mcg Oral Daily KEISHA Newberry     • dextromethorphan-guaiFENesin  10 mL Oral Q6H PRN KEISHA Newberry     • furosemide  40 mg Intravenous Q12H Raina Chan MD     • gabapentin  100 mg Oral BID KEISHA Newberry     • heparin (porcine)  5,000 Units Subcutaneous Q8H KEISHA Banks     • hydrALAZINE  100 mg Oral TID Alexandra Hutchinson MD     • hydrocodone-chlorpheniramine polistirex  5 mL Oral HS Dillon Rodriguez MD     • insulin glargine  10 Units Subcutaneous HS Dillon Rodriguez MD     • insulin lispro  1-5 Units Subcutaneous TID AC KEISHA Newberry     • insulin lispro  1-5 Units Subcutaneous 0200 KEISHA Newberry     • insulin lispro  1-5 Units Subcutaneous HS KEISHA Newberry     • ipratropium  0 5 mg Nebulization TID KEISHA Newberry     • isosorbide mononitrate  60 mg Oral Daily KEISHA Newberry     • labetalol  10 mg Intravenous Q6H PRN Asmita Hickman PA-C     • levalbuterol  0 63 mg Nebulization Q4H PRN KEISHA Newberry     • levalbuterol  1 25 mg Nebulization TID KEISHA Newberry      And   • sodium chloride  3 mL Nebulization TID KEISHA Newberry     • nystatin   Topical BID KEISHA Newberry     • ondansetron  4 mg Intravenous Q6H PRN KEISHA Newberry     • pantoprazole  40 mg Oral Early Morning KEISHA Newberry     • polyethylene glycol  17 g Oral BID Tasha Brown DO     • repaglinide  0 5 mg Oral TID AC Valerie Lee MD     • senna  2 tablet Oral HS KEISHA Newberry     • tamsulosin  0 4 mg Oral Daily With Delaney Serna MD          Today, Patient Was Seen By: Valerie Lee MD    **Please Note: This note may have been constructed using a voice recognition system  **

## 2022-12-08 NOTE — PLAN OF CARE
Problem: Potential for Falls  Goal: Patient will remain free of falls  Description: INTERVENTIONS:  - Educate patient/family on patient safety including physical limitations  - Instruct patient to call for assistance with activity   - Consult OT/PT to assist with strengthening/mobility   - Keep Call bell within reach  - Keep bed low and locked with side rails adjusted as appropriate  - Keep care items and personal belongings within reach  - Initiate and maintain comfort rounds  - Make Fall Risk Sign visible to staff  - Offer Toileting every 2 Hours, in advance of need  - Initiate/Maintain bed alarm  - Obtain necessary fall risk management equipment: walker  - Apply yellow socks and bracelet for high fall risk patients  - Consider moving patient to room near nurses station  Outcome: Progressing     Problem: INFECTION - ADULT  Goal: Absence or prevention of progression during hospitalization  Description: INTERVENTIONS:  - Assess and monitor for signs and symptoms of infection  - Monitor lab/diagnostic results  - Monitor all insertion sites, i e  indwelling lines, tubes, and drains  - Monitor endotracheal if appropriate and nasal secretions for changes in amount and color  - Holland appropriate cooling/warming therapies per order  - Administer medications as ordered  - Instruct and encourage patient and family to use good hand hygiene technique  - Identify and instruct in appropriate isolation precautions for identified infection/condition  Outcome: Progressing

## 2022-12-08 NOTE — ASSESSMENT & PLAN NOTE
Prolonged discussion with patient's both daughters and son along with case management together with nephrology  Discussed the overall prognosis regarding worsening kidney function with persistent fluid overload, esophageal dysmotility and overall deconditioning  Family to discuss amongst themselves and possibly proceed with comfort care

## 2022-12-08 NOTE — ASSESSMENT & PLAN NOTE
Per speech therapy, patient at high risk for aspiration despite the level of diet    Currently on pureed diet with thin liquids with medications crushed with puree  · Continue pantoprazole daily  · Obstructive series showed barium throughout the colon with modest amount of stool along the rectosigmoid region  · Family meeting held and patient possibly to be discharged on comfort care

## 2022-12-08 NOTE — ASSESSMENT & PLAN NOTE
Lab Results   Component Value Date    EGFR 14 12/08/2022    EGFR 14 12/07/2022    EGFR 15 12/06/2022    CREATININE 3 54 (H) 12/08/2022    CREATININE 3 50 (H) 12/07/2022    CREATININE 3 22 (H) 12/06/2022     History of CKD 4, baseline creatinine appears to be around 1 5-1 9 in past 2 years in care everywhere  Creatinine during previous hospitalization ranged in 2 4-2 8  Admission creatinine was 2 8 which peaked at 3 9  · Possible CKD progression  · Urinalysis was bland  · Received IV Lasix 40 mg in ED  Patient also initially received IV diuretics and was later transitioned to [facility] which have been on hold  · Patient received IV fluids from 11/30 for a couple of days  · Renal ultrasound showed no hydronephrosis moderate left renal atrophy  · Nephrology input appreciated  · No indication for dialysis at the current time  Continue conservative kidney management plan given his advanced age per nephrology  · Nutritional supplements were changed from Glucerna to Nepro  · CAT scan of the abdomen and pelvis on 12/5/2022 showed bilateral pleural effusions  · Chest x-ray from 12 5 showing persistent extensive bilateral consolidation without any effusion or pneumothorax  · Trial of Lasix with albumin in the setting of fluid overload  · Prolonged discussion with family in coordination with nephrology  Patient not a candidate for dialysis    · Recommend  conservative treatment and possible discharge on comfort care

## 2022-12-09 LAB
ANION GAP SERPL CALCULATED.3IONS-SCNC: 10 MMOL/L (ref 4–13)
BUN SERPL-MCNC: 127 MG/DL (ref 5–25)
CALCIUM SERPL-MCNC: 8.5 MG/DL (ref 8.3–10.1)
CHLORIDE SERPL-SCNC: 101 MMOL/L (ref 96–108)
CO2 SERPL-SCNC: 33 MMOL/L (ref 21–32)
CREAT SERPL-MCNC: 3.4 MG/DL (ref 0.6–1.3)
FLUAV RNA RESP QL NAA+PROBE: NEGATIVE
FLUBV RNA RESP QL NAA+PROBE: NEGATIVE
GFR SERPL CREATININE-BSD FRML MDRD: 14 ML/MIN/1.73SQ M
GLUCOSE SERPL-MCNC: 113 MG/DL (ref 65–140)
GLUCOSE SERPL-MCNC: 117 MG/DL (ref 65–140)
GLUCOSE SERPL-MCNC: 139 MG/DL (ref 65–140)
GLUCOSE SERPL-MCNC: 149 MG/DL (ref 65–140)
GLUCOSE SERPL-MCNC: 169 MG/DL (ref 65–140)
GLUCOSE SERPL-MCNC: 182 MG/DL (ref 65–140)
POTASSIUM SERPL-SCNC: 4.4 MMOL/L (ref 3.5–5.3)
RSV RNA RESP QL NAA+PROBE: NEGATIVE
SARS-COV-2 RNA RESP QL NAA+PROBE: NEGATIVE
SODIUM SERPL-SCNC: 144 MMOL/L (ref 135–147)

## 2022-12-09 RX ORDER — FUROSEMIDE 40 MG/1
40 TABLET ORAL
Status: DISCONTINUED | OUTPATIENT
Start: 2022-12-09 | End: 2022-12-10 | Stop reason: HOSPADM

## 2022-12-09 RX ADMIN — IPRATROPIUM BROMIDE 0.5 MG: 0.5 SOLUTION RESPIRATORY (INHALATION) at 21:01

## 2022-12-09 RX ADMIN — ISOSORBIDE MONONITRATE 60 MG: 60 TABLET, EXTENDED RELEASE ORAL at 09:59

## 2022-12-09 RX ADMIN — INSULIN GLARGINE 10 UNITS: 100 INJECTION, SOLUTION SUBCUTANEOUS at 22:29

## 2022-12-09 RX ADMIN — GABAPENTIN 100 MG: 100 CAPSULE ORAL at 17:41

## 2022-12-09 RX ADMIN — POLYETHYLENE GLYCOL 3350 17 G: 17 POWDER, FOR SOLUTION ORAL at 09:58

## 2022-12-09 RX ADMIN — Medication 5 ML: at 22:27

## 2022-12-09 RX ADMIN — GABAPENTIN 100 MG: 100 CAPSULE ORAL at 09:59

## 2022-12-09 RX ADMIN — HYDRALAZINE HYDROCHLORIDE 100 MG: 25 TABLET ORAL at 09:59

## 2022-12-09 RX ADMIN — ATORVASTATIN CALCIUM 40 MG: 40 TABLET, FILM COATED ORAL at 09:59

## 2022-12-09 RX ADMIN — LEVALBUTEROL HYDROCHLORIDE 1.25 MG: 1.25 SOLUTION, CONCENTRATE RESPIRATORY (INHALATION) at 07:27

## 2022-12-09 RX ADMIN — TAMSULOSIN HYDROCHLORIDE 0.4 MG: 0.4 CAPSULE ORAL at 17:43

## 2022-12-09 RX ADMIN — IPRATROPIUM BROMIDE 0.5 MG: 0.5 SOLUTION RESPIRATORY (INHALATION) at 13:48

## 2022-12-09 RX ADMIN — ONDANSETRON 4 MG: 2 INJECTION INTRAMUSCULAR; INTRAVENOUS at 22:48

## 2022-12-09 RX ADMIN — HEPARIN SODIUM 5000 UNITS: 5000 INJECTION INTRAVENOUS; SUBCUTANEOUS at 22:29

## 2022-12-09 RX ADMIN — FUROSEMIDE 40 MG: 40 TABLET ORAL at 17:43

## 2022-12-09 RX ADMIN — LEVALBUTEROL HYDROCHLORIDE 1.25 MG: 1.25 SOLUTION, CONCENTRATE RESPIRATORY (INHALATION) at 21:01

## 2022-12-09 RX ADMIN — IPRATROPIUM BROMIDE 0.5 MG: 0.5 SOLUTION RESPIRATORY (INHALATION) at 07:27

## 2022-12-09 RX ADMIN — HEPARIN SODIUM 5000 UNITS: 5000 INJECTION INTRAVENOUS; SUBCUTANEOUS at 05:12

## 2022-12-09 RX ADMIN — CYANOCOBALAMIN TAB 500 MCG 500 MCG: 500 TAB at 09:59

## 2022-12-09 RX ADMIN — INSULIN LISPRO 1 UNITS: 100 INJECTION, SOLUTION INTRAVENOUS; SUBCUTANEOUS at 17:47

## 2022-12-09 RX ADMIN — ISODIUM CHLORIDE 3 ML: 0.03 SOLUTION RESPIRATORY (INHALATION) at 21:01

## 2022-12-09 RX ADMIN — NYSTATIN: 100000 POWDER TOPICAL at 10:07

## 2022-12-09 RX ADMIN — ISODIUM CHLORIDE 3 ML: 0.03 SOLUTION RESPIRATORY (INHALATION) at 07:27

## 2022-12-09 RX ADMIN — NYSTATIN: 100000 POWDER TOPICAL at 17:52

## 2022-12-09 RX ADMIN — GUAIFENESIN AND DEXTROMETHORPHAN 10 ML: 100; 10 SYRUP ORAL at 17:52

## 2022-12-09 RX ADMIN — POLYETHYLENE GLYCOL 3350 17 G: 17 POWDER, FOR SOLUTION ORAL at 22:35

## 2022-12-09 RX ADMIN — HYDRALAZINE HYDROCHLORIDE 100 MG: 25 TABLET ORAL at 22:34

## 2022-12-09 RX ADMIN — FUROSEMIDE 40 MG: 40 TABLET ORAL at 10:23

## 2022-12-09 RX ADMIN — BENZONATATE 200 MG: 100 CAPSULE ORAL at 22:27

## 2022-12-09 RX ADMIN — CARVEDILOL 12.5 MG: 12.5 TABLET, FILM COATED ORAL at 17:43

## 2022-12-09 RX ADMIN — AMLODIPINE BESYLATE 10 MG: 10 TABLET ORAL at 09:59

## 2022-12-09 RX ADMIN — SENNOSIDES 17.2 MG: 8.6 TABLET, FILM COATED ORAL at 22:27

## 2022-12-09 RX ADMIN — PANTOPRAZOLE SODIUM 40 MG: 40 TABLET, DELAYED RELEASE ORAL at 05:12

## 2022-12-09 RX ADMIN — Medication 2000 UNITS: at 09:59

## 2022-12-09 RX ADMIN — CARVEDILOL 12.5 MG: 12.5 TABLET, FILM COATED ORAL at 10:02

## 2022-12-09 RX ADMIN — HYDRALAZINE HYDROCHLORIDE 100 MG: 25 TABLET ORAL at 17:42

## 2022-12-09 RX ADMIN — LEVALBUTEROL HYDROCHLORIDE 1.25 MG: 1.25 SOLUTION, CONCENTRATE RESPIRATORY (INHALATION) at 13:49

## 2022-12-09 RX ADMIN — HEPARIN SODIUM 5000 UNITS: 5000 INJECTION INTRAVENOUS; SUBCUTANEOUS at 17:41

## 2022-12-09 NOTE — CASE MANAGEMENT
Case Management Progress Note    Patient name Payton Romero  Location [address] [facility] [place] MRN [id number]  : 10/19/1929 Date 2022       LOS (days): 17  Geometric Mean LOS (GMLOS) (days): 5 20  Days to GMLOS:-11 3        OBJECTIVE:    Current admission status: Inpatient  Preferred Pharmacy:   [facility] [address] [facility] [address] [place] [id number]  Phone: [phone] Fax: [phone]    [facility]  [address] [place], [place] - [address]  [address]  [facility] [address]  Phone: [phone] Fax: [phone]    Primary Care Provider: Gustavo Haynes PA-C    Primary Insurance: MEDICARE  Secondary Insurance: [employer] BS OF [place]    PROGRESS NOTE:    SW following to assist with DCP  Current plan is for pt to return to [facility] with [facility] services  SW spoke with pt's daughter, Kannan Diggs, after she had talked with Chelsey Kaur,  at [facility], about plans to return  Prescriptions were sent to [place] [address] yesterday afternoon by Dr Lane David  Referral was made to [facility] for hospice  SW spoke with Bozena from [facility] this morning  She is prepared to complete paperwork and have DME delivered to facility  Per Kimo Setting she has left message for Chelsey Kaur at [facility] to determine needs and arrange delivery  SW also called Chelsey Kaur to discuss plan and discharge timeframe  Message left requesting return call  SW will continue to follow to assist with planning and coordinate transfer

## 2022-12-09 NOTE — ASSESSMENT & PLAN NOTE
Lab Results   Component Value Date    HGBA1C 5 7 (H) 11/16/2022       Recent Labs     12/08/22  2120 12/09/22  0140 12/09/22  0712 12/09/22  1115   POCGLU 179* 139 117 182*     Patient was discharged on Prandin t i d  With meals  · Continue Prandin, Humalog sliding scale with Accu-Cheks q a c  And HS  · Currently on diabetic pureed Diet with thin liquids  Continue Lantus 10 units q h s

## 2022-12-09 NOTE — ASSESSMENT & PLAN NOTE
20 level has been minimally elevated since admission  · Patient received cefepime and Flagyl in the ED   Completed 7 day course of cefepime 1 gram IV daily  · Patient febrile again to 101 1 on 12/1 and 100 8 today, likely aspiration pneumonitis    procalcitonin mildly elevated, blood cultures negative, COVID/flu/RSV negative, urine culture with small colony of Enterococcus not requiring treatment per ID  · Due to recurrent fevers, ID was consulted who recommended monitoring off antibiotics  · Daughter/patient aware that he is at high risk for aspiration events due to his dysphagia although did speak with them that PEG tube placement does not guarantee zero risk of aspiration  · Sputum culture with mixed respiratory hardik  · Urine for Legionella and pneumococcal antigens were negative  · Blood cultures negative  · Repeat chest x-ray 12/1 with waxing and waning opacities  · Chest x-ray from 12/5/2022 showing multifocal consolidation  · CT abdomen pelvis showing bilateral pleural effusions with bibasilar atelectasis  · Recurrent SIRS suspected secondary to aspiration pneumonitis  · White count has improved and patient has no further fever

## 2022-12-09 NOTE — ASSESSMENT & PLAN NOTE
Lab Results   Component Value Date    EGFR 14 12/09/2022    EGFR 14 12/08/2022    EGFR 14 12/07/2022    CREATININE 3 40 (H) 12/09/2022    CREATININE 3 54 (H) 12/08/2022    CREATININE 3 50 (H) 12/07/2022     History of CKD 4, baseline creatinine appears to be around 1 5-1 9 in past 2 years in care everywhere  Creatinine during previous hospitalization ranged in 2 4-2 8  Admission creatinine was 2 8 which peaked at 3 9  · Possible CKD progression  · Urinalysis was bland  · Received IV Lasix 40 mg in ED  Patient also initially received IV diuretics and was later transitioned to [facility] which have been on hold  · Patient received IV fluids from 11/30 for a couple of days  · Renal ultrasound showed no hydronephrosis moderate left renal atrophy  · Nephrology input appreciated  · No indication for dialysis at the current time  Continue conservative kidney management plan given his advanced age per nephrology  · Nutritional supplements were changed from Glucerna to Nepro  · CAT scan of the abdomen and pelvis on 12/5/2022 showed bilateral pleural effusions  · Chest x-ray from 12 /5 showing persistent extensive bilateral consolidation without any effusion or pneumothorax  · Patient currently on Lasix 40 mg IV every 12 hours with albumin to help with the fluid overload  Patient be discharged on Lasix 40 mg p o  twice daily for comfort  · Prolonged discussion with family in coordination with nephrology  Patient not a candidate for dialysis    · Recommend  conservative treatment and possible discharge on comfort care

## 2022-12-09 NOTE — WOUND OSTOMY CARE
Progress Note - Wound   Jose A Rodriguez 80 y o  male MRN: [id number]  Unit/Bed#: [address] Encounter: [id number]      Assessment: This is a 80year old male patient admitted on 11/22/22 with acute respiratory failure  He has a history of DM 2, CHF, and CKD 4  He was awake, alert & oriented x 2 and is dependent upon nursing staff for all of his care  He was repositioned with assist of 2 persons  He has a urinary catheter in place for retention and is incontinent of stool  Assessment Findings:  1-Stage 2 pressure injuries not POA to mid sacrum and left buttock with pink granulation  tissue  Both areas have hyperpigmentation to periwound and left buttock has purple discoloration, most likely from chronic sitting (at home)  Orders in place for wound care and for prevention  2-Penis with swelling and erythema - no open areas  There is scar tissue noted on top of penis @ 11-12:00 from chronic urinary catheter use  There is milky white purulent drainage noted from penis - Dr Estevan Yee present and aware  Orders in place for skin care and for prevention  Plan:   Skin care Plan:  1-Cleanse sacro-buttocks with soap and water  Apply Allevyn foam to mid-sacral & left buttock stage 2 pressure injuries  Gerald with T for treatment  Change every two days and PRN  check skin q-shift  2-Cleanse penis with foaming cleanser & dry well gently  Apply 3M No Sting to skin for protection daily & prn soilage/dislodgement  3-Turn/reposition q2h or when medically stable for pressure re-distribution on skin   4-Float heels to offload pressure  5-Moisturize skin daily with skin nourishing cream  6-Ehob cushion in chair when out of bed  7-Hydraguard to bilateral heels BID and PRN        Wound 11/30/22 Pressure Injury Sacrum (Active)   Wound Image   12/09/22 1013   Wound Description Granulation tissue;Pink 12/09/22 1013   Pressure Injury Stage Stage 2 not POA 12/09/22 1013   Pura-wound Assessment Dry;Scaly;hyperpigmentation 12/09/22 1013   Wound Length (cm) 1 cm 12/09/22 1013   Wound Width (cm) 0 5 cm 12/09/22 1013   Wound Depth (cm) 0 1 cm 12/09/22 1013   Wound Surface Area (cm^2) 0 5 cm^2 12/09/22 1013   Wound Volume (cm^3) 0 05 cm^3 12/09/22 1013   Calculated Wound Volume (cm^3) 0 05 cm^3 12/09/22 1013   Drainage Amount Scant 12/09/22 1013   Drainage Description Serosanguineous 12/09/22 1013   Treatments Cleansed 12/09/22 1013   Dressing Foam, Silicon (eg  Allevyn, etc) 12/09/22 1013   Wound packed? No 12/09/22 1013   Dressing Changed Changed 12/09/22 1013   Dressing Status Clean;Dry; Intact 12/09/22 1013       Wound 12/08/22 Penis (Active)   Wound Image   12/09/22 1005   Wound Description No open wound noted - there is swelling & erythema of entire penis 12/09/22 1005   Pura-wound Assessment Swelling;Erythema;Scar tissue due to chronic catheter use 12/09/22 1005   Drainage Amount Moderate 12/09/22 1005   Drainage Description Milky;Purulent 12/09/22 1005   Non-staged Wound Description Not applicable -  84/17/07 9631   Treatments Cleansed 12/09/22 1005   Dressing Protective barrier 12/09/22 1005   Dressing Status Intact; Clean;Dry 12/09/22 1005       Wound 12/08/22 Pressure Injury Buttocks Left (Active)   Wound Image   12/09/22 1013   Wound Description Pink granulation tissue 12/09/22 1013   Pressure Injury Stage Stage 2 Not POA 12/09/22 1013   Pura-wound Assessment Hyperpigmented; Purple 12/09/22 1013   Wound Length (cm) 2 cm 12/09/22 1013   Wound Width (cm) 1 cm 12/09/22 1013   Wound Depth (cm) 0 1 cm 12/09/22 1013   Wound Surface Area (cm^2) 2 cm^2 12/09/22 1013   Wound Volume (cm^3) 0 2 cm^3 12/09/22 1013   Calculated Wound Volume (cm^3) 0 2 cm^3 12/09/22 1013   Drainage Amount Scant 12/09/22 1013   Drainage Description Serosanguineous 12/09/22 1013   Treatments Cleansed 12/09/22 1013   Dressing Foam, Silicon (eg  Allevyn, etc) 12/09/22 1013   Dressing Changed New 12/09/22 1013   Dressing Status Clean;Dry; Intact 12/09/22 1013     Discussed assessment findings, and plan of care/recommendations with Dr Guilherme Matias RN  Wound care will follow along with patient throughout admission, please call or tiger text with questions and concerns  Recommendations written as orders    Shannon Sharif RN, BSN, [employer]

## 2022-12-09 NOTE — OCCUPATIONAL THERAPY NOTE
Occupational Therapy Note       12/09/22 7732   Note Type   Note Type Cancelled Session   Cancel Reasons Refusal  (Patient refused OOB activity due to fatigue; per chart patient with plans to return to [address] with hospice care; will continue to follow with patient as appropriate)   Licensure   [place] License Number  Uche Shah, OTR/L [id number]

## 2022-12-09 NOTE — PROGRESS NOTES
Irene 45  Progress Note Liz Majano 10/19/1929, 80 y o  male MRN: [id number]  Unit/Bed#: [address]  Encounter: [id number]  Primary Care Provider: Mao Nichole PA-C   Date and time admitted to hospital: 11/22/2022  8:41 PM    Goals of care, counseling/discussion  Assessment & Plan  Prolonged discussion with patient's both daughters and son along with case management together with nephrology  Discussed the overall prognosis regarding worsening kidney function with persistent fluid overload, esophageal dysmotility and overall deconditioning- on 12/  Patient to be discharged on comfort care to assisted living facility     * Acute respiratory failure with hypoxia [facility])  Assessment & Plan  Patient presented with acute respiratory distress shortly after eating a few spoonful of puree diet in STR, patient reported chest pressure and SOB  Patient was satting 85-95% on oxygen 3 L per STR transfer paper  Patient was belching after eating/drinking limited amount of diet/water per staff  · Patient was discharged on the day of admission after being hospitalized for aspiration pneumonia, acute on chronic diastolic CHF, dysphagia  Received IV Lasix and 7 days of IV Rocephin and Flagyl  Seen by GI, underwent EGD, found to have oropharyngeal dysphagia with esophageal dysmotility, no obvious esophageal stricture  Underwent Barium swallow study which showed moderate to severe esophageal dysmotility with significant residual contrast remaining in the esophagus at the end of the study  Patient was recommended regular diet by speech and discharged on regular diet per STR  · Likely secondary to multifocal pneumonia and CHF  · Patient required BiPAP on ED arrival   ABG post BiPAP essentially unremarkable  Then titrated down to 6 liters oxygen upon admission    Patient was down to 1 L oxygen but currently again up to 3 to suction to keep O2 saturation in low 90s  · Chest x-ray upon admission- Pulmonary edema, worse when comparing to 11/19/2022  Underlying infection could not be excluded  · ProBNP 15,094  · Repeat chest x-ray from 12/5/2022 with persistent bilateral consolidation without any pleural effusion or atelectasis  · Patient received Lasix 40 IV in ED  · Received cefepime and Flagyl in ED  Completed 7 days of IV cefepime  · Received IV Solu-Medrol in ED  No wheezing on auscultation  · CT chest-pulmonary edema with moderate size bilateral pleural effusions with significant bibasilar atelectasis and concomitant multifocal pneumonia   · Obstructive series showed persistent pulmonary edema with multifocal pneumonia  · CT of the abdomen pelvis still showing bilateral pleural effusions with bibasilar atelectasis  · Chest x-ray from 12/5/2022 with extensive bilateral consolidation without any effusion  · CT abdomen pelvis from 12/5/2022 showing bilateral pleural effusion  · Currently on Lasix 40 mg IV every 12 hours with albumin  Patient possibly to be transitioned Lasix 40 mg p o  twice daily    Acute kidney injury superimposed on CKD [facility])  Assessment & Plan  Lab Results   Component Value Date    EGFR 14 12/09/2022    EGFR 14 12/08/2022    EGFR 14 12/07/2022    CREATININE 3 40 (H) 12/09/2022    CREATININE 3 54 (H) 12/08/2022    CREATININE 3 50 (H) 12/07/2022     History of CKD 4, baseline creatinine appears to be around 1 5-1 9 in past 2 years in care everywhere  Creatinine during previous hospitalization ranged in 2 4-2 8  Admission creatinine was 2 8 which peaked at 3 9  · Possible CKD progression  · Urinalysis was bland  · Received IV Lasix 40 mg in ED  Patient also initially received IV diuretics and was later transitioned to [facility] which have been on hold  · Patient received IV fluids from 11/30 for a couple of days  · Renal ultrasound showed no hydronephrosis moderate left renal atrophy  · Nephrology input appreciated  · No indication for dialysis at the current time    Continue conservative kidney management plan given his advanced age per nephrology  · Nutritional supplements were changed from Glucerna to Nepro  · CAT scan of the abdomen and pelvis on 12/5/2022 showed bilateral pleural effusions  · Chest x-ray from 12 /5 showing persistent extensive bilateral consolidation without any effusion or pneumothorax  · Patient currently on Lasix 40 mg IV every 12 hours with albumin to help with the fluid overload  Patient be discharged on Lasix 40 mg p o  twice daily for comfort  · Prolonged discussion with family in coordination with nephrology  Patient not a candidate for dialysis  · Recommend  conservative treatment and possible discharge on comfort care    SIRS (systemic inflammatory response syndrome) ([facility] [address] )  Assessment & Plan  Patient continues to have intermittent fevers with persistent elevation of white count despite receiving 14 days of antibiotics  [place] was treated with Rocephin and Flagyl during prior hospitalization and also received cefepime for 7 days during this hospitalization  ID Input appreciated  Monitor off antibiotics  Current SIRS likely secondary to aspiration pneumonitis  White count is improving and fever curve improving    Left upper quadrant abdominal mass  Assessment & Plan  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  · Discussed with radiology previously and radiologist recommended getting MRI for further evaluation  · Per nephrology okay to obtain MRI with gadolinium using class 2 gadolinium agents  · Dr Lazara Álvarez with radiologist again  who recommended CT scan with p o  Contrast for further evaluation  Discussed with radiologist patient's issues with dysphagia and aspiration    NG-tube was placed for contrast administration and patient had a CAT scan of the abdomen pelvis  · Abdomen pelvis showed left upper quadrant mass which may be arising from the gastric fundus and tail of the pancreas without any evidence of invasion of adjacent structures or metastatic disease in the abdomen-CAT scan with contrast using pancreatic mass protocol or MRI was suggested  · Discussed goals of care and possible discharge on comfort care    Acute on chronic diastolic congestive heart failure (HCC)  Assessment & Plan  Wt Readings from Last 3 Encounters:   12/08/22 83 3 kg (183 lb 11 2 oz)   11/22/22 84 5 kg (186 lb 4 8 oz)     Patient received IV Lasix in recent admission  · Chest x-ray upon admission shows worsening pulmonary edema  · Received 40 milligrams of Lasix IV in the ED and was continued on IV Lasix  Then transitioned to [facility] which is currently on hold  · Left upper extremity venous Doppler showed no evidence of DVT  · CT chest upon admission showed pulmonary edema with moderate size bilateral pleural effusions left more than right  · Might need accept higher creatinine to maintain euvolemia  · Diuretics have been on hold in setting of elevated creatinine  · Chest x-ray from 12/5/2022 showed persistent extensive bilateral consolidation without any pleural effusion or pneumothorax  · CT abdomen pelvis showed bilateral pleural effusions  · Started back on Lasix 40 mg IV every 12 hours along with albumin 12 5 g every 12 hours  · Patient to be discharged on Lasix 40 mg p o  twice daily        Esophageal dysphagia  Assessment & Plan  Per speech therapy, patient at high risk for aspiration despite the level of diet  Currently on pureed diet with thin liquids with medications crushed with puree  · Continue pantoprazole daily  · Obstructive series showed barium throughout the colon with modest amount of stool along the rectosigmoid region  · Family meeting held on 12/8/22 and patient possibly to be discharged on comfort care      Aspiration pneumonia [facility])  Assessment & Plan  20 level has been minimally elevated since admission  · Patient received cefepime and Flagyl in the ED     Completed 7 day course of cefepime 1 gram IV daily  · Patient febrile again to 101 1 on 12/1 and 100 8 today, likely aspiration pneumonitis  procalcitonin mildly elevated, blood cultures negative, COVID/flu/RSV negative, urine culture with small colony of Enterococcus not requiring treatment per ID  · Due to recurrent fevers, ID was consulted who recommended monitoring off antibiotics  · Daughter/patient aware that he is at high risk for aspiration events due to his dysphagia although did speak with them that PEG tube placement does not guarantee zero risk of aspiration  · Sputum culture with mixed respiratory hardik  · Urine for Legionella and pneumococcal antigens were negative  · Blood cultures negative  · Repeat chest x-ray 12/1 with waxing and waning opacities  · Chest x-ray from 12/5/2022 showing multifocal consolidation  · CT abdomen pelvis showing bilateral pleural effusions with bibasilar atelectasis  · Recurrent SIRS suspected secondary to aspiration pneumonitis  · White count has improved and patient has no further fever    Anemia  Assessment & Plan  Hemoglobin has been fluctuating in the range of 7 5-8 5  Stable  Transfuse to keep hemoglobin more than 7    Constipation  Assessment & Plan  Patient with constipation  Continue MiraLax, senna q h s  · MiraLAX for symptoms to twice daily dosing  · Continue bowel regimen  Patient had a large bowel movement on 12/5/2022  · Patient has been stable with regular bowel movements      BPH (benign prostatic hyperplasia)  Assessment & Plan  Patient with urinary retention requiring multiple straight cath  Per RN patient also with penile swelling and therefore Ayala catheter placed  · Urology input appreciated  Cardura was discontinued and started Flomax  · Per Urology voiding trial once patient is more mobile    Hyperlipidemia  Assessment & Plan  Continue statin    Elevated troponin  Assessment & Plan  Troponin [phone]  Elevated troponin in recent admission as well    Reports chest pressure when in respiratory distress  Denies history of CAD  · EKG no ischemic changes, read as AFib, rate 103, RBBB per prior provider  · No history of AFib  Prior EKG shows sinus rhythm with PACs /PVCs  repeat EKG showed sinus rhythm  · Recent 2D echo showed EF low normal, normal wall motion, grade 2 diastolic dysfunction, moderately dilated atriums  · Most likely non MI troponin elevation due to # 1 and CHF  Type 2 diabetes mellitus [facility])  Assessment & Plan  Lab Results   Component Value Date    HGBA1C 5 7 (H) 11/16/2022       Recent Labs     12/08/22  2120 12/09/22  0140 12/09/22  0712 12/09/22  1115   POCGLU 179* 139 117 182*     Patient was discharged on Prandin t i d  With meals  · Continue Prandin, Humalog sliding scale with Accu-Cheks q a c  And HS  · Currently on diabetic pureed Diet with thin liquids  Continue Lantus 10 units q h s  Primary hypertension  Assessment & Plan  Continue hydralazine, Coreg, Norvasc, Imdur  · Blood pressures overall stable          VTE Pharmacologic Prophylaxis:   High Risk (Score >/= 5) - Pharmacological DVT Prophylaxis Ordered: heparin  Sequential Compression Devices Ordered  Patient Centered Rounds: I performed bedside rounds with nursing staff today  Discussions with Specialists or Other Care Team Provider: Nephrology    Education and Discussions with Family / Patient: Updated  (daughter) via phone  Time Spent for Care: 45 minutes  More than 50% of total time spent on counseling and coordination of care as described above  Current Length of Stay: 17 day(s)  Current Patient Status: Inpatient   Certification Statement: The patient will continue to require additional inpatient hospital stay due to Acute kidney injury superimposed on CKD, recurrent aspiration pneumonia, esophageal dysmotility  Discharge Plan: Anticipate discharge tomorrow to prior assisted or independent living facility      Code Status: Level 3 - DNAR and DNI    Subjective:   Patient denies any shortness of breath, chest pain or abdominal pain  Patient noted to have a small area of healing wound on his penis with some scrotal and penile swelling  Objective:     Vitals:   Temp (24hrs), Av 8 °F (36 6 °C), Min:97 2 °F (36 2 °C), Max:98 4 °F (36 9 °C)    Temp:  [97 2 °F (36 2 °C)-98 4 °F (36 9 °C)] 97 2 °F (36 2 °C)  HR:  [60-65] 62  Resp:  [18-20] 19  BP: (144-165)/(52-59) 153/54  SpO2:  [89 %-96 %] 93 %  Body mass index is 30 57 kg/m²  Input and Output Summary (last 24 hours): Intake/Output Summary (Last 24 hours) at 2022 1501  Last data filed at 2022 0520  Gross per 24 hour   Intake --   Output 950 ml   Net -950 ml       Physical Exam:   Physical Exam  Constitutional:       Appearance: Normal appearance  HENT:      Head: Normocephalic and atraumatic  Eyes:      Extraocular Movements: Extraocular movements intact  Pupils: Pupils are equal, round, and reactive to light  Cardiovascular:      Rate and Rhythm: Normal rate and regular rhythm  Heart sounds: No murmur heard  No gallop  Pulmonary:      Effort: Pulmonary effort is normal       Breath sounds: Normal breath sounds  Abdominal:      General: Bowel sounds are normal       Palpations: Abdomen is soft  Tenderness: There is no abdominal tenderness  Musculoskeletal:         General: No swelling or deformity  Normal range of motion  Cervical back: Normal range of motion and neck supple  Skin:     General: Skin is warm and dry  Neurological:      General: No focal deficit present  Mental Status: He is alert           Additional Data:     Labs:  Results from last 7 days   Lab Units 22  0556   WBC Thousand/uL 11 41*   HEMOGLOBIN g/dL 7 6*   HEMATOCRIT % 24 3*   PLATELETS Thousands/uL 240   NEUTROS PCT % 80*   LYMPHS PCT % 9*   MONOS PCT % 7   EOS PCT % 3     Results from last 7 days   Lab Units 22  0517 22  0556   SODIUM mmol/L 144 141 POTASSIUM mmol/L 4 4 4 7   CHLORIDE mmol/L 101 101   CO2 mmol/L 33* 33*   BUN mg/dL 127* 127*   CREATININE mg/dL 3 40* 3 54*   ANION GAP mmol/L 10 7   CALCIUM mg/dL 8 5 8 4   ALBUMIN g/dL  --  2 3*   GLUCOSE RANDOM mg/dL 113 125         Results from last 7 days   Lab Units 12/09/22  1115 12/09/22  0712 12/09/22  0140 12/08/22  2120 12/08/22  1559 12/08/22  1103 12/08/22  0712 12/08/22  0200 12/07/22  2113 12/07/22  1552 12/07/22  1116 12/07/22  0703   POC GLUCOSE mg/dl 182* 117 139 179* 136 129 134 136 121 183* 119 92               Lines/Drains:  Invasive Devices     Peripheral Intravenous Line  Duration           Peripheral IV 12/06/22 Right Forearm 3 days          Drain  Duration           Urethral Catheter 7 days              Urinary Catheter:  Goal for removal: Continue Ayala for comfort               Imaging: Reviewed radiology reports from this admission including: chest xray, chest CT scan and abdominal/pelvic CT    Recent Cultures (last 7 days):         Last 24 Hours Medication List:   Current Facility-Administered Medications   Medication Dose Route Frequency Provider Last Rate   • acetaminophen  650 mg Oral Q6H PRN KEISHA Newberry     • amLODIPine  10 mg Oral Daily KEISHA Newberry     • atorvastatin  40 mg Oral Daily KEISHA Newberry     • benzonatate  200 mg Oral TID KEISHA Penny     • bisacodyl  10 mg Rectal Daily PRN Da Ledbetter MD     • carvedilol  12 5 mg Oral BID With Meals KEISHA Newberry     • cholecalciferol  2,000 Units Oral Daily KEISHA Newberry     • vitamin B-12  500 mcg Oral Daily KEISHA Newberry     • dextromethorphan-guaiFENesin  10 mL Oral Q6H PRN KEISHA Newberry     • furosemide  40 mg Oral BID (diuretic) Rocky Choudhury MD     • gabapentin  100 mg Oral BID KEISHA Newberry     • heparin (porcine)  5,000 Units Subcutaneous Q8H [address] KEISHA Newberry     • hydrALAZINE  100 mg Oral TID Wade Fish MD     • hydrocodone-chlorpheniramine polistirex  5 mL Oral HS Da Ledbetter MD     • insulin glargine  10 Units Subcutaneous HS Da Ledbetter MD     • insulin lispro  1-5 Units Subcutaneous TID AC KEISHA Newberry     • insulin lispro  1-5 Units Subcutaneous 0200 KEISHA Newberry     • insulin lispro  1-5 Units Subcutaneous HS KEISHA Newberry     • ipratropium  0 5 mg Nebulization TID KEISHA Newberry     • isosorbide mononitrate  60 mg Oral Daily KEISHA Newberry     • labetalol  10 mg Intravenous Q6H PRN Marianne Rivera PA-C     • levalbuterol  0 63 mg Nebulization Q4H PRN KEISHA Newberry     • levalbuterol  1 25 mg Nebulization TID KEISHA Newberry      And   • sodium chloride  3 mL Nebulization TID KEISHA Newberry     • nystatin   Topical BID KEISHA Newberry     • ondansetron  4 mg Intravenous Q6H PRN KEISHA Newberry     • pantoprazole  40 mg Oral Early Morning KEISHA Newberry     • polyethylene glycol  17 g Oral BID Tasha Brown DO     • repaglinide  0 5 mg Oral TID AC Da Ledbetter MD     • senna  2 tablet Oral HS KEISHA Newberry     • tamsulosin  0 4 mg Oral Daily With Garry Power MD          Today, Patient Was Seen By: Da Ledbetter MD    **Please Note: This note may have been constructed using a voice recognition system  **

## 2022-12-09 NOTE — PLAN OF CARE
Problem: RESPIRATORY - ADULT  Goal: Achieves optimal ventilation and oxygenation  Description: INTERVENTIONS:  - Assess for changes in respiratory status  - Assess for changes in mentation and behavior  - Position to facilitate oxygenation and minimize respiratory effort  - Oxygen administered by appropriate delivery if ordered  - Initiate smoking cessation education as indicated  - Encourage broncho-pulmonary hygiene including cough, deep breathe, Incentive Spirometry  - Assess the need for suctioning and aspirate as needed  - Assess and instruct to report SOB or any respiratory difficulty  - Respiratory Therapy support as indicated  Outcome: Progressing     Problem: Prexisting or High Potential for Compromised Skin Integrity  Goal: Skin integrity is maintained or improved  Description: INTERVENTIONS:  - Identify patients at risk for skin breakdown  - Assess and monitor skin integrity  - Assess and monitor nutrition and hydration status  - Monitor labs   - Assess for incontinence   - Turn and reposition patient  - Assist with mobility/ambulation  - Relieve pressure over bony prominences  - Avoid friction and shearing  - Provide appropriate hygiene as needed including keeping skin clean and dry  - Evaluate need for skin moisturizer/barrier cream  - Collaborate with interdisciplinary team   - Patient/family teaching  - Consider wound care consult   Outcome: Progressing     Problem: MOBILITY - ADULT  Goal: Maintain or return to baseline ADL function  Description: INTERVENTIONS:  -  Assess patient's ability to carry out ADLs; assess patient's baseline for ADL function and identify physical deficits which impact ability to perform ADLs (bathing, care of mouth/teeth, toileting, grooming, dressing, etc )  - Assess/evaluate cause of self-care deficits   - Assess range of motion  - Assess patient's mobility; develop plan if impaired  - Assess patient's need for assistive devices and provide as appropriate  - Encourage maximum independence but intervene and supervise when necessary  - Involve family in performance of ADLs  - Assess for home care needs following discharge   - Consider OT consult to assist with ADL evaluation and planning for discharge  - Provide patient education as appropriate  Outcome: Progressing  Goal: Maintains/Returns to pre admission functional level  Description: INTERVENTIONS:  - Perform BMAT or MOVE assessment daily    - Set and communicate daily mobility goal to care team and patient/family/caregiver     - Collaborate with rehabilitation services on mobility goals if consulted  - Out of bed for toileting  - Record patient progress and toleration of activity level   Outcome: Progressing     Problem: Potential for Falls  Goal: Patient will remain free of falls  Description: INTERVENTIONS:  - Educate patient/family on patient safety including physical limitations  - Instruct patient to call for assistance with activity   - Consult OT/PT to assist with strengthening/mobility   - Keep Call bell within reach  - Keep bed low and locked with side rails adjusted as appropriate  - Keep care items and personal belongings within reach  - Initiate and maintain comfort rounds  - Make Fall Risk Sign visible to staff  - Apply yellow socks and bracelet for high fall risk patients  - Consider moving patient to room near nurses station  Outcome: Progressing     Problem: PAIN - ADULT  Goal: Verbalizes/displays adequate comfort level or baseline comfort level  Description: Interventions:  - Encourage patient to monitor pain and request assistance  - Assess pain using appropriate pain scale  - Administer analgesics based on type and severity of pain and evaluate response  - Implement non-pharmacological measures as appropriate and evaluate response  - Consider cultural and social influences on pain and pain management  - Notify physician/advanced practitioner if interventions unsuccessful or patient reports new pain  Outcome: Progressing     Problem: INFECTION - ADULT  Goal: Absence or prevention of progression during hospitalization  Description: INTERVENTIONS:  - Assess and monitor for signs and symptoms of infection  - Monitor lab/diagnostic results  - Monitor all insertion sites, i e  indwelling lines, tubes, and drains  - Monitor endotracheal if appropriate and nasal secretions for changes in amount and color  - [place] appropriate cooling/warming therapies per order  - Administer medications as ordered  - Instruct and encourage patient and family to use good hand hygiene technique  - Identify and instruct in appropriate isolation precautions for identified infection/condition  Outcome: Progressing  Goal: Absence of fever/infection during neutropenic period  Description: INTERVENTIONS:  - Monitor WBC    Outcome: Progressing     Problem: SAFETY ADULT  Goal: Maintain or return to baseline ADL function  Description: INTERVENTIONS:  -  Assess patient's ability to carry out ADLs; assess patient's baseline for ADL function and identify physical deficits which impact ability to perform ADLs (bathing, care of mouth/teeth, toileting, grooming, dressing, etc )  - Assess/evaluate cause of self-care deficits   - Assess range of motion  - Assess patient's mobility; develop plan if impaired  - Assess patient's need for assistive devices and provide as appropriate  - Encourage maximum independence but intervene and supervise when necessary  - Involve family in performance of ADLs  - Assess for home care needs following discharge   - Consider OT consult to assist with ADL evaluation and planning for discharge  - Provide patient education as appropriate  Outcome: Progressing  Goal: Maintains/Returns to pre admission functional level  Description: INTERVENTIONS:  - Perform BMAT or MOVE assessment daily    - Set and communicate daily mobility goal to care team and patient/family/caregiver     - Collaborate with rehabilitation services on mobility goals if consulted  - Out of bed for toileting  - Record patient progress and toleration of activity level   Outcome: Progressing  Goal: Patient will remain free of falls  Description: INTERVENTIONS:  - Educate patient/family on patient safety including physical limitations  - Instruct patient to call for assistance with activity   - Consult OT/PT to assist with strengthening/mobility   - Keep Call bell within reach  - Keep bed low and locked with side rails adjusted as appropriate  - Keep care items and personal belongings within reach  - Initiate and maintain comfort rounds  - Make Fall Risk Sign visible to staff  - Apply yellow socks and bracelet for high fall risk patients  - Consider moving patient to room near nurses station  Outcome: Progressing     Problem: DISCHARGE PLANNING  Goal: Discharge to home or other facility with appropriate resources  Description: INTERVENTIONS:  - Identify barriers to discharge w/patient and caregiver  - Arrange for needed discharge resources and transportation as appropriate  - Identify discharge learning needs (meds, wound care, etc )  - Arrange for interpretive services to assist at discharge as needed  - Refer to Case Management Department for coordinating discharge planning if the patient needs post-hospital services based on physician/advanced practitioner order or complex needs related to functional status, cognitive ability, or social support system  Outcome: Progressing     Problem: Knowledge Deficit  Goal: Patient/family/caregiver demonstrates understanding of disease process, treatment plan, medications, and discharge instructions  Description: Complete learning assessment and assess knowledge base    Interventions:  - Provide teaching at level of understanding  - Provide teaching via preferred learning methods  Outcome: Progressing     Problem: Nutrition/Hydration-ADULT  Goal: Nutrient/Hydration intake appropriate for improving, restoring or maintaining nutritional needs  Description: Monitor and assess patient's nutrition/hydration status for malnutrition  Collaborate with interdisciplinary team and initiate plan and interventions as ordered  Monitor patient's weight and dietary intake as ordered or per policy  Utilize nutrition screening tool and intervene as necessary  Determine patient's food preferences and provide high-protein, high-caloric foods as appropriate       INTERVENTIONS:  - Monitor oral intake, urinary output, labs, and treatment plans  - Assess nutrition and hydration status and recommend course of action  - Evaluate amount of meals eaten  - Assist patient with eating if necessary   - Allow adequate time for meals  - Recommend/ encourage appropriate diets, oral nutritional supplements, and vitamin/mineral supplements  - Order, calculate, and assess calorie counts as needed  - Assess need for intravenous fluids  - Provide nutrition/hydration education as appropriate  - Include patient/family/caregiver in decisions related to nutrition  Outcome: Progressing

## 2022-12-09 NOTE — ASSESSMENT & PLAN NOTE
Patient continues to have intermittent fevers with persistent elevation of white count despite receiving 14 days of antibiotics  Fredo [place] was treated with Rocephin and Flagyl during prior hospitalization and also received cefepime for 7 days during this hospitalization  ID Input appreciated    Monitor off antibiotics  Current SIRS likely secondary to aspiration pneumonitis  White count is improving and fever curve improving

## 2022-12-09 NOTE — PROGRESS NOTES
[address] [facility] NOTE   Merlin Sousa 80 y o  male MRN: [id number]  Unit/Bed#: [address]  Encounter: [id number]  Reason for Consult: SHAHID    ASSESSMENT and PLAN:  80year old with history of CKD admitted with shortness of breath  We are consulted for evaluation management of SHAHID  # Acute Kidney Injury   - Baseline creatinine 1 6-2 0  - Admission creatinine 2 8 with peak of 3 9 and currently 3 5  - Etiology was thought to be due to over-diuresis and a component of urinary retention  - Currently appears volume overloaded again probably in setting of third spacing from hypoalbuminemia  - Continue furosemide 40 mg twice daily for comfort  - Ayala catheter is in place  - BUN is elevated and he has asterixis concerning for uremia  - Changed nutritional supplements from Glucerna to Nepro  - Consider conservative kidney management given advanced age  - Discussed with family in detail about non dialytic therapy  - Plans noted for hospice directed care    # Chronic kidney disease stage 3  - Etiology, most likely cardiorenal syndrome and age-related nephron loss    # Hypertension  - Current regimen includes hydralazine 100 mg t i d , amlodipine 10 mg daily, Coreg 12 5 mg b i d   Imdur 60 mg daily  - Patient is now off doxazosin and switch to Flomax per Urology    # Volume  - CT scan admission was consistent with pulmonary edema with moderate size pleural effusion left more than right status post IV and oral diuretics  - Repeat CT scan 12/06 shows bilateral pleural effusions  - IV diuresis undercover of IV albumin today    # Anemia   - Continue to monitor hemoglobin  - Transfuse to keep hemoglobin more than 7    # Electrolytes/Acid base status   - Mild elevation of bicarbonate in setting of diuresis    # Left upper quadrant mass  - Renal ultrasound suggestive of solid hypoechoic mass in left upper quadrant medial to spleen  - CT abdomen without IV contrast ompleted showing gastric fundus mass  - Plan noted for hospice directed care    DISPOSITION:  Plans noted for hospice directed care  Continue furosemide 40 mg twice daily to decrease body swelling for comfort  SUBJECTIVE / 24H INTERVAL HISTORY:  Patient sleeping  Review of Systems   Unable to perform ROS: Acuity of condition     OBJECTIVE:  Current Weight: Weight - Scale: 83 3 kg (183 lb 11 2 oz)  Vitals:    12/08/22 2203 12/09/22 0005 12/09/22 0719 12/09/22 0729   BP: 146/57 144/52 153/54    BP Location:       Pulse: 61 61 65    Resp: 20 19     Temp:  98 4 °F (36 9 °C) (!) 97 2 °F (36 2 °C)    TempSrc:       SpO2: 93% 90% 95% 96%   Weight:       Height:           Intake/Output Summary (Last 24 hours) at 12/9/2022 0754  Last data filed at 12/9/2022 0520  Gross per 24 hour   Intake 410 ml   Output 950 ml   Net -540 ml     Physical Exam  Vitals and nursing note reviewed  Constitutional:       General: He is not in acute distress  Appearance: He is well-developed  He is ill-appearing  HENT:      Head: Normocephalic and atraumatic  Eyes:      Conjunctiva/sclera: Conjunctivae normal    Cardiovascular:      Rate and Rhythm: Normal rate and regular rhythm  Pulses: Normal pulses  Heart sounds: Normal heart sounds  No murmur heard  Pulmonary:      Effort: Pulmonary effort is normal  No respiratory distress  Breath sounds: Normal breath sounds  Abdominal:      Palpations: Abdomen is soft  Tenderness: There is no abdominal tenderness  Musculoskeletal:         General: No swelling  Cervical back: Neck supple  Comments: Significant bilateral upper extremity edema   Skin:     General: Skin is warm and dry  Capillary Refill: Capillary refill takes less than 2 seconds     Neurological:      Comments: Asleep       Medications:    Current Facility-Administered Medications:   •  acetaminophen (TYLENOL) tablet 650 mg, 650 mg, Oral, Q6H PRN, KEISHA Newberry, 650 mg at 12/05/22 2133  •  amLODIPine (NORVASC) tablet 10 mg, 10 mg, Oral, Daily, KEISHA Newberry, 10 mg at 12/08/22 4869  •  atorvastatin (LIPITOR) tablet 40 mg, 40 mg, Oral, Daily, KEISHA Newberry, 40 mg at 12/08/22 6018  •  benzonatate (TESSALON PERLES) capsule 200 mg, 200 mg, Oral, TID, Destin Albrecht, CRNP, 200 mg at 12/08/22 2212  •  bisacodyl (DULCOLAX) rectal suppository 10 mg, 10 mg, Rectal, Daily PRN, Mirian Sheikh MD, 10 mg at 12/05/22 1946  •  carvedilol (COREG) tablet 12 5 mg, 12 5 mg, Oral, BID With Meals, KEISHA Newberry, 12 5 mg at 12/08/22 1749  •  cholecalciferol (VITAMIN D3) tablet 2,000 Units, 2,000 Units, Oral, Daily, KEISHA Newberry, 2,000 Units at 12/08/22 0950  •  cyanocobalamin (VITAMIN B-12) tablet 500 mcg, 500 mcg, Oral, Daily, KEISHA Newberry, 500 mcg at 12/08/22 0949  •  dextromethorphan-guaiFENesin (ROBITUSSIN DM) oral syrup 10 mL, 10 mL, Oral, Q6H PRN, KEISHA Newberry, 10 mL at 12/07/22 1639  •  gabapentin (NEURONTIN) capsule 100 mg, 100 mg, Oral, BID, KEISHA Newberry, 100 mg at 12/08/22 1749  •  heparin (porcine) subcutaneous injection 5,000 Units, 5,000 Units, Subcutaneous, Q8H [address], 5,000 Units at 12/09/22 0512 **AND** [CANCELED] Platelet count, , , Once, KEISHA Newberry  •  hydrALAZINE (APRESOLINE) tablet 100 mg, 100 mg, Oral, TID, Ady Da Silva MD, 100 mg at 12/08/22 2212  •  hydrocodone-chlorpheniramine polistirex (TUSSIONEX) ER suspension 5 mL, 5 mL, Oral, HS, Mirian Sheikh MD, 5 mL at 12/08/22 2211  •  insulin glargine (LANTUS) subcutaneous injection 10 Units 0 1 mL, 10 Units, Subcutaneous, , Mirian Sheikh MD, 10 Units at 12/08/22 2211  •  insulin lispro (HumaLOG) 100 units/mL subcutaneous injection 1-5 Units, 1-5 Units, Subcutaneous, TID AC, 1 Units at 12/07/22 1640 **AND** Fingerstick Glucose (POCT), , , TID AC, KEISHA Newberry  •  insulin lispro (HumaLOG) 100 units/mL subcutaneous injection 1-5 Units, 1-5 Units, Subcutaneous, 0200, KEISHA Newberry, 1 Units at 12/03/22 0257  •  insulin lispro (HumaLOG) 100 units/mL subcutaneous injection 1-5 Units, 1-5 Units, Subcutaneous, HS, Cuidoc Zunigarik, CRNP, 1 Units at 12/08/22 2211  •  ipratropium (ATROVENT) 0 02 % inhalation solution 0 5 mg, 0 5 mg, Nebulization, TID, Cuijohnn Nadiarik, CRNP, 0 5 mg at 12/09/22 9995  •  isosorbide mononitrate (IMDUR) 24 hr tablet 60 mg, 60 mg, Oral, Daily, Cuiyin Nadiarik, CRNP, 60 mg at 12/08/22 0950  •  labetalol (NORMODYNE) injection 10 mg, 10 mg, Intravenous, Q6H PRN, Agustina JUSTUS SchneiderC, 10 mg at 12/07/22 2338  •  levalbuterol (XOPENEX) inhalation solution 0 63 mg, 0 63 mg, Nebulization, Q4H PRN, Emilee Link, CRNP, 0 63 mg at 12/01/22 0807  •  levalbuterol (XOPENEX) inhalation solution 1 25 mg, 1 25 mg, Nebulization, TID, 1 25 mg at 12/09/22 0727 **AND** sodium chloride 0 9 % inhalation solution 3 mL, 3 mL, Nebulization, TID, Emilee Zunigarik, CRNP, 3 mL at 12/09/22 3699  •  nystatin (MYCOSTATIN) powder, , Topical, BID, Emilee Hammondsk, CRNP, 1 application at 57/51/35 1837  •  ondansetron (ZOFRAN) injection 4 mg, 4 mg, Intravenous, Q6H PRN, Emilee Zunigarik, CRNP, 4 mg at 12/05/22 1120  •  pantoprazole (PROTONIX) EC tablet 40 mg, 40 mg, Oral, Early Morning, Cuiyicarlos Zunigarik, CRNP, 40 mg at 12/09/22 0331  •  polyethylene glycol (MIRALAX) packet 17 g, 17 g, Oral, BID, Tashaescobar Bryantar, DO, 17 g at 12/08/22 2213  •  repaglinide (PRANDIN) tablet 0 5 mg, 0 5 mg, Oral, TID AC, Steve Yan MD, 0 5 mg at 12/08/22 1749  •  senna (SENOKOT) tablet 17 2 mg, 2 tablet, Oral, HS, KEISHA Newberry, 17 2 mg at 12/08/22 2212  •  tamsulosin (FLOMAX) capsule 0 4 mg, 0 4 mg, Oral, Daily With Cielo Nieto MD, 0 4 mg at 12/08/22 1749    Laboratory Results:  Results from last 7 days   Lab Units 12/09/22  0517 12/08/22  0556 12/07/22  0539 12/06/22  0551 12/05/22  0530 12/04/22  0552 12/03/22  0533   WBC Thousand/uL  --  11 41* 11 84* 11 63* 13 91*  --   --    HEMOGLOBIN g/dL  --  7 6* 7 6* 8 0* 7 8*  --   --    HEMATOCRIT %  --  24 3* 24 5* 25 9* 25 2*  --   --    PLATELETS Thousands/uL  --  240 263 267 292  --   --    POTASSIUM mmol/L 4 4 4 7 5 0 4 8 4 6 4 8 4 9   CHLORIDE mmol/L 101 101 102 103 105 106 103   CO2 mmol/L 33* 33* 32 30 32 31 32   BUN mg/dL 127* 127* 119* 113* 113* 118* 119*   CREATININE mg/dL 3 40* 3 54* 3 50* 3 22* 3 28* 3 55* 3 78*   CALCIUM mg/dL 8 5 8 4 8 4 8 6 8 7 8 6 8 6     Portions of the record may have been created with voice recognition software  Occasional wrong word or "sound a like" substitutions may have occurred due to the inherent limitations of voice recognition software  Read the chart carefully and recognize, using context, where substitutions have occurred  If you have any questions, please contact the dictating provider  No

## 2022-12-09 NOTE — ASSESSMENT & PLAN NOTE
Prolonged discussion with patient's both daughters and son along with case management together with nephrology  Discussed the overall prognosis regarding worsening kidney function with persistent fluid overload, esophageal dysmotility and overall deconditioning- on 12/  Patient to be discharged on comfort care to assisted living facility

## 2022-12-09 NOTE — PROGRESS NOTES
-- Patient: Surya Alcantara  -- MRN: [id number]  -- Aidin Request ID: [id number]  -- Level of care reserved: Hospice  -- Partner Reserved: [facility] [place], [address] [place] [address] [phone]  -- Clinical needs requested:  -- Geography searched: [id number]  -- Start of Service:  -- Request sent: 5:05pm EST on 12/8/2022 by Leah Coyne  -- Partner reserved: 12:01pm EST on 12/9/2022 by Leah Coyne  -- Choice list shared: 10:03am EST on 12/9/2022 by Leah Coyne

## 2022-12-09 NOTE — CASE MANAGEMENT
Case Management Discharge Planning Note    Patient name Mohamud Osorio  Location [address] [address] [address] MRN [id number]  : 10/19/1929 Date 2022       Current Admission Date: 2022  Current Admission Diagnosis:Acute respiratory failure with hypoxia [facility])   Patient Active Problem List    Diagnosis Date Noted   • Goals of care, counseling/discussion 2022   • Anemia 2022   • SIRS (systemic inflammatory response syndrome) ([address] ) 2022   • Constipation 2022   • Left upper quadrant abdominal mass 2022   • Chronic kidney disease    • Acute respiratory failure with hypoxia ([facility] ) 2022   • Acute on chronic diastolic congestive heart failure ([facility] ) 2022   • BPH (benign prostatic hyperplasia) 2022   • Acute diastolic (congestive) heart failure ([address] ) 2022   • PVCs (premature ventricular contractions) 2022   • Hyperlipidemia 2022   • Primary hypertension 11/15/2022   • Aspiration pneumonia ([address] ) 11/15/2022   • CHF (congestive heart failure) ([address] ) 11/15/2022   • Acute kidney injury superimposed on CKD ([facility] ) 11/15/2022   • Type 2 diabetes mellitus ([address] ) 11/15/2022   • Esophageal dysphagia 11/15/2022   • Elevated troponin 11/15/2022      LOS (days): 17  Geometric Mean LOS (GMLOS) (days): 5 20  Days to GMLOS:-11 5     OBJECTIVE:  Risk of Unplanned Readmission Score: 30 29     Current admission status: Inpatient   Preferred Pharmacy:   [facility] [address]  [address] [address]  Phone: [phone] Fax: [phone]    [facility]   [address] [address]  Phone: [phone] Fax: [phone]    Primary Care Provider: Ellen Delaney PA-C    Primary Insurance: MEDICARE  Secondary Insurance: [employer] OF [place]    DISCHARGE DETAILS:    Discharge planning discussed with[de-identified] Daughter, Inocencio Anderson of Choice: Yes  Comments - Freedom of Choice: SW following to assist with DCP  SAMSON met with pt's daughter to review plan and IMM  Daughter is planning on pt returning to [facility] with [facility] services  Per daughter she is planning on pt being transferred once she hears from [facility] about DME delivery  CM contacted family/caregiver?: Yes  Were Treatment Team discharge recommendations reviewed with patient/caregiver?: Yes  Did patient/caregiver verbalize understanding of patient care needs?: Yes  Were patient/caregiver advised of the risks associated with not following Treatment Team discharge recommendations?: Yes    Contacts  Patient Contacts: Lupe Weiss (ariellar)  Relationship to Patient[de-identified] Family  Contact Method: In Person  Reason/Outcome: Discharge Planning, Continuity of Care    Other Referral/Resources/Interventions Provided:  Interventions: Hospice  Referral Comments: Plans in place for pt to transfer back to [facility] with [facility] services  SAMSON spoke with [facility] at [facility] to discuss coordinating transfer  At this time she is still waiting for DME delivery by [facility]  Due to current time of day and uncertainty of delivery time plans request has been made for transfer back to facility tomorrow later morning  SAMSON notified pt's daughter of transfer timeframe  Treatment Team Recommendation: Hospice, Facility Return  Discharge Destination Plan[de-identified] Facility Return, Hospice  Transport at Discharge : BLS Ambulance  Dispatcher Contacted: Yes  Number/Name of Dispatcher: Roundtrip  Transported by [employer] and Unit #):  (pending)  ETA of Transport (Date): 12/10/22  ETA of Transport (Time): 1100 (requested)    IMM Given (Date):: 12/09/22  IMM Given to[de-identified] Family (IMM reviewed with daughter  Daughter verbalized understanding  Daughter signed IMM and copy given   Copy also placed in scan bin for chart )

## 2022-12-09 NOTE — ASSESSMENT & PLAN NOTE
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  · Discussed with radiology previously and radiologist recommended getting MRI for further evaluation  · Per nephrology okay to obtain MRI with gadolinium using class 2 gadolinium agents  · Dr Brunilda Sprague with radiologist again  who recommended CT scan with p o  Contrast for further evaluation  Discussed with radiologist patient's issues with dysphagia and aspiration    NG-tube was placed for contrast administration and patient had a CAT scan of the abdomen pelvis  · Abdomen pelvis showed left upper quadrant mass which may be arising from the gastric fundus and tail of the pancreas without any evidence of invasion of adjacent structures or metastatic disease in the abdomen-CAT scan with contrast using pancreatic mass protocol or MRI was suggested  · Discussed goals of care and possible discharge on comfort care

## 2022-12-09 NOTE — ASSESSMENT & PLAN NOTE
Patient with constipation  Continue MiraLax, senna q h s  · MiraLAX for symptoms to twice daily dosing  · Continue bowel regimen  Patient had a large bowel movement on 12/5/2022    · Patient has been stable with regular bowel movements

## 2022-12-09 NOTE — ASSESSMENT & PLAN NOTE
Patient presented with acute respiratory distress shortly after eating a few spoonful of puree diet in STR, patient reported chest pressure and SOB  Patient was satting 85-95% on oxygen 3 L per STR transfer paper  Patient was belching after eating/drinking limited amount of diet/water per staff  · Patient was discharged on the day of admission after being hospitalized for aspiration pneumonia, acute on chronic diastolic CHF, dysphagia  Received IV Lasix and 7 days of IV Rocephin and Flagyl  Seen by GI, underwent EGD, found to have oropharyngeal dysphagia with esophageal dysmotility, no obvious esophageal stricture  Underwent Barium swallow study which showed moderate to severe esophageal dysmotility with significant residual contrast remaining in the esophagus at the end of the study  Patient was recommended regular diet by speech and discharged on regular diet per STR  · Likely secondary to multifocal pneumonia and CHF  · Patient required BiPAP on ED arrival   ABG post BiPAP essentially unremarkable  Then titrated down to 6 liters oxygen upon admission  Patient was down to 1 L oxygen but currently again up to 3 to suction to keep O2 saturation in low 90s  · Chest x-ray upon admission- Pulmonary edema, worse when comparing to 11/19/2022  Underlying infection could not be excluded  · ProBNP 15,094  · Repeat chest x-ray from 12/5/2022 with persistent bilateral consolidation without any pleural effusion or atelectasis  · Patient received Lasix 40 IV in ED  · Received cefepime and Flagyl in ED  Completed 7 days of IV cefepime  · Received IV Solu-Medrol in ED  No wheezing on auscultation    · CT chest-pulmonary edema with moderate size bilateral pleural effusions with significant bibasilar atelectasis and concomitant multifocal pneumonia   · Obstructive series showed persistent pulmonary edema with multifocal pneumonia  · CT of the abdomen pelvis still showing bilateral pleural effusions with bibasilar atelectasis  · Chest x-ray from 12/5/2022 with extensive bilateral consolidation without any effusion  · CT abdomen pelvis from 12/5/2022 showing bilateral pleural effusion  · Currently on Lasix 40 mg IV every 12 hours with albumin    Patient possibly to be transitioned Lasix 40 mg p o  twice daily

## 2022-12-09 NOTE — DISCHARGE INSTR - OTHER ORDERS
Plan:   Skin care Plan:    1-Cleanse sacro-buttocks with soap and water  Apply Allevyn foam to mid-sacral & left buttock stage 2 pressure injuries  Gerald with T for treatment  Change every two days and PRN  2-Cleanse penis with foaming cleanser & dry well gently  Apply 3M No Sting (or equivalent - without alcohol) to skin for protection daily & prn soilage/dislodgement  3-Turn/reposition q2h or when medically stable for pressure re-distribution on skin   4-Float heels to offload pressure  5-Moisturize skin daily with skin nourishing cream  6-Pressure redistribution cushion in chair when out of bed  7-Hydraguard to bilateral heels BID and PRN

## 2022-12-09 NOTE — PLAN OF CARE
Problem: RESPIRATORY - ADULT  Goal: Achieves optimal ventilation and oxygenation  Description: INTERVENTIONS:  - Assess for changes in respiratory status  - Assess for changes in mentation and behavior  - Position to facilitate oxygenation and minimize respiratory effort  - Oxygen administered by appropriate delivery if ordered  - Initiate smoking cessation education as indicated  - Encourage broncho-pulmonary hygiene including cough, deep breathe, Incentive Spirometry  - Assess the need for suctioning and aspirate as needed  - Assess and instruct to report SOB or any respiratory difficulty  - Respiratory Therapy support as indicated  Outcome: Progressing     Problem: Potential for Falls  Goal: Patient will remain free of falls  Description: INTERVENTIONS:  - Educate patient/family on patient safety including physical limitations  - Instruct patient to call for assistance with activity   - Consult OT/PT to assist with strengthening/mobility   - Keep Call bell within reach  - Keep bed low and locked with side rails adjusted as appropriate  - Keep care items and personal belongings within reach  - Initiate and maintain comfort rounds  - Make Fall Risk Sign visible to staff  - Offer Toileting every 2 Hours, in advance of need  - Initiate/Maintain bed alarm  - Obtain necessary fall risk management equipment: walker  - Apply yellow socks and bracelet for high fall risk patients  - Consider moving patient to room near nurses station  Outcome: Progressing     Problem: PAIN - ADULT  Goal: Verbalizes/displays adequate comfort level or baseline comfort level  Description: Interventions:  - Encourage patient to monitor pain and request assistance  - Assess pain using appropriate pain scale  - Administer analgesics based on type and severity of pain and evaluate response  - Implement non-pharmacological measures as appropriate and evaluate response  - Consider cultural and social influences on pain and pain management  - Notify physician/advanced practitioner if interventions unsuccessful or patient reports new pain  Outcome: Progressing

## 2022-12-09 NOTE — ASSESSMENT & PLAN NOTE
Troponin [id number]  Elevated troponin in recent admission as well  Reports chest pressure when in respiratory distress  Denies history of CAD  · EKG no ischemic changes, read as AFib, rate 103, RBBB per prior provider  · No history of AFib  Prior EKG shows sinus rhythm with PACs /PVCs  repeat EKG showed sinus rhythm  · Recent 2D echo showed EF low normal, normal wall motion, grade 2 diastolic dysfunction, moderately dilated atriums  · Most likely non MI troponin elevation due to # 1 and CHF

## 2022-12-09 NOTE — ASSESSMENT & PLAN NOTE
Per speech therapy, patient at high risk for aspiration despite the level of diet    Currently on pureed diet with thin liquids with medications crushed with puree  · Continue pantoprazole daily  · Obstructive series showed barium throughout the colon with modest amount of stool along the rectosigmoid region  · Family meeting held on 12/8/22 and patient possibly to be discharged on comfort care

## 2022-12-09 NOTE — PHYSICAL THERAPY NOTE
PT cancellation   12/09/22 0940   PT Last Visit   PT Visit Date 12/09/22   Note Type   Note Type Cancelled Session   Cancel Reasons Refusal  (Pt refused therapies this AM due to too tired  Per chart pt is to return to [facility] on hospice  Will follow as appropriate )   Licensure   [place] License Number  Casey Mckeon PT  [id number]

## 2022-12-09 NOTE — ASSESSMENT & PLAN NOTE
Wt Readings from Last 3 Encounters:   12/08/22 83 3 kg (183 lb 11 2 oz)   11/22/22 84 5 kg (186 lb 4 8 oz)     Patient received IV Lasix in recent admission  · Chest x-ray upon admission shows worsening pulmonary edema  · Received 40 milligrams of Lasix IV in the ED and was continued on IV Lasix  Then transitioned to [facility] which is currently on hold  · Left upper extremity venous Doppler showed no evidence of DVT  · CT chest upon admission showed pulmonary edema with moderate size bilateral pleural effusions left more than right  · Might need accept higher creatinine to maintain euvolemia    · Diuretics have been on hold in setting of elevated creatinine  · Chest x-ray from 12/5/2022 showed persistent extensive bilateral consolidation without any pleural effusion or pneumothorax  · CT abdomen pelvis showed bilateral pleural effusions  · Started back on Lasix 40 mg IV every 12 hours along with albumin 12 5 g every 12 hours  · Patient to be discharged on Lasix 40 mg p o  twice daily

## 2022-12-10 VITALS
HEART RATE: 63 BPM | WEIGHT: 184 LBS | SYSTOLIC BLOOD PRESSURE: 138 MMHG | DIASTOLIC BLOOD PRESSURE: 51 MMHG | TEMPERATURE: 98.7 F | BODY MASS INDEX: 30.66 KG/M2 | OXYGEN SATURATION: 94 % | HEIGHT: 65 IN | RESPIRATION RATE: 18 BRPM

## 2022-12-10 LAB
GLUCOSE SERPL-MCNC: 152 MG/DL (ref 65–140)
GLUCOSE SERPL-MCNC: 164 MG/DL (ref 65–140)
GLUCOSE SERPL-MCNC: 84 MG/DL (ref 65–140)
GLUCOSE SERPL-MCNC: 98 MG/DL (ref 65–140)

## 2022-12-10 RX ADMIN — NYSTATIN: 100000 POWDER TOPICAL at 09:34

## 2022-12-10 RX ADMIN — ISODIUM CHLORIDE 3 ML: 0.03 SOLUTION RESPIRATORY (INHALATION) at 13:43

## 2022-12-10 RX ADMIN — CARVEDILOL 12.5 MG: 12.5 TABLET, FILM COATED ORAL at 09:27

## 2022-12-10 RX ADMIN — LEVALBUTEROL HYDROCHLORIDE 1.25 MG: 1.25 SOLUTION, CONCENTRATE RESPIRATORY (INHALATION) at 07:06

## 2022-12-10 RX ADMIN — INSULIN LISPRO 1 UNITS: 100 INJECTION, SOLUTION INTRAVENOUS; SUBCUTANEOUS at 13:04

## 2022-12-10 RX ADMIN — CYANOCOBALAMIN TAB 500 MCG 500 MCG: 500 TAB at 09:28

## 2022-12-10 RX ADMIN — BENZONATATE 200 MG: 100 CAPSULE ORAL at 09:27

## 2022-12-10 RX ADMIN — ISOSORBIDE MONONITRATE 60 MG: 60 TABLET, EXTENDED RELEASE ORAL at 09:28

## 2022-12-10 RX ADMIN — ISODIUM CHLORIDE 3 ML: 0.03 SOLUTION RESPIRATORY (INHALATION) at 07:05

## 2022-12-10 RX ADMIN — FUROSEMIDE 40 MG: 40 TABLET ORAL at 09:31

## 2022-12-10 RX ADMIN — PANTOPRAZOLE SODIUM 40 MG: 40 TABLET, DELAYED RELEASE ORAL at 05:40

## 2022-12-10 RX ADMIN — POLYETHYLENE GLYCOL 3350 17 G: 17 POWDER, FOR SOLUTION ORAL at 09:27

## 2022-12-10 RX ADMIN — GABAPENTIN 100 MG: 100 CAPSULE ORAL at 09:27

## 2022-12-10 RX ADMIN — INSULIN LISPRO 1 UNITS: 100 INJECTION, SOLUTION INTRAVENOUS; SUBCUTANEOUS at 02:28

## 2022-12-10 RX ADMIN — ONDANSETRON 4 MG: 2 INJECTION INTRAMUSCULAR; INTRAVENOUS at 05:41

## 2022-12-10 RX ADMIN — IPRATROPIUM BROMIDE 0.5 MG: 0.5 SOLUTION RESPIRATORY (INHALATION) at 07:05

## 2022-12-10 RX ADMIN — Medication 2000 UNITS: at 09:28

## 2022-12-10 RX ADMIN — ATORVASTATIN CALCIUM 40 MG: 40 TABLET, FILM COATED ORAL at 09:27

## 2022-12-10 RX ADMIN — REPAGLINIDE 0.5 MG: 1 TABLET ORAL at 09:31

## 2022-12-10 RX ADMIN — LEVALBUTEROL HYDROCHLORIDE 1.25 MG: 1.25 SOLUTION, CONCENTRATE RESPIRATORY (INHALATION) at 13:43

## 2022-12-10 RX ADMIN — REPAGLINIDE 0.5 MG: 1 TABLET ORAL at 13:05

## 2022-12-10 RX ADMIN — HYDRALAZINE HYDROCHLORIDE 100 MG: 25 TABLET ORAL at 09:27

## 2022-12-10 RX ADMIN — HEPARIN SODIUM 5000 UNITS: 5000 INJECTION INTRAVENOUS; SUBCUTANEOUS at 05:40

## 2022-12-10 RX ADMIN — IPRATROPIUM BROMIDE 0.5 MG: 0.5 SOLUTION RESPIRATORY (INHALATION) at 13:43

## 2022-12-10 RX ADMIN — AMLODIPINE BESYLATE 10 MG: 10 TABLET ORAL at 09:28

## 2022-12-10 RX ADMIN — HEPARIN SODIUM 5000 UNITS: 5000 INJECTION INTRAVENOUS; SUBCUTANEOUS at 13:04

## 2022-12-10 NOTE — DISCHARGE SUMMARY
Irene 45  Discharge- Eder William 10/19/1929, 80 y o  male MRN: [id number]  Unit/Bed#: [facility] Encounter: [id number]  Primary Care Provider: Yudi Casarez PA-C   Date and time admitted to hospital: 11/22/2022  8:41 PM    Goals of care, counseling/discussion  Assessment & Plan  Prolonged discussion with patient's both daughters and son along with case management together with nephrology  Discussed the overall prognosis regarding worsening kidney function with persistent fluid overload, esophageal dysmotility and overall deconditioning- on 12/8/22  Patient to be discharged on comfort care to assisted living facility     * Acute respiratory failure with hypoxia [facility])  Assessment & Plan  Patient presented with acute respiratory distress shortly after eating a few spoonful of puree diet in STR, patient reported chest pressure and SOB  Patient was satting 85-95% on oxygen 3 L per STR transfer paper  Patient was belching after eating/drinking limited amount of diet/water per staff  · Patient was discharged on the day of admission after being hospitalized for aspiration pneumonia, acute on chronic diastolic CHF, dysphagia  Received IV Lasix and 7 days of IV Rocephin and Flagyl  Seen by GI, underwent EGD, found to have oropharyngeal dysphagia with esophageal dysmotility, no obvious esophageal stricture  Underwent Barium swallow study which showed moderate to severe esophageal dysmotility with significant residual contrast remaining in the esophagus at the end of the study  Patient was recommended regular diet by speech and discharged on regular diet per STR  · Likely secondary to multifocal pneumonia and CHF  · Patient required BiPAP on ED arrival   ABG post BiPAP essentially unremarkable  Then titrated down to 6 liters oxygen upon admission    Patient was down to 1 L oxygen but currently again up to 3 L O 2 to keep O2 saturation in low 90s  · Chest x-ray upon admission- Pulmonary edema, worse when comparing to 11/19/2022  Underlying infection could not be excluded  · ProBNP 15,094  · Repeat chest x-ray from 12/5/2022 with persistent bilateral consolidation without any pleural effusion or atelectasis  · Patient received Lasix 40 IV in ED  · Received cefepime and Flagyl in ED  Completed 7 days of IV cefepime  · Received IV Solu-Medrol in ED  No wheezing on auscultation  · CT chest-pulmonary edema with moderate size bilateral pleural effusions with significant bibasilar atelectasis and concomitant multifocal pneumonia   · Obstructive series showed persistent pulmonary edema with multifocal pneumonia  · CT of the abdomen pelvis still showing bilateral pleural effusions with bibasilar atelectasis  · Chest x-ray from 12/5/2022 with extensive bilateral consolidation without any effusion  · CT abdomen pelvis from 12/5/2022 showing bilateral pleural effusion  · Currently on Lasix 40 mg IV every 12 hours with albumin  Patient possibly to be transitioned Lasix 40 mg p o  twice daily    Acute kidney injury superimposed on CKD [facility])  Assessment & Plan  Lab Results   Component Value Date    EGFR 14 12/09/2022    EGFR 14 12/08/2022    EGFR 14 12/07/2022    CREATININE 3 40 (H) 12/09/2022    CREATININE 3 54 (H) 12/08/2022    CREATININE 3 50 (H) 12/07/2022     History of CKD 4, baseline creatinine appears to be around 1 5-1 9 in past 2 years in care everywhere  Creatinine during previous hospitalization ranged in 2 4-2 8  Admission creatinine was 2 8 which peaked at 3 9  · Possible CKD progression  · Urinalysis was bland  · Received IV Lasix 40 mg in ED  Patient also initially received IV diuretics and was later transitioned to [facility] which have been on hold  · Patient received IV fluids from 11/30 for a couple of days  · Renal ultrasound showed no hydronephrosis moderate left renal atrophy  · Nephrology input appreciated  · No indication for dialysis at the current time    Continue conservative kidney management plan given his advanced age per nephrology  · Nutritional supplements were changed from Glucerna to Nepro  · CAT scan of the abdomen and pelvis on 12/5/2022 showed bilateral pleural effusions  · Chest x-ray from 12 /5 showing persistent extensive bilateral consolidation without any effusion or pneumothorax  · Patient currently on Lasix 40 mg IV every 12 hours with albumin to help with the fluid overload  Patient be discharged on Lasix 40 mg p o  twice daily for comfort  · Prolonged discussion with family in coordination with nephrology  Patient not a candidate for dialysis  · Continue conservative management  Patient be evaluated by hospice as outpatient    SIRS (systemic inflammatory response syndrome) ([facility] [address] )  Assessment & Plan  Patient continues to have intermittent fevers with persistent elevation of white count despite receiving 14 days of antibiotics  Joey Rios was treated with Rocephin and Flagyl during prior hospitalization and also received cefepime for 7 days during this hospitalization  ID Input appreciated  Monitor off antibiotics  ReCurrent SIRS likely secondary to aspiration pneumonitis  White count is improving and fever curve improving    Left upper quadrant abdominal mass  Assessment & Plan  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  · Discussed with radiology previously and radiologist recommended getting MRI for further evaluation  · Per nephrology okay to obtain MRI with gadolinium using class 2 gadolinium agents  · Dr Ana Banuelos with radiologist again  who recommended CT scan with p o  Contrast for further evaluation  Discussed with radiologist patient's issues with dysphagia and aspiration    NG-tube was placed for contrast administration and patient had a CAT scan of the abdomen pelvis  · Abdomen pelvis showed left upper quadrant mass which may be arising from the gastric fundus and tail of the pancreas without any evidence of invasion of adjacent structures or metastatic disease in the abdomen-CAT scan with contrast using pancreatic mass protocol or MRI was suggested  · Had goals of care conversation with the patient's family on 12/8/22 and will be discharged for outpatient hospice evaluation    Acute on chronic diastolic congestive heart failure [facility])  Assessment & Plan  Wt Readings from Last 3 Encounters:   12/10/22 83 5 kg (184 lb)   11/22/22 84 5 kg (186 lb 4 8 oz)     Patient received IV Lasix in recent admission  · Chest x-ray upon admission shows worsening pulmonary edema  · Received 40 milligrams of Lasix IV in the ED and was continued on IV Lasix  Then transitioned to [facility] which is currently on hold  · Left upper extremity venous Doppler showed no evidence of DVT  · CT chest upon admission showed pulmonary edema with moderate size bilateral pleural effusions left more than right  · Might need accept higher creatinine to maintain euvolemia  · Diuretics have been on hold in setting of elevated creatinine  · Chest x-ray from 12/5/2022 showed persistent extensive bilateral consolidation without any pleural effusion or pneumothorax  · CT abdomen pelvis showed bilateral pleural effusions  · Started back on Lasix 40 mg IV every 12 hours along with albumin 12 5 g every 12 hours  · Continue Lasix 40 mg p o  twice daily for comfort        Esophageal dysphagia  Assessment & Plan  Per speech therapy, patient at high risk for aspiration despite the level of diet    Currently on pureed diet with thin liquids with medications crushed with puree  · Continue pantoprazole daily  · Obstructive series showed barium throughout the colon with modest amount of stool along the rectosigmoid region  · Family meeting held on 12/8/22 and patient to be discharged and being evaluated by hospice      Aspiration pneumonia [facility])  Assessment & Plan  20 level has been minimally elevated since admission  · Patient received cefepime and Flagyl in the ED   Completed 7 day course of cefepime 1 gram IV daily  · Patient febrile again to 101 1 on 12/1 and 100 8 today, likely aspiration pneumonitis  procalcitonin mildly elevated, blood cultures negative, COVID/flu/RSV negative, urine culture with small colony of Enterococcus not requiring treatment per ID  · Due to recurrent fevers, ID was consulted who recommended monitoring off antibiotics  · Daughter/patient aware that he is at high risk for aspiration events due to his dysphagia although did speak with them that PEG tube placement does not guarantee zero risk of aspiration  · Sputum culture with mixed respiratory hardik  · Urine for Legionella and pneumococcal antigens were negative  · Blood cultures negative  · Repeat chest x-ray 12/1 with waxing and waning opacities  · Chest x-ray from 12/5/2022 showing multifocal consolidation  · CT abdomen pelvis showing bilateral pleural effusions with bibasilar atelectasis  · Recurrent SIRS suspected secondary to aspiration pneumonitis  · White count has improved and patient has no further fever    Anemia  Assessment & Plan  Hemoglobin has been fluctuating in the range of 7 5-8 5  Stable  Transfuse to keep hemoglobin more than 7    Constipation  Assessment & Plan  Patient with constipation  Continue MiraLax, senna q h s  · MiraLAX for symptoms to twice daily dosing  · Continue bowel regimen  Patient had a large bowel movement on 12/5/2022  · Patient has been stable with regular bowel movements      BPH (benign prostatic hyperplasia)  Assessment & Plan  Patient with urinary retention requiring multiple straight cath  Per RN patient also with penile swelling and therefore Ayala catheter placed  · Urology input appreciated  Cardura was discontinued and started Flomax  · Per Urology voiding trial once patient is more mobile    Hyperlipidemia  Assessment & Plan  Continue statin    Elevated troponin  Assessment & Plan  Troponin [id number]    Elevated troponin in recent admission as well  Reports chest pressure when in respiratory distress  Denies history of CAD  · EKG no ischemic changes, read as AFib, rate 103, RBBB per prior provider  · No history of AFib  Prior EKG shows sinus rhythm with PACs /PVCs  repeat EKG showed sinus rhythm  · Recent 2D echo showed EF low normal, normal wall motion, grade 2 diastolic dysfunction, moderately dilated atriums  · Most likely non MI troponin elevation due to # 1 and CHF  Type 2 diabetes mellitus [facility])  Assessment & Plan  Lab Results   Component Value Date    HGBA1C 5 7 (H) 11/16/2022       Recent Labs     12/09/22  2101 12/10/22  0222 12/10/22  0703 12/10/22  1052   POCGLU 149* 152* 84 164*     Patient was discharged on Prandin t i d  With meals  · Continue Prandin  · Currently on diabetic pureed Diet with thin liquids  DC Lantus    Primary hypertension  Assessment & Plan  Continue hydralazine, Coreg, Norvasc, Imdur  · Blood pressures overall stable        Medical Problems     Resolved Problems  Date Reviewed: 12/10/2022          Resolved    Thrush 12/3/2022     Resolved by  Tiffany Zuniga DO        Discharging Physician / Practitioner: Dillon Rodriguez MD  PCP: Tiesha Luke PA-C  Admission Date:   Admission Orders (From admission, onward)     Ordered        11/22/22 2318  INPATIENT ADMISSION  Once                      Discharge Date: 12/10/22    Consultations During Hospital Stay:  · Urology,, ID, nephrology, pulmonary, GI       Outpatient Tests Requested:  · Outpatient follow-up for hospice    Complications: None    Reason for Admission: Shortness of breath    Hospital Course:   Fidencio Matthew is a 80 y o  male patient with past medical history of esophageal dysmotility, diabetes mellitus type 2, CHF, hyperlipidemia, CKD stage IV, BPH who originally presented to the hospital on 11/22/2022 due to acute respiratory distress after eating puréed diet at the rehab facility    Patient also reported chest pressure and shortness of breath  Patient required BiPAP initially on arrival to the ED and was later transitioned to mid flow oxygen and eventually to nasal cannula  Patient was initially treated for CHF exacerbation and recurrent aspiration pneumonia with IV cefepime and IV Lasix  Patient was seen in consultation with pulmonology and nephrology  Patient completed 7-day course with cefepime and patient was also transitioned to [facility]  Multiple discussions held with the patient's family regarding goals of care  Patient continued to develop recurrent fevers and worsening white count in the setting of recurrent aspiration  Patient was also evaluated by ID who is recommending holding off further antibiotics  Patient later started having worsening kidney function patient received gentle IV hydration with holding diuretics  Patient continued to have poor oral intake and also started to have worsening BUN/creatinine with fluid overload  Patient was restarted back on IV Lasix with albumin  Family meeting held regarding the management in coordination with nephrology  Patient to be transitioned to hospice care upon discharge  Please see above list of diagnoses and related plan for additional information  Condition at Discharge: fair    Discharge Day Visit / Exam:   Subjective: Patient is more sleepy per RN  Patient himself denies any chest pain, shortness of breath, abdominal pain  Vitals: Blood Pressure: 138/51 (12/10/22 0926)  Pulse: 63 (12/10/22 0926)  Temperature: 98 7 °F (37 1 °C) (12/10/22 0926)  Temp Source: Axillary (12/09/22 2024)  Respirations: 18 (12/10/22 0059)  Height: 5' 5" (165 1 cm) (11/22/22 2055)  Weight - Scale: 83 5 kg (184 lb) (12/10/22 0600)  SpO2: 93 % (12/10/22 0926)  Exam:   Physical Exam  Constitutional:       Appearance: Normal appearance  HENT:      Head: Normocephalic and atraumatic  Eyes:      Extraocular Movements: Extraocular movements intact        Pupils: Pupils are equal, round, and reactive to light  Cardiovascular:      Rate and Rhythm: Normal rate and regular rhythm  Heart sounds: No murmur heard  No gallop  Pulmonary:      Effort: Pulmonary effort is normal       Breath sounds: Normal breath sounds  Abdominal:      General: Bowel sounds are normal       Palpations: Abdomen is soft  Tenderness: There is no abdominal tenderness  Musculoskeletal:         General: No swelling or deformity  Normal range of motion  Cervical back: Normal range of motion and neck supple  Comments: BiLateral upper extremity swelling   Skin:     General: Skin is warm and dry  Neurological:      General: No focal deficit present  Mental Status: He is alert  Discussion with Family: Updated  (daughter) via phone  Discharge instructions/Information to patient and family:   See after visit summary for information provided to patient and family  Provisions for Follow-Up Care:  See after visit summary for information related to follow-up care and any pertinent home health orders  Disposition:   Home    Planned Readmission: No     Discharge Statement:  I spent 40 minutes discharging the patient  This time was spent on the day of discharge  I had direct contact with the patient on the day of discharge  Greater than 50% of the total time was spent examining patient, answering all patient questions, arranging and discussing plan of care with patient as well as directly providing post-discharge instructions  Additional time then spent on discharge activities  Discharge Medications:  See after visit summary for reconciled discharge medications provided to patient and/or family        **Please Note: This note may have been constructed using a voice recognition system**

## 2022-12-10 NOTE — ASSESSMENT & PLAN NOTE
Lab Results   Component Value Date    EGFR 14 12/09/2022    EGFR 14 12/08/2022    EGFR 14 12/07/2022    CREATININE 3 40 (H) 12/09/2022    CREATININE 3 54 (H) 12/08/2022    CREATININE 3 50 (H) 12/07/2022     History of CKD 4, baseline creatinine appears to be around 1 5-1 9 in past 2 years in care everywhere  Creatinine during previous hospitalization ranged in 2 4-2 8  Admission creatinine was 2 8 which peaked at 3 9  · Possible CKD progression  · Urinalysis was bland  · Received IV Lasix 40 mg in ED  Patient also initially received IV diuretics and was later transitioned to [facility] which have been on hold  · Patient received IV fluids from 11/30 for a couple of days  · Renal ultrasound showed no hydronephrosis moderate left renal atrophy  · Nephrology input appreciated  · No indication for dialysis at the current time  Continue conservative kidney management plan given his advanced age per nephrology  · Nutritional supplements were changed from Glucerna to Nepro  · CAT scan of the abdomen and pelvis on 12/5/2022 showed bilateral pleural effusions  · Chest x-ray from 12 /5 showing persistent extensive bilateral consolidation without any effusion or pneumothorax  · Patient currently on Lasix 40 mg IV every 12 hours with albumin to help with the fluid overload  Patient be discharged on Lasix 40 mg p o  twice daily for comfort  · Prolonged discussion with family in coordination with nephrology  Patient not a candidate for dialysis  · Continue conservative management    Patient be evaluated by hospice as outpatient

## 2022-12-10 NOTE — ASSESSMENT & PLAN NOTE
Troponin [id number]  Elevated troponin in recent admission as well  Reports chest pressure when in respiratory distress  Denies history of CAD  · EKG no ischemic changes, read as AFib, rate 103, RBBB per prior provider  · No history of AFib  Prior EKG shows sinus rhythm with PACs /PVCs  repeat EKG showed sinus rhythm  · Recent 2D echo showed EF low normal, normal wall motion, grade 2 diastolic dysfunction, moderately dilated atriums  · Most likely non MI troponin elevation due to # 1 and CHF

## 2022-12-10 NOTE — ASSESSMENT & PLAN NOTE
Patient presented with acute respiratory distress shortly after eating a few spoonful of puree diet in STR, patient reported chest pressure and SOB  Patient was satting 85-95% on oxygen 3 L per STR transfer paper  Patient was belching after eating/drinking limited amount of diet/water per staff  · Patient was discharged on the day of admission after being hospitalized for aspiration pneumonia, acute on chronic diastolic CHF, dysphagia  Received IV Lasix and 7 days of IV Rocephin and Flagyl  Seen by GI, underwent EGD, found to have oropharyngeal dysphagia with esophageal dysmotility, no obvious esophageal stricture  Underwent Barium swallow study which showed moderate to severe esophageal dysmotility with significant residual contrast remaining in the esophagus at the end of the study  Patient was recommended regular diet by speech and discharged on regular diet per STR  · Likely secondary to multifocal pneumonia and CHF  · Patient required BiPAP on ED arrival   ABG post BiPAP essentially unremarkable  Then titrated down to 6 liters oxygen upon admission  Patient was down to 1 L oxygen but currently again up to 3 L O 2 to keep O2 saturation in low 90s  · Chest x-ray upon admission- Pulmonary edema, worse when comparing to 11/19/2022  Underlying infection could not be excluded  · ProBNP 15,094  · Repeat chest x-ray from 12/5/2022 with persistent bilateral consolidation without any pleural effusion or atelectasis  · Patient received Lasix 40 IV in ED  · Received cefepime and Flagyl in ED  Completed 7 days of IV cefepime  · Received IV Solu-Medrol in ED  No wheezing on auscultation    · CT chest-pulmonary edema with moderate size bilateral pleural effusions with significant bibasilar atelectasis and concomitant multifocal pneumonia   · Obstructive series showed persistent pulmonary edema with multifocal pneumonia  · CT of the abdomen pelvis still showing bilateral pleural effusions with bibasilar atelectasis  · Chest x-ray from 12/5/2022 with extensive bilateral consolidation without any effusion  · CT abdomen pelvis from 12/5/2022 showing bilateral pleural effusion  · Currently on Lasix 40 mg IV every 12 hours with albumin    Patient possibly to be transitioned Lasix 40 mg p o  twice daily

## 2022-12-10 NOTE — ASSESSMENT & PLAN NOTE
Prolonged discussion with patient's both daughters and son along with case management together with nephrology  Discussed the overall prognosis regarding worsening kidney function with persistent fluid overload, esophageal dysmotility and overall deconditioning- on 12/8/22  Patient to be discharged on comfort care to assisted living facility

## 2022-12-10 NOTE — [place] UNIVERSAL TRANSFER FORM
[place] UNIVERSAL TRANSFER FORM  (ALL ITEMS MUST BE COMPLETED)    1  TRANSFER FROM: Lara Rogel TO: Dk Topete     2  DATE OF TRANSFER: 12/10/2022                        TIME OF TRANSFER: 2:45    3  PATIENT NAME: Russ Christensen,        YOB: 1929                             GENDER: male    4  LANGUAGE:   English    5  PHYSICIAN NAME:  Gerardo Calderon MD                   PHONE: [phone]    6  CODE STATUS: Level 3 - DNAR and DNI        Out of Hospital DNR Attached: No    7  :                                      :  Extended Emergency Contact Information  Primary Emergency Contact: Nba Elizabeth  Mobile Phone: [phone]  Relation: Daughter  Secondary Emergency Contact: Shantal Weaver  Mobile Phone: [phone]  Relation: [address] Representative/Proxy:  No           Legal Guardian:  No             NAME OF:           HEALTH CARE REPRESENTATIVE/PROXY:                                         OR           LEGAL GUARDIAN, IF NOT :                                               PHONE:  (Day)           (Night)                        (Cell)    8  REASON FOR TRANSFER: (Must include brief medical history and recent changes in physical function or cognition ) LTR            V/S: /51   Pulse 63   Temp 98 7 °F (37 1 °C)   Resp 18   Ht 5' 5" (1 651 m)   Wt 83 5 kg (184 lb)   SpO2 94%   BMI 30 62 kg/m²           PAIN: None    9  PRIMARY DIAGNOSIS: Acute respiratory failure with hypoxia (HCC)      Secondary Diagnosis:         Pacemaker: No      Internal Defib: No          Mental Health Diagnosis (if Applicable):    10  RESTRAINTS: No     11  RESPIRATORY NEEDS: None    12  ISOLATION/PRECAUTION: None    13  ALLERGY: Patient has no known allergies  14  SENSORY:       Vision Good    15  SKIN CONDITION: No Wounds    16  DIET: Regular    17  IV ACCESS: None    18   PERSONAL ITEMS SENT WITH PATIENT: None    19  ATTACHED DOCUMENTS: MUST ATTACH CURRENT MEDICATION INFORMATION Face Sheet, MAR and Medication Reconciliation    20  AT RISK ALERTS:Pressure Ulcer        HARM TO: N/A    21  WEIGHT BEARING STATUS:         Left Leg: None        Right Leg: None    22  MENTAL STATUS:Disoriented    23  FUNCTION:        Walk: With Help        Transfer: With Help        Toilet: With Help        Feed: With Help    24  IMMUNIZATIONS/SCREENING:     There is no immunization history on file for this patient  25  BOWEL: Incontinent     26  BLADDER: Incontinent    27   SENDING FACILITY CONTACT: Manuel Buck                   Title: RN         [facility]         Phone: [phone]          [address] (if known):        Title:        Unit:         Phone:         FORM PREFILLED BY (if applicable)       Title:       Unit:        Phone:         Augustin Phillips RN      Title: VERONIQUE      Phone: [phone]

## 2022-12-10 NOTE — ASSESSMENT & PLAN NOTE
Patient continues to have intermittent fevers with persistent elevation of white count despite receiving 14 days of antibiotics  Darrel Diaz was treated with Rocephin and Flagyl during prior hospitalization and also received cefepime for 7 days during this hospitalization  ID Input appreciated    Monitor off antibiotics  ReCurrent SIRS likely secondary to aspiration pneumonitis  White count is improving and fever curve improving

## 2022-12-10 NOTE — ASSESSMENT & PLAN NOTE
Wt Readings from Last 3 Encounters:   12/10/22 83 5 kg (184 lb)   11/22/22 84 5 kg (186 lb 4 8 oz)     Patient received IV Lasix in recent admission  · Chest x-ray upon admission shows worsening pulmonary edema  · Received 40 milligrams of Lasix IV in the ED and was continued on IV Lasix  Then transitioned to [facility] which is currently on hold  · Left upper extremity venous Doppler showed no evidence of DVT  · CT chest upon admission showed pulmonary edema with moderate size bilateral pleural effusions left more than right  · Might need accept higher creatinine to maintain euvolemia    · Diuretics have been on hold in setting of elevated creatinine  · Chest x-ray from 12/5/2022 showed persistent extensive bilateral consolidation without any pleural effusion or pneumothorax  · CT abdomen pelvis showed bilateral pleural effusions  · Started back on Lasix 40 mg IV every 12 hours along with albumin 12 5 g every 12 hours  · Continue Lasix 40 mg p o  twice daily for comfort

## 2022-12-10 NOTE — CASE MANAGEMENT
Case Management Discharge Planning Note    Patient name Chitra Lemos  Location [address] [address] [address] MRN [id number]  : 10/19/1929 Date 12/10/2022       Current Admission Date: 2022  Current Admission Diagnosis:Acute respiratory failure with hypoxia [facility])   Patient Active Problem List    Diagnosis Date Noted   • Goals of care, counseling/discussion 2022   • Anemia 2022   • SIRS (systemic inflammatory response syndrome) ([address] ) 2022   • Constipation 2022   • Left upper quadrant abdominal mass 2022   • Chronic kidney disease    • Acute respiratory failure with hypoxia ([address] ) 2022   • Acute on chronic diastolic congestive heart failure ([address] ) 2022   • BPH (benign prostatic hyperplasia) 2022   • Acute diastolic (congestive) heart failure ([address] ) 2022   • PVCs (premature ventricular contractions) 2022   • Hyperlipidemia 2022   • Primary hypertension 11/15/2022   • Aspiration pneumonia ([address] ) 11/15/2022   • CHF (congestive heart failure) ([address] ) 11/15/2022   • Acute kidney injury superimposed on CKD ([address] ) 11/15/2022   • Type 2 diabetes mellitus ([address] ) 11/15/2022   • Esophageal dysphagia 11/15/2022   • Elevated troponin 11/15/2022      LOS (days): 18  Geometric Mean LOS (GMLOS) (days): 5 20  Days to GMLOS:-12 2     OBJECTIVE:  Risk of Unplanned Readmission Score: 30 6     Current admission status: Inpatient   Preferred Pharmacy:   [facility] [address] [facility] [place] - [address]  Phone: [phone] Fax: [phone]    [facility]   [address] [facility] [address]  [facility]  [facility] [address]  Phone: [phone] Fax: [phone]    Primary Care Provider: Annabel Rosas PA-C    Primary Insurance: MEDICARE  Secondary Insurance: [employer] BC BS OF [place]    DISCHARGE DETAILS:    Discharge planning discussed with[de-identified] Darian Qureshi, on-call nurse at [facility], and daughter, Alana Settler of Choice: Yes  Comments - Freedom of Choice: SW following to assist with DCP  Transfer back to [facility] with [facility] services is planned for today  [facility] who stated DME has not been delivered yet  SW spoke with Harvey Castro, on-call nurse at [facility], to inquire about delivery  Harvey Catsro spoke with equipment company who stated DME will not be delivered until later today, between 3:00pm-5:00pm   Discussed changing transport time with Harvey Castro  Per [facility] services will be initiated this evening when pt returns to facility  SW also spoke with Hellen Gonzales from [facility] to confirm plans  SW changed  time request in Roundtrip to 5:00 pm   SW spoke with daughter over phone to discuss change in discharge timeframe    CM contacted family/caregiver?: Yes  Were Treatment Team discharge recommendations reviewed with patient/caregiver?: Yes  Did patient/caregiver verbalize understanding of patient care needs?: Yes  Were patient/caregiver advised of the risks associated with not following Treatment Team discharge recommendations?: Yes    Contacts  Patient Contacts: Pia Dials (dtr)  Relationship to Patient[de-identified] Family  Contact Method: Phone  Phone Number: [phone]  Reason/Outcome: Discharge Planning, Continuity of Care    Other Referral/Resources/Interventions Provided:  Interventions: Facility Return, Hospice  Referral Comments: See above    Treatment Team Recommendation: Facility Return, Hospice  Discharge Destination Plan[de-identified] Facility Return, Hospice  Transport at Discharge : BLS Ambulance  Dispatcher Contacted: Yes  Number/Name of Dispatcher: Roundtrip  Transported by [employer] and Unit #): Christelle  ETA of Transport (Date): 12/10/22  ETA of Transport (Time): 1700 (requested)

## 2022-12-10 NOTE — PLAN OF CARE
Problem: RESPIRATORY - ADULT  Goal: Achieves optimal ventilation and oxygenation  Description: INTERVENTIONS:  - Assess for changes in respiratory status  - Assess for changes in mentation and behavior  - Position to facilitate oxygenation and minimize respiratory effort  - Oxygen administered by appropriate delivery if ordered  - Initiate smoking cessation education as indicated  - Encourage broncho-pulmonary hygiene including cough, deep breathe, Incentive Spirometry  - Assess the need for suctioning and aspirate as needed  - Assess and instruct to report SOB or any respiratory difficulty  - Respiratory Therapy support as indicated  Outcome: Progressing     Problem: Prexisting or High Potential for Compromised Skin Integrity  Goal: Skin integrity is maintained or improved  Description: INTERVENTIONS:  - Identify patients at risk for skin breakdown  - Assess and monitor skin integrity  - Assess and monitor nutrition and hydration status  - Monitor labs   - Assess for incontinence   - Turn and reposition patient  - Assist with mobility/ambulation  - Relieve pressure over bony prominences  - Avoid friction and shearing  - Provide appropriate hygiene as needed including keeping skin clean and dry  - Evaluate need for skin moisturizer/barrier cream  - Collaborate with interdisciplinary team   - Patient/family teaching  - Consider wound care consult   Outcome: Progressing     Problem: MOBILITY - ADULT  Goal: Maintain or return to baseline ADL function  Description: INTERVENTIONS:  -  Assess patient's ability to carry out ADLs; assess patient's baseline for ADL function and identify physical deficits which impact ability to perform ADLs (bathing, care of mouth/teeth, toileting, grooming, dressing, etc )  - Assess/evaluate cause of self-care deficits   - Assess range of motion  - Assess patient's mobility; develop plan if impaired  - Assess patient's need for assistive devices and provide as appropriate  - Encourage maximum independence but intervene and supervise when necessary  - Involve family in performance of ADLs  - Assess for home care needs following discharge   - Consider OT consult to assist with ADL evaluation and planning for discharge  - Provide patient education as appropriate  Outcome: Progressing  Goal: Maintains/Returns to pre admission functional level  Description: INTERVENTIONS:  - Perform BMAT or MOVE assessment daily    - Set and communicate daily mobility goal to care team and patient/family/caregiver  - Collaborate with rehabilitation services on mobility goals if consulted  - Perform Range of Motion 2 times a day  - Reposition patient every 3 hours    - Dangle patient 3 times a day  - Stand patient 2 times a day  - Ambulate patient 3 times a day  - Out of bed to chair 3 times a day   - Out of bed for meals 3 times a day  - Out of bed for toileting  - Record patient progress and toleration of activity level   Outcome: Progressing     Problem: Potential for Falls  Goal: Patient will remain free of falls  Description: INTERVENTIONS:  - Educate patient/family on patient safety including physical limitations  - Instruct patient to call for assistance with activity   - Consult OT/PT to assist with strengthening/mobility   - Keep Call bell within reach  - Keep bed low and locked with side rails adjusted as appropriate  - Keep care items and personal belongings within reach  - Initiate and maintain comfort rounds  - Make Fall Risk Sign visible to staff  - Offer Toileting every 2 Hours, in advance of need  - Initiate/Maintain bed alarm  - Obtain necessary fall risk management equipment: bed alarm, yellow socks   - Apply yellow socks and bracelet for high fall risk patients  - Consider moving patient to room near nurses station  Outcome: Progressing     Problem: PAIN - ADULT  Goal: Verbalizes/displays adequate comfort level or baseline comfort level  Description: Interventions:  - Encourage patient to monitor pain and request assistance  - Assess pain using appropriate pain scale  - Administer analgesics based on type and severity of pain and evaluate response  - Implement non-pharmacological measures as appropriate and evaluate response  - Consider cultural and social influences on pain and pain management  - Notify physician/advanced practitioner if interventions unsuccessful or patient reports new pain  Outcome: Progressing     Problem: INFECTION - ADULT  Goal: Absence or prevention of progression during hospitalization  Description: INTERVENTIONS:  - Assess and monitor for signs and symptoms of infection  - Monitor lab/diagnostic results  - Monitor all insertion sites, i e  indwelling lines, tubes, and drains  - Monitor endotracheal if appropriate and nasal secretions for changes in amount and color  - Clare appropriate cooling/warming therapies per order  - Administer medications as ordered  - Instruct and encourage patient and family to use good hand hygiene technique  - Identify and instruct in appropriate isolation precautions for identified infection/condition  Outcome: Progressing  Goal: Absence of fever/infection during neutropenic period  Description: INTERVENTIONS:  - Monitor WBC    Outcome: Progressing     Problem: SAFETY ADULT  Goal: Maintain or return to baseline ADL function  Description: INTERVENTIONS:  -  Assess patient's ability to carry out ADLs; assess patient's baseline for ADL function and identify physical deficits which impact ability to perform ADLs (bathing, care of mouth/teeth, toileting, grooming, dressing, etc )  - Assess/evaluate cause of self-care deficits   - Assess range of motion  - Assess patient's mobility; develop plan if impaired  - Assess patient's need for assistive devices and provide as appropriate  - Encourage maximum independence but intervene and supervise when necessary  - Involve family in performance of ADLs  - Assess for home care needs following discharge   - Consider OT consult to assist with ADL evaluation and planning for discharge  - Provide patient education as appropriate  Outcome: Progressing  Goal: Maintains/Returns to pre admission functional level  Description: INTERVENTIONS:  - Perform BMAT or MOVE assessment daily    - Set and communicate daily mobility goal to care team and patient/family/caregiver  - Collaborate with rehabilitation services on mobility goals if consulted  - Perform Range of Motion 4 times a day  - Reposition patient every 2 hours    - Dangle patient 3 times a day  - Stand patient 3 times a day  - Ambulate patient 3 times a day  - Out of bed to chair 3 times a day   - Out of bed for meals 3 times a day  - Out of bed for toileting  - Record patient progress and toleration of activity level   Outcome: Progressing  Goal: Patient will remain free of falls  Description: INTERVENTIONS:  - Educate patient/family on patient safety including physical limitations  - Instruct patient to call for assistance with activity   - Consult OT/PT to assist with strengthening/mobility   - Keep Call bell within reach  - Keep bed low and locked with side rails adjusted as appropriate  - Keep care items and personal belongings within reach  - Initiate and maintain comfort rounds  - Make Fall Risk Sign visible to staff  - Offer Toileting every 2 Hours, in advance of need  - Initiate/Maintain bed alarm  - Obtain necessary fall risk management equipment: bed alarm, yellow socks   - Apply yellow socks and bracelet for high fall risk patients  - Consider moving patient to room near nurses station  Outcome: Progressing     Problem: DISCHARGE PLANNING  Goal: Discharge to home or other facility with appropriate resources  Description: INTERVENTIONS:  - Identify barriers to discharge w/patient and caregiver  - Arrange for needed discharge resources and transportation as appropriate  - Identify discharge learning needs (meds, wound care, etc )  - Arrange for interpretive services to assist at discharge as needed  - Refer to Case Management Department for coordinating discharge planning if the patient needs post-hospital services based on physician/advanced practitioner order or complex needs related to functional status, cognitive ability, or social support system  Outcome: Progressing     Problem: Knowledge Deficit  Goal: Patient/family/caregiver demonstrates understanding of disease process, treatment plan, medications, and discharge instructions  Description: Complete learning assessment and assess knowledge base  Interventions:  - Provide teaching at level of understanding  - Provide teaching via preferred learning methods  Outcome: Progressing     Problem: Nutrition/Hydration-ADULT  Goal: Nutrient/Hydration intake appropriate for improving, restoring or maintaining nutritional needs  Description: Monitor and assess patient's nutrition/hydration status for malnutrition  Collaborate with interdisciplinary team and initiate plan and interventions as ordered  Monitor patient's weight and dietary intake as ordered or per policy  Utilize nutrition screening tool and intervene as necessary  Determine patient's food preferences and provide high-protein, high-caloric foods as appropriate       INTERVENTIONS:  - Monitor oral intake, urinary output, labs, and treatment plans  - Assess nutrition and hydration status and recommend course of action  - Evaluate amount of meals eaten  - Assist patient with eating if necessary   - Allow adequate time for meals  - Recommend/ encourage appropriate diets, oral nutritional supplements, and vitamin/mineral supplements  - Order, calculate, and assess calorie counts as needed  - Assess need for intravenous fluids  - Provide nutrition/hydration education as appropriate  - Include patient/family/caregiver in decisions related to nutrition  Outcome: Progressing

## 2022-12-10 NOTE — [place] UNIVERSAL TRANSFER FORM
[place] UNIVERSAL [place]ER FORM  (ALL ITEMS MUST BE COMPLETED)    1  TRANSFER FROM: Lara Rogel TO: ***    2  DATE OF TRANSFER: 12/10/2022                        TIME OF TRANSFER: ***    3  PATIENT NAME: Cindy Reynaga,        YOB: 1929                             GENDER: male    4  LANGUAGE:   English    5  PHYSICIAN NAME:  Drew Petty MD                   PHONE: [phone]    6  CODE STATUS: Level 3 - DNAR and DNI        Out of Hospital DNR Attached: {Yes/No:20651}    7  :                                      :  Extended Emergency Contact Information  Primary Emergency Contact: Nba Elizabeth  Mobile Phone: [phone]  Relation: Daughter  Secondary Emergency Contact: Khloe Vazquez  Mobile Phone: [phone]  Relation: [id number] Representative/Proxy:  {Yes/No:20651}           Legal Guardian:  {Yes/No:20651}             NAME OF:           HEALTH CARE REPRESENTATIVE/PROXY:                                         OR           LEGAL GUARDIAN, IF NOT :                                               PHONE:  (Day)           (Night)                        (Cell)    8  REASON FOR TRANSFER: (Must include brief medical history and recent changes in physical function or cognition ) ***            V/S: /51   Pulse 63   Temp 98 7 °F (37 1 °C)   Resp 18   Ht 5' 5" (1 651 m)   Wt 83 5 kg (184 lb)   SpO2 94%   BMI 30 62 kg/m²           PAIN: {Pain:20652}    9  PRIMARY DIAGNOSIS: Acute respiratory failure with hypoxia (HCC)      Secondary Diagnosis:         Pacemaker: {Yes/No:20651}      Internal Defib: {Yes/No:20651}          Mental Health Diagnosis (if Applicable):    10  RESTRAINTS: {Restraints:20654}     11  RESPIRATORY NEEDS: {Respiratory Needs:20655}    12  ISOLATION/PRECAUTION: {Isolation Precautions:20657}    13  ALLERGY: Patient has no known allergies      14  SENSORY:       {Sensory:20658}    15  SKIN CONDITION: {Skin Cond:70022}    16  DIET: {Diet:20783}    17  IV ACCESS: {IV Access:20887}    18  PERSONAL ITEMS SENT WITH PATIENT: {Personal Items:20888}    19  ATTACHED DOCUMENTS: MUST ATTACH CURRENT MEDICATION INFORMATION {Attached Documents:20891}    20  AT RISK ALERTS:{Risks:20892}        HARM TO: {Harm To:20893}    21  WEIGHT BEARING STATUS:         Left Leg: {None/Limited/Full:20896}        Right Leg: {None/Limited/Full:20896}    22  MENTAL STATUS:{Mental Status:20897}    23  FUNCTION:        Walk: {Self/With Help/Not Able:20899}        Transfer: {Self/With Help/Not Able:20899}        Toilet: {Self/With Help/Not Able:20899}        Feed: {Self/With Help/Not Able:20899}    24  IMMUNIZATIONS/SCREENING:     There is no immunization history on file for this patient  25  BOWEL: {Bowel:20900}    26  BLADDER: {Bladder:20901}    27   SENDING FACILITY CONTACT: ***                  Title: ***        Unit: ***        Phone: ***          REC'G FACILITY CONTACT (if known):        Title:        Unit:         Phone:         FORM PREFILLED BY (if applicable)       Title:       Unit:        Phone:         Abby Majano RN      Title: ***      Phone: ***

## 2022-12-10 NOTE — ASSESSMENT & PLAN NOTE
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

## 2022-12-10 NOTE — NURSING NOTE
Discharge instructions reviewed with Juarez Draper at [facility]  No questions at this time  IV was removed  Patient left stable

## 2024-02-20 NOTE — ASSESSMENT & PLAN NOTE
Troponin [id number]  Elevated troponin in recent admission as well  Reports chest pressure when in respiratory distress  Denies history of CAD  · EKG no ischemic changes, read as AFib, rate 103, RBBB per prior provider  · No history of AFib  Prior EKG shows sinus rhythm with PACs /PVCs  repeat EKG showed sinus rhythm  · Recent 2D echo showed EF low normal, normal wall motion, grade 2 diastolic dysfunction, moderately dilated atriums  · Most likely non MI troponin elevation due to # 1 and CHF  Statement Selected

## 2024-10-06 NOTE — ASSESSMENT & PLAN NOTE
Bed: TR 1  Expected date:   Expected time:   Means of arrival:   Comments:   Wt Readings from Last 3 Encounters:   11/19/22 82 2 kg (181 lb 3 2 oz)     Patient appeared to be volume overloaded, pitting edema noted bilateral lower extremities  BNP elevated 9359  Given lasix on admission with good diuresis      · Continue lasix 40mg IVP daily   · Cardiology consultation appreciated  · Echo done  · Daily Weight  · Intake and output  · Low sodium diet